# Patient Record
Sex: MALE | Race: WHITE | NOT HISPANIC OR LATINO | Employment: UNEMPLOYED | URBAN - METROPOLITAN AREA
[De-identification: names, ages, dates, MRNs, and addresses within clinical notes are randomized per-mention and may not be internally consistent; named-entity substitution may affect disease eponyms.]

---

## 2021-04-14 DIAGNOSIS — Z23 ENCOUNTER FOR IMMUNIZATION: ICD-10-CM

## 2023-07-17 ENCOUNTER — APPOINTMENT (EMERGENCY)
Dept: RADIOLOGY | Facility: HOSPITAL | Age: 68
DRG: 871 | End: 2023-07-17
Payer: COMMERCIAL

## 2023-07-17 ENCOUNTER — APPOINTMENT (EMERGENCY)
Dept: CT IMAGING | Facility: HOSPITAL | Age: 68
DRG: 871 | End: 2023-07-17
Payer: COMMERCIAL

## 2023-07-17 ENCOUNTER — HOSPITAL ENCOUNTER (INPATIENT)
Facility: HOSPITAL | Age: 68
LOS: 10 days | Discharge: NURSING, REHAB OR LONG-TERM CARE | DRG: 871 | End: 2023-07-27
Attending: EMERGENCY MEDICINE | Admitting: INTERNAL MEDICINE
Payer: COMMERCIAL

## 2023-07-17 DIAGNOSIS — J81.1 PULMONARY EDEMA: ICD-10-CM

## 2023-07-17 DIAGNOSIS — R60.1 ANASARCA: ICD-10-CM

## 2023-07-17 DIAGNOSIS — L30.9 DERMATITIS: ICD-10-CM

## 2023-07-17 DIAGNOSIS — R41.82 ALTERED MENTAL STATUS: Primary | ICD-10-CM

## 2023-07-17 DIAGNOSIS — J18.9 PNEUMONIA: ICD-10-CM

## 2023-07-17 PROBLEM — E11.9 TYPE 2 DIABETES MELLITUS (HCC): Status: ACTIVE | Noted: 2023-07-17

## 2023-07-17 PROBLEM — E66.9 OBESITY: Status: ACTIVE | Noted: 2023-07-17

## 2023-07-17 PROBLEM — I89.0 LYMPHEDEMA: Status: ACTIVE | Noted: 2023-07-17

## 2023-07-17 PROBLEM — J96.22 ACUTE ON CHRONIC RESPIRATORY FAILURE WITH HYPOXIA AND HYPERCAPNIA (HCC): Status: ACTIVE | Noted: 2023-07-17

## 2023-07-17 PROBLEM — J96.21 ACUTE ON CHRONIC RESPIRATORY FAILURE WITH HYPOXIA AND HYPERCAPNIA (HCC): Status: ACTIVE | Noted: 2023-07-17

## 2023-07-17 PROBLEM — R79.89 ELEVATED TROPONIN: Status: ACTIVE | Noted: 2023-07-17

## 2023-07-17 PROBLEM — R77.8 ELEVATED TROPONIN: Status: ACTIVE | Noted: 2023-07-17

## 2023-07-17 PROBLEM — G93.40 ENCEPHALOPATHY ACUTE: Status: ACTIVE | Noted: 2023-07-17

## 2023-07-17 PROBLEM — I10 HYPERTENSION: Status: ACTIVE | Noted: 2023-07-17

## 2023-07-17 PROBLEM — A41.9 SEPSIS (HCC): Status: ACTIVE | Noted: 2023-07-17

## 2023-07-17 PROBLEM — J96.92 RESPIRATORY FAILURE WITH HYPERCAPNIA (HCC): Status: ACTIVE | Noted: 2023-07-17

## 2023-07-17 PROBLEM — N39.0 URINARY TRACT INFECTION: Status: ACTIVE | Noted: 2023-07-17

## 2023-07-17 LAB
4HR DELTA HS TROPONIN: 14 NG/L
ALBUMIN SERPL BCP-MCNC: 3.3 G/DL (ref 3.5–5)
ALP SERPL-CCNC: 118 U/L (ref 34–104)
ALT SERPL W P-5'-P-CCNC: 26 U/L (ref 7–52)
AMMONIA PLAS-SCNC: 38 UMOL/L (ref 18–72)
ANION GAP SERPL CALCULATED.3IONS-SCNC: 6 MMOL/L
APTT PPP: 30 SECONDS (ref 23–37)
AST SERPL W P-5'-P-CCNC: 29 U/L (ref 13–39)
ATRIAL RATE: 86 BPM
BACTERIA UR QL AUTO: ABNORMAL /HPF
BASE EXCESS BLDA CALC-SCNC: 7 MMOL/L (ref -2–3)
BASOPHILS # BLD MANUAL: 0 THOUSAND/UL (ref 0–0.1)
BASOPHILS NFR MAR MANUAL: 0 % (ref 0–1)
BILIRUB SERPL-MCNC: 0.74 MG/DL (ref 0.2–1)
BILIRUB UR QL STRIP: NEGATIVE
BNP SERPL-MCNC: 402 PG/ML (ref 0–100)
BUN SERPL-MCNC: 34 MG/DL (ref 5–25)
CA-I BLD-SCNC: 1.37 MMOL/L (ref 1.12–1.32)
CALCIUM ALBUM COR SERPL-MCNC: 10.7 MG/DL (ref 8.3–10.1)
CALCIUM SERPL-MCNC: 10.1 MG/DL (ref 8.4–10.2)
CARDIAC TROPONIN I PNL SERPL HS: 177 NG/L
CARDIAC TROPONIN I PNL SERPL HS: 191 NG/L
CHLORIDE SERPL-SCNC: 92 MMOL/L (ref 96–108)
CK SERPL-CCNC: 53 U/L (ref 39–308)
CLARITY UR: CLEAR
CO2 SERPL-SCNC: 35 MMOL/L (ref 21–32)
COLOR UR: YELLOW
CREAT SERPL-MCNC: 1.01 MG/DL (ref 0.6–1.3)
EOSINOPHIL # BLD MANUAL: 0 THOUSAND/UL (ref 0–0.4)
EOSINOPHIL NFR BLD MANUAL: 0 % (ref 0–6)
ERYTHROCYTE [DISTWIDTH] IN BLOOD BY AUTOMATED COUNT: 12.3 % (ref 11.6–15.1)
FLUAV RNA RESP QL NAA+PROBE: NEGATIVE
FLUBV RNA RESP QL NAA+PROBE: NEGATIVE
GFR SERPL CREATININE-BSD FRML MDRD: 75 ML/MIN/1.73SQ M
GLUCOSE SERPL-MCNC: 164 MG/DL (ref 65–140)
GLUCOSE SERPL-MCNC: 174 MG/DL (ref 65–140)
GLUCOSE UR STRIP-MCNC: NEGATIVE MG/DL
HCO3 BLDA-SCNC: 36.6 MMOL/L (ref 24–30)
HCT VFR BLD AUTO: 46.9 % (ref 36.5–49.3)
HCT VFR BLD CALC: 38 % (ref 36.5–49.3)
HGB BLD-MCNC: 15 G/DL (ref 12–17)
HGB BLDA-MCNC: 12.9 G/DL (ref 12–17)
HGB UR QL STRIP.AUTO: ABNORMAL
HYALINE CASTS #/AREA URNS LPF: ABNORMAL /LPF
INR PPP: 1.01 (ref 0.84–1.19)
KETONES UR STRIP-MCNC: NEGATIVE MG/DL
L PNEUMO1 AG UR QL IA.RAPID: NEGATIVE
LACTATE SERPL-SCNC: 1.4 MMOL/L (ref 0.5–2)
LEUKOCYTE ESTERASE UR QL STRIP: NEGATIVE
LYMPHOCYTES # BLD AUTO: 0.93 THOUSAND/UL (ref 0.6–4.47)
LYMPHOCYTES # BLD AUTO: 6 % (ref 14–44)
MCH RBC QN AUTO: 31.3 PG (ref 26.8–34.3)
MCHC RBC AUTO-ENTMCNC: 32 G/DL (ref 31.4–37.4)
MCV RBC AUTO: 98 FL (ref 82–98)
METAMYELOCYTES NFR BLD MANUAL: 2 % (ref 0–1)
MONOCYTES # BLD AUTO: 0.47 THOUSAND/UL (ref 0–1.22)
MONOCYTES NFR BLD: 3 % (ref 4–12)
MUCOUS THREADS UR QL AUTO: ABNORMAL
MYELOCYTES NFR BLD MANUAL: 1 % (ref 0–1)
NEUTROPHILS # BLD MANUAL: 13.67 THOUSAND/UL (ref 1.85–7.62)
NEUTS BAND NFR BLD MANUAL: 2 % (ref 0–8)
NEUTS SEG NFR BLD AUTO: 86 % (ref 43–75)
NITRITE UR QL STRIP: NEGATIVE
NON-SQ EPI CELLS URNS QL MICRO: ABNORMAL /HPF
P AXIS: 50 DEGREES
PCO2 BLD: 39 MMOL/L (ref 21–32)
PCO2 BLD: 79.9 MM HG (ref 42–50)
PH BLD: 7.27 [PH] (ref 7.3–7.4)
PH UR STRIP.AUTO: 5.5 [PH]
PLATELET # BLD AUTO: 508 THOUSANDS/UL (ref 149–390)
PLATELET BLD QL SMEAR: ABNORMAL
PMV BLD AUTO: 9.3 FL (ref 8.9–12.7)
PO2 BLD: 198 MM HG (ref 35–45)
POLYCHROMASIA BLD QL SMEAR: PRESENT
POTASSIUM BLD-SCNC: 4.3 MMOL/L (ref 3.5–5.3)
POTASSIUM SERPL-SCNC: 4.5 MMOL/L (ref 3.5–5.3)
PR INTERVAL: 200 MS
PROCALCITONIN SERPL-MCNC: 0.29 NG/ML
PROT SERPL-MCNC: 7.7 G/DL (ref 6.4–8.4)
PROT UR STRIP-MCNC: ABNORMAL MG/DL
PROTHROMBIN TIME: 14 SECONDS (ref 11.6–14.5)
QRS AXIS: 115 DEGREES
QRSD INTERVAL: 112 MS
QT INTERVAL: 368 MS
QTC INTERVAL: 440 MS
RBC # BLD AUTO: 4.8 MILLION/UL (ref 3.88–5.62)
RBC #/AREA URNS AUTO: ABNORMAL /HPF
RSV RNA RESP QL NAA+PROBE: NEGATIVE
S PNEUM AG UR QL: NEGATIVE
SAO2 % BLD FROM PO2: 99 % (ref 60–85)
SARS-COV-2 RNA RESP QL NAA+PROBE: NEGATIVE
SODIUM BLD-SCNC: 132 MMOL/L (ref 136–145)
SODIUM SERPL-SCNC: 133 MMOL/L (ref 135–147)
SP GR UR STRIP.AUTO: >=1.03 (ref 1–1.03)
SPECIMEN SOURCE: ABNORMAL
T WAVE AXIS: 57 DEGREES
UROBILINOGEN UR STRIP-ACNC: 2 MG/DL
VENTRICULAR RATE: 86 BPM
WBC # BLD AUTO: 15.53 THOUSAND/UL (ref 4.31–10.16)
WBC #/AREA URNS AUTO: ABNORMAL /HPF

## 2023-07-17 PROCEDURE — 85014 HEMATOCRIT: CPT

## 2023-07-17 PROCEDURE — 93010 ELECTROCARDIOGRAM REPORT: CPT | Performed by: INTERNAL MEDICINE

## 2023-07-17 PROCEDURE — 82330 ASSAY OF CALCIUM: CPT

## 2023-07-17 PROCEDURE — 99291 CRITICAL CARE FIRST HOUR: CPT

## 2023-07-17 PROCEDURE — 74177 CT ABD & PELVIS W/CONTRAST: CPT

## 2023-07-17 PROCEDURE — 99291 CRITICAL CARE FIRST HOUR: CPT | Performed by: INTERNAL MEDICINE

## 2023-07-17 PROCEDURE — 94760 N-INVAS EAR/PLS OXIMETRY 1: CPT

## 2023-07-17 PROCEDURE — 84145 PROCALCITONIN (PCT): CPT | Performed by: EMERGENCY MEDICINE

## 2023-07-17 PROCEDURE — 85027 COMPLETE CBC AUTOMATED: CPT | Performed by: EMERGENCY MEDICINE

## 2023-07-17 PROCEDURE — 83036 HEMOGLOBIN GLYCOSYLATED A1C: CPT

## 2023-07-17 PROCEDURE — 72125 CT NECK SPINE W/O DYE: CPT

## 2023-07-17 PROCEDURE — 85007 BL SMEAR W/DIFF WBC COUNT: CPT | Performed by: EMERGENCY MEDICINE

## 2023-07-17 PROCEDURE — 80053 COMPREHEN METABOLIC PANEL: CPT | Performed by: EMERGENCY MEDICINE

## 2023-07-17 PROCEDURE — 99291 CRITICAL CARE FIRST HOUR: CPT | Performed by: EMERGENCY MEDICINE

## 2023-07-17 PROCEDURE — 94003 VENT MGMT INPAT SUBQ DAY: CPT

## 2023-07-17 PROCEDURE — 87449 NOS EACH ORGANISM AG IA: CPT | Performed by: INTERNAL MEDICINE

## 2023-07-17 PROCEDURE — 93005 ELECTROCARDIOGRAM TRACING: CPT

## 2023-07-17 PROCEDURE — 94762 N-INVAS EAR/PLS OXIMTRY CONT: CPT

## 2023-07-17 PROCEDURE — 82947 ASSAY GLUCOSE BLOOD QUANT: CPT

## 2023-07-17 PROCEDURE — 96375 TX/PRO/DX INJ NEW DRUG ADDON: CPT

## 2023-07-17 PROCEDURE — 96365 THER/PROPH/DIAG IV INF INIT: CPT

## 2023-07-17 PROCEDURE — 71260 CT THORAX DX C+: CPT

## 2023-07-17 PROCEDURE — 36600 WITHDRAWAL OF ARTERIAL BLOOD: CPT

## 2023-07-17 PROCEDURE — 85730 THROMBOPLASTIN TIME PARTIAL: CPT | Performed by: EMERGENCY MEDICINE

## 2023-07-17 PROCEDURE — 82550 ASSAY OF CK (CPK): CPT

## 2023-07-17 PROCEDURE — G0390 TRAUMA RESPONS W/HOSP CRITI: HCPCS

## 2023-07-17 PROCEDURE — 70450 CT HEAD/BRAIN W/O DYE: CPT

## 2023-07-17 PROCEDURE — 85610 PROTHROMBIN TIME: CPT | Performed by: EMERGENCY MEDICINE

## 2023-07-17 PROCEDURE — 71045 X-RAY EXAM CHEST 1 VIEW: CPT

## 2023-07-17 PROCEDURE — 82803 BLOOD GASES ANY COMBINATION: CPT

## 2023-07-17 PROCEDURE — 83605 ASSAY OF LACTIC ACID: CPT | Performed by: EMERGENCY MEDICINE

## 2023-07-17 PROCEDURE — 36415 COLL VENOUS BLD VENIPUNCTURE: CPT | Performed by: EMERGENCY MEDICINE

## 2023-07-17 PROCEDURE — 84132 ASSAY OF SERUM POTASSIUM: CPT

## 2023-07-17 PROCEDURE — 83880 ASSAY OF NATRIURETIC PEPTIDE: CPT | Performed by: EMERGENCY MEDICINE

## 2023-07-17 PROCEDURE — 0241U HB NFCT DS VIR RESP RNA 4 TRGT: CPT | Performed by: EMERGENCY MEDICINE

## 2023-07-17 PROCEDURE — 81001 URINALYSIS AUTO W/SCOPE: CPT | Performed by: EMERGENCY MEDICINE

## 2023-07-17 PROCEDURE — 82140 ASSAY OF AMMONIA: CPT

## 2023-07-17 PROCEDURE — 94002 VENT MGMT INPAT INIT DAY: CPT

## 2023-07-17 PROCEDURE — 87040 BLOOD CULTURE FOR BACTERIA: CPT | Performed by: EMERGENCY MEDICINE

## 2023-07-17 PROCEDURE — 84484 ASSAY OF TROPONIN QUANT: CPT | Performed by: EMERGENCY MEDICINE

## 2023-07-17 PROCEDURE — 84295 ASSAY OF SERUM SODIUM: CPT

## 2023-07-17 PROCEDURE — 87081 CULTURE SCREEN ONLY: CPT | Performed by: INTERNAL MEDICINE

## 2023-07-17 RX ORDER — LISINOPRIL 10 MG/1
10 TABLET ORAL DAILY
Status: CANCELLED | OUTPATIENT
Start: 2023-07-18

## 2023-07-17 RX ORDER — CEFTRIAXONE 1 G/50ML
1000 INJECTION, SOLUTION INTRAVENOUS ONCE
Status: COMPLETED | OUTPATIENT
Start: 2023-07-17 | End: 2023-07-17

## 2023-07-17 RX ORDER — CEFEPIME HYDROCHLORIDE 2 G/50ML
2000 INJECTION, SOLUTION INTRAVENOUS EVERY 8 HOURS
Status: COMPLETED | OUTPATIENT
Start: 2023-07-17 | End: 2023-07-23

## 2023-07-17 RX ORDER — HEPARIN SODIUM 5000 [USP'U]/ML
5000 INJECTION, SOLUTION INTRAVENOUS; SUBCUTANEOUS EVERY 8 HOURS SCHEDULED
Status: DISCONTINUED | OUTPATIENT
Start: 2023-07-17 | End: 2023-07-27 | Stop reason: HOSPADM

## 2023-07-17 RX ORDER — CEFDINIR 300 MG/1
300 CAPSULE ORAL EVERY 12 HOURS SCHEDULED
COMMUNITY
End: 2023-07-27

## 2023-07-17 RX ORDER — VANCOMYCIN HYDROCHLORIDE 1 G/200ML
9 INJECTION, SOLUTION INTRAVENOUS EVERY 12 HOURS
Status: DISCONTINUED | OUTPATIENT
Start: 2023-07-18 | End: 2023-07-19

## 2023-07-17 RX ORDER — CHLORHEXIDINE GLUCONATE 0.12 MG/ML
15 RINSE ORAL EVERY 12 HOURS SCHEDULED
Status: DISCONTINUED | OUTPATIENT
Start: 2023-07-17 | End: 2023-07-20

## 2023-07-17 RX ORDER — FERROUS SULFATE 325(65) MG
325 TABLET ORAL
Status: ON HOLD | COMMUNITY

## 2023-07-17 RX ORDER — HYDROCHLOROTHIAZIDE 12.5 MG/1
12.5 TABLET ORAL DAILY
Status: CANCELLED | OUTPATIENT
Start: 2023-07-18

## 2023-07-17 RX ORDER — NALOXONE HYDROCHLORIDE 1 MG/ML
2 INJECTION INTRAMUSCULAR; INTRAVENOUS; SUBCUTANEOUS ONCE
Status: COMPLETED | OUTPATIENT
Start: 2023-07-17 | End: 2023-07-17

## 2023-07-17 RX ORDER — FUROSEMIDE 10 MG/ML
40 INJECTION INTRAMUSCULAR; INTRAVENOUS DAILY
Status: DISCONTINUED | OUTPATIENT
Start: 2023-07-18 | End: 2023-07-23

## 2023-07-17 RX ORDER — NALOXONE HYDROCHLORIDE 1 MG/ML
INJECTION INTRAMUSCULAR; INTRAVENOUS; SUBCUTANEOUS
Status: COMPLETED
Start: 2023-07-17 | End: 2023-07-17

## 2023-07-17 RX ORDER — HYDROCHLOROTHIAZIDE 12.5 MG/1
12.5 TABLET ORAL DAILY
COMMUNITY
End: 2023-07-27

## 2023-07-17 RX ORDER — LISINOPRIL 10 MG/1
10 TABLET ORAL DAILY
Status: ON HOLD | COMMUNITY

## 2023-07-17 RX ORDER — FUROSEMIDE 10 MG/ML
40 INJECTION INTRAMUSCULAR; INTRAVENOUS ONCE
Status: COMPLETED | OUTPATIENT
Start: 2023-07-17 | End: 2023-07-17

## 2023-07-17 RX ADMIN — HEPARIN SODIUM 5000 UNITS: 5000 INJECTION INTRAVENOUS; SUBCUTANEOUS at 18:10

## 2023-07-17 RX ADMIN — FUROSEMIDE 40 MG: 10 INJECTION, SOLUTION INTRAVENOUS at 15:10

## 2023-07-17 RX ADMIN — IOHEXOL 100 ML: 350 INJECTION, SOLUTION INTRAVENOUS at 14:22

## 2023-07-17 RX ADMIN — AZITHROMYCIN MONOHYDRATE 500 MG: 500 INJECTION, POWDER, LYOPHILIZED, FOR SOLUTION INTRAVENOUS at 15:58

## 2023-07-17 RX ADMIN — CEFEPIME HYDROCHLORIDE 2000 MG: 2 INJECTION, SOLUTION INTRAVENOUS at 18:11

## 2023-07-17 RX ADMIN — SODIUM CHLORIDE 250 ML: 0.9 INJECTION, SOLUTION INTRAVENOUS at 15:19

## 2023-07-17 RX ADMIN — VANCOMYCIN HYDROCHLORIDE 2000 MG: 1 INJECTION, POWDER, LYOPHILIZED, FOR SOLUTION INTRAVENOUS at 19:32

## 2023-07-17 RX ADMIN — NALOXONE HYDROCHLORIDE 2 MG: 1 INJECTION INTRAMUSCULAR; INTRAVENOUS; SUBCUTANEOUS at 13:42

## 2023-07-17 RX ADMIN — CEFTRIAXONE 1000 MG: 1 INJECTION, SOLUTION INTRAVENOUS at 15:09

## 2023-07-17 RX ADMIN — NALOXONE HYDROCHLORIDE 2 MG: 1 INJECTION PARENTERAL at 13:42

## 2023-07-17 NOTE — ASSESSMENT & PLAN NOTE
· Brought to ED from [facility]F after being found by roommate on the floor and altered compared to baseline.   · Approximate downtime 1.5 hours  · CTH: no acute intracranial abnormality  · CT c-spine: no c-spine fracture or traumatic malalignment  · VBG pH 7.2/pCO2 79.9  · Received 2mg IV Narvcan in ED without improvement in mental status    Plan  · Q4H neuro checks  · Continuous BIPAP  · Check ammonia level

## 2023-07-17 NOTE — PLAN OF CARE
Problem: MOBILITY - ADULT  Goal: Maintain or return to baseline ADL function  Description: INTERVENTIONS:  -  Assess patient's ability to carry out ADLs; assess patient's baseline for ADL function and identify physical deficits which impact ability to perform ADLs (bathing, care of mouth/teeth, toileting, grooming, dressing, etc.)  - Assess/evaluate cause of self-care deficits   - Assess range of motion  - Assess patient's mobility; develop plan if impaired  - Assess patient's need for assistive devices and provide as appropriate  - Encourage maximum independence but intervene and supervise when necessary  - Involve family in performance of ADLs  - Assess for home care needs following discharge   - Consider OT consult to assist with ADL evaluation and planning for discharge  - Provide patient education as appropriate  Outcome: Progressing  Goal: Maintains/Returns to pre admission functional level  Description: INTERVENTIONS:  - Perform BMAT or MOVE assessment daily.   - Set and communicate daily mobility goal to care team and patient/family/caregiver. - Collaborate with rehabilitation services on mobility goals if consulted  - Perform Range of Motion 2 times a day. - Reposition patient every 2 hours.   -- Record patient progress and toleration of activity level   Outcome: Progressing     Problem: INFECTION - ADULT  Goal: Absence or prevention of progression during hospitalization  Description: INTERVENTIONS:  - Assess and monitor for signs and symptoms of infection  - Monitor lab/diagnostic results  - Monitor all insertion sites, i.e. indwelling lines, tubes, and drains  - Monitor endotracheal if appropriate and nasal secretions for changes in amount and color  - Reform appropriate cooling/warming therapies per order  - Administer medications as ordered  - Instruct and encourage patient and family to use good hand hygiene technique  - Identify and instruct in appropriate isolation precautions for identified infection/condition  Outcome: Progressing     Problem: DISCHARGE PLANNING  Goal: Discharge to home or other facility with appropriate resources  Description: INTERVENTIONS:  - Identify barriers to discharge w/patient and caregiver  - Arrange for needed discharge resources and transportation as appropriate  - Identify discharge learning needs (meds, wound care, etc.)  - Arrange for interpretive services to assist at discharge as needed  - Refer to Case Management Department for coordinating discharge planning if the patient needs post-hospital services based on physician/advanced practitioner order or complex needs related to functional status, cognitive ability, or social support system  Outcome: Progressing     Problem: Knowledge Deficit  Goal: Patient/family/caregiver demonstrates understanding of disease process, treatment plan, medications, and discharge instructions  Description: Complete learning assessment and assess knowledge base.   Interventions:  - Provide teaching at level of understanding  - Provide teaching via preferred learning methods  Outcome: Progressing     Problem: CARDIOVASCULAR - ADULT  Goal: Maintains optimal cardiac output and hemodynamic stability  Description: INTERVENTIONS:  - Monitor I/O, vital signs and rhythm  - Monitor for S/S and trends of decreased cardiac output  - Administer and titrate ordered vasoactive medications to optimize hemodynamic stability  - Assess quality of pulses, skin color and temperature  - Assess for signs of decreased coronary artery perfusion  - Instruct patient to report change in severity of symptoms  Outcome: Progressing  Goal: Absence of cardiac dysrhythmias or at baseline rhythm  Description: INTERVENTIONS:  - Continuous cardiac monitoring, vital signs, obtain 12 lead EKG if ordered  - Administer antiarrhythmic and heart rate control medications as ordered  - Monitor electrolytes and administer replacement therapy as ordered  Outcome: Progressing Problem: RESPIRATORY - ADULT  Goal: Achieves optimal ventilation and oxygenation  Description: INTERVENTIONS:  - Assess for changes in respiratory status  - Assess for changes in mentation and behavior  - Position to facilitate oxygenation and minimize respiratory effort  - Oxygen administered by appropriate delivery if ordered  - Initiate smoking cessation education as indicated  - Encourage broncho-pulmonary hygiene including cough, deep breathe, Incentive Spirometry  - Assess the need for suctioning and aspirate as needed  - Assess and instruct to report SOB or any respiratory difficulty  - Respiratory Therapy support as indicated  Outcome: Progressing

## 2023-07-17 NOTE — ASSESSMENT & PLAN NOTE
· SIRS: tachypnec, leukocytosis  · CT CAP: Right upper lobe groundglass opacity and right lower lobe consolidation along with bilateral effusions  · Per family, patient admitted at [facility] x1 week for UTI and DEANDRE, discharged 7/14 on 300mg Cefdinir BID.   · UA negative  · Received azithromycin and ceftriaxone in ED  · LA 1.4  · Procal 0.29    Plan  · Cefepime and Vancomycin  · Follow up blood cultures   · Trend WBC and fever curve

## 2023-07-17 NOTE — ASSESSMENT & PLAN NOTE
· Per family, patient admitted at [facility] x1 week for UTI and DEANDRE, discharged 7/14 on 300mg Cefdinir BID.   · UA negative    Plan  · IV Cefepime and Vanco to cover PNA and UTI

## 2023-07-17 NOTE — ASSESSMENT & PLAN NOTE
· Per family, patient admitted at [facility] x1 week for UTI and DEANDRE, discharged 7/14 on 300mg Cefdinir BID.   · UA negative    Plan  · IV Cefepime and Vanco to cover PNA and UTI

## 2023-07-17 NOTE — ASSESSMENT & PLAN NOTE
· Per chart patient wears 2L oxygen at SNF, however, family states the patient does not wear oxygen at home. Unsure if oxygen requirement is new since admission at [facility] last week.   · VBG pH 7.2/pCO2 79.9  · Placed on continuous BIPAP in ED    Plan  · Continue BIPAP  · Check ABG   · Titrate oxygen with SpO2 goal > 90%

## 2023-07-17 NOTE — ASSESSMENT & PLAN NOTE
· Per chart patient wears 2L oxygen at SNF, however, family states the patient does not wear oxygen at home. Unsure if oxygen requirement is new since admission at [facility] last week.   · VBG pH 7.2/pCO2 79.9  · Placed on continuous BIPAP in ED    Plan  · Continue BIPAP  · Check ABG   · Titrate oxygen with SpO2 goal > 90%

## 2023-07-17 NOTE — RESPIRATORY THERAPY NOTE
Respiratory Therapy Note. Pt returned to Bipap therapy at request of MD. RN notified. Pt must remain on therapy for now. RT will  Continue to monitor.  Pt pulled mask off earlier and was placed on NC 5L/M   07/17/23 1600   Non-Invasive Information   O2 Interface Device Face mask   Non-Invasive Ventilation Mode BiPAP   $ Continous NIV Subsequent   SpO2 95 %   Non-Invasive Settings   IPAP (cm) 22 cm   EPAP (cm) 6 cm   Rate (Set) 18   FiO2 (%) (S)  50   Pressure Support (cm H2O) 16   Rise Time 3   Inspiratory Time (Set) 0.9   Non-Invasive Readings   Skin Intervention Skin intact   Total Rate 18   MV (Mech) 14.6   Spontaneous Vt (mL) 811   Leak (lpm) 35   Non-Invasive Alarms   Insp Pressure High (cm H20) 30   Insp Pressure Low (cm H20) 5   MV Low (L/min) 4.5   Vt High (mL) 1500   Vt Low (mL) 300   High Resp Rate (BPM) 40 BPM   Low Resp Rate (BPM) 10 BPM   Apnea Interval (sec) 20   Apnea Pressure 20

## 2023-07-17 NOTE — ED PROVIDER NOTES
Emergency Department Trauma Note  Yordan Anderson 69 y.o. male MRN: [id number]  Unit/Bed#: /-01 Encounter: [id number]      Trauma Alert: Trauma Acuity: A  Model of Arrival: Mode of Arrival: ALS via Trauma Squad Name and Number: [id number]  Trauma Team: Current Providers  Attending Provider: Roni Metcalf MD  Attending Provider: Carlos Gamino DO  Attending Provider: Levi Christianson DO  Registered Nurse: Addie Stanton RN  Registered Nurse: Gifty Guillen RN  Consultants:     None      History of Present Illness     Chief Complaint:   Chief Complaint   Patient presents with   • Altered Mental Status     Since this AM, found down at [facility] 1 hours PTA     HPI:  Yordan Anderson is a 69 y.o. male who presents with confusion then found down today. Mechanism:Details of Incident: pt found by roommate at [facility] on the floor. according to staff, patient is altered compared to baseline. confusion started around breakfast this am Injury Date: 07/17/23 Injury Time: 1234      Hx from paramedics - 38-year-old male brought in by ambulance from local [facility] with concern for altered mental status with baseline confusion. Staff noted he was confused this morning at breakfast today but then he was found down next to his bed about an hour and a half ago by roommate. GCS was initially 10 by paramedics and he was chronically on 2 L oxygen but staff placed him on a nonrebreather. GCS 13 in ER. Breath sounds rales bilateral. No crepitus or chest wall tenderness with compression over the chest. No pain or instability with compression over the pelvis. We placed cervical collar on patient in ER. Review of Systems   Unable to perform ROS: Mental status change   All other systems reviewed and are negative. Historical Information     Immunizations: There is no immunization history on file for this patient.     Past Medical History:   Diagnosis Date   • Diabetes mellitus ([address])    • Hypertension No family history on file. No past surgical history on file. E-Cigarette/Vaping     E-Cigarette/Vaping Substances       Family History: non-contributory    Meds/Allergies   Prior to Admission Medications   Prescriptions Last Dose Informant Patient Reported? Taking? cefdinir (OMNICEF) 300 mg capsule 7/16/2023  Yes Yes   Sig: Take 300 mg by mouth every 12 (twelve) hours   ferrous sulfate 325 (65 Fe) mg tablet 7/16/2023  Yes Yes   Sig: Take 325 mg by mouth daily with breakfast   hydrochlorothiazide (HYDRODIURIL) 12.5 mg tablet 7/16/2023  Yes Yes   Sig: Take 12.5 mg by mouth daily   lisinopril (ZESTRIL) 10 mg tablet 7/16/2023  Yes Yes   Sig: Take 10 mg by mouth daily      Facility-Administered Medications: None       Not on File    PHYSICAL EXAM    PE limited by: nothing    Objective   Vitals:   First set: Temperature: (!) 96.2 °F (35.7 °C) (07/17/23 1340)  Pulse: 86 (07/17/23 1326)  Respirations: 18 (07/17/23 1326)  Blood Pressure: 154/74 (07/17/23 1326)  SpO2: 95 % (07/17/23 1326)  FiO2 (%): 50 (07/17/23 1723)    Primary Survey:   (A) Airway: patent  (B) Breathing: rales  (C) Circulation: Pulses:   normal  (D) Disabliity:  GCS Total:  13  (E) Expose:  Completed    Secondary Survey: (Click on Physical Exam tab above)  Physical Exam  Vitals and nursing note reviewed. Constitutional:       Appearance: He is well-developed. He is obese. He is diaphoretic. HENT:      Head: Normocephalic and atraumatic. Right Ear: External ear normal.      Left Ear: External ear normal.      Nose: Nose normal.   Eyes:      Conjunctiva/sclera: Conjunctivae normal.      Comments: Pupils 2 mm equal and reactive bilateral   Cardiovascular:      Rate and Rhythm: Normal rate and regular rhythm. Heart sounds: Normal heart sounds. Pulmonary:      Effort: Pulmonary effort is normal. No respiratory distress. Breath sounds: Decreased breath sounds and rales present. No wheezing.    Abdominal:      General: Bowel sounds are normal. There is no distension. Palpations: Abdomen is soft. Tenderness: There is no abdominal tenderness. Musculoskeletal:         General: Swelling present. No deformity. Normal range of motion. Cervical back: No bony tenderness. No spinous process tenderness. Thoracic back: No bony tenderness. Lumbar back: No bony tenderness. Skin:     General: Skin is warm. Findings: No rash. Neurological:      General: No focal deficit present. Mental Status: He is lethargic. GCS: GCS eye subscore is 3. GCS verbal subscore is 4. GCS motor subscore is 6. Cranial Nerves: Cranial nerves 2-12 are intact. Sensory: Sensation is intact. No sensory deficit. Motor: Motor function is intact. Comments: Generalized weakness, no focal deficit noted   Psychiatric:         Mood and Affect: Mood normal.         Cervical spine cleared by clinical criteria? No (imaging required)      Invasive Devices     Peripheral Intravenous Line  Duration           Peripheral IV 07/17/23 Distal;Right;Upper;Ventral (anterior) Arm <1 day    Peripheral IV 07/17/23 Dorsal (posterior); Left Hand <1 day    Peripheral IV 07/17/23 Dorsal (posterior); Right Hand <1 day    Peripheral IV 07/17/23 Left Antecubital <1 day          Drain  Duration           Urethral Catheter Temperature probe 16 Fr. <1 day                Lab Results:   Results Reviewed     Procedure Component Value Units Date/Time    Ammonia [[id number]]  (Normal) Collected: 07/17/23 1653    Lab Status: Final result Specimen: Blood from Arm, Left Updated: 07/17/23 1717     Ammonia 38 umol/L     FLU/RSV/COVID - if FLU/RSV clinically relevant [[id number]] Collected: 07/17/23 1350    Lab Status:  In process Specimen: Nares from Nose Updated: 07/17/23 1657    CK [[id number]]  (Normal) Collected: 07/17/23 1339    Lab Status: Final result Specimen: Blood from Arm, Right Updated: 07/17/23 1649     Total CK 53 U/L     Hemoglobin A1C [[id number]] Lab Status: No result Specimen: Blood     HS Troponin I 4hr [[id number]]     Lab Status: No result Specimen: Blood     HS Troponin I 2hr [[id number]]     Lab Status: No result Specimen: Blood     HS Troponin 0hr (reflex protocol) [[id number]]  (Abnormal) Collected: 07/17/23 1559    Lab Status: Final result Specimen: Blood from Arm, Left Updated: 07/17/23 1628     hs TnI 0hr 177 ng/L     Procalcitonin [[id number]]  (Abnormal) Collected: 07/17/23 1339    Lab Status: Final result Specimen: Blood from Arm, Right Updated: 07/17/23 1541     Procalcitonin 0.29 ng/ml     B-Type Natriuretic Peptide(BNP) [[id number]]  (Abnormal) Collected: 07/17/23 1339    Lab Status: Final result Specimen: Blood from Arm, Right Updated: 07/17/23 1523      pg/mL     Urine Microscopic [[id number]]  (Abnormal) Collected: 07/17/23 1444    Lab Status: Final result Specimen: Urine, Indwelling Sung Catheter Updated: 07/17/23 1512     RBC, UA 2-4 /hpf      WBC, UA None Seen /hpf      Epithelial Cells Moderate /hpf      Bacteria, UA Occasional /hpf      MUCUS THREADS Occasional     Hyaline Casts, UA 4-10 /lpf     RBC Morphology Reflex Test [[id number]] Collected: 07/17/23 1339    Lab Status: Final result Specimen: Blood from Arm, Right Updated: 07/17/23 1501    CBC and differential [[id number]]  (Abnormal) Collected: 07/17/23 1339    Lab Status: Final result Specimen: Blood from Arm, Right Updated: 07/17/23 1456     WBC 15.53 Thousand/uL      RBC 4.80 Million/uL      Hemoglobin 15.0 g/dL      Hematocrit 46.9 %      MCV 98 fL      MCH 31.3 pg      MCHC 32.0 g/dL      RDW 12.3 %      MPV 9.3 fL      Platelets 773 Thousands/uL     Narrative: This is an appended report. These results have been appended to a previously verified report.     Manual Differential(PHLEBS Do Not Order) [[id number]]  (Abnormal) Collected: 07/17/23 1339    Lab Status: Final result Specimen: Blood from Arm, Right Updated: 07/17/23 1456     Segmented % 86 %      Bands % 2 % Lymphocytes % 6 %      Monocytes % 3 %      Eosinophils, % 0 %      Basophils % 0 %      Metamyelocytes% 2 %      Myelocytes % 1 %      Absolute Neutrophils 13.67 Thousand/uL      Lymphocytes Absolute 0.93 Thousand/uL      Monocytes Absolute 0.47 Thousand/uL      Eosinophils Absolute 0.00 Thousand/uL      Basophils Absolute 0.00 Thousand/uL      Total Counted --     Platelet Estimate Increased     Polychromasia Present    UA w Reflex to Microscopic w Reflex to Culture [[id number]]  (Abnormal) Collected: 07/17/23 1444    Lab Status: Final result Specimen: Urine, Indwelling Sung Catheter Updated: 07/17/23 1453     Color, UA Yellow     Clarity, UA Clear     Specific Gravity, UA >=1.030     pH, UA 5.5     Leukocytes, UA Negative     Nitrite, UA Negative     Protein, UA 30 (1+) mg/dl      Glucose, UA Negative mg/dl      Ketones, UA Negative mg/dl      Urobilinogen, UA 2.0 mg/dl      Bilirubin, UA Negative     Occult Blood, UA Trace    Comprehensive metabolic panel [[id number]]  (Abnormal) Collected: 07/17/23 1339    Lab Status: Final result Specimen: Blood from Arm, Right Updated: 07/17/23 1427     Sodium 133 mmol/L      Potassium 4.5 mmol/L      Chloride 92 mmol/L      CO2 35 mmol/L      ANION GAP 6 mmol/L      BUN 34 mg/dL      Creatinine 1.01 mg/dL      Glucose 164 mg/dL      Calcium 10.1 mg/dL      Corrected Calcium 10.7 mg/dL      AST 29 U/L      ALT 26 U/L      Alkaline Phosphatase 118 U/L      Total Protein 7.7 g/dL      Albumin 3.3 g/dL      Total Bilirubin 0.74 mg/dL      eGFR 75 ml/min/1.73sq m     Narrative:      [place] guidelines for Chronic Kidney Disease (CKD):   •  Stage 1 with normal or high GFR (GFR > 90 mL/min/1.73 square meters)  •  Stage 2 Mild CKD (GFR = 60-89 mL/min/1.73 square meters)  •  Stage 3A Moderate CKD (GFR = 45-59 mL/min/1.73 square meters)  •  Stage 3B Moderate CKD (GFR = 30-44 mL/min/1.73 square meters)  •  Stage 4 Severe CKD (GFR = 15-29 mL/min/1.73 square meters)  •  Stage 5 End Stage CKD (GFR <15 mL/min/1.73 square meters)  Note: GFR calculation is accurate only with a steady state creatinine    Lactic acid, plasma (w/reflex if result > 2.0) [[id number]]  (Normal) Collected: 07/17/23 1350    Lab Status: Final result Specimen: Blood from Arm, Left Updated: 07/17/23 1415     LACTIC ACID 1.4 mmol/L     Narrative:      Result may be elevated if tourniquet was used during collection. APTT [[id number]]  (Normal) Collected: 07/17/23 1339    Lab Status: Final result Specimen: Blood from Arm, Right Updated: 07/17/23 1405     PTT 30 seconds     Protime-INR [[id number]]  (Normal) Collected: 07/17/23 1339    Lab Status: Final result Specimen: Blood from Arm, Right Updated: 07/17/23 1405     Protime 14.0 seconds      INR 1.01    POCT Blood Gas (CG8+) [[id number]]  (Abnormal) Collected: 07/17/23 1356    Lab Status: Final result Specimen: Venous Updated: 07/17/23 1359     ph, [place] ISTAT 7.268     pCO2, [place] i-STAT 79.9 mm HG      pO2, [place] i-STAT 198.0 mm HG      BE, i-STAT 7 mmol/L      HCO3, [place] i-STAT 36.6 mmol/L      CO2, i-STAT 39 mmol/L      O2 Sat, i-STAT 99 %      SODIUM, I-STAT 132 mmol/l      Potassium, i-STAT 4.3 mmol/L      Calcium, Ionized i-STAT 1.37 mmol/L      Hct, i-STAT 38 %      Hgb, i-STAT 12.9 g/dl      Glucose, i-STAT 174 mg/dl      Specimen Type VENOUS    Blood culture #2 [[id number]] Collected: 07/17/23 1350    Lab Status: In process Specimen: Blood from Arm, Left Updated: 07/17/23 1353    Blood culture #1 [[id number]] Collected: 07/17/23 1350    Lab Status: In process Specimen: Blood from Hand, Left Updated: 07/17/23 1353                 Imaging Studies:   Direct to CT: No  TRAUMA - CT head wo contrast   Final Result by Judy Huitron MD (07/17 1443)      No acute intracranial abnormality. 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             Workstation performed: [id number]         TRAUMA - CT spine cervical wo contrast   Final Result by Johny Freitas MD (07/17 5833)      No cervical spine fracture or traumatic malalignment. Right upper lobe groundglass opacity and small effusion noted. Please see concurrent report for CT scan including the chest.            Workstation performed: [id number]         TRAUMA - CT chest abdomen pelvis w contrast   Final Result by Johny Freitas MD (07/17 0758)      No acute posttraumatic abnormality detected in the chest, abdomen or pelvis. Right upper lobe groundglass opacity and right lower lobe consolidation along with bilateral effusions. Correlate for possible pneumonia versus pulmonary edema. This could all be due to third spacing. Gallstones with surrounding inflammation highly suspicious for acute cholecystitis. Chest and abdominal wall anasarca. Workstation performed: [id number]         XR Trauma chest portable   Final Result by Monica Sotelo MD (07/17 6042)      Focal opacity in the right mid lung suspect for pneumonia. Vascular congestion and pulmonary edema. The study was marked in [facility] for immediate notification. Workstation performed: [id number]               Procedures  ECG 12 Lead Documentation Only    Date/Time: 7/17/2023 4:34 PM    Performed by: Abelardo Cervantes DO  Authorized by: Abelardo Cervantes DO    Indications / Diagnosis:  Altered, found down  ECG reviewed by me, the ED Provider: yes    Patient location:  ED  Previous ECG:     Previous ECG:  Unavailable  Interpretation:     Interpretation: normal    Rate:     ECG rate:  86    ECG rate assessment: normal    Rhythm:     Rhythm: sinus rhythm    Ectopy:     Ectopy: PAC    QRS:     QRS axis:  Normal    QRS intervals:  Normal  Conduction:     Conduction: abnormal      Abnormal conduction: incomplete RBBB    ST segments:     ST segments:  Normal  T waves:     T waves: normal    Comments: This EKG was interpreted by me.     CriticalCare Time    Date/Time: 7/17/2023 5:30 PM    Performed by: Los Morgan DO  Authorized by: Los Morgan DO    Critical care provider statement:     Critical care time (minutes):  45    Critical care time was exclusive of:  Separately billable procedures and treating other patients and teaching time    Critical care was necessary to treat or prevent imminent or life-threatening deterioration of the following conditions:  Cardiac failure, CNS failure or compromise and respiratory failure    Critical care was time spent personally by me on the following activities:  Obtaining history from patient or surrogate, development of treatment plan with patient or surrogate, discussions with consultants, evaluation of patient's response to treatment, examination of patient, review of old charts, re-evaluation of patient's condition, ordering and review of radiographic studies, ordering and review of laboratory studies and ordering and performing treatments and interventions             ED Course  ED Course as of 07/17/23 1736   Mon Jul 17, 2023   0138 The 30ml/kg fluid bolus was not given to the patient despite hypotension and/or significantly elevated lactate of = 4 and/or presence of septic shock due to: Heart Failure. The patient will be administered 250 ml of crystalloid fluid instead. Orders for this have been placed in [facility]. The patient may receive additional colloid or crystalloid fluids thereafter based on clinical condition. Los Morgan DO       Patient had NRB on and sats were good, we tried nasal canula and sats decreased into 80s. We placed on bipap and he improved - he pulled it off and was mentating well, GCS 15 about 90 minutes after bipap but critical care felt he needs more time on the bipap. Cervical collar removed by me after imaging and when patient was more alert - no pain or tenderness with rom. Wife came to bedside - she said he has been confused intermittently for couple weeks now.   He was discharged from [address] [place] [address] several days ago for sepsis uti.  Patient doesn't think they attended to him while at the nursing home. Medical Decision Making  Differential less likely significant trauma -found down but no visible trauma noted, no pain or tenderness. Other differential for acute encephalopathy - hypoxia possibly from sleep apnea, pneumonia, sepsis, chf, less likely narcotic overdose, dehydration, electrolyte abnl, arrhythmia. Amount and/or Complexity of Data Reviewed  Labs: ordered. Radiology: ordered. Risk  Prescription drug management. Decision regarding hospitalization. Disposition  [facility] Transfer: No  Final diagnoses: Altered mental status   Pneumonia   Anasarca   Pulmonary edema     Time reflects when diagnosis was documented in both MDM as applicable and the Disposition within this note     Time User Action Codes Description Comment    7/17/2023  3:19 PM Salena Mayes Add [R41.82] Altered mental status     7/17/2023  3:19 PM Salena Mayes Add [J18.9] Pneumonia     7/17/2023  3:19 PM Salena Mayes Add [R60.1] Anasarca     7/17/2023  3:43 PM Salena Mayes Add [J81.1] Pulmonary edema       ED Disposition     ED Disposition   Admit    Condition   Stable    Date/Time   Mon Jul 17, 2023  3:43 PM    Comment   Case was discussed with Sandra Coughlin* and the patient's admission status was agreed to be Admission Status: inpatient status to the service of Dr. Charles Raza* . Follow-up Information    None       Current Discharge Medication List      CONTINUE these medications which have NOT CHANGED    Details   cefdinir (OMNICEF) 300 mg capsule Take 300 mg by mouth every 12 (twelve) hours      ferrous sulfate 325 (65 Fe) mg tablet Take 325 mg by mouth daily with breakfast      hydrochlorothiazide (HYDRODIURIL) 12.5 mg tablet Take 12.5 mg by mouth daily      lisinopril (ZESTRIL) 10 mg tablet Take 10 mg by mouth daily           No discharge procedures on file.     PDMP Review     None          ED Provider  Electronically Signed by         El Holm, DO  07/17/23 1730

## 2023-07-17 NOTE — ASSESSMENT & PLAN NOTE
No results found for: "HGBA1C"    No results for input(s): "POCGLU" in the last 72 hours.     Blood Sugar Average: Last 72 hrs:    · History of DM2 listed in chart from SNF,however, no medication listed on MAR from SNF    Plan  · Q6H fingersticks  · Check Hgb A1C

## 2023-07-17 NOTE — RESPIRATORY THERAPY NOTE
Respiratory Therapy Note. Pt placed on bipap therapy s/p ABG, and the taken to cat scan on bipap. Pt tolerated this well and  Returned to ER. RN at bedside and aware.  Ambu bag at bedside with mask    07/17/23 0417   Respiratory Assessment   Assessment Type Pre-treatment   General Appearance Lethargic;Eyes open/responds to voice   Respiratory Pattern Shallow   Chest Assessment Chest expansion symmetrical   Bilateral Breath Sounds Diminished   Resp Comments S/P ABG pt placed on bipap therapy pre cat scan   Non-Invasive Information   O2 Interface Device Face mask   Non-Invasive Ventilation Mode BiPAP   $ Continous NIV Initial   SpO2 100 %   Non-Invasive Settings   IPAP (cm) 22 cm   EPAP (cm) 6 cm   Rate (Set) 18   FiO2 (%) 60   Pressure Support (cm H2O) 16   Rise Time 3   Inspiratory Time (Set) 0.9   Non-Invasive Readings   Skin Intervention Skin intact   Total Rate 22   Spontaneous Vt (mL) 680   Leak (lpm) 42   Non-Invasive Alarms   Insp Pressure High (cm H20) 30   Insp Pressure Low (cm H20) 5   MV Low (L/min) 4.5   Vt High (mL) 1500   Vt Low (mL) 300   High Resp Rate (BPM) 40 BPM   Low Resp Rate (BPM) 10 BPM   Apnea Interval (sec) 20   Apnea Pressure 20

## 2023-07-17 NOTE — ASSESSMENT & PLAN NOTE
· Brought to ED from SNF after being found by roommate on the floor and altered compared to baseline.   · Approximate downtime 1.5 hours  · CTH: no acute intracranial abnormality  · CT c-spine: no c-spine fracture or traumatic malalignment  · VBG pH 7.2/pCO2 79.9  · Received 2mg IV Narvcan in ED without improvement in mental status  · Ammonia 38     Plan  · Q4H neuro checks  · Continuous BIPAP

## 2023-07-17 NOTE — SEPSIS NOTE
Sepsis Note   Felisha Cooper 69 y.o. male MRN: [id number]  Unit/Bed#: TR13B Encounter: [id number]       Initial Sepsis Screening     [address] Name 07/17/23 1518                Is the patient's history suggestive of a new or worsening infection? Yes (Proceed)  -REGSI        Suspected source of infection pneumonia  -REGIS        Indicate SIRS criteria Tachypnea > 20 resp per min;Leukocytosis (WBC > 75090 IJL) OR Leukopenia (WBC <4000 IJL) OR Bandemia (WBC >10% bands)  -REGIS        Are two or more of the above signs & symptoms of infection both present and new to the patient? Yes (Proceed)  -REGIS        Assess for evidence of organ dysfunction: Are any of the below criteria present within 6 hours of suspected infection and SIRS criteria that are NOT considered to be chronic conditions? New need for invasive/non-invasive ventilation  -REGIS        Date of presentation of severe sepsis 07/17/23  -REGIS        Time of presentation of severe sepsis 1519  -REGIS        Sepsis Note: Click "NEXT" below (NOT "close") to generate sepsis note based on above information. YES (proceed by clicking "NEXT")  -[address]  (r) = Recorded By, (t) = Taken By, (c) = Cosigned By    [facility] Name Provider Type    [address] [place] [address],  Physician                    There is no height or weight on file to calculate BMI. Wt Readings from Last 1 Encounters:   07/17/23 (!) 172 kg (379 lb 3.1 oz)        Height is required to calculate ideal body weight. Undermining Type: Entire Wound

## 2023-07-17 NOTE — H&P
[facility]  H&P  Name: Skip Ruiz 69 y.o. male I MRN: [id number]  Unit/Bed#: [facility] I Date of Admission: 7/17/2023   Date of Service: 7/17/2023 I Hospital Day: 0      Assessment/Plan   * Acute on chronic respiratory failure with hypoxia and hypercapnia (HCC)  Assessment & Plan  · Per chart patient wears 2L oxygen at SNF, however, family states the patient does not wear oxygen at home. Unsure if oxygen requirement is new since admission at [facility] last week. · VBG pH 7.2/pCO2 79.9  · Placed on continuous BIPAP in ED    Plan  · Continue BIPAP  · Check ABG   · Titrate oxygen with SpO2 goal > 90%    Encephalopathy acute  Assessment & Plan  · Brought to ED from [facility] after being found by roommate on the floor and altered compared to baseline. · Approximate downtime 1.5 hours  · CTH: no acute intracranial abnormality  · CT c-spine: no c-spine fracture or traumatic malalignment  · VBG pH 7.2/pCO2 79.9  · Received 2mg IV Narvcan in ED without improvement in mental status    Plan  · Q4H neuro checks  · Continuous BIPAP  · Check ammonia level    Sepsis (HCC)  Assessment & Plan  · SIRS: tachypnec, leukocytosis  · CT CAP: Right upper lobe groundglass opacity and right lower lobe consolidation along with bilateral effusions  · Per family, patient admitted at [facility] x1 week for UTI and DEANDRE, discharged 7/14 on 300mg Cefdinir BID. · UA negative  · Received azithromycin and ceftriaxone in ED  · LA 1.4  · Procal 0.29    Plan  · Cefepime and Vancomycin  · Follow up blood cultures   · Trend WBC and fever curve    Urinary tract infection  Assessment & Plan  · Per family, patient admitted at [facility] x1 week for UTI and DEANDRE, discharged 7/14 on 300mg Cefdinir BID.   · UA negative    Plan  · IV Cefepime and Vanco to cover PNA and UTI    Hypertension  Assessment & Plan  · Home regimen: lisinopril    Plan  · Hold home regimen while NPO    Elevated troponin  Assessment & Plan  · Tropon 177     Plan  · Trend troponin   · No ischemic changes on EKG    Obesity  Assessment & Plan  · Encourage diet and lifestyle modifications when medically stable    Type 2 diabetes mellitus ([address])  Assessment & Plan  No results found for: "HGBA1C"    No results for input(s): "POCGLU" in the last 72 hours. Blood Sugar Average: Last 72 hrs:    · History of DM2 listed in chart from [facility],however, no medication listed on MAR from SNF    Plan  · Q6H fingersticks  · Check Hgb A1C      Lymphedema  Assessment & Plan  · Home regimen: HCTZ    Plan  · 40mg IV lasix daily       History of Present Illness     HPI: Anne Marie Elias is a 69 y.o. who presents to the ED from SNF after being found on the floor by his roommate and altered/confused from baseline. PMH includes HTN, lymphedema and DM2. Patient sleepy/lethargic but easily arousable. GCS 13. CTH and CT c-spine negative. Patient met SIRS criteria (tachypnea and leukocytosis) and CT CAP +pneumonia, pulm congestion and atelectasis. UA negative. LA 1.4. Procal 0.29. Received azithromycin and ceftriaxone in ED. Patient placed on BIPAP due to VBG with pH 7.2/pCO2 79.9. To note, patient discharged on 7/14 from [facility] after spending a week being treated for urosepsis and DEANDRE. Patient admitted to [facility] for continuous BIPAP. History obtained from spouse and child and chart review. Review of Systems   Constitutional: Positive for fatigue. Negative for chills, diaphoresis, fever and unexpected weight change. HENT: Negative. Eyes: Negative. Respiratory: Negative. Cardiovascular: Positive for leg swelling. Negative for chest pain and palpitations. Gastrointestinal: Negative. Endocrine: Negative. Genitourinary: Positive for difficulty urinating. Negative for dysuria and flank pain. Musculoskeletal: Negative. Skin: Negative. Allergic/Immunologic: Negative. Neurological: Negative. Hematological: Negative. Psychiatric/Behavioral: Positive for confusion.  Negative for agitation, behavioral problems and hallucinations. Historical Information   Past Medical History:  No date: Diabetes mellitus ([address])  No date: Hypertension No past surgical history on file. Current Outpatient Medications   Medication Instructions   • cefdinir (OMNICEF) 300 mg, Oral, Every 12 hours scheduled   • ferrous sulfate 325 mg, Oral, Daily with breakfast   • hydrochlorothiazide (HYDRODIURIL) 12.5 mg, Oral, Daily   • lisinopril (ZESTRIL) 10 mg, Oral, Daily    Not on File     No family history on file. Objective                            Vitals I/O      Most Recent Min/Max in 24hrs   Temp 98.4 °F (36.9 °C) Temp  Min: 81.1 °F (27.3 °C)  Max: 98.7 °F (37.1 °C)   Pulse 73 Pulse  Min: 71  Max: 88   Resp 22 Resp  Min: 15  Max: 29   /80 BP  Min: 116/87  Max: 154/74   O2 Sat 97 % SpO2  Min: 91 %  Max: 100 %      Intake/Output Summary (Last 24 hours) at 7/17/2023 1705  Last data filed at 7/17/2023 1539  Gross per 24 hour   Intake 50 ml   Output --   Net 50 ml         No diet orders on file     Invasive Monitoring Physical exam   N/A Physical Exam  Vitals and nursing note reviewed. Constitutional:       General: He is not in acute distress. Appearance: He is morbidly obese. HENT:      Head: Normocephalic and atraumatic. Mouth/Throat:      Mouth: Mucous membranes are dry. Pharynx: Oropharynx is clear. Eyes:      Extraocular Movements: Extraocular movements intact. Pupils: Pupils are equal, round, and reactive to light. Cardiovascular:      Rate and Rhythm: Normal rate and regular rhythm. Pulses: Normal pulses. Heart sounds: Normal heart sounds. Pulmonary:      Effort: No respiratory distress. Breath sounds: No stridor. Rhonchi present. No wheezing or rales. Abdominal:      General: Bowel sounds are normal. There is no distension. Tenderness: There is no abdominal tenderness. Musculoskeletal:      Cervical back: Normal range of motion. Right lower leg: Edema present.    Skin: General: Skin is warm and dry. Capillary Refill: Capillary refill takes less than 2 seconds. Coloration: Skin is not jaundiced or pale. Neurological:      General: No focal deficit present. Mental Status: He is lethargic and confused. GCS: GCS eye subscore is 3. GCS verbal subscore is 4. GCS motor subscore is 6. Sensory: Sensation is intact. Motor: Motor function is intact. Psychiatric:         Mood and Affect: Mood normal.         Behavior: Behavior normal.          Diagnostic Studies      EKG: Sinus rhythm  Imaging: I have personally reviewed pertinent reports.    and I have personally reviewed pertinent films in PACS     Medications:  Scheduled PRN   cefepime, 2,000 mg, Q12H  vancomycin, 15 mg/kg, Q12H          Continuous          Labs:    CBC    Recent Labs     07/17/23  1339 07/17/23  1356   WBC 15.53*  --    HGB 15.0 12.9   HCT 46.9 38   *  --    BANDSPCT 2  --      BMP    Recent Labs     07/17/23  1339 07/17/23  1356   SODIUM 133*  --    K 4.5  --    CL 92*  --    CO2 35* 39*   AGAP 6  --    BUN 34*  --    CREATININE 1.01  --    CALCIUM 10.1  --        Coags    Recent Labs     07/17/23  1339   INR 1.01   PTT 30        Additional Electrolytes  Recent Labs     07/17/23  1356   CAIONIZED 1.37*          Blood Gas    No recent results  No recent results LFTs  Recent Labs     07/17/23  1339   ALT 26   AST 29   ALKPHOS 118*   ALB 3.3*   TBILI 0.74       Infectious  Recent Labs     07/17/23  1339   PROCALCITONI 0.29*     Glucose  Recent Labs     07/17/23  1339   GLUC 164*           Anticipated Length of Stay is > [facility] Chantale Greenberg

## 2023-07-17 NOTE — ASSESSMENT & PLAN NOTE
· SIRS: tachypnec, leukocytosis  · CT CAP: Right upper lobe groundglass opacity and right lower lobe consolidation along with bilateral effusions  · Per family, patient admitted at [facility] x1 week for UTI and DEANDRE, discharged 7/14 on 300mg Cefdinir BID.   · UA negative  · Received azithromycin and ceftriaxone in ED  · LA 1.4  · Procal 0.29    Plan  · Cefepime and Vancomycin  · Follow up blood cultures   · Trend WBC and fever curve

## 2023-07-18 LAB
2HR DELTA HS TROPONIN: -12 NG/L
ANION GAP SERPL CALCULATED.3IONS-SCNC: 6 MMOL/L
BASE EX.OXY STD BLDV CALC-SCNC: 91.2 % (ref 60–80)
BASE EXCESS BLDV CALC-SCNC: 2.7 MMOL/L
BASOPHILS # BLD AUTO: 0.09 THOUSANDS/ÂΜL (ref 0–0.1)
BASOPHILS NFR BLD AUTO: 1 % (ref 0–1)
BUN SERPL-MCNC: 35 MG/DL (ref 5–25)
CALCIUM SERPL-MCNC: 9.6 MG/DL (ref 8.4–10.2)
CARDIAC TROPONIN I PNL SERPL HS: 148 NG/L (ref 8–18)
CARDIAC TROPONIN I PNL SERPL HS: 165 NG/L
CHLORIDE SERPL-SCNC: 95 MMOL/L (ref 96–108)
CO2 SERPL-SCNC: 32 MMOL/L (ref 21–32)
CREAT SERPL-MCNC: 1.02 MG/DL (ref 0.6–1.3)
EOSINOPHIL # BLD AUTO: 0.07 THOUSAND/ÂΜL (ref 0–0.61)
EOSINOPHIL NFR BLD AUTO: 1 % (ref 0–6)
ERYTHROCYTE [DISTWIDTH] IN BLOOD BY AUTOMATED COUNT: 12.3 % (ref 11.6–15.1)
EST. AVERAGE GLUCOSE BLD GHB EST-MCNC: 140 MG/DL
GFR SERPL CREATININE-BSD FRML MDRD: 74 ML/MIN/1.73SQ M
GLUCOSE SERPL-MCNC: 149 MG/DL (ref 65–140)
GLUCOSE SERPL-MCNC: 153 MG/DL (ref 65–140)
GLUCOSE SERPL-MCNC: 153 MG/DL (ref 65–140)
GLUCOSE SERPL-MCNC: 163 MG/DL (ref 65–140)
HBA1C MFR BLD: 6.5 %
HCO3 BLDV-SCNC: 30.8 MMOL/L (ref 24–30)
HCT VFR BLD AUTO: 44.3 % (ref 36.5–49.3)
HGB BLD-MCNC: 14.1 G/DL (ref 12–17)
IMM GRANULOCYTES # BLD AUTO: 0.29 THOUSAND/UL (ref 0–0.2)
IMM GRANULOCYTES NFR BLD AUTO: 2 % (ref 0–2)
LYMPHOCYTES # BLD AUTO: 0.77 THOUSANDS/ÂΜL (ref 0.6–4.47)
LYMPHOCYTES NFR BLD AUTO: 5 % (ref 14–44)
MAGNESIUM SERPL-MCNC: 2 MG/DL (ref 1.9–2.7)
MCH RBC QN AUTO: 31.5 PG (ref 26.8–34.3)
MCHC RBC AUTO-ENTMCNC: 31.8 G/DL (ref 31.4–37.4)
MCV RBC AUTO: 99 FL (ref 82–98)
MONOCYTES # BLD AUTO: 0.8 THOUSAND/ÂΜL (ref 0.17–1.22)
MONOCYTES NFR BLD AUTO: 6 % (ref 4–12)
NEUTROPHILS # BLD AUTO: 12.12 THOUSANDS/ÂΜL (ref 1.85–7.62)
NEUTS SEG NFR BLD AUTO: 85 % (ref 43–75)
NRBC BLD AUTO-RTO: 0 /100 WBCS
O2 CT BLDV-SCNC: 19 ML/DL
PCO2 BLDV: 62.9 MM HG (ref 42–50)
PH BLDV: 7.31 [PH] (ref 7.3–7.4)
PHOSPHATE SERPL-MCNC: 3.6 MG/DL (ref 2.3–4.1)
PLATELET # BLD AUTO: 331 THOUSANDS/UL (ref 149–390)
PMV BLD AUTO: 10 FL (ref 8.9–12.7)
PO2 BLDV: 73.4 MM HG (ref 35–45)
POTASSIUM SERPL-SCNC: 4.2 MMOL/L (ref 3.5–5.3)
PROCALCITONIN SERPL-MCNC: 0.25 NG/ML
RBC # BLD AUTO: 4.48 MILLION/UL (ref 3.88–5.62)
SODIUM SERPL-SCNC: 133 MMOL/L (ref 135–147)
WBC # BLD AUTO: 14.14 THOUSAND/UL (ref 4.31–10.16)

## 2023-07-18 PROCEDURE — 94003 VENT MGMT INPAT SUBQ DAY: CPT

## 2023-07-18 PROCEDURE — 84484 ASSAY OF TROPONIN QUANT: CPT | Performed by: EMERGENCY MEDICINE

## 2023-07-18 PROCEDURE — 84145 PROCALCITONIN (PCT): CPT

## 2023-07-18 PROCEDURE — 94760 N-INVAS EAR/PLS OXIMETRY 1: CPT

## 2023-07-18 PROCEDURE — 83735 ASSAY OF MAGNESIUM: CPT

## 2023-07-18 PROCEDURE — 84100 ASSAY OF PHOSPHORUS: CPT

## 2023-07-18 PROCEDURE — 84484 ASSAY OF TROPONIN QUANT: CPT | Performed by: PHYSICIAN ASSISTANT

## 2023-07-18 PROCEDURE — 87070 CULTURE OTHR SPECIMN AEROBIC: CPT

## 2023-07-18 PROCEDURE — 82948 REAGENT STRIP/BLOOD GLUCOSE: CPT

## 2023-07-18 PROCEDURE — 85025 COMPLETE CBC W/AUTO DIFF WBC: CPT

## 2023-07-18 PROCEDURE — 87205 SMEAR GRAM STAIN: CPT

## 2023-07-18 PROCEDURE — 97163 PT EVAL HIGH COMPLEX 45 MIN: CPT

## 2023-07-18 PROCEDURE — NC001 PR NO CHARGE: Performed by: INTERNAL MEDICINE

## 2023-07-18 PROCEDURE — 80048 BASIC METABOLIC PNL TOTAL CA: CPT

## 2023-07-18 PROCEDURE — 99232 SBSQ HOSP IP/OBS MODERATE 35: CPT | Performed by: INTERNAL MEDICINE

## 2023-07-18 PROCEDURE — 82805 BLOOD GASES W/O2 SATURATION: CPT

## 2023-07-18 PROCEDURE — 97167 OT EVAL HIGH COMPLEX 60 MIN: CPT

## 2023-07-18 PROCEDURE — 94660 CPAP INITIATION&MGMT: CPT

## 2023-07-18 RX ORDER — LEVALBUTEROL INHALATION SOLUTION 1.25 MG/3ML
SOLUTION RESPIRATORY (INHALATION)
Status: COMPLETED
Start: 2023-07-18 | End: 2023-07-18

## 2023-07-18 RX ORDER — LISINOPRIL 10 MG/1
10 TABLET ORAL DAILY
Status: DISCONTINUED | OUTPATIENT
Start: 2023-07-18 | End: 2023-07-27 | Stop reason: HOSPADM

## 2023-07-18 RX ORDER — INSULIN LISPRO 100 [IU]/ML
2-12 INJECTION, SOLUTION INTRAVENOUS; SUBCUTANEOUS EVERY 6 HOURS SCHEDULED
Status: DISCONTINUED | OUTPATIENT
Start: 2023-07-18 | End: 2023-07-19

## 2023-07-18 RX ORDER — MICONAZOLE NITRATE 20 MG/G
CREAM TOPICAL 2 TIMES DAILY
Status: DISCONTINUED | OUTPATIENT
Start: 2023-07-18 | End: 2023-07-27 | Stop reason: HOSPADM

## 2023-07-18 RX ORDER — HYDRALAZINE HYDROCHLORIDE 20 MG/ML
5 INJECTION INTRAMUSCULAR; INTRAVENOUS EVERY 6 HOURS PRN
Status: DISCONTINUED | OUTPATIENT
Start: 2023-07-18 | End: 2023-07-27 | Stop reason: HOSPADM

## 2023-07-18 RX ORDER — LANOLIN ALCOHOL/MO/W.PET/CERES
6 CREAM (GRAM) TOPICAL
Status: DISCONTINUED | OUTPATIENT
Start: 2023-07-18 | End: 2023-07-27 | Stop reason: HOSPADM

## 2023-07-18 RX ADMIN — VANCOMYCIN HYDROCHLORIDE 1000 MG: 1 INJECTION, SOLUTION INTRAVENOUS at 09:07

## 2023-07-18 RX ADMIN — LEVALBUTEROL HYDROCHLORIDE: 1.25 SOLUTION RESPIRATORY (INHALATION) at 03:02

## 2023-07-18 RX ADMIN — CHLORHEXIDINE GLUCONATE 15 ML: 1.2 RINSE ORAL at 21:33

## 2023-07-18 RX ADMIN — MICONAZOLE NITRATE: 20 CREAM TOPICAL at 18:14

## 2023-07-18 RX ADMIN — HEPARIN SODIUM 5000 UNITS: 5000 INJECTION INTRAVENOUS; SUBCUTANEOUS at 13:04

## 2023-07-18 RX ADMIN — CEFEPIME HYDROCHLORIDE 2000 MG: 2 INJECTION, SOLUTION INTRAVENOUS at 11:09

## 2023-07-18 RX ADMIN — VANCOMYCIN HYDROCHLORIDE 1000 MG: 1 INJECTION, SOLUTION INTRAVENOUS at 19:55

## 2023-07-18 RX ADMIN — IPRATROPIUM BROMIDE: 0.5 SOLUTION RESPIRATORY (INHALATION) at 03:02

## 2023-07-18 RX ADMIN — HYDRALAZINE HYDROCHLORIDE 5 MG: 20 INJECTION INTRAMUSCULAR; INTRAVENOUS at 00:41

## 2023-07-18 RX ADMIN — CEFEPIME HYDROCHLORIDE 2000 MG: 2 INJECTION, SOLUTION INTRAVENOUS at 18:14

## 2023-07-18 RX ADMIN — FUROSEMIDE 40 MG: 10 INJECTION, SOLUTION INTRAMUSCULAR; INTRAVENOUS at 09:22

## 2023-07-18 RX ADMIN — INSULIN LISPRO 2 UNITS: 100 INJECTION, SOLUTION INTRAVENOUS; SUBCUTANEOUS at 13:04

## 2023-07-18 RX ADMIN — MELATONIN 6 MG: at 21:33

## 2023-07-18 RX ADMIN — HEPARIN SODIUM 5000 UNITS: 5000 INJECTION INTRAVENOUS; SUBCUTANEOUS at 05:34

## 2023-07-18 RX ADMIN — CEFEPIME HYDROCHLORIDE 2000 MG: 2 INJECTION, SOLUTION INTRAVENOUS at 01:55

## 2023-07-18 RX ADMIN — CHLORHEXIDINE GLUCONATE 15 ML: 1.2 RINSE ORAL at 09:22

## 2023-07-18 RX ADMIN — LISINOPRIL 10 MG: 10 TABLET ORAL at 09:22

## 2023-07-18 RX ADMIN — HEPARIN SODIUM 5000 UNITS: 5000 INJECTION INTRAVENOUS; SUBCUTANEOUS at 21:33

## 2023-07-18 NOTE — ASSESSMENT & PLAN NOTE
• Per family, patient admitted at [facility] x1 week for UTI and DEANDRE, discharged 7/14 on 300mg Cefdinir BID. • UA negative     Plan  • IV Cefepime. DC vancomycin as MRSA testing neg.

## 2023-07-18 NOTE — ASSESSMENT & PLAN NOTE
• Per chart patient wears 2L oxygen at SNF, however, family states the patient does not wear oxygen at home. Unsure if oxygen requirement is new since admission at [facility] last week.   • VBG pH 7.2/pCO2 79.9 -->Placed on continuous BIPAP in ED  • VBG pH 7.3/62.9 --> BIPAP HS       Plan  • Titrate oxygen with SpO2 goal > 90%

## 2023-07-18 NOTE — UTILIZATION REVIEW
Initial Clinical Review  Date: 7/19/23     Day 3: Has surpassed a 2nd midnight with active treatments and services, which include monitoring oxygen sats to keep sat 88-94%,  Attempt to qualify for Bipap tonight, check overnight sat 4 liters and am abg, continue cefepime and vancomycin diuresis as needed and on IV lasix. .    Admission: Date/Time/Statement:   Admission Orders (From admission, onward)     Ordered        07/17/23 1544  INPATIENT ADMISSION  Once                      Orders Placed This Encounter   Procedures   • INPATIENT ADMISSION     Standing Status:   Standing     Number of Occurrences:   1     Order Specific Question:   Level of Care     Answer:   Level 1 Stepdown [13]     Order Specific Question:   Estimated length of stay     Answer:   More than 2 Midnights     Order Specific Question:   Certification     Answer:   I certify that inpatient services are medically necessary for this patient for a duration of greater than two midnights. See H&P and MD Progress Notes for additional information about the patient's course of treatment. ED Arrival Information     Expected   -    Arrival   7/17/2023 13:20    Acuity   Emergent            Means of arrival   Ambulance    Escorted by   Trang Sandoval)    Service   Hospitalist    Admission type   Emergency            Arrival complaint   Altered Mental Status            Chief Complaint   Patient presents with   • Altered Mental Status     Since this AM, found down at SNF 1 hours PTA       Initial Presentation: 69 y.o. male from SNF to ED via ems admitted inpatient due to acute on chronic hypercarbic and acute hypoxic respiratory failure/Sepsis related to pneumonia/toxic metabolic encephalopathy/pleural effusions/volume overload. Presented due to altered mental status starting at breakfast on day of arrival and then found down next to his bed about 1.5 hours prior to arrival.  Per wife confused last 1 to 2 weeks. Per ems GCS 10. On exam GCS 13 in ED. Lungs rales. Obese. Diaphoretic. Lethargic. Generalized swelling. . Procalcitonin 0.29. Wbc 15.53. Bun 34, creatinine 1.01. PCO2 79.9. Hs Tnl 177. 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 Ct chest showed possible pneumonia vs pulmonary edema. Possible acute cholecystitis. In the ED placed on NRB, trial of nasal cannula and sat to 80s. Placed on Bipap   In the ED given Narcan, Lasix, started on ceftriaxone and azithromycin, given IVF bolus. Plan is hold narcotics, benzodiazepines. Neuro checks. Trial of diuresis. Wean Bipap as able. Use at night only if able to wean. NPO while on Bipap. Continue cefepime and vancomycin. Follow cultures. Date: 7/18/23    Day 2:  Episode overnight refused Bipap, confused to place. Was off Bipap about 2 hours. + chills and fatigue. Short of breath and cough. Agitation. On exam:  Abdominal distention. Obese. Bilateral +2 edema LE with bandages and chronic vacular changes. Alert and answers questions. Confused about time in hospital and thinks at SNF. Added melatonin. Continue to hold narcotics and benzodiazepines. Keep off Bipap as tolerated during day, return at HS. Diuresis. Continue antibiotics. Date: 7/19/23     Day 3: Has surpassed a 2nd midnight with active treatments and services, which include monitoring oxygen sats to keep sat 88-94%,  Attempt to qualify for Bipap tonight, check overnight sat 4 liters and am abg, continue cefepime and vancomycin diuresis as needed and on IV lasix. Able to tolerate Bipap when not sleeping. Sputum clear. On exam net -[id number].   Lungs: diminished diffusely likely related to habitus    ED Triage Vitals   Temperature Pulse Respirations Blood Pressure SpO2   07/17/23 1340 07/17/23 1326 07/17/23 1326 07/17/23 1326 07/17/23 1326   (!) 96.2 °F (35.7 °C) 86 18 154/74 95 %      Temp Source Heart Rate Source Patient Position - Orthostatic VS BP Location FiO2 (%)   07/17/23 1340 07/17/23 1350 07/17/23 1326 07/17/23 1326 07/17/23 1723   Axillary Monitor Sitting Right arm 50      Pain Score       07/17/23 1326       No Pain          Wt Readings from Last 1 Encounters:   07/18/23 (!) 168 kg (370 lb 13 oz)     Additional Vital Signs:   07/19/23 0730 99.3 °F (37.4 °C) 69 19 151/75 105 96 % -- -- -- BiPAP -- Lying   07/19/23 0431 99 °F (37.2 °C) 69 19 141/74 100 95 % -- -- -- BiPAP -- Lying   07/19/23 0409 -- -- -- -- -- 98 % -- -- -- -- Face mask --   07/19/23 0013 97.4 °F (36.3 °C) Abnormal  76 19 101/68 81 95 % -- -- -- BiPAP -- Lying   07/18/23 2300 -- -- -- -- -- -- -- -- -- -- Face mask --   07/18/23 1959 98.3 °F (36.8 °C) 74 18 104/59 76 93 % -- -- -- BiPAP -- Lying   07/18/23 1307 -- -- -- -- -- 94 % 50 -- -- BiPAP Face mask --   07/18/23 1132 -- 86 20 155/81 112 89 % Abnormal  -- -- -- -- -- --   07/18/23 1000 -- 91 20 169/109 Abnormal  128 91 % -- -- -- -- -- --   07/18/23 0800 98.2 °F (36.8 °C) 87 19 153/85 113 93 % -- 36 4 L/min Nasal cannula -- Lying   07/18/23 0400 98.1 °F (36.7 °C) 86 22 187/95 Abnormal  132 92 % -- 36 4 L/min Nasal cannula -- Lying   07/18/23 0000 98.5 °F (36.9 °C) 81 16 176/91 Abnormal  126 98 % 50 -- -- BiPAP -- Lying   07/17/23 2019 -- -- -- -- -- 92 % -- 28 2 L/min Nasal cannula -- --   07/17/23 2000 97.3 °F (36.3 °C) Abnormal  80 21 170/96 127 96 % 50 -- -- BiPAP -- Lying   07/17/23 1705 98.4 °F (36.9 °C) 73 22 142/80 -- 97 % -- -- -- BiPAP -- Lying   07/17/23 1600 98.2 °F (36.8 °C) 84 22 -- -- 95 % -- -- -- -- Face mask --   07/17/23 1525 98.4 °F (36.9 °C) 81 -- -- -- 95 % -- -- -- -- -- --   07/17/23 15:23:07 98.7 °F (37.1 °C) 84 22 -- -- 93 % -- -- -- Nasal cannula -- --   07/17/23 1455 97.5 °F (36.4 °C) 76 -- -- -- 100 % -- -- -- -- -- --   07/17/23 14:46:37 97 °F (36.1 °C) Abnormal  78 22 146/107 Abnormal  -- 100 % -- -- -- BiPAP -- Sitting   07/17/23 1411 -- 79 20 -- -- 99 % -- -- -- BiPAP -- Lying   07/17/23 1356 -- -- -- -- -- 100 % -- -- -- -- Face mask --   07/17/23 1350 98.7 °F (37.1 °C) 79 22 148/82 -- 99 % -- -- -- BiPAP -- Lying     Pertinent Labs/Diagnostic Test Results:   TRAUMA - CT head wo contrast   Final Result by Abdi Mueller MD (07/17 1443)      No acute intracranial abnormality. 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 Workstation performed: [id number]         TRAUMA - CT spine cervical wo contrast   Final Result by Abdi Mueller MD (07/17 1446)      No cervical spine fracture or traumatic malalignment. Right upper lobe groundglass opacity and small effusion noted. Please see concurrent report for CT scan including the chest.            Workstation performed: [id number]         TRAUMA - CT chest abdomen pelvis w contrast   Final Result by Abdi Mueller MD (07/17 1455)      No acute posttraumatic abnormality detected in the chest, abdomen or pelvis. Right upper lobe groundglass opacity and right lower lobe consolidation along with bilateral effusions. Correlate for possible pneumonia versus pulmonary edema. This could all be due to third spacing. Gallstones with surrounding inflammation highly suspicious for acute cholecystitis. Chest and abdominal wall anasarca. Workstation performed: [id number]         XR Trauma chest portable   Final Result by Bran Gonzalez MD (07/17 1352)      Focal opacity in the right mid lung suspect for pneumonia. Vascular congestion and pulmonary edema. The study was marked in [facility] for immediate notification.                   Workstation performed: [id number]           Results from last 7 days   Lab Units 07/17/23  1350   SARS-COV-2  Negative     Results from last 7 days   Lab Units 07/18/23  0526 07/17/23  1356 07/17/23  1339   WBC Thousand/uL 14.14*  --  15.53*   HEMOGLOBIN g/dL 14.1  --  15.0   I STAT HEMOGLOBIN g/dl  --  12.9  --    HEMATOCRIT % 44.3  --  46.9   HEMATOCRIT, ISTAT %  --  38  --    PLATELETS Thousands/uL 331  --  508*   NEUTROS ABS Thousands/µL 12.12*  --   --    BANDS PCT %  --   --  2     Results from last 7 days   Lab Units 07/18/23  0526 07/17/23  1356 07/17/23  1339   SODIUM mmol/L 133*  --  133*   POTASSIUM mmol/L 4.2  --  4.5   CHLORIDE mmol/L 95*  --  92*   CO2 mmol/L 32  --  35*   CO2, I-STAT mmol/L  --  39*  --    ANION GAP mmol/L 6  --  6   BUN mg/dL 35*  --  34*   CREATININE mg/dL 1.02  --  1.01   EGFR ml/min/1.73sq m 74  --  75   CALCIUM mg/dL 9.6  --  10.1   CALCIUM, IONIZED, ISTAT mmol/L  --  1.37*  --    MAGNESIUM mg/dL 2.0  --   --    PHOSPHORUS mg/dL 3.6  --   --      Results from last 7 days   Lab Units 07/17/23  1653 07/17/23  1339   AST U/L  --  29   ALT U/L  --  26   ALK PHOS U/L  --  118*   TOTAL PROTEIN g/dL  --  7.7   ALBUMIN g/dL  --  3.3*   TOTAL BILIRUBIN mg/dL  --  0.74   AMMONIA umol/L 38  --      Results from last 7 days   Lab Units 07/18/23  0022   POC GLUCOSE mg/dl 163*     Results from last 7 days   Lab Units 07/18/23  0526 07/17/23  1339   GLUCOSE RANDOM mg/dL 153* 164*     Results from last 7 days   Lab Units 07/17/23  1947   HEMOGLOBIN A1C % 6.5*   EAG mg/dl 140     Results from last 7 days   Lab Units 07/18/23  0808   PH FAISAL  7.308   PCO2 FAISAL mm Hg 62.9*   PO2 FAISAL mm Hg 73.4*   HCO3 FAISAL mmol/L 30.8*   BASE EXC FAISAL mmol/L 2.7   O2 CONTENT FAISAL ml/dL 19.0   O2 HGB, VENOUS % 91.2*     Results from last 7 days   Lab Units 07/17/23  1356   PH, FAISAL I-STAT  7.268*   PCO2, FAISAL ISTAT mm HG 79.9*   PO2, FAISAL ISTAT mm .0*   HCO3, FAISAL ISTAT mmol/L 36.6*   I STAT BASE EXC mmol/L 7*   I STAT O2 SAT % 99*     Results from last 7 days   Lab Units 07/17/23  1339   CK TOTAL U/L 53     Results from last 7 days   Lab Units 07/18/23  0808 07/17/23  1947 07/17/23  1559   HS TNI 0HR ng/L  --   --  177*   HS TNI 2HR ng/L 165*  --   --    HSTNI D2 ng/L -12  --   --    HS TNI 4HR ng/L  --  191*  --    HSTNI D4 ng/L  --  14  --      Results from last 7 days   Lab Units 07/17/23  1339   PROTIME seconds 14.0   INR  1.01   PTT seconds 30     Results from last 7 days   Lab Units 07/18/23  0526 07/17/23  1339   PROCALCITONIN ng/ml 0.25 0.29*     Results from last 7 days   Lab Units 07/17/23  1350   LACTIC ACID mmol/L 1.4     Results from last 7 days   Lab Units 07/17/23  1339   BNP pg/mL 402*     Results from last 7 days   Lab Units 07/17/23  1444   CLARITY UA  Clear   COLOR UA  Yellow   SPEC GRAV UA  >=1.030   PH UA  5.5   GLUCOSE UA mg/dl Negative   KETONES UA mg/dl Negative   BLOOD UA  Trace*   PROTEIN UA mg/dl 30 (1+)*   NITRITE UA  Negative   BILIRUBIN UA  Negative   UROBILINOGEN UA (BE) mg/dl 2.0*   LEUKOCYTES UA  Negative   WBC UA /hpf None Seen   RBC UA /hpf 2-4   BACTERIA UA /hpf Occasional   EPITHELIAL CELLS WET PREP /hpf Moderate*   MUCUS THREADS  Occasional*     Results from last 7 days   Lab Units 07/17/23  1826 07/17/23  1350   STREP PNEUMONIAE ANTIGEN, URINE  Negative  --    LEGIONELLA URINARY ANTIGEN  Negative  --    INFLUENZA A PCR   --  Negative   INFLUENZA B PCR   --  Negative   RSV PCR   --  Negative     Results from last 7 days   Lab Units 07/17/23  1350   BLOOD CULTURE  Received in Microbiology Lab. Culture in Progress. Received in Microbiology Lab. Culture in Progress.        ED Treatment:   Medication Administration from 07/17/2023 1320 to 07/17/2023 1727       Date/Time Order Dose Route Action Comments     07/17/2023 1342 EDT naloxone (NARCAN) injection 2 mg 2 mg Intravenous Given --     07/17/2023 1510 EDT furosemide (LASIX) injection 40 mg 40 mg Intravenous Given --     07/17/2023 1509 EDT cefTRIAXone (ROCEPHIN) IVPB (premix in dextrose) 1,000 mg 50 mL 1,000 mg Intravenous New Bag --     07/17/2023 1558 EDT azithromycin (ZITHROMAX) 500 mg in sodium chloride 0.9% 250mL IVPB 500 mg 500 mg Intravenous New Bag --     07/17/2023 1519 EDT sodium chloride 0.9 % bolus 250 mL 250 mL Intravenous New Bag --        Past Medical History:   Diagnosis Date   • Diabetes mellitus ([address])    • Hypertension      Present on Admission:  **None**      Admitting Diagnosis: Anasarca [R60.1]  Altered mental status [R41.82]  Pneumonia [J18.9]  Pulmonary edema [J81.1]  Age/Sex: 69 y.o. male  Admission Orders:   07/17/23 1544  Scheduled Medications:  cefepime, 2,000 mg, Intravenous, Q8H  chlorhexidine, 15 mL, Mouth/Throat, Q12H MARIO  furosemide, 40 mg, Intravenous, Daily  heparin (porcine), 5,000 Units, Subcutaneous, Q8H MARIO  insulin lispro, 2-12 Units, Subcutaneous, Q6H MARIO  lisinopril, 10 mg, Oral, Daily  melatonin, 6 mg, Oral, HS  vancomycin, 9 mg/kg (Adjusted), Intravenous, Q12H      Continuous IV Infusions:     PRN Meds:  hydrALAZINE, 5 mg, Intravenous, Q6H PRN x 1 7/18      Urinary catheter - dc 7/18  Cardio pulmonary monitoring  NPO  Bipap      Network Utilization Review Department  ATTENTION: Please call with any questions or concerns to [phone] and carefully listen to the prompts so that you are directed to the right person. All voicemails are confidential.  Rodri Amen all requests for admission clinical reviews, approved or denied determinations and any other requests to dedicated fax number below belonging to the campus where the patient is receiving treatment.  List of dedicated fax numbers for the Facilities:  [place] DENIALS (Administrative/Medical Necessity) [phone]   [facility] [phone]   [address] [place] [address] Drive [phone]   [facility] [address] [facility] [phone]   [address] [facility] [address] [facility] [id number] [place] [address] [phone]   [address] [facility] [address] [place] [phone]

## 2023-07-18 NOTE — PLAN OF CARE
Problem: MOBILITY - ADULT  Goal: Maintain or return to baseline ADL function  Description: INTERVENTIONS:  -  Assess patient's ability to carry out ADLs; assess patient's baseline for ADL function and identify physical deficits which impact ability to perform ADLs (bathing, care of mouth/teeth, toileting, grooming, dressing, etc.)  - Assess/evaluate cause of self-care deficits   - Assess range of motion  - Assess patient's mobility; develop plan if impaired  - Assess patient's need for assistive devices and provide as appropriate  - Encourage maximum independence but intervene and supervise when necessary  - Involve family in performance of ADLs  - Assess for home care needs following discharge   - Consider OT consult to assist with ADL evaluation and planning for discharge  - Provide patient education as appropriate  Outcome: Progressing  Goal: Maintains/Returns to pre admission functional level  Description: INTERVENTIONS:  - Perform BMAT or MOVE assessment daily.   - Set and communicate daily mobility goal to care team and patient/family/caregiver. - Collaborate with rehabilitation services on mobility goals if consulted  - Perform Range of Motion 4 times a day. - Reposition patient every 2 hours.   - Dangle patient 3 times a day  - Stand patient 3 times a day  - Ambulate patient 3 times a day  - Out of bed to chair 3 times a day   - Out of bed for meals 3 times a day  - Out of bed for toileting  - Record patient progress and toleration of activity level   Outcome: Progressing     Problem: PAIN - ADULT  Goal: Verbalizes/displays adequate comfort level or baseline comfort level  Description: Interventions:  - Encourage patient to monitor pain and request assistance  - Assess pain using appropriate pain scale  - Administer analgesics based on type and severity of pain and evaluate response  - Implement non-pharmacological measures as appropriate and evaluate response  - Consider cultural and social influences on pain and pain management  - Notify physician/advanced practitioner if interventions unsuccessful or patient reports new pain  Outcome: Progressing     Problem: INFECTION - ADULT  Goal: Absence or prevention of progression during hospitalization  Description: INTERVENTIONS:  - Assess and monitor for signs and symptoms of infection  - Monitor lab/diagnostic results  - Monitor all insertion sites, i.e. indwelling lines, tubes, and drains  - Monitor endotracheal if appropriate and nasal secretions for changes in amount and color  - [place] appropriate cooling/warming therapies per order  - Administer medications as ordered  - Instruct and encourage patient and family to use good hand hygiene technique  - Identify and instruct in appropriate isolation precautions for identified infection/condition  Outcome: Progressing     Problem: DISCHARGE PLANNING  Goal: Discharge to home or other facility with appropriate resources  Description: INTERVENTIONS:  - Identify barriers to discharge w/patient and caregiver  - Arrange for needed discharge resources and transportation as appropriate  - Identify discharge learning needs (meds, wound care, etc.)  - Arrange for interpretive services to assist at discharge as needed  - Refer to Case Management Department for coordinating discharge planning if the patient needs post-hospital services based on physician/advanced practitioner order or complex needs related to functional status, cognitive ability, or social support system  Outcome: Progressing     Problem: Knowledge Deficit  Goal: Patient/family/caregiver demonstrates understanding of disease process, treatment plan, medications, and discharge instructions  Description: Complete learning assessment and assess knowledge base.   Interventions:  - Provide teaching at level of understanding  - Provide teaching via preferred learning methods  Outcome: Progressing     Problem: CARDIOVASCULAR - ADULT  Goal: Maintains optimal cardiac output and hemodynamic stability  Description: INTERVENTIONS:  - Monitor I/O, vital signs and rhythm  - Monitor for S/S and trends of decreased cardiac output  - Administer and titrate ordered vasoactive medications to optimize hemodynamic stability  - Assess quality of pulses, skin color and temperature  - Assess for signs of decreased coronary artery perfusion  - Instruct patient to report change in severity of symptoms  Outcome: Progressing  Goal: Absence of cardiac dysrhythmias or at baseline rhythm  Description: INTERVENTIONS:  - Continuous cardiac monitoring, vital signs, obtain 12 lead EKG if ordered  - Administer antiarrhythmic and heart rate control medications as ordered  - Monitor electrolytes and administer replacement therapy as ordered  Outcome: Progressing     Problem: RESPIRATORY - ADULT  Goal: Achieves optimal ventilation and oxygenation  Description: INTERVENTIONS:  - Assess for changes in respiratory status  - Assess for changes in mentation and behavior  - Position to facilitate oxygenation and minimize respiratory effort  - Oxygen administered by appropriate delivery if ordered  - Initiate smoking cessation education as indicated  - Encourage broncho-pulmonary hygiene including cough, deep breathe, Incentive Spirometry  - Assess the need for suctioning and aspirate as needed  - Assess and instruct to report SOB or any respiratory difficulty  - Respiratory Therapy support as indicated  Outcome: Progressing     Problem: SAFETY,RESTRAINT: NV/NON-SELF DESTRUCTIVE BEHAVIOR  Goal: Remains free of harm/injury (restraint for non violent/non self-detsructive behavior)  Description: INTERVENTIONS:  - Instruct patient/family regarding restraint use   - Assess and monitor physiologic and psychological status   - Provide interventions and comfort measures to meet assessed patient needs   - Identify and implement measures to help patient regain control  - Assess readiness for release of restraint   Outcome: Progressing  Goal: Returns to optimal restraint-free functioning  Description: INTERVENTIONS:  - Assess the patient's behavior and symptoms that indicate continued need for restraint  - Identify and implement measures to help patient regain control  - Assess readiness for release of restraint   Outcome: Progressing     Problem: Prexisting or High Potential for Compromised Skin Integrity  Goal: Skin integrity is maintained or improved  Description: INTERVENTIONS:  - Identify patients at risk for skin breakdown  - Assess and monitor skin integrity  - Assess and monitor nutrition and hydration status  - Monitor labs   - Assess for incontinence   - Turn and reposition patient  - Assist with mobility/ambulation  - Relieve pressure over bony prominences  - Avoid friction and shearing  - Provide appropriate hygiene as needed including keeping skin clean and dry  - Evaluate need for skin moisturizer/barrier cream  - Collaborate with interdisciplinary team   - Patient/family teaching  - Consider wound care consult   Outcome: Progressing

## 2023-07-18 NOTE — NURSING NOTE
SARA Marinelli made aware that pt still HTN now 180s/90s despite PRN hydralazine order. No new orders received at this time.

## 2023-07-18 NOTE — PHYSICAL THERAPY NOTE
PHYSICAL THERAPY EVALUATION NOTE      Patient Name: Shyla Sy  TUXPG'J Date: 2023    AGE:   69 y.o.  Mrn:   [id number]  ADMIT DX:  Anasarca [R60.1]  Altered mental status [R41.82]  Pneumonia [J18.9]  Pulmonary edema [J81.1]    Past Medical History:   Diagnosis Date    Diabetes mellitus ([address])     Hypertension      Length Of Stay: 1  PHYSICAL THERAPY EVALUATION :   Patient's identity confirmed via 2 patient identifiers (full name and ) at start of session       23 1222   PT Last Visit   PT Visit Date 23   Note Type   Note type Evaluation   Pain Assessment   Pain Assessment Tool 0-10   Pain Score No Pain   Home Living   Type of Home SNF  ([facility])   Additional Comments Pt resides in a 2 , can have [address]   Prior Function   Vocational Retired  (Construction)   Comments At baseline, pt resides w/ his wife, does not use AD PTA   General   Family/Caregiver Present Yes  (Wife)   Cognition   Overall Cognitive Status Impaired   Arousal/Participation Alert   Attention Attends with cues to redirect   Orientation Level Oriented to person;Disoriented to place; Disoriented to time;Disoriented to situation   Memory Decreased recall of recent events;Decreased recall of precautions   Following Commands Follows one step commands with increased time or repetition   Comments Pt requires frequent redirection, step-by-step instructions. Often questioning why we are doing something. RLE Assessment   RLE Assessment WFL   Strength RLE   RLE Overall Strength 3+/5   LLE Assessment   LLE Assessment WFL   Strength LLE   LLE Overall Strength 3+/5   Bed Mobility   Supine to Sit 3  Moderate assistance   Additional items Assist x 1; Increased time required;Verbal cues  (to pt's R)   Additional Comments Pt perseverates on recliner chair "elevating".  Provided continued education that recliner chair is not adjustable, not a lift chair   Transfers Sit to Stand 3  Moderate assistance   Additional items Assist x 2; Increased time required;Verbal cues  (from lowest height of bed)   Stand to Sit 4  Minimal assistance   Additional items Assist x 2; Increased time required;Verbal cues   Ambulation/Elevation   Gait pattern Decreased foot clearance; Short stride; Excessively slow   Gait Assistance 4  Minimal assist   Additional items Assist x 2;Verbal cues   Assistive Device Rolling walker   Distance 6 ft   Balance   Static Sitting Fair +   Static Standing Poor +   Ambulatory Poor +   Activity Tolerance   Activity Tolerance Patient limited by fatigue  (AMS)   Medical Staff Made Aware OT [facility] Aware ICU RN Sammy May   Assessment   Problem List Decreased strength;Decreased endurance; Impaired balance;Decreased mobility; Decreased cognition; Impaired judgement;Decreased safety awareness; Obesity   Assessment Sanjiv Harmon is a 69 y.o. Male who presents to [address] [place] [address] on 7/17/2023 from [facility] w/ c/o unwitnessed fall and diagnosis of acute on chronic respiratory failure w/hypoxia. Orders for PT eval and treat received. Pt presents w/ comorbidities of DMII, lymphedema, HTN. At baseline, pt mobilizes I w/ no AD, and reports + falls in the last 6 months. Upon evaluation, pt presents w/ the following deficits: weakness, impaired balance, decreased endurance and impaired cognition. Upon eval, pt requires  Mod A x 1 for bed mobility, mod A x 2 for transfers, and min A x 2 for gait. Based on this PT evaluation today, patient's discharge recommendation is for Level II. During this admission, pt would benefit from continued skilled inpatient PT in the acute care setting in order to address the abovementioned deficits to maximize function and mobility before DC from acute care.    Goals   Patient Goals to get better   [facility] Expiration Date 07/28/23   Short Term Goal #1 Patient will: Perform all bed mobility tasks w/ minx1 to improve pt's independence w/ repositioning for decrease risk of skin breakdown, Perform all transfers w/ minx1 consistently from various height surfaces in order to improve I w/ engagement w/ real-world environments/situations, Ambulate at least 25 ft. with roller walker w/ minx1 w/o LOB to facilitate return and engagement w/ previous living environment, Increase all balance 1/2 grade to decrease risk for falls and Tolerate 3 hr OOB to faciliate upright tolerance   PT Treatment Day 0   Plan   Treatment/Interventions Functional transfer training;LE strengthening/ROM; Therapeutic exercise; Endurance training;Cognitive reorientation;Patient/family training;Equipment eval/education; Bed mobility;Gait training   PT Frequency 2-3x/wk   Recommendation   UB Rehab Discharge Recommendation (PT/OT) Level 2   Equipment Recommended Walker   AM-PAC Basic Mobility Inpatient   Turning in Flat Bed Without Bedrails 2   Lying on Back to Sitting on Edge of Flat Bed Without Bedrails 2   Moving Bed to Chair 2   Standing Up From Chair Using Arms 2   Walk in Room 2   Climb 3-5 Stairs With Railing 1   Basic Mobility Inpatient Raw Score 11   Basic Mobility Standardized Score 30.25   Highest Level Of Mobility   -HL Goal 4: Move to chair/commode   JH-HLM Achieved 6: Walk 10 steps or more   End of Consult   Patient Position at End of Consult Bedside chair;Bed/Chair alarm activated; All needs within reach           The patient's AM-PAC Basic Mobility Inpatient Short Form Raw Score is 11, Standardized Score is 30.25. A standardized score less than 38.32 (raw score of 16) suggests the patient may benefit from discharge to post-acute rehabilitation services which may not coincide with above PT recommendations. However please refer to therapist recommendation for discharge planning given other factors that may influence destination.     Pt would benefit from skilled inpatient PT during this admission in order to facilitate progress towards goals to maximize functional independence    Ash Doss, PT, DPT

## 2023-07-18 NOTE — ASSESSMENT & PLAN NOTE
• Brought to ED from SNF after being found by roommate on the floor and altered compared to baseline.   • Approximate downtime 1.5 hours  • CTH: no acute intracranial abnormality  • CT c-spine: no c-spine fracture or traumatic malalignment  • VBG pH 7.2/pCO2 79.9  • Received 2mg IV Narvcan in ED without improvement in mental status  • Ammonia 38      Plan  • Q4H neuro checks  • Add melatonin  • OOB during the day to encourage proper sleep/wake cycle

## 2023-07-18 NOTE — OCCUPATIONAL THERAPY NOTE
Occupational Therapy Evaluation     Patient Name: Sanjiv Harmon  [id number]'J Date: 7/18/2023  Problem List  Principal Problem:    Acute on chronic respiratory failure with hypoxia and hypercapnia (HCC)  Active Problems:    Encephalopathy acute    Sepsis ([address])    Urinary tract infection    Hypertension    Lymphedema    Type 2 diabetes mellitus (HCC)    Obesity    Elevated troponin    Past Medical History  Past Medical History:   Diagnosis Date    Diabetes mellitus ([address])     Hypertension      Past Surgical History  No past surgical history on file.        07/18/23 1238   OT Last Visit   OT Visit Date 07/18/23   Note Type   Note type Evaluation   Pain Assessment   Pain Assessment Tool 0-10   Pain Score No Pain   Home Living   Type of Home SNF   Additional Comments Pt presents from STR. Prior to rehab was living with wife in [address] [place] [address] where he was independent with ADLs   Prior Function   Comments Pt presents from STR where he required assist with ADLs and functional mobility. Prior to this was home and performing ADLs independently. Subjective   Subjective Pt received in supine position. Pt agreeable to session. Confused at times. Wife at bedside. ADL   Eating Assistance 5  Supervision/Setup   Grooming Assistance 5  Supervision/Setup   UB Bathing Assistance 4  Minimal Assistance   LB Bathing Assistance 2  Maximal Assistance   UB Dressing Assistance 3  Moderate Assistance   LB Dressing Assistance 2  Maximal Assistance   Toileting Assistance  3  Moderate Assistance   Bed Mobility   Supine to Sit 3  Moderate assistance   Additional items Assist x 1;Verbal cues   Additional Comments Pt remained seated in recliner by end of session.    Transfers   Sit to Stand 3  Moderate assistance   Additional items Assist x 2;Verbal cues   Stand to Sit 4  Minimal assistance   Additional items Assist x 2;Verbal cues   Functional Mobility   Functional Mobility 4  Minimal assistance   Additional Comments x 2, RW   Balance   Static Sitting Fair + Dynamic Sitting Fair   Static Standing Poor +   Dynamic Standing Poor   Activity Tolerance   Activity Tolerance Patient limited by fatigue   Medical Staff Made Aware  [address]   Nurse Made Aware LIVAN GLORIA Assessment   RUE Assessment WFL   LUE Assessment   LUE Assessment WFL   Cognition   Overall Cognitive Status Impaired   Arousal/Participation Alert   Attention Attends with cues to redirect   Orientation Level Oriented to person;Disoriented to place; Disoriented to time   Memory Decreased recall of precautions;Decreased recall of recent events;Decreased short term memory   Following Commands Follows one step commands with increased time or repetition   Assessment   Limitation Decreased ADL status; Decreased fine motor control;Decreased self-care trans;Decreased high-level ADLs; Decreased cognition;Decreased endurance   Prognosis Fair   Assessment Pt is a 69 y.o. male seen for OT evaluation at [facility], admitted 7/17/2023 w/ Acute on chronic respiratory failure with hypoxia and hypercapnia ([address]). Comorbidities affecting pt's functional performance at time of assessment include: HTN, obesity post remote gastric bypass, lymphedema, DM2, prior CA (previously on CPAP). Personal factors affecting pt at time of IE include:difficulty performing ADLS, difficulty performing IADLS  and decreased functional mobility. Prior to admission, pt was undergoing STR. Pt required assist with ADLS and IADLS,  & required RW PTA. Prior to STR, was independent with ADLs and mobility. Upon evaluation: Pt requires mod Ax 1 for bed mobility, min-mod X 2 for functional mobility/transfers, sup-mod A for UB ADLs and mod- max A for LB ADLS 2* the following deficits impacting occupational performance: weakness, decreased strength, decreased balance, decreased tolerance, impaired memory and impaired problem solving. Full objective findings from OT assessment regarding body systems outlined above.  These impairments, as well as risk for falls  limit pt's ability to safely engage in all baseline areas of occupation and mobility. Pt to benefit from continued skilled OT tx while in the hospital to address deficits as defined above and maximize level of functional independence w ADL's and functional mobility. Occupational Performance areas to address include: bathing/shower, toilet hygiene, dressing and functional mobility. This evaluation required an extensive review of medical and/or therapy records and additional review of physical, cognitive and psychosocial history related to functional performance. Based upon functional performance deficits and assessments, this evaluation has been identified as a high complexity evaluation. The patient's raw score on the AM-PAC Daily Activity inpatient short form is 15, standardized score is 34.69, less than 39.4. Patients at this level are likely to benefit from DC to post-acute rehabilitation services. However please refer to therapist recommendation for discharge planning given other factors that may influence destination. At this time, OT recommendations at time of discharge are level 2 resources. Goals   Patient Goals Pt wishes to get better   Plan   Treatment Interventions ADL retraining;Functional transfer training; Compensatory technique education;Patient/family training; Endurance training;Cognitive reorientation   Goal Expiration Date 07/28/23   OT Treatment Day 0   OT Frequency 3-5x/wk   Recommendation   UB Rehab Discharge Recommendation (PT/OT) Level 2   AM-PAC Daily Activity Inpatient   Lower Body Dressing 2   Bathing 2   Toileting 2   Upper Body Dressing 3   Grooming 3   Eating 3   Daily Activity Raw Score 15   Daily Activity Standardized Score (Calc for Raw Score >=11) 34.69   AM-PAC Applied Cognition Inpatient   Following a Speech/Presentation 2   Understanding Ordinary Conversation 3   Taking Medications 2   Remembering Where Things Are Placed or Put Away 2   Remembering List of 4-5 Errands 2   Taking Care of Complicated Tasks 2   Applied Cognition Raw Score 13   Applied Cognition Standardized Score 30.46   End of Consult   Education Provided Yes   Patient Position at End of Consult Bedside chair;Bed/Chair alarm activated; All needs within reach   Nurse Communication Nurse aware of consult           Pt will achieve the following goals within 10 days. *Pt will complete UB bathing and dressing with sup. *Pt will complete LB bathing and dressing with sup . * Pt will complete toileting w/ sup w/ G hygiene/thoroughness using DME PRN    *Pt will complete bed mobility with sup, with bed flat and no side rail to prep for purposeful tasks    *Pt will perform functional transfers with on/off all surfaces with sup using DME as needed w/ G balance/safety. *Pt will increase standing tolerance x 5  minutes for inc'd tolerance with standing purposeful tasks. *Pt will sit on EOB for 20 minutes for increased safety with seated activity tolerance during ADL tasks. *Patient will verbalize 3 safety awareness/ principles to prevent falls in the home setting. *Pt will improve functional mobility during ADL/IADL/leisure tasks to sup using DME as needed w/ G balance/safety.      Patricia Aragon, OTR/L

## 2023-07-18 NOTE — PROGRESS NOTES
Kee Jean is a 69 y.o. male who is currently ordered Vancomycin IV with management by the Pharmacy Consult service. Relevant clinical data and objective / subjective history reviewed. Vancomycin Assessment:  Indication and Goal AUC/Trough: Pneumonia (goal -600, trough >10), -600, trough >10  Clinical Status: stable  Micro:     Renal Function:  SCr: 1.01 mg/dL  CrCl: 114.7 mL/min  Renal replacement: Not on dialysis  Days of Therapy: 1  Current Dose: 2000mg IV loading dose  Vancomycin Plan:  New Dosinmg IV Q12H  Estimated AUC: 465 mcg*hr/mL  Estimated Trough: 13 mcg/mL  Next Level: With morning labs at approximately 0600 on 23  Renal Function Monitoring: Daily BMP and [place] will continue to follow closely for s/sx of nephrotoxicity, infusion reactions and appropriateness of therapy. BMP and CBC will be ordered per protocol. We will continue to follow the patient’s culture results and clinical progress daily.     Shani Martinez, Pharmacist

## 2023-07-18 NOTE — CASE MANAGEMENT
Case Management Assessment & Discharge Planning Note    Patient name Madiha Rodríguez  Location /-93 MRN [id number]  : 1955 Date 2023       Current Admission Date: 2023  Current Admission Diagnosis:Acute on chronic respiratory failure with hypoxia and hypercapnia [facility])   Patient Active Problem List    Diagnosis Date Noted   • Encephalopathy acute 2023   • Sepsis ([address]) 2023   • Urinary tract infection 2023   • Hypertension 2023   • Lymphedema 2023   • Type 2 diabetes mellitus ([address]) 2023   • Obesity 2023   • Acute on chronic respiratory failure with hypoxia and hypercapnia ([address]) 2023   • Elevated troponin 2023      LOS (days): 1  Geometric Mean LOS (GMLOS) (days): 5.00  Days to GMLOS:4     OBJECTIVE:    Risk of Unplanned Readmission Score: 11.99      Current admission status: Inpatient       Preferred Pharmacy:   [address] [place] [address]  Phone: [phone] Fax: [phone]    Primary Care Provider: Cherie Schultz DO    Primary Insurance: [facility]  Secondary Insurance:     ASSESSMENT:  [place] Proxies    There are no active Health Care Proxies on file. Readmission Root Cause  30 Day Readmission: No    Patient Information  Admitted from[de-identified] Facility [facility])  Mental Status: Alert  During Assessment patient was accompanied by: Spouse, Daughter  Assessment information provided by[de-identified] Spouse, Daughter  Primary Caregiver:  Other (Comment)  Caregiver's Name[de-identified] [facility]  Support Systems: Spouse/significant other, Children, Family members  [place] of Residence: [address] [facility] do you live in?: [facility]  Type of Current Residence: 2 [place] home  Upon entering residence, is there a bedroom on the main floor (no further steps)?: No  A bedroom is located on the following floor levels of residence (select all that apply):: 2nd Floor  Upon entering residence, is there a bathroom on the main floor (no further steps)?: No  Indicate which floors of current residence have a bathroom (select all the apply):: 2nd Floor  Number of steps to 2nd floor from main floor: One Flight  In the last 12 months, was there a time when you were not able to pay the mortgage or rent on time?: No  In the last 12 months, how many places have you lived?: 1  In the last 12 months, was there a time when you did not have a steady place to sleep or slept in a shelter (including now)?: No  Homeless/housing insecurity resource given?: N/A  Living Arrangements: Lives w/ Spouse/significant other, Other (Comment) (Currently at [facility])  Is patient a ?: No    Activities of Daily Living Prior to Admission  Functional Status: Assistance  Completes ADLs independently?: No  Level of ADL dependence: Assistance  Ambulates independently?: No  Level of ambulatory dependence: Assistance  Does patient use assisted devices?: No  Does patient currently own DME?: No  Does patient have a history of Outpatient Therapy (PT/OT)?: No  Does the patient have a history of Short-Term Rehab?: No  Does patient have a history of HHC?: No  Does patient currently have [address]?: No    Patient Information Continued  Income Source: Pension/FPC  Does patient have prescription coverage?: Yes  Within the past 12 months, you worried that your food would run out before you got the money to buy more.: Never true  Within the past 12 months, the food you bought just didn't last and you didn't have money to get more.: Never true  Food insecurity resource given?: N/A  Does patient receive dialysis treatments?: No  Does patient have a history of substance abuse?: No  Does patient have a history of Mental Health Diagnosis?: No    Means of Transportation  Means of Transport to Appts[de-identified] Drives Self  In the past 12 months, has lack of transportation kept you from medical appointments or from getting medications?: No  In the past 12 months, has lack of transportation kept you from meetings, work, or from getting things needed for daily living?: No  Was application for public transport provided?: N/A  DISCHARGE DETAILS:    Discharge planning discussed with[de-identified] patient's wife and patient's dtr  Surprise of Choice: Yes  Comments - Freedom of Choice: Was at [facility] for a few days for STR-requesting new STR facility  CM contacted family/caregiver?: Yes       Contacts  Patient Contacts: Real Cocking: wife: [phone], Angela Gardner: dtr: [phone]  Relationship to Patient[de-identified] Family  Contact Method: In Person  Reason/Outcome: Emergency Contact, Continuity of Care, Discharge Planning      Additional Comments: Met with Pt, Pt's wife, dtr at bedside. Discussed role of case management. Pt currently at [facility] for a few days for STR. Pt's dtr reports Pt was discharged to [facility] from [facility] on Saturday. Prior to [place], Pt lives with wife in [facility] and independent PTA. No DME. No VNA/SNF/MH/D&A. Pt's wife and dtr are requesting new STR facility. Pt's wife and dtr are agreeable for blanket referral within 20 miles. [place] SNF referral sent via [address] Will need to obtain insurance auth prior to discharge. CM to follow.

## 2023-07-18 NOTE — PROGRESS NOTES
[facility]  Progress Note  Name: Rich Zamora  MRN: [id number]  Unit/Bed#: -01 I Date of Admission: 7/17/2023   Date of Service: 7/18/2023 I Hospital Day: 1    Assessment/Plan   * Acute on chronic respiratory failure with hypoxia and hypercapnia (HCC)  Assessment & Plan  · Per chart patient wears 2L oxygen at SNF, however, family states the patient does not wear oxygen at home. Unsure if oxygen requirement is new since admission at [facility] last week. · VBG pH 7.2/pCO2 79.9  · Placed on continuous BIPAP in ED    Plan  · Continue BIPAP  · Check ABG   · Titrate oxygen with SpO2 goal > 90%    Encephalopathy acute  Assessment & Plan  · Brought to ED from SNF after being found by roommate on the floor and altered compared to baseline. · Approximate downtime 1.5 hours  · CTH: no acute intracranial abnormality  · CT c-spine: no c-spine fracture or traumatic malalignment  · VBG pH 7.2/pCO2 79.9  · Received 2mg IV Narvcan in ED without improvement in mental status  · Ammonia 38     Plan  · Q4H neuro checks  · Continuous BIPAP      Sepsis (HCC)  Assessment & Plan  · SIRS: tachypnec, leukocytosis  · CT CAP: Right upper lobe groundglass opacity and right lower lobe consolidation along with bilateral effusions  · Per family, patient admitted at [facility] x1 week for UTI and DEANDRE, discharged 7/14 on 300mg Cefdinir BID. · UA negative  · Received azithromycin and ceftriaxone in ED  · LA 1.4  · Procal 0.29    Plan  · Cefepime and Vancomycin  · Follow up blood cultures   · Trend WBC and fever curve    Urinary tract infection  Assessment & Plan  · Per family, patient admitted at [facility] x1 week for UTI and DEANDRE, discharged 7/14 on 300mg Cefdinir BID.   · UA negative    Plan  · IV Cefepime and Vanco to cover PNA and UTI    Hypertension  Assessment & Plan  · Home regimen: lisinopril    Plan  · Hold home regimen while NPO    Elevated troponin  Assessment & Plan  · Tropon 177     Plan  · Trend troponin   · No ischemic changes on EKG      Obesity  Assessment & Plan  · Encourage diet and lifestyle modifications when medically stable    Type 2 diabetes mellitus ([address])  Assessment & Plan  No results found for: "HGBA1C"    No results for input(s): "POCGLU" in the last 72 hours. Blood Sugar Average: Last 72 hrs:    · History of DM2 listed in chart from SNF,however, no medication listed on MAR from SNF    Plan  · Q6H fingersticks  · Check Hgb A1C      Lymphedema  Assessment & Plan  · Home regimen: HCTZ    Plan  · 40mg IV lasix daily           ICU Core Measures     A: Assess, Prevent, and Manage Pain · Has pain been assessed? Yes  · Need for changes to pain regimen? No   B: Both SAT/SAT  · N/A   C: Choice of Sedation · RASS Goal: 0 Alert and Calm or N/A patient not on sedation  · Need for changes to sedation or analgesia regimen? NA   D: Delirium · CAM-ICU: Positive   E: Early Mobility  · Plan for early mobility? Yes   F: Family Engagement · Plan for family engagement today? Yes       Antibiotic Review: Awaiting culture results. Review of Invasive Devices: Sung Plan: Sung to be removed. Order has been placed        Prophylaxis:  VTE VTE covered by:  heparin (porcine), Subcutaneous, 5,000 Units at 07/17/23 1810       Stress Ulcer  not ordered       Subjective   HPI/24hr events:   · Had an episode where he refused BiPAP, also seems to be confused how long he was in the hospital which hospital and his nursing home stay. · Had him off BiPAP for approximately 2 hours and placed him back on, and the time of of BiPAP he was clear he was at [facility] still confused how long he was here  · Patient will need to get out of bed to chair, PT OT evaluation  · BiPAP through the night- he would benefit from BiPAP nightly  · Patient again pulled his BiPAP off refused and started pulling all his leads off and wanted to go home appeared confused -time and date.   This is his second night here not his first     ROS performed Review of Systems   Constitutional: Positive for chills and fatigue. HENT: Negative. Respiratory: Positive for cough and shortness of breath. Negative for choking and chest tightness. Cardiovascular: Positive for leg swelling. Gastrointestinal: Negative. Genitourinary: Negative. Musculoskeletal: Negative. Allergic/Immunologic: Negative. Neurological: Negative. Psychiatric/Behavioral: Positive for agitation. Objective               Patient states he has some discomfort in his lower back is mostly because of how he lays he likes to sit up in a chair and he sometimes sleeps in recliner. Vitals I/O      Most Recent Min/Max in 24hrs   Temp 98.5 °F (36.9 °C) Temp  Min: 81.1 °F (27.3 °C)  Max: 98.7 °F (37.1 °C)   Pulse 81 Pulse  Min: 70  Max: 88   Resp 16 Resp  Min: 15  Max: 29   BP (!) 176/91 BP  Min: 116/87  Max: 176/91   O2 Sat 98 % SpO2  Min: 91 %  Max: 100 %      Intake/Output Summary (Last 24 hours) at 7/18/2023 0505  Last data filed at 7/18/2023 0000  Gross per 24 hour   Intake 130 ml   Output 2015 ml   Net -1885 ml         Diet NPO     Invasive Monitoring Physical exam    Physical Exam  Vitals and nursing note reviewed. Constitutional:       Appearance: He is well-developed. HENT:      Head: Normocephalic and atraumatic. Eyes:      Pupils: Pupils are equal, round, and reactive to light. Cardiovascular:      Rate and Rhythm: Normal rate and regular rhythm. Pulmonary:      Effort: Pulmonary effort is normal.      Breath sounds: Normal breath sounds. Abdominal:      General: Abdomen is protuberant. Bowel sounds are normal. There is distension. Palpations: Abdomen is soft. Tenderness: There is no abdominal tenderness. There is no guarding. Comments: obese   Genitourinary:     Penis: Normal.    Musculoskeletal:         General: Normal range of motion. Cervical back: Normal range of motion and neck supple.       Right lower leg: Edema present. Left lower leg: Edema present. Comments: +2 edema with bandages and chronic vascular changes    Skin:     General: Skin is warm and dry. Capillary Refill: Capillary refill takes less than 2 seconds. Neurological:      Mental Status: He is alert. Comments: Is alert and answers questions appropriately at times  Still be is confused about length of stay in this hospital  It up with the nursing home            Diagnostic Studies      EKG: NSR  Imaging: I have personally reviewed pertinent reports.        Medications:  Scheduled PRN   cefepime, 2,000 mg, Q8H  chlorhexidine, 15 mL, Q12H MARIO  furosemide, 40 mg, Daily  heparin (porcine), 5,000 Units, Q8H MARIO  vancomycin, 9 mg/kg (Adjusted), Q12H      hydrALAZINE, 5 mg, Q6H PRN       Continuous          Labs:    CBC    Recent Labs     07/17/23  1339 07/17/23  1356   WBC 15.53*  --    HGB 15.0 12.9   HCT 46.9 38   *  --    BANDSPCT 2  --      BMP    Recent Labs     07/17/23  1339 07/17/23  1356   SODIUM 133*  --    K 4.5  --    CL 92*  --    CO2 35* 39*   AGAP 6  --    BUN 34*  --    CREATININE 1.01  --    CALCIUM 10.1  --        Coags    Recent Labs     07/17/23  1339   INR 1.01   PTT 30        Additional Electrolytes  Recent Labs     07/17/23  1356   CAIONIZED 1.37*          Blood Gas    No recent results  No recent results LFTs  Recent Labs     07/17/23  1339   ALT 26   AST 29   ALKPHOS 118*   ALB 3.3*   TBILI 0.74       Infectious  Recent Labs     07/17/23  1339   PROCALCITONI 0.29*     Glucose  Recent Labs     07/17/23  1339   GLUC 164*               José Skinner PA-C

## 2023-07-18 NOTE — ASSESSMENT & PLAN NOTE
Lab Results   Component Value Date    HGBA1C 6.5 (H) 07/17/2023       Recent Labs     07/18/23  0022   POCGLU 163*       Blood Sugar Average: Last 72 hrs:  (P) 163     • History of DM2 listed in chart from SNF,however, no medication listed on MAR from SNF     Plan  • Q6H fingersticks  • Check Hgb A1C

## 2023-07-18 NOTE — PLAN OF CARE
Problem: OCCUPATIONAL THERAPY ADULT  Goal: Performs self-care activities at highest level of function for planned discharge setting. See evaluation for individualized goals. Description: Treatment Interventions: ADL retraining, Functional transfer training, Compensatory technique education, Patient/family training, Endurance training, Cognitive reorientation          See flowsheet documentation for full assessment, interventions and recommendations. Note: Limitation: Decreased ADL status, Decreased fine motor control, Decreased self-care trans, Decreased high-level ADLs, Decreased cognition, Decreased endurance  Prognosis: Fair  Assessment: Pt is a 69 y.o. male seen for OT evaluation at [facility], admitted 7/17/2023 w/ Acute on chronic respiratory failure with hypoxia and hypercapnia ([address]). Comorbidities affecting pt's functional performance at time of assessment include: HTN, obesity post remote gastric bypass, lymphedema, DM2, prior CA (previously on CPAP). Personal factors affecting pt at time of IE include:difficulty performing ADLS, difficulty performing IADLS  and decreased functional mobility. Prior to admission, pt was undergoing STR. Pt required assist with ADLS and IADLS,  & required RW PTA. Prior to STR, was independent with ADLs and mobility. Upon evaluation: Pt requires mod Ax 1 for bed mobility, min-mod X 2 for functional mobility/transfers, sup-mod A for UB ADLs and mod- max A for LB ADLS 2* the following deficits impacting occupational performance: weakness, decreased strength, decreased balance, decreased tolerance, impaired memory and impaired problem solving. Full objective findings from OT assessment regarding body systems outlined above. These impairments, as well as risk for falls  limit pt's ability to safely engage in all baseline areas of occupation and mobility.  Pt to benefit from continued skilled OT tx while in the hospital to address deficits as defined above and maximize level of functional independence w ADL's and functional mobility. Occupational Performance areas to address include: bathing/shower, toilet hygiene, dressing and functional mobility. This evaluation required an extensive review of medical and/or therapy records and additional review of physical, cognitive and psychosocial history related to functional performance. Based upon functional performance deficits and assessments, this evaluation has been identified as a high complexity evaluation. The patient's raw score on the -PAC Daily Activity inpatient short form is 15, standardized score is 34.69, less than 39.4. Patients at this level are likely to benefit from DC to post-acute rehabilitation services. However please refer to therapist recommendation for discharge planning given other factors that may influence destination. At this time, OT recommendations at time of discharge are level 2 resources.

## 2023-07-18 NOTE — PROGRESS NOTES
Yahaira Acevedo is a 69 y.o. male who is currently ordered Vancomycin IV with management by the Pharmacy Consult service. Relevant clinical data and objective / subjective history reviewed. Vancomycin Assessment:  Indication and Goal AUC/Trough: Pneumonia (goal -600, trough >10), -600, trough >10  Clinical Status: stable  Micro:     Renal Function:  SCr: 1.02 mg/dL  CrCl: 112.8 mL/min  Renal replacement: Not on dialysis  Days of Therapy: 2  Current Dose: 1000mg IV Q12H  Vancomycin Plan:  New Dosing: No change  Estimated AUC: 472 mcg*hr/mL  Estimated Trough: 13.3 mcg/mL  Next Level: With AM labs at 0600 on 7/19/23  Renal Function Monitoring: Daily BMP and [place] will continue to follow closely for s/sx of nephrotoxicity, infusion reactions and appropriateness of therapy. BMP and CBC will be ordered per protocol. We will continue to follow the patient’s culture results and clinical progress daily.     Maggie Benítez, Pharmacist

## 2023-07-18 NOTE — ASSESSMENT & PLAN NOTE
• SIRS: tachypnec, leukocytosis  • CT CAP: Right upper lobe groundglass opacity and right lower lobe consolidation along with bilateral effusions  • Per family, patient admitted at [facility] x1 week for UTI and DEANDRE, discharged 7/14 on 300mg Cefdinir BID. • UA negative  • Received azithromycin and ceftriaxone in ED  • LA 1.4  • Procal 0.29     Plan  • Cefepime . DC further vancomycin d/t neg MRSA swab  • Blood cx neg thus far.   • Trend WBC and fever curve

## 2023-07-18 NOTE — PLAN OF CARE
Problem: PHYSICAL THERAPY ADULT  Goal: Performs mobility at highest level of function for planned discharge setting. See evaluation for individualized goals. Description: Treatment/Interventions: Functional transfer training, LE strengthening/ROM, Therapeutic exercise, Endurance training, Cognitive reorientation, Patient/family training, Equipment eval/education, Bed mobility, Gait training  Equipment Recommended: Maxime Benitez       See flowsheet documentation for full assessment, interventions and recommendations. 7/18/2023 1550 by Laurence Gaona PT  Note:    Problem List: Decreased strength, Decreased endurance, Impaired balance, Decreased mobility, Decreased cognition, Impaired judgement, Decreased safety awareness, Obesity  Assessment: Yordan Anderson is a 69 y.o. Male who presents to [address] [place] [address] on 7/17/2023 from [facility] STR w/ c/o unwitnessed fall and diagnosis of acute on chronic respiratory failure w/hypoxia. Orders for PT eval and treat received. Pt presents w/ comorbidities of DMII, lymphedema, HTN. At baseline, pt mobilizes I w/ no AD, and reports + falls in the last 6 months. Upon evaluation, pt presents w/ the following deficits: weakness, impaired balance, decreased endurance and impaired cognition. Upon eval, pt requires  Mod A x 1 for bed mobility, mod A x 2 for transfers, and min A x 2 for gait. Based on this PT evaluation today, patient's discharge recommendation is for Level II. During this admission, pt would benefit from continued skilled inpatient PT in the acute care setting in order to address the abovementioned deficits to maximize function and mobility before DC from acute care. See flowsheet documentation for full assessment.

## 2023-07-18 NOTE — PROGRESS NOTES
Critical Care Interval Transfer Note:    Please refer to progress note from earlier today for full details. Barriers to discharge:   · Speech evaluation  • Trial of diuresis goal negative 500-1000cc  • IV Vancomycin and IV cefepime     Consults: IP CONSULT TO CASE MANAGEMENT  IP CONSULT TO PHARMACY    Recommended to review admission imaging for incidental findings and document in discharge navigator: Chart reviewed, no known incidental findings noted at this time. Discharge Plan: Anticipate discharge in 48-72 hrs to discharge location to be determined pending rehab evaluations. Sung Plan: Sung to be removed. Order has been placed      Will need PT/OT evaluation due to patient coming from SNF after previous 1 week hospital admission at [facility]. Patient seen and evaluated by Critical Care today and deemed to be appropriate for transfer to Stepdown Level 2. Spoke to Dr. Miley Arreola from [facility] to accept transfer. Critical care can be contacted via [employer] with any questions or concerns.     Kimberlee Dwyer, [address] [place] [address]

## 2023-07-19 LAB
ANION GAP SERPL CALCULATED.3IONS-SCNC: 5 MMOL/L
BASOPHILS # BLD AUTO: 0.06 THOUSANDS/ÂΜL (ref 0–0.1)
BASOPHILS NFR BLD AUTO: 0 % (ref 0–1)
BUN SERPL-MCNC: 31 MG/DL (ref 5–25)
CALCIUM SERPL-MCNC: 9.6 MG/DL (ref 8.4–10.2)
CHLORIDE SERPL-SCNC: 96 MMOL/L (ref 96–108)
CO2 SERPL-SCNC: 36 MMOL/L (ref 21–32)
CREAT SERPL-MCNC: 0.98 MG/DL (ref 0.6–1.3)
EOSINOPHIL # BLD AUTO: 0.42 THOUSAND/ÂΜL (ref 0–0.61)
EOSINOPHIL NFR BLD AUTO: 3 % (ref 0–6)
ERYTHROCYTE [DISTWIDTH] IN BLOOD BY AUTOMATED COUNT: 12.3 % (ref 11.6–15.1)
GFR SERPL CREATININE-BSD FRML MDRD: 78 ML/MIN/1.73SQ M
GLUCOSE SERPL-MCNC: 127 MG/DL (ref 65–140)
GLUCOSE SERPL-MCNC: 135 MG/DL (ref 65–140)
GLUCOSE SERPL-MCNC: 148 MG/DL (ref 65–140)
GLUCOSE SERPL-MCNC: 149 MG/DL (ref 65–140)
GLUCOSE SERPL-MCNC: 156 MG/DL (ref 65–140)
GLUCOSE SERPL-MCNC: 206 MG/DL (ref 65–140)
HCT VFR BLD AUTO: 39.4 % (ref 36.5–49.3)
HGB BLD-MCNC: 12.6 G/DL (ref 12–17)
IMM GRANULOCYTES # BLD AUTO: 0.24 THOUSAND/UL (ref 0–0.2)
IMM GRANULOCYTES NFR BLD AUTO: 2 % (ref 0–2)
LYMPHOCYTES # BLD AUTO: 1.07 THOUSANDS/ÂΜL (ref 0.6–4.47)
LYMPHOCYTES NFR BLD AUTO: 7 % (ref 14–44)
MAGNESIUM SERPL-MCNC: 1.9 MG/DL (ref 1.9–2.7)
MCH RBC QN AUTO: 30.7 PG (ref 26.8–34.3)
MCHC RBC AUTO-ENTMCNC: 32 G/DL (ref 31.4–37.4)
MCV RBC AUTO: 96 FL (ref 82–98)
MONOCYTES # BLD AUTO: 0.99 THOUSAND/ÂΜL (ref 0.17–1.22)
MONOCYTES NFR BLD AUTO: 6 % (ref 4–12)
MRSA NOSE QL CULT: NORMAL
NEUTROPHILS # BLD AUTO: 12.79 THOUSANDS/ÂΜL (ref 1.85–7.62)
NEUTS SEG NFR BLD AUTO: 82 % (ref 43–75)
NRBC BLD AUTO-RTO: 0 /100 WBCS
PHOSPHATE SERPL-MCNC: 3.1 MG/DL (ref 2.3–4.1)
PLATELET # BLD AUTO: 432 THOUSANDS/UL (ref 149–390)
PMV BLD AUTO: 8.9 FL (ref 8.9–12.7)
POTASSIUM SERPL-SCNC: 3.9 MMOL/L (ref 3.5–5.3)
RBC # BLD AUTO: 4.11 MILLION/UL (ref 3.88–5.62)
SODIUM SERPL-SCNC: 137 MMOL/L (ref 135–147)
VANCOMYCIN SERPL-MCNC: 13.6 UG/ML (ref 10–20)
WBC # BLD AUTO: 15.57 THOUSAND/UL (ref 4.31–10.16)

## 2023-07-19 PROCEDURE — 99232 SBSQ HOSP IP/OBS MODERATE 35: CPT | Performed by: INTERNAL MEDICINE

## 2023-07-19 PROCEDURE — 80202 ASSAY OF VANCOMYCIN: CPT

## 2023-07-19 PROCEDURE — 94660 CPAP INITIATION&MGMT: CPT

## 2023-07-19 PROCEDURE — 99232 SBSQ HOSP IP/OBS MODERATE 35: CPT | Performed by: HOSPITALIST

## 2023-07-19 PROCEDURE — 82948 REAGENT STRIP/BLOOD GLUCOSE: CPT

## 2023-07-19 PROCEDURE — 85025 COMPLETE CBC W/AUTO DIFF WBC: CPT

## 2023-07-19 PROCEDURE — 84100 ASSAY OF PHOSPHORUS: CPT

## 2023-07-19 PROCEDURE — 97530 THERAPEUTIC ACTIVITIES: CPT

## 2023-07-19 PROCEDURE — 80048 BASIC METABOLIC PNL TOTAL CA: CPT

## 2023-07-19 PROCEDURE — 83735 ASSAY OF MAGNESIUM: CPT

## 2023-07-19 PROCEDURE — 94760 N-INVAS EAR/PLS OXIMETRY 1: CPT

## 2023-07-19 RX ORDER — INSULIN LISPRO 100 [IU]/ML
2-12 INJECTION, SOLUTION INTRAVENOUS; SUBCUTANEOUS
Status: DISCONTINUED | OUTPATIENT
Start: 2023-07-19 | End: 2023-07-27 | Stop reason: HOSPADM

## 2023-07-19 RX ADMIN — CEFEPIME HYDROCHLORIDE 2000 MG: 2 INJECTION, SOLUTION INTRAVENOUS at 10:27

## 2023-07-19 RX ADMIN — CHLORHEXIDINE GLUCONATE 15 ML: 1.2 RINSE ORAL at 09:00

## 2023-07-19 RX ADMIN — MICONAZOLE NITRATE: 20 CREAM TOPICAL at 09:10

## 2023-07-19 RX ADMIN — GLYCERIN, HYPROMELLOSE, POLYETHYLENE GLYCOL 1 DROP: .2; .2; 1 LIQUID OPHTHALMIC at 17:44

## 2023-07-19 RX ADMIN — VANCOMYCIN HYDROCHLORIDE 1000 MG: 1 INJECTION, SOLUTION INTRAVENOUS at 08:55

## 2023-07-19 RX ADMIN — INSULIN LISPRO 4 UNITS: 100 INJECTION, SOLUTION INTRAVENOUS; SUBCUTANEOUS at 12:31

## 2023-07-19 RX ADMIN — CEFEPIME HYDROCHLORIDE 2000 MG: 2 INJECTION, SOLUTION INTRAVENOUS at 02:07

## 2023-07-19 RX ADMIN — MICONAZOLE NITRATE: 20 CREAM TOPICAL at 17:44

## 2023-07-19 RX ADMIN — CHLORHEXIDINE GLUCONATE 15 ML: 1.2 RINSE ORAL at 21:58

## 2023-07-19 RX ADMIN — INSULIN LISPRO 2 UNITS: 100 INJECTION, SOLUTION INTRAVENOUS; SUBCUTANEOUS at 21:59

## 2023-07-19 RX ADMIN — FUROSEMIDE 40 MG: 10 INJECTION, SOLUTION INTRAMUSCULAR; INTRAVENOUS at 09:01

## 2023-07-19 RX ADMIN — HEPARIN SODIUM 5000 UNITS: 5000 INJECTION INTRAVENOUS; SUBCUTANEOUS at 21:58

## 2023-07-19 RX ADMIN — HEPARIN SODIUM 5000 UNITS: 5000 INJECTION INTRAVENOUS; SUBCUTANEOUS at 05:40

## 2023-07-19 RX ADMIN — MELATONIN 6 MG: at 21:58

## 2023-07-19 RX ADMIN — LISINOPRIL 10 MG: 10 TABLET ORAL at 09:01

## 2023-07-19 RX ADMIN — CEFEPIME HYDROCHLORIDE 2000 MG: 2 INJECTION, SOLUTION INTRAVENOUS at 17:44

## 2023-07-19 RX ADMIN — HEPARIN SODIUM 5000 UNITS: 5000 INJECTION INTRAVENOUS; SUBCUTANEOUS at 14:53

## 2023-07-19 NOTE — PHYSICAL THERAPY NOTE
PHYSICAL THERAPY TREATMENT NOTE    Patient Name: Yahaira Acevedo  UCEIV'U Date: 7/19/2023 07/19/23 1310   PT Last Visit   PT Visit Date 07/19/23   Note Type   Note Type Treatment   Pain Assessment   Pain Assessment Tool 0-10   Pain Score No Pain   Restrictions/Precautions   Weight Bearing Precautions Per Order No   Other Precautions Chair Alarm; Bed Alarm; Fall Risk;Multiple lines;Telemetry   General   Chart Reviewed Yes   Family/Caregiver Present Yes   Cognition   Overall Cognitive Status Impaired   Arousal/Participation Alert   Attention Attends with cues to redirect   Orientation Level Oriented to person;Oriented to place;Oriented to time;Disoriented to situation   Memory Decreased recall of precautions;Decreased recall of recent events;Decreased short term memory   Following Commands Follows one step commands with increased time or repetition   Bed Mobility   Supine to Sit Unable to assess   Transfers   Sit to Stand 2  Maximal assistance   Additional items Assist x 2; Increased time required;Verbal cues   Stand to Sit 4  Minimal assistance   Additional items Assist x 2; Increased time required;Verbal cues   Additional Comments Pt requires 3 trials of STS from recliner chair due to low height and his tall stature. Benefits from B HHA w/ knee block to come to stand, then provided w/ RW   Ambulation/Elevation   Gait pattern Decreased foot clearance   Gait Assistance 4  Minimal assist   Additional items Assist x 2;Verbal cues   Assistive Device Rolling walker   Distance 2 ft   Balance   Static Sitting Fair +   Static Standing Poor   Ambulatory Poor   Activity Tolerance   Activity Tolerance Patient limited by fatigue   Nurse Made Aware RN [facility]   Assessment   Problem List Decreased strength;Decreased endurance; Impaired balance;Decreased mobility; Decreased cognition; Impaired judgement;Decreased safety awareness; Obesity   Assessment Pt seen for PT intervention. Improved mentation, cognition today compared to yesterday. Pt w/ increased difficulty getting out of chair compared to EOB due the lower chair height and his tall stature. Once on his feet, pt is able to pivot with less physical A than his STS transfer requires. Pt seated OOB in recliner chair at end of session   Goals   Patient Goals none stated today   STG Expiration Date 07/28/23   Short Term Goal #1 Patient will: Perform all bed mobility tasks w/ minx1 to improve pt's independence w/ repositioning for decrease risk of skin breakdown, Perform all transfers w/ minx1 consistently from various height surfaces in order to improve I w/ engagement w/ real-world environments/situations, Ambulate at least 25 ft. with roller walker w/ minx1 w/o LOB to facilitate return and engagement w/ previous living environment, Increase all balance 1/2 grade to decrease risk for falls and Tolerate 3 hr OOB to faciliate upright tolerance   PT Treatment Day 1   Plan   Treatment/Interventions Functional transfer training;LE strengthening/ROM; Therapeutic exercise; Endurance training;Cognitive reorientation;Patient/family training;Equipment eval/education; Bed mobility;Gait training   PT Frequency 2-3x/wk   Recommendation   UB Rehab Discharge Recommendation (PT/OT) Level 2   Equipment Recommended Walker   AM-PAC Basic Mobility Inpatient   Turning in Flat Bed Without Bedrails 2   Lying on Back to Sitting on Edge of Flat Bed Without Bedrails 2   Moving Bed to Chair 2   Standing Up From Chair Using Arms 1   Walk in Room 2   Climb 3-5 Stairs With Railing 1   Basic Mobility Inpatient Raw Score 10   Turning Head Towards Sound 3   Follow Simple Instructions 3   Low Function Basic Mobility Raw Score  16   Low Function Basic Mobility Standardized Score  25.72   Highest Level Of Mobility   -HLM Goal 4: Move to chair/commode   JH-HLM Achieved 6: Walk 10 steps or more   End of Consult   Patient Position at End of Consult Seated edge of bed; All needs within reach       The patient's AM-PAC Basic Mobility Inpatient Short Form Raw Score is 10. A Raw score of less than or equal to 16 suggests the patient may benefit from discharge to post-acute rehabilitation services. Please also refer to the recommendation of the Physical Therapist for safe discharge planning. Pt would continue to benefit from skilled PT during this admission in order to progress patient towards goals to decrease risk of falls and maximize independence.      Lavern Schumacher, PT, DPT

## 2023-07-19 NOTE — PROGRESS NOTES
[facility]  Progress Note  Name: Johann Gutierres  MRN: [id number]  Unit/Bed#: -01 I Date of Admission: 7/17/2023   Date of Service: 7/19/2023 I Hospital Day: 2    Assessment/Plan   Elevated troponin  Assessment & Plan  • Tropon 177      Plan  • Trend troponin   • No ischemic changes on EKG    Obesity  Assessment & Plan  • Encourage diet and lifestyle modifications when medically stable    Type 2 diabetes mellitus (HCC)  Assessment & Plan  Lab Results   Component Value Date    HGBA1C 6.5 (H) 07/17/2023       Recent Labs     07/18/23  0022   POCGLU 163*       Blood Sugar Average: Last 72 hrs:  (P) 163     • History of DM2 listed in chart from SNF,however, no medication listed on MAR from SNF     Plan  • Q6H fingersticks  • Check Hgb A1C    Lymphedema  Assessment & Plan  • Home regimen: HCTZ     Plan  • 40mg IV lasix daily    Hypertension  Assessment & Plan  • Home regimen: lisinopril     Plan  • Hold home regimen while NPO    Urinary tract infection  Assessment & Plan  • Per family, patient admitted at [facility] x1 week for UTI and DEANDRE, discharged 7/14 on 300mg Cefdinir BID. • UA negative     Plan  • IV Cefepime. DC vancomycin as MRSA testing neg. Sepsis ([address])  Assessment & Plan  • SIRS: tachypnec, leukocytosis  • CT CAP: Right upper lobe groundglass opacity and right lower lobe consolidation along with bilateral effusions  • Per family, patient admitted at [facility] x1 week for UTI and DEANDRE, discharged 7/14 on 300mg Cefdinir BID. • UA negative  • Received azithromycin and ceftriaxone in ED  • LA 1.4  • Procal 0.29     Plan  • Cefepime . DC further vancomycin d/t neg MRSA swab  • Blood cx neg thus far. • Trend WBC and fever curve    Encephalopathy acute  Assessment & Plan  • Brought to ED from SNF after being found by roommate on the floor and altered compared to baseline.   • Approximate downtime 1.5 hours  • CTH: no acute intracranial abnormality  • CT c-spine: no c-spine fracture or traumatic malalignment  • VBG pH 7.2/pCO2 79.9  • Received 2mg IV Narvcan in ED without improvement in mental status  • Ammonia 38      Plan  • Q4H neuro checks  • Add melatonin  • OOB during the day to encourage proper sleep/wake cycle    * Acute on chronic respiratory failure with hypoxia and hypercapnia (HCC)  Assessment & Plan  • Per chart patient wears 2L oxygen at SNF, however, family states the patient does not wear oxygen at home. Unsure if oxygen requirement is new since admission at [facility] last week. • VBG pH 7.2/pCO2 79.9 -->Placed on continuous BIPAP in ED  • VBG pH 7.3/62.9 --> BIPAP HS       Plan  • Titrate oxygen with SpO2 goal > 90%            VTE  Prophylaxis:   Pharmacologic: in place  Mechanical VTE Prophylaxis in Place: Yes    Patient Centered Rounds: I have performed bedside rounds with nursing staff today. Discussions with Specialists or Other Care Team Provider: case management    Education and Discussions with Family / Patient: pt family bedside      Current Length of Stay: 2 day(s)    Current Patient Status: Inpatient        Code Status: Level 3 - DNAR and DNI    Discharge Plan: Pt will require continued inpatient hospitalization. Subjective:   Pt reports interval improvement from admission  He still has a dense cough but improved   Working on getting out of bed    Patient is seen and examined at bedside. All other ROS are negative. Objective:     Vitals:   Temp (24hrs), Av.5 °F (36.9 °C), Min:97.4 °F (36.3 °C), Max:99.3 °F (37.4 °C)    Temp:  [97.4 °F (36.3 °C)-99.3 °F (37.4 °C)] 98.4 °F (36.9 °C)  HR:  [69-80] 80  Resp:  [18-22] 22  BP: (101-169)/(59-82) 169/82  SpO2:  [92 %-98 %] 92 %  Body mass index is 46.13 kg/m². Input and Output Summary (last 24 hours):        Intake/Output Summary (Last 24 hours) at 2023 1427  Last data filed at 2023 1201  Gross per 24 hour   Intake 620 ml   Output 1990 ml   Net -1370 ml       Physical Exam:       GEN: No acute distress, comfortable, obese male on o2  HEEENT: No JVD, PERRLA, no scleral icterus  RESP: Lungs coarse   CV: RRR, +s1/s2   ABD: SOFT NON TENDER, POSITIVE BOWEL SOUNDS, NO DISTENTION  PSYCH: CALM  NEURO: Mentation baseline, generalized weakness  SKIN: NO RASH  EXTREM: NO EDEMA    Additional Data:     Labs:    Results from last 7 days   Lab Units 07/19/23  0552 07/17/23  1356 07/17/23  1339   WBC Thousand/uL 15.57*   < > 15.53*   HEMOGLOBIN g/dL 12.6   < > 15.0   I STAT HEMOGLOBIN   --    < >  --    HEMATOCRIT % 39.4   < > 46.9   HEMATOCRIT, ISTAT   --    < >  --    PLATELETS Thousands/uL 432*   < > 508*   BANDS PCT %  --   --  2   NEUTROS PCT % 82*   < >  --    LYMPHS PCT % 7*   < >  --    LYMPHO PCT %  --   --  6*   MONOS PCT % 6   < >  --    MONO PCT %  --   --  3*   EOS PCT % 3   < > 0    < > = values in this interval not displayed. Results from last 7 days   Lab Units 07/19/23  0552 07/17/23  1356 07/17/23  1339   SODIUM mmol/L 137   < > 133*   POTASSIUM mmol/L 3.9   < > 4.5   CHLORIDE mmol/L 96   < > 92*   CO2 mmol/L 36*   < > 35*   CO2, I-STAT   --    < >  --    BUN mg/dL 31*   < > 34*   CREATININE mg/dL 0.98   < > 1.01   ANION GAP mmol/L 5   < > 6   CALCIUM mg/dL 9.6   < > 10.1   ALBUMIN g/dL  --   --  3.3*   TOTAL BILIRUBIN mg/dL  --   --  0.74   ALK PHOS U/L  --   --  118*   ALT U/L  --   --  26   AST U/L  --   --  29   GLUCOSE RANDOM mg/dL 127   < > 164*    < > = values in this interval not displayed.      Results from last 7 days   Lab Units 07/17/23  1339   INR  1.01     Results from last 7 days   Lab Units 07/19/23  1231 07/19/23  0538 07/19/23  0016 07/18/23  1804 07/18/23  1148 07/18/23  0022   POC GLUCOSE mg/dl 206* 135 149* 149* 153* 163*     Results from last 7 days   Lab Units 07/17/23  1947   HEMOGLOBIN A1C % 6.5*     Results from last 7 days   Lab Units 07/18/23  0526 07/17/23  1350 07/17/23  1339   LACTIC ACID mmol/L  --  1.4  --    PROCALCITONIN ng/ml 0.25  --  0.29*       Lines/Drains:  Invasive Devices     Peripheral Intravenous Line  Duration           Peripheral IV 07/18/23 Dorsal (posterior); Left Forearm 1 day                Telemetry:        * I Have Reviewed All Lab Data Listed Above. Imaging:     Results for orders placed during the hospital encounter of 07/17/23    XR Trauma chest portable    Narrative  CHEST    INDICATION:   TRAUMA. Patient found unresponsive. Patient has suspected COVID-19. COMPARISON:  None    EXAM PERFORMED/VIEWS:  XR CHEST PORTABLE AP semierect  Images: 2    FINDINGS:    Heart shadow appears enlarged. Atherosclerotic aortic tortuosity is noted. Vascular congestion with a pulmonary edema pattern. More focal patchy opacity in the right mid lung suspect for pneumonia. No obvious effusion or pneumothorax. No acute osseous abnormalities. Impression  Focal opacity in the right mid lung suspect for pneumonia. Vascular congestion and pulmonary edema. The study was marked in [facility] for immediate notification. Workstation performed: [id number]    No results found for this or any previous visit. *I have reviewed all imaging reports listed above      Recent Cultures (last 7 days):     Results from last 7 days   Lab Units 07/18/23  0940 07/17/23  1826 07/17/23  1350   BLOOD CULTURE   --   --  No Growth at 24 hrs. No Growth at 24 hrs.    SPUTUM CULTURE  Culture too young- will reincubate  --   --    GRAM STAIN RESULT  Rare Epithelial cells per low power field  No Polys or Bacteria seen  --   --    LEGIONELLA URINARY ANTIGEN   --  Negative  --        Last 24 Hours Medication List:   Current Facility-Administered Medications   Medication Dose Route Frequency Provider Last Rate   • cefepime  2,000 mg Intravenous Q8H RALPH Sullivan Stopped (07/19/23 1041)   • chlorhexidine  15 mL Mouth/Throat Q12H [facility] RALPH Sullivan     • furosemide  40 mg Intravenous Daily RALPH Sullivan     • heparin (porcine)  5,000 Units Subcutaneous Q8H [address]     • hydrALAZINE  5 mg Intravenous Q6H PRN RALPH Knox     • insulin lispro  2-12 Units Subcutaneous Q6H [address]     • lisinopril  10 mg Oral Daily RALPH Knox     • melatonin  6 mg Oral HS RALPH Knox     • DANNA ANTIFUNGAL   Topical BID RALPH Knox          Today, Patient Was Seen By: Madhu Claire MD    ** Please Note: Dictation voice to text software may have been used in the creation of this document.  **

## 2023-07-19 NOTE — CASE MANAGEMENT
Case Management Discharge Planning Note    Patient name Yahaira Acevedo  Location /-42 MRN [id number]  : 1955 Date 2023       Current Admission Date: 2023  Current Admission Diagnosis:Acute on chronic respiratory failure with hypoxia and hypercapnia [facility])   Patient Active Problem List    Diagnosis Date Noted   • Encephalopathy acute 2023   • Sepsis ([address]) 2023   • Urinary tract infection 2023   • Hypertension 2023   • Lymphedema 2023   • Type 2 diabetes mellitus ([address]) 2023   • Obesity 2023   • Acute on chronic respiratory failure with hypoxia and hypercapnia ([address]) 2023   • Elevated troponin 2023      LOS (days): 2  Geometric Mean LOS (GMLOS) (days): 5.00  Days to GMLOS:3.1     OBJECTIVE:  Risk of Unplanned Readmission Score: 11.62         Current admission status: Inpatient   Preferred Pharmacy:   [address] [place] [address]  Phone: [phone] Fax: [phone]    Primary Care Provider: Sharif Veras DO    Primary Insurance: [facility]  Secondary Insurance:     DISCHARGE DETAILS:    Discharge planning discussed with[de-identified] Tova Biswas (wife)  Freedom of Choice: Yes     Treatment Team Recommendation: Short Term Rehab  Discharge Destination Plan[de-identified] Short Term Rehab  Transport at Discharge : BLS Ambulance     Additional Comments: CM called and spoke to pt's wife Tova Biswas to discuss accepting SNF's. Tova Biswas is requesting [facility]--CM reserved in [address] no

## 2023-07-19 NOTE — PROGRESS NOTES
Progress Note - Pulmonary   Jenny London 69 y.o. male MRN: [id number]  Unit/Bed#: -01 Encounter: [id number]      Assessment & Recommendations:  1. Acute on likely chronic hypercarbic and acute hypoxic respiratory failure  · Titrate O2 to keep SpO2 88-94%  · IS, OOB-chair, PT/OT  · Will attempt to qualify for BiPAP tonight - check overnight SpO2 on 4L/min NC and check AM ABG  · Discussed extensively with patient and his daughter    2. SIRS/Sepsis - POA - RR/WBC related to pneumonia - improving    3. Multilobar pneumonia with recent admission for UTI  · Continue cefepime/vanc pending further culture results D#3/7 abx    4. Obesity, suspected CA, possible OHVS - as above attempt to qualify for BiPAP    5. Toxic/metabolic encephalopathy with likely delirium component - improved    6. Pleural effusions, volume overload - diuresis per primary team      Subjective:   69year old man with HTN, obesity post remote gastric bypass, lymphedema, DM2, prior CA (previously on CPAP), and recent admission to [facility] for UTI.  Found unresponsive by roommate at rehab. [facility] ED found to have respiratory distress/hypercarbia and placed on BiPAP was well as multilobar pneumonia and started on CTX.  He was weaned from BiPAP, downgraded to [facility] 7/18.     24hr events - feeling improved, tolerating BiPAP but does not sleep well with it, clearing sputum, no hemoptysis, no pleurisy    Objective:       Vitals: Blood pressure 159/82, pulse 69, temperature 99.3 °F (37.4 °C), temperature source Oral, resp. rate 19, height 6' 3" (1.905 m), weight (!) 167 kg (369 lb 0.8 oz), SpO2 95 %. , 4LNC, Body mass index is 46.13 kg/m².       Intake/Output Summary (Last 24 hours) at 7/19/2023 1011  Last data filed at 7/19/2023 0935  Gross per 24 hour   Intake 500 ml   Output 1765 ml   Net -1265 ml         Physical Exam  Gen: Obese older man, awake, alert, oriented x 3, no acute distress  HEENT: Mucous membranes moist, no oral lesions, no thrush  NECK: No accessory muscle use, JVP not elevated  Cardiac: Regular, single S1, single S2, no murmurs, no rubs, no gallops  Lungs: diminished diffusely likely related to habitus, no wheeze or rales, no pleural rubs, no rhonchi  Abdomen: normoactive bowel sounds, soft nontender, nondistended, no rebound or rigidity, no guarding  Extremities: no cyanosis, no clubbing, 1-2+ edema with CVS changes and LE dressings in place    Labs: I have personally reviewed pertinent lab results.   Laboratory and Diagnostics  Results from last 7 days   Lab Units 07/19/23  0552 07/18/23  0526 07/17/23  1356 07/17/23  1339   WBC Thousand/uL 15.57* 14.14*  --  15.53*   HEMOGLOBIN g/dL 12.6 14.1  --  15.0   I STAT HEMOGLOBIN g/dl  --   --  12.9  --    HEMATOCRIT % 39.4 44.3  --  46.9   HEMATOCRIT, ISTAT %  --   --  38  --    PLATELETS Thousands/uL 432* 331  --  508*   NEUTROS PCT % 82* 85*  --   --    BANDS PCT %  --   --   --  2   MONOS PCT % 6 6  --   --    MONO PCT %  --   --   --  3*   EOS PCT % 3 1  --  0     Results from last 7 days   Lab Units 07/19/23 0552 07/18/23 0526 07/17/23 1356 07/17/23  1339   SODIUM mmol/L 137 133*  --  133*   POTASSIUM mmol/L 3.9 4.2  --  4.5   CHLORIDE mmol/L 96 95*  --  92*   CO2 mmol/L 36* 32  --  35*   CO2, I-STAT mmol/L  --   --  39*  --    ANION GAP mmol/L 5 6  --  6   BUN mg/dL 31* 35*  --  34*   CREATININE mg/dL 0.98 1.02  --  1.01   CALCIUM mg/dL 9.6 9.6  --  10.1   GLUCOSE RANDOM mg/dL 127 153*  --  164*   ALT U/L  --   --   --  26   AST U/L  --   --   --  29   ALK PHOS U/L  --   --   --  118*   ALBUMIN g/dL  --   --   --  3.3*   TOTAL BILIRUBIN mg/dL  --   --   --  0.74     Results from last 7 days   Lab Units 07/19/23  0552 07/18/23  0526   MAGNESIUM mg/dL 1.9 2.0   PHOSPHORUS mg/dL 3.1 3.6      Results from last 7 days   Lab Units 07/17/23  1339   INR  1.01   PTT seconds 30          Results from last 7 days   Lab Units 07/17/23  1350   LACTIC ACID mmol/L 1.4                     Results from last 7 days Lab Units 07/18/23  0526 07/17/23  1339   PROCALCITONIN ng/ml 0.25 0.29*       VBG 7.27/80/198--->7.31/63/73     MICRO  Sputum 7/18 pending  MRSA pending  FLU/RSV/COVID neg  Bld Cx 7/17 NGTD  UA neg nit/LE  Strep/legionella ag neg     RADIOGRAPHS - images personally reviewed  CT C/A/P - right sided mixed ground glass and more dense basilar infiltrates, slight left basilar infiltrate, right > left pleural effusions, no acute IA pathology, noted anasarca     CT  head/Cspine without acute traumatic injury or acute IC findings, remote right cerebellar infarct      Davide Wheat DO, [facility]

## 2023-07-19 NOTE — PROGRESS NOTES
Jenny London is a 69 y.o. male who is currently ordered Vancomycin IV with management by the Pharmacy Consult service. Relevant clinical data and objective / subjective history reviewed. Vancomycin Assessment:  Indication and Goal AUC/Trough: Pneumonia (goal -600, trough >10), -600, trough >10  Clinical Status: stable  Micro:     Renal Function:  SCr:  0.98 mg/dL  CrCl: 118 mL/min  Renal replacement: Not on dialysis  Days of Therapy: 3  Current Dose: 1000 mg Q12H  Vancomycin Plan:  New Dosing: No change  Estimated AUC: 415  mcg*hr/mL  Estimated Trough: 10.8 mcg/mL  Next Level: @0600 AM on 07/24 Good Fit (unless renal fxn changes )  Renal Function Monitoring: Daily BMP and UOP  Pharmacy will continue to follow closely for s/sx of nephrotoxicity, infusion reactions and appropriateness of therapy. BMP and CBC will be ordered per protocol. We will continue to follow the patient’s culture results and clinical progress daily.     Moncho Shah, Pharmacist

## 2023-07-19 NOTE — PLAN OF CARE
Problem: MOBILITY - ADULT  Goal: Maintain or return to baseline ADL function  Description: INTERVENTIONS:  -  Assess patient's ability to carry out ADLs; assess patient's baseline for ADL function and identify physical deficits which impact ability to perform ADLs (bathing, care of mouth/teeth, toileting, grooming, dressing, etc.)  - Assess/evaluate cause of self-care deficits   - Assess range of motion  - Assess patient's mobility; develop plan if impaired  - Assess patient's need for assistive devices and provide as appropriate  - Encourage maximum independence but intervene and supervise when necessary  - Involve family in performance of ADLs  - Assess for home care needs following discharge   - Consider OT consult to assist with ADL evaluation and planning for discharge  - Provide patient education as appropriate  Outcome: Progressing  Goal: Maintains/Returns to pre admission functional level  Description: INTERVENTIONS:  - Perform BMAT or MOVE assessment daily.   - Set and communicate daily mobility goal to care team and patient/family/caregiver. - Collaborate with rehabilitation services on mobility goals if consulted  - Perform Range of Motion 4 times a day. - Reposition patient every 2 hours.   - Dangle patient 3 times a day  - Stand patient 3 times a day  - Ambulate patient 3 times a day  - Out of bed to chair 3 times a day   - Out of bed for meals 3 times a day  - Out of bed for toileting  - Record patient progress and toleration of activity level   Outcome: Progressing     Problem: PAIN - ADULT  Goal: Verbalizes/displays adequate comfort level or baseline comfort level  Description: Interventions:  - Encourage patient to monitor pain and request assistance  - Assess pain using appropriate pain scale  - Administer analgesics based on type and severity of pain and evaluate response  - Implement non-pharmacological measures as appropriate and evaluate response  - Consider cultural and social influences on pain and pain management  - Notify physician/advanced practitioner if interventions unsuccessful or patient reports new pain  Outcome: Progressing     Problem: INFECTION - ADULT  Goal: Absence or prevention of progression during hospitalization  Description: INTERVENTIONS:  - Assess and monitor for signs and symptoms of infection  - Monitor lab/diagnostic results  - Monitor all insertion sites, i.e. indwelling lines, tubes, and drains  - Monitor endotracheal if appropriate and nasal secretions for changes in amount and color  - [place] appropriate cooling/warming therapies per order  - Administer medications as ordered  - Instruct and encourage patient and family to use good hand hygiene technique  - Identify and instruct in appropriate isolation precautions for identified infection/condition  Outcome: Progressing     Problem: DISCHARGE PLANNING  Goal: Discharge to home or other facility with appropriate resources  Description: INTERVENTIONS:  - Identify barriers to discharge w/patient and caregiver  - Arrange for needed discharge resources and transportation as appropriate  - Identify discharge learning needs (meds, wound care, etc.)  - Arrange for interpretive services to assist at discharge as needed  - Refer to Case Management Department for coordinating discharge planning if the patient needs post-hospital services based on physician/advanced practitioner order or complex needs related to functional status, cognitive ability, or social support system  Outcome: Progressing     Problem: Knowledge Deficit  Goal: Patient/family/caregiver demonstrates understanding of disease process, treatment plan, medications, and discharge instructions  Description: Complete learning assessment and assess knowledge base.   Interventions:  - Provide teaching at level of understanding  - Provide teaching via preferred learning methods  Outcome: Progressing     Problem: CARDIOVASCULAR - ADULT  Goal: Maintains optimal cardiac output and hemodynamic stability  Description: INTERVENTIONS:  - Monitor I/O, vital signs and rhythm  - Monitor for S/S and trends of decreased cardiac output  - Administer and titrate ordered vasoactive medications to optimize hemodynamic stability  - Assess quality of pulses, skin color and temperature  - Assess for signs of decreased coronary artery perfusion  - Instruct patient to report change in severity of symptoms  Outcome: Progressing  Goal: Absence of cardiac dysrhythmias or at baseline rhythm  Description: INTERVENTIONS:  - Continuous cardiac monitoring, vital signs, obtain 12 lead EKG if ordered  - Administer antiarrhythmic and heart rate control medications as ordered  - Monitor electrolytes and administer replacement therapy as ordered  Outcome: Progressing     Problem: RESPIRATORY - ADULT  Goal: Achieves optimal ventilation and oxygenation  Description: INTERVENTIONS:  - Assess for changes in respiratory status  - Assess for changes in mentation and behavior  - Position to facilitate oxygenation and minimize respiratory effort  - Oxygen administered by appropriate delivery if ordered  - Initiate smoking cessation education as indicated  - Encourage broncho-pulmonary hygiene including cough, deep breathe, Incentive Spirometry  - Assess the need for suctioning and aspirate as needed  - Assess and instruct to report SOB or any respiratory difficulty  - Respiratory Therapy support as indicated  Outcome: Progressing     Problem: SAFETY,RESTRAINT: NV/NON-SELF DESTRUCTIVE BEHAVIOR  Goal: Remains free of harm/injury (restraint for non violent/non self-detsructive behavior)  Description: INTERVENTIONS:  - Instruct patient/family regarding restraint use   - Assess and monitor physiologic and psychological status   - Provide interventions and comfort measures to meet assessed patient needs   - Identify and implement measures to help patient regain control  - Assess readiness for release of restraint   Outcome: Progressing  Goal: Returns to optimal restraint-free functioning  Description: INTERVENTIONS:  - Assess the patient's behavior and symptoms that indicate continued need for restraint  - Identify and implement measures to help patient regain control  - Assess readiness for release of restraint   Outcome: Progressing     Problem: Prexisting or High Potential for Compromised Skin Integrity  Goal: Skin integrity is maintained or improved  Description: INTERVENTIONS:  - Identify patients at risk for skin breakdown  - Assess and monitor skin integrity  - Assess and monitor nutrition and hydration status  - Monitor labs   - Assess for incontinence   - Turn and reposition patient  - Assist with mobility/ambulation  - Relieve pressure over bony prominences  - Avoid friction and shearing  - Provide appropriate hygiene as needed including keeping skin clean and dry  - Evaluate need for skin moisturizer/barrier cream  - Collaborate with interdisciplinary team   - Patient/family teaching  - Consider wound care consult   Outcome: Progressing

## 2023-07-19 NOTE — PLAN OF CARE
Problem: PHYSICAL THERAPY ADULT  Goal: Performs mobility at highest level of function for planned discharge setting. See evaluation for individualized goals. Description: Treatment/Interventions: Functional transfer training, LE strengthening/ROM, Therapeutic exercise, Endurance training, Cognitive reorientation, Patient/family training, Equipment eval/education, Bed mobility, Gait training  Equipment Recommended: [employer]       See flowsheet documentation for full assessment, interventions and recommendations. Note:    Problem List: Decreased strength, Decreased endurance, Impaired balance, Decreased mobility, Decreased cognition, Impaired judgement, Decreased safety awareness, Obesity  Assessment: Pt seen for PT intervention. Improved mentation, cognition today compared to yesterday. Pt w/ increased difficulty getting out of chair compared to EOB due the lower chair height and his tall stature. Once on his feet, pt is able to pivot with less physical A than his STS transfer requires. Pt seated OOB in recliner chair at end of session             See flowsheet documentation for full assessment.

## 2023-07-20 LAB
ALBUMIN SERPL BCP-MCNC: 2.7 G/DL (ref 3.5–5)
ALP SERPL-CCNC: 82 U/L (ref 34–104)
ALT SERPL W P-5'-P-CCNC: 16 U/L (ref 7–52)
ANION GAP SERPL CALCULATED.3IONS-SCNC: 4 MMOL/L
AST SERPL W P-5'-P-CCNC: 22 U/L (ref 13–39)
BACTERIA SPT RESP CULT: NORMAL
BASE EXCESS BLDA CALC-SCNC: 11 MMOL/L (ref -2–3)
BASOPHILS # BLD AUTO: 0.04 THOUSANDS/ÂΜL (ref 0–0.1)
BASOPHILS NFR BLD AUTO: 0 % (ref 0–1)
BILIRUB SERPL-MCNC: 0.87 MG/DL (ref 0.2–1)
BUN SERPL-MCNC: 28 MG/DL (ref 5–25)
CA-I BLD-SCNC: 1.33 MMOL/L (ref 1.12–1.32)
CALCIUM ALBUM COR SERPL-MCNC: 10.5 MG/DL (ref 8.3–10.1)
CALCIUM SERPL-MCNC: 9.5 MG/DL (ref 8.4–10.2)
CHLORIDE SERPL-SCNC: 94 MMOL/L (ref 96–108)
CO2 SERPL-SCNC: 38 MMOL/L (ref 21–32)
CREAT SERPL-MCNC: 0.99 MG/DL (ref 0.6–1.3)
EOSINOPHIL # BLD AUTO: 0.47 THOUSAND/ÂΜL (ref 0–0.61)
EOSINOPHIL NFR BLD AUTO: 3 % (ref 0–6)
ERYTHROCYTE [DISTWIDTH] IN BLOOD BY AUTOMATED COUNT: 12.5 % (ref 11.6–15.1)
GFR SERPL CREATININE-BSD FRML MDRD: 77 ML/MIN/1.73SQ M
GLUCOSE SERPL-MCNC: 123 MG/DL (ref 65–140)
GLUCOSE SERPL-MCNC: 123 MG/DL (ref 65–140)
GLUCOSE SERPL-MCNC: 130 MG/DL (ref 65–140)
GLUCOSE SERPL-MCNC: 160 MG/DL (ref 65–140)
GLUCOSE SERPL-MCNC: 179 MG/DL (ref 65–140)
GLUCOSE SERPL-MCNC: 182 MG/DL (ref 65–140)
GRAM STN SPEC: NORMAL
GRAM STN SPEC: NORMAL
HCO3 BLDA-SCNC: 38.6 MMOL/L (ref 22–28)
HCT VFR BLD AUTO: 40.4 % (ref 36.5–49.3)
HCT VFR BLD CALC: 37 % (ref 36.5–49.3)
HGB BLD-MCNC: 12.8 G/DL (ref 12–17)
HGB BLDA-MCNC: 12.6 G/DL (ref 12–17)
IMM GRANULOCYTES # BLD AUTO: 0.21 THOUSAND/UL (ref 0–0.2)
IMM GRANULOCYTES NFR BLD AUTO: 1 % (ref 0–2)
LYMPHOCYTES # BLD AUTO: 1.24 THOUSANDS/ÂΜL (ref 0.6–4.47)
LYMPHOCYTES NFR BLD AUTO: 8 % (ref 14–44)
MCH RBC QN AUTO: 30.7 PG (ref 26.8–34.3)
MCHC RBC AUTO-ENTMCNC: 31.7 G/DL (ref 31.4–37.4)
MCV RBC AUTO: 97 FL (ref 82–98)
MONOCYTES # BLD AUTO: 1.04 THOUSAND/ÂΜL (ref 0.17–1.22)
MONOCYTES NFR BLD AUTO: 6 % (ref 4–12)
NEUTROPHILS # BLD AUTO: 13.56 THOUSANDS/ÂΜL (ref 1.85–7.62)
NEUTS SEG NFR BLD AUTO: 82 % (ref 43–75)
NRBC BLD AUTO-RTO: 0 /100 WBCS
PCO2 BLD: 41 MMOL/L (ref 21–32)
PCO2 BLD: 62.2 MM HG (ref 36–44)
PH BLD: 7.4 [PH] (ref 7.35–7.45)
PLATELET # BLD AUTO: 445 THOUSANDS/UL (ref 149–390)
PMV BLD AUTO: 8.9 FL (ref 8.9–12.7)
PO2 BLD: 59 MM HG (ref 75–129)
POTASSIUM BLD-SCNC: 3.5 MMOL/L (ref 3.5–5.3)
POTASSIUM SERPL-SCNC: 3.7 MMOL/L (ref 3.5–5.3)
PROT SERPL-MCNC: 6 G/DL (ref 6.4–8.4)
RBC # BLD AUTO: 4.17 MILLION/UL (ref 3.88–5.62)
SAO2 % BLD FROM PO2: 89 % (ref 60–85)
SODIUM BLD-SCNC: 135 MMOL/L (ref 136–145)
SODIUM SERPL-SCNC: 136 MMOL/L (ref 135–147)
SPECIMEN SOURCE: ABNORMAL
WBC # BLD AUTO: 16.56 THOUSAND/UL (ref 4.31–10.16)

## 2023-07-20 PROCEDURE — 99232 SBSQ HOSP IP/OBS MODERATE 35: CPT | Performed by: HOSPITALIST

## 2023-07-20 PROCEDURE — 94760 N-INVAS EAR/PLS OXIMETRY 1: CPT

## 2023-07-20 PROCEDURE — 84295 ASSAY OF SERUM SODIUM: CPT

## 2023-07-20 PROCEDURE — 85025 COMPLETE CBC W/AUTO DIFF WBC: CPT | Performed by: PHYSICIAN ASSISTANT

## 2023-07-20 PROCEDURE — 85014 HEMATOCRIT: CPT

## 2023-07-20 PROCEDURE — 82947 ASSAY GLUCOSE BLOOD QUANT: CPT

## 2023-07-20 PROCEDURE — 94640 AIRWAY INHALATION TREATMENT: CPT

## 2023-07-20 PROCEDURE — 80053 COMPREHEN METABOLIC PANEL: CPT | Performed by: PHYSICIAN ASSISTANT

## 2023-07-20 PROCEDURE — 99232 SBSQ HOSP IP/OBS MODERATE 35: CPT | Performed by: INTERNAL MEDICINE

## 2023-07-20 PROCEDURE — 82330 ASSAY OF CALCIUM: CPT

## 2023-07-20 PROCEDURE — 94762 N-INVAS EAR/PLS OXIMTRY CONT: CPT

## 2023-07-20 PROCEDURE — 94660 CPAP INITIATION&MGMT: CPT

## 2023-07-20 PROCEDURE — 82803 BLOOD GASES ANY COMBINATION: CPT

## 2023-07-20 PROCEDURE — 84132 ASSAY OF SERUM POTASSIUM: CPT

## 2023-07-20 PROCEDURE — 36600 WITHDRAWAL OF ARTERIAL BLOOD: CPT

## 2023-07-20 PROCEDURE — 82948 REAGENT STRIP/BLOOD GLUCOSE: CPT

## 2023-07-20 RX ORDER — ALBUTEROL SULFATE 2.5 MG/3ML
2.5 SOLUTION RESPIRATORY (INHALATION) EVERY 4 HOURS PRN
Status: DISCONTINUED | OUTPATIENT
Start: 2023-07-20 | End: 2023-07-27 | Stop reason: HOSPADM

## 2023-07-20 RX ORDER — LEVALBUTEROL INHALATION SOLUTION 1.25 MG/3ML
1.25 SOLUTION RESPIRATORY (INHALATION)
Status: DISCONTINUED | OUTPATIENT
Start: 2023-07-20 | End: 2023-07-27 | Stop reason: HOSPADM

## 2023-07-20 RX ORDER — ACETAMINOPHEN 325 MG/1
650 TABLET ORAL EVERY 6 HOURS PRN
Status: DISCONTINUED | OUTPATIENT
Start: 2023-07-20 | End: 2023-07-27 | Stop reason: HOSPADM

## 2023-07-20 RX ORDER — GUAIFENESIN 600 MG/1
1200 TABLET, EXTENDED RELEASE ORAL EVERY 12 HOURS SCHEDULED
Status: DISCONTINUED | OUTPATIENT
Start: 2023-07-20 | End: 2023-07-27 | Stop reason: HOSPADM

## 2023-07-20 RX ADMIN — HEPARIN SODIUM 5000 UNITS: 5000 INJECTION INTRAVENOUS; SUBCUTANEOUS at 13:43

## 2023-07-20 RX ADMIN — LEVALBUTEROL HYDROCHLORIDE 1.25 MG: 1.25 SOLUTION RESPIRATORY (INHALATION) at 08:10

## 2023-07-20 RX ADMIN — LISINOPRIL 10 MG: 10 TABLET ORAL at 08:40

## 2023-07-20 RX ADMIN — IPRATROPIUM BROMIDE 0.5 MG: 0.5 SOLUTION RESPIRATORY (INHALATION) at 08:09

## 2023-07-20 RX ADMIN — INSULIN LISPRO 2 UNITS: 100 INJECTION, SOLUTION INTRAVENOUS; SUBCUTANEOUS at 11:42

## 2023-07-20 RX ADMIN — HEPARIN SODIUM 5000 UNITS: 5000 INJECTION INTRAVENOUS; SUBCUTANEOUS at 04:50

## 2023-07-20 RX ADMIN — GUAIFENESIN 1200 MG: 600 TABLET ORAL at 11:42

## 2023-07-20 RX ADMIN — GUAIFENESIN 1200 MG: 600 TABLET ORAL at 22:27

## 2023-07-20 RX ADMIN — CEFEPIME HYDROCHLORIDE 2000 MG: 2 INJECTION, SOLUTION INTRAVENOUS at 02:08

## 2023-07-20 RX ADMIN — LEVALBUTEROL HYDROCHLORIDE 1.25 MG: 1.25 SOLUTION RESPIRATORY (INHALATION) at 19:13

## 2023-07-20 RX ADMIN — GLYCERIN, HYPROMELLOSE, POLYETHYLENE GLYCOL 1 DROP: .2; .2; 1 LIQUID OPHTHALMIC at 10:29

## 2023-07-20 RX ADMIN — CEFEPIME HYDROCHLORIDE 2000 MG: 2 INJECTION, SOLUTION INTRAVENOUS at 17:11

## 2023-07-20 RX ADMIN — LEVALBUTEROL HYDROCHLORIDE 1.25 MG: 1.25 SOLUTION RESPIRATORY (INHALATION) at 13:20

## 2023-07-20 RX ADMIN — HEPARIN SODIUM 5000 UNITS: 5000 INJECTION INTRAVENOUS; SUBCUTANEOUS at 22:27

## 2023-07-20 RX ADMIN — INSULIN LISPRO 2 UNITS: 100 INJECTION, SOLUTION INTRAVENOUS; SUBCUTANEOUS at 22:27

## 2023-07-20 RX ADMIN — MICONAZOLE NITRATE: 20 CREAM TOPICAL at 17:11

## 2023-07-20 RX ADMIN — MICONAZOLE NITRATE: 20 CREAM TOPICAL at 08:41

## 2023-07-20 RX ADMIN — IPRATROPIUM BROMIDE 0.5 MG: 0.5 SOLUTION RESPIRATORY (INHALATION) at 19:13

## 2023-07-20 RX ADMIN — MELATONIN 6 MG: at 22:27

## 2023-07-20 RX ADMIN — FUROSEMIDE 40 MG: 10 INJECTION, SOLUTION INTRAMUSCULAR; INTRAVENOUS at 08:40

## 2023-07-20 RX ADMIN — IPRATROPIUM BROMIDE 0.5 MG: 0.5 SOLUTION RESPIRATORY (INHALATION) at 13:20

## 2023-07-20 RX ADMIN — INSULIN LISPRO 2 UNITS: 100 INJECTION, SOLUTION INTRAVENOUS; SUBCUTANEOUS at 17:10

## 2023-07-20 RX ADMIN — CEFEPIME HYDROCHLORIDE 2000 MG: 2 INJECTION, SOLUTION INTRAVENOUS at 10:21

## 2023-07-20 RX ADMIN — ACETAMINOPHEN 325MG 650 MG: 325 TABLET ORAL at 17:10

## 2023-07-20 NOTE — PLAN OF CARE
Problem: MOBILITY - ADULT  Goal: Maintain or return to baseline ADL function  Description: INTERVENTIONS:  -  Assess patient's ability to carry out ADLs; assess patient's baseline for ADL function and identify physical deficits which impact ability to perform ADLs (bathing, care of mouth/teeth, toileting, grooming, dressing, etc.)  - Assess/evaluate cause of self-care deficits   - Assess range of motion  - Assess patient's mobility; develop plan if impaired  - Assess patient's need for assistive devices and provide as appropriate  - Encourage maximum independence but intervene and supervise when necessary  - Involve family in performance of ADLs  - Assess for home care needs following discharge   - Consider OT consult to assist with ADL evaluation and planning for discharge  - Provide patient education as appropriate  Outcome: Progressing  Goal: Maintains/Returns to pre admission functional level  Description: INTERVENTIONS:  - Perform BMAT or MOVE assessment daily.   - Set and communicate daily mobility goal to care team and patient/family/caregiver. - Collaborate with rehabilitation services on mobility goals if consulted  - Perform Range of Motion 3 times a day. - Reposition patient every 2 hours.   - Dangle patient 3 times a day  - Stand patient 3 times a day  - Ambulate patient 3 times a day  - Out of bed to chair 3 times a day   - Out of bed for meals 3 times a day  - Out of bed for toileting  - Record patient progress and toleration of activity level   Outcome: Progressing     Problem: PAIN - ADULT  Goal: Verbalizes/displays adequate comfort level or baseline comfort level  Description: Interventions:  - Encourage patient to monitor pain and request assistance  - Assess pain using appropriate pain scale  - Administer analgesics based on type and severity of pain and evaluate response  - Implement non-pharmacological measures as appropriate and evaluate response  - Consider cultural and social influences on pain and pain management  - Notify physician/advanced practitioner if interventions unsuccessful or patient reports new pain  Outcome: Progressing     Problem: INFECTION - ADULT  Goal: Absence or prevention of progression during hospitalization  Description: INTERVENTIONS:  - Assess and monitor for signs and symptoms of infection  - Monitor lab/diagnostic results  - Monitor all insertion sites, i.e. indwelling lines, tubes, and drains  - Monitor endotracheal if appropriate and nasal secretions for changes in amount and color  - [place] appropriate cooling/warming therapies per order  - Administer medications as ordered  - Instruct and encourage patient and family to use good hand hygiene technique  - Identify and instruct in appropriate isolation precautions for identified infection/condition  Outcome: Progressing     Problem: DISCHARGE PLANNING  Goal: Discharge to home or other facility with appropriate resources  Description: INTERVENTIONS:  - Identify barriers to discharge w/patient and caregiver  - Arrange for needed discharge resources and transportation as appropriate  - Identify discharge learning needs (meds, wound care, etc.)  - Arrange for interpretive services to assist at discharge as needed  - Refer to Case Management Department for coordinating discharge planning if the patient needs post-hospital services based on physician/advanced practitioner order or complex needs related to functional status, cognitive ability, or social support system  Outcome: Progressing     Problem: Knowledge Deficit  Goal: Patient/family/caregiver demonstrates understanding of disease process, treatment plan, medications, and discharge instructions  Description: Complete learning assessment and assess knowledge base.   Interventions:  - Provide teaching at level of understanding  - Provide teaching via preferred learning methods  Outcome: Progressing     Problem: CARDIOVASCULAR - ADULT  Goal: Maintains optimal cardiac output and hemodynamic stability  Description: INTERVENTIONS:  - Monitor I/O, vital signs and rhythm  - Monitor for S/S and trends of decreased cardiac output  - Administer and titrate ordered vasoactive medications to optimize hemodynamic stability  - Assess quality of pulses, skin color and temperature  - Assess for signs of decreased coronary artery perfusion  - Instruct patient to report change in severity of symptoms  Outcome: Progressing  Goal: Absence of cardiac dysrhythmias or at baseline rhythm  Description: INTERVENTIONS:  - Continuous cardiac monitoring, vital signs, obtain 12 lead EKG if ordered  - Administer antiarrhythmic and heart rate control medications as ordered  - Monitor electrolytes and administer replacement therapy as ordered  Outcome: Progressing     Problem: RESPIRATORY - ADULT  Goal: Achieves optimal ventilation and oxygenation  Description: INTERVENTIONS:  - Assess for changes in respiratory status  - Assess for changes in mentation and behavior  - Position to facilitate oxygenation and minimize respiratory effort  - Oxygen administered by appropriate delivery if ordered  - Initiate smoking cessation education as indicated  - Encourage broncho-pulmonary hygiene including cough, deep breathe, Incentive Spirometry  - Assess the need for suctioning and aspirate as needed  - Assess and instruct to report SOB or any respiratory difficulty  - Respiratory Therapy support as indicated  Outcome: Progressing     Problem: SAFETY,RESTRAINT: NV/NON-SELF DESTRUCTIVE BEHAVIOR  Goal: Remains free of harm/injury (restraint for non violent/non self-detsructive behavior)  Description: INTERVENTIONS:  - Instruct patient/family regarding restraint use   - Assess and monitor physiologic and psychological status   - Provide interventions and comfort measures to meet assessed patient needs   - Identify and implement measures to help patient regain control  - Assess readiness for release of restraint   Outcome: Progressing  Goal: Returns to optimal restraint-free functioning  Description: INTERVENTIONS:  - Assess the patient's behavior and symptoms that indicate continued need for restraint  - Identify and implement measures to help patient regain control  - Assess readiness for release of restraint   Outcome: Progressing     Problem: Prexisting or High Potential for Compromised Skin Integrity  Goal: Skin integrity is maintained or improved  Description: INTERVENTIONS:  - Identify patients at risk for skin breakdown  - Assess and monitor skin integrity  - Assess and monitor nutrition and hydration status  - Monitor labs   - Assess for incontinence   - Turn and reposition patient  - Assist with mobility/ambulation  - Relieve pressure over bony prominences  - Avoid friction and shearing  - Provide appropriate hygiene as needed including keeping skin clean and dry  - Evaluate need for skin moisturizer/barrier cream  - Collaborate with interdisciplinary team   - Patient/family teaching  - Consider wound care consult   Outcome: Progressing     Problem: Nutrition/Hydration-ADULT  Goal: Nutrient/Hydration intake appropriate for improving, restoring or maintaining nutritional needs  Description: Monitor and assess patient's nutrition/hydration status for malnutrition. Collaborate with interdisciplinary team and initiate plan and interventions as ordered. Monitor patient's weight and dietary intake as ordered or per policy. Utilize nutrition screening tool and intervene as necessary. Determine patient's food preferences and provide high-protein, high-caloric foods as appropriate.      INTERVENTIONS:  - Monitor oral intake, urinary output, labs, and treatment plans  - Assess nutrition and hydration status and recommend course of action  - Evaluate amount of meals eaten  - Assist patient with eating if necessary   - Allow adequate time for meals  - Recommend/ encourage appropriate diets, oral nutritional supplements, and vitamin/mineral supplements  - Order, calculate, and assess calorie counts as needed  - Recommend, monitor, and adjust tube feedings and TPN/PPN based on assessed needs  - Assess need for intravenous fluids  - Provide specific nutrition/hydration education as appropriate  - Include patient/family/caregiver in decisions related to nutrition  Outcome: Progressing

## 2023-07-20 NOTE — ASSESSMENT & PLAN NOTE
• Per chart patient wears 2L oxygen at [facility], however, family states the patient does not wear oxygen at home. Unsure if oxygen requirement is new since admission at [facility] last week.   • VBG pH 7.2/pCO2 79.9 -->Placed on continuous BIPAP in ED  • VBG pH 7.3/62.9 --> BIPAP HS       Plan  • Titrate oxygen with SpO2 goal > 90%

## 2023-07-20 NOTE — PROGRESS NOTES
[id number]  Progress Note  Name: Goyo Joseph  MRN: [id number]  Unit/Bed#: -01 I Date of Admission: 7/17/2023   Date of Service: 7/20/2023 I Hospital Day: 3    Assessment/Plan   Elevated troponin  Assessment & Plan  • Tropon 177      Plan  • Trend troponin   • No ischemic changes on EKG    Obesity  Assessment & Plan  • Encourage diet and lifestyle modifications when medically stable    Type 2 diabetes mellitus [facility])  Assessment & Plan  Lab Results   Component Value Date    HGBA1C 6.5 (H) 07/17/2023       Recent Labs     07/19/23  1231 07/19/23  1635 07/19/23  2127 07/20/23  0734   POCGLU 206* 148* 156* 123       Blood Sugar Average: Last 72 hrs:  (P) 585.5875929935106086     • History of DM2 listed in chart from [facility],however, no medication listed on MAR from SNF     Plan  • Q6H fingersticks  • Check Hgb A1C    Lymphedema  Assessment & Plan  • Home regimen: HCTZ     Plan  • 40mg IV lasix daily    Hypertension  Assessment & Plan  • Home regimen: lisinopril     Plan  • Hold home regimen while NPO    Urinary tract infection  Assessment & Plan  • Per family, patient admitted at [facility] x1 week for UTI and DEANDRE, discharged 7/14 on 300mg Cefdinir BID. • UA negative     Plan  • IV Cefepime. DC vancomycin as MRSA testing neg. Sepsis ([address])  Assessment & Plan  • SIRS: tachypnec, leukocytosis  • CT CAP: Right upper lobe groundglass opacity and right lower lobe consolidation along with bilateral effusions  • Per family, patient admitted at [facility] x1 week for UTI and DEANDRE, discharged 7/14 on 300mg Cefdinir BID. • UA negative  • Received azithromycin and ceftriaxone in ED  • LA 1.4  • Procal 0.29     Plan  • Cefepime . DC further vancomycin d/t neg MRSA swab  • Blood cx neg thus far. • Trend WBC and fever curve    Encephalopathy acute  Assessment & Plan  • Brought to ED from [facility] after being found by roommate on the floor and altered compared to baseline.   • Approximate downtime 1.5 hours  • CTH: no acute intracranial abnormality  • CT c-spine: no c-spine fracture or traumatic malalignment  • VBG pH 7.2/pCO2 79.9  • Received 2mg IV Narvcan in ED without improvement in mental status  • Ammonia 38      Plan  • RESOLVED. Was 2/2 hypercapnea  • Add melatonin  • OOB during the day to encourage proper sleep/wake cycle    * Acute on chronic respiratory failure with hypoxia and hypercapnia (HCC)  Assessment & Plan  • Per chart patient wears 2L oxygen at SNF, however, family states the patient does not wear oxygen at home. Unsure if oxygen requirement is new since admission at [facility] last week. • VBG pH 7.2/pCO2 79.9 -->Placed on continuous BIPAP in ED  • VBG pH 7.3/62.9 --> BIPAP HS       Plan  • Titrate oxygen with SpO2 goal > 90%            VTE  Prophylaxis:   Pharmacologic: in place  Mechanical VTE Prophylaxis in Place: Yes    Patient Centered Rounds: I have performed bedside rounds with nursing staff today. Discussions with Specialists or Other Care Team Provider: case management    Education and Discussions with Family / Patient: pt family bedside      Current Length of Stay: 3 day(s)    Current Patient Status: Inpatient        Code Status: Level 3 - DNAR and DNI    Discharge Plan: Pt will require continued inpatient hospitalization. Subjective:   Pt wants to get out of the bed  Coughing up copious amounts of sputum    Patient is seen and examined at bedside. All other ROS are negative. Objective:     Vitals:   Temp (24hrs), Av.8 °F (37.1 °C), Min:98.4 °F (36.9 °C), Max:99.4 °F (37.4 °C)    Temp:  [98.4 °F (36.9 °C)-99.4 °F (37.4 °C)] 98.4 °F (36.9 °C)  HR:  [77-86] 77  Resp:  [16-22] 16  BP: (125-169)/(76-82) 125/76  SpO2:  [89 %-93 %] 92 %  Body mass index is 44.92 kg/m². Input and Output Summary (last 24 hours):        Intake/Output Summary (Last 24 hours) at 2023 1106  Last data filed at 2023 0909  Gross per 24 hour   Intake 960 ml   Output 1070 ml   Net -110 ml       Physical Exam: GEN: No acute distress, comfortable, on o2  HEEENT: No JVD, PERRLA, no scleral icterus  RESP: Lungs coarse  CV: RRR, +s1/s2   ABD: SOFT NON TENDER, POSITIVE BOWEL SOUNDS, NO DISTENTION  PSYCH: CALM  NEURO: Mentation baseline, NO FOCAL DEFICITS  SKIN: NO RASH  EXTREM: NO EDEMA    Additional Data:     Labs:    Results from last 7 days   Lab Units 07/20/23  0553 07/20/23  0503 07/17/23  1356 07/17/23  1339   WBC Thousand/uL  --  16.56*   < > 15.53*   HEMOGLOBIN g/dL  --  12.8   < > 15.0   I STAT HEMOGLOBIN g/dl 12.6  --    < >  --    HEMATOCRIT %  --  40.4   < > 46.9   HEMATOCRIT, ISTAT % 37  --    < >  --    PLATELETS Thousands/uL  --  445*   < > 508*   BANDS PCT %  --   --   --  2   NEUTROS PCT %  --  82*   < >  --    LYMPHS PCT %  --  8*   < >  --    LYMPHO PCT %  --   --   --  6*   MONOS PCT %  --  6   < >  --    MONO PCT %  --   --   --  3*   EOS PCT %  --  3   < > 0    < > = values in this interval not displayed.      Results from last 7 days   Lab Units 07/20/23  0553 07/20/23  0503   SODIUM mmol/L  --  136   POTASSIUM mmol/L  --  3.7   CHLORIDE mmol/L  --  94*   CO2 mmol/L  --  38*   CO2, I-STAT mmol/L 41*  --    BUN mg/dL  --  28*   CREATININE mg/dL  --  0.99   ANION GAP mmol/L  --  4   CALCIUM mg/dL  --  9.5   ALBUMIN g/dL  --  2.7*   TOTAL BILIRUBIN mg/dL  --  0.87   ALK PHOS U/L  --  82   ALT U/L  --  16   AST U/L  --  22   GLUCOSE RANDOM mg/dL  --  123     Results from last 7 days   Lab Units 07/17/23  1339   INR  1.01     Results from last 7 days   Lab Units 07/20/23  0734 07/19/23  2127 07/19/23  1635 07/19/23  1231 07/19/23  0538 07/19/23  0016 07/18/23  1804 07/18/23  1148 07/18/23  0022   POC GLUCOSE mg/dl 123 156* 148* 206* 135 149* 149* 153* 163*     Results from last 7 days   Lab Units 07/17/23  1947   HEMOGLOBIN A1C % 6.5*     Results from last 7 days   Lab Units 07/18/23  0526 07/17/23  1350 07/17/23  1339   LACTIC ACID mmol/L  --  1.4  --    PROCALCITONIN ng/ml 0.25  --  0.29* Lines/Drains:  Invasive Devices     Peripheral Intravenous Line  Duration           Peripheral IV 07/18/23 Dorsal (posterior); Left Forearm 2 days                Telemetry:        * I Have Reviewed All Lab Data Listed Above. Imaging:     Results for orders placed during the hospital encounter of 07/17/23    XR Trauma chest portable    Narrative  CHEST    INDICATION:   TRAUMA. Patient found unresponsive. Patient has suspected COVID-19. COMPARISON:  None    EXAM PERFORMED/VIEWS:  XR CHEST PORTABLE AP semierect  Images: 2    FINDINGS:    Heart shadow appears enlarged. Atherosclerotic aortic tortuosity is noted. Vascular congestion with a pulmonary edema pattern. More focal patchy opacity in the right mid lung suspect for pneumonia. No obvious effusion or pneumothorax. No acute osseous abnormalities. Impression  Focal opacity in the right mid lung suspect for pneumonia. Vascular congestion and pulmonary edema. The study was marked in [facility] for immediate notification. Workstation performed: [id number]    No results found for this or any previous visit. *I have reviewed all imaging reports listed above      Recent Cultures (last 7 days):     Results from last 7 days   Lab Units 07/18/23  0940 07/17/23  1826 07/17/23  1350   BLOOD CULTURE   --   --  No Growth at 48 hrs. No Growth at 48 hrs.    SPUTUM CULTURE  Culture too young- will reincubate  --   --    GRAM STAIN RESULT  Rare Epithelial cells per low power field  No Polys or Bacteria seen  --   --    LEGIONELLA URINARY ANTIGEN   --  Negative  --        Last 24 Hours Medication List:   Current Facility-Administered Medications   Medication Dose Route Frequency Provider Last Rate   • albuterol  2.5 mg Nebulization Q4H PRN Vitaliy Kim MD     • cefepime  2,000 mg Intravenous Q8H Xander Burgos PA-C 2,000 mg (07/20/23 1021)   • furosemide  40 mg Intravenous Daily Xander Burgos PA-C     • glycerin-hypromellose-  1 drop Both Eyes Q4H PRN Yash Garcia PA-C     • guaiFENesin  1,200 mg Oral Q12H [address] [place] MD     • heparin (porcine)  5,000 Units Subcutaneous [facility] Yash Garcia PA-C     • hydrALAZINE  5 mg Intravenous Q6H PRN Yash Garcia PA-C     • insulin lispro  2-12 Units Subcutaneous 4x Daily (AC & HS) Yash Garcia PA-C     • ipratropium  0.5 mg Nebulization TID Orlando Trent MD     • levalbuterol  1.25 mg Nebulization TID Orlando Trent MD     • lisinopril  10 mg Oral Daily Yash Garcia PA-C     • melatonin  6 mg Oral HS Yash Garcia PA-C     • DANNA ANTIFUNGAL   Topical BID Yash Garcia PA-C          Today, Patient Was Seen By: Orlando Trent MD    ** Please Note: Dictation voice to text software may have been used in the creation of this document.  **

## 2023-07-20 NOTE — ASSESSMENT & PLAN NOTE
• SIRS: tachypnec, leukocytosis  • CT CAP: Right upper lobe groundglass opacity and right lower lobe consolidation along with bilateral effusions  • Per family, patient admitted at [facility] x1 week for UTI and DEANDRE, discharged 7/14 on 300mg Cefdinir BID. • UA negative  • Received azithromycin and ceftriaxone in ED  • LA 1.4  • Procal 0.29     Plan  • Cefepime . DC further vancomycin d/t neg MRSA swab  • Blood cx neg thus far.   • Trend WBC and fever curve

## 2023-07-20 NOTE — ASSESSMENT & PLAN NOTE
• Per family, patient admitted at [facility] x1 week for UTI and DEANDRE, discharged 7/14 on 300mg Cefdinir BID. • UA negative     Plan  • IV Cefepime. DC vancomycin as MRSA testing neg.

## 2023-07-20 NOTE — RESPIRATORY THERAPY NOTE
RT Protocol Note  Yordan Anderson 69 y.o. male MRN: [id number]  Unit/Bed#: -01 Encounter: [id number]    Assessment    Principal Problem:    Acute on chronic respiratory failure with hypoxia and hypercapnia (HCC)  Active Problems:    Encephalopathy acute    Sepsis ([address])    Urinary tract infection    Hypertension    Lymphedema    Type 2 diabetes mellitus (HCC)    Obesity    Elevated troponin      Home Pulmonary Medications:         Past Medical History:   Diagnosis Date    Diabetes mellitus ([address])     Hypertension      Social History     Socioeconomic History    Marital status:      Spouse name: Not on file    Number of children: Not on file    Years of education: Not on file    Highest education level: Not on file   Occupational History    Not on file   Tobacco Use    Smoking status: Not on file    Smokeless tobacco: Not on file   Substance and Sexual Activity    Alcohol use: Not on file    Drug use: Not on file    Sexual activity: Not on file   Other Topics Concern    Not on file   Social History Narrative    Not on file     Social Determinants of Health     Financial Resource Strain: Not on file   Food Insecurity: No Food Insecurity (7/18/2023)    Hunger Vital Sign     Worried About Running Out of Food in the Last Year: Never true     Ran Out of Food in the Last Year: Never true   Transportation Needs: No Transportation Needs (7/18/2023)    PRAPARE - Transportation     Lack of Transportation (Medical): No     Lack of Transportation (Non-Medical):  No   Physical Activity: Not on file   Stress: Not on file   Social Connections: Not on file   Intimate Partner Violence: Not on file   Housing Stability: Low Risk  (7/18/2023)    Housing Stability Vital Sign     Unable to Pay for Housing in the Last Year: No     Number of Places Lived in the Last Year: 1     Unstable Housing in the Last Year: No       Subjective    Subjective Data: pt was transitioned from Bipap to 4 L NC    Objective    Physical Exam: Vitals:  Blood pressure 147/76, pulse 85, temperature 99.4 °F (37.4 °C), resp. rate 17, height 6' 3" (1.905 m), weight (!) 163 kg (359 lb 5.6 oz), SpO2 (!) 89 %. Imaging and other studies: I have personally reviewed pertinent reports.       O2 Device: (S) NC     Plan    Respiratory Plan: (P) Mild Distress pathway        Resp Comments: Pt placed on bipap for nap

## 2023-07-20 NOTE — ASSESSMENT & PLAN NOTE
Lab Results   Component Value Date    HGBA1C 6.5 (H) 07/17/2023       Recent Labs     07/19/23  1231 07/19/23  1635 07/19/23  2127 07/20/23  0734   POCGLU 206* 148* 156* 123       Blood Sugar Average: Last 72 hrs:  (P) 307.6813919556003167     • History of DM2 listed in chart from [facility],however, no medication listed on MAR from SNF     Plan  • Q6H fingersticks  • Check Hgb A1C

## 2023-07-20 NOTE — PROGRESS NOTES
Progress Note - Pulmonary   Madiha Listen 69 y.o. male MRN: [id number]  Unit/Bed#: -01 Encounter: [id number]      Assessment & Recommendations:  1. Acute on likely chronic hypercarbic and acute hypoxic respiratory failure  · Titrate O2 to keep SpO2 88-94%  · IS, OOB-chair, PT/OT  · Overnight SpO2 on 4L/min NC - Paras SpO2 69%, desaturation <89% 4 hours 1 min  · AM ABG 7.40/62/59  · Has qualified for home BiPAP, will plan for initiation of AutoBiPAP at d/c and will d/w CM  · Will resume BiPAP 18/8cmH2O for tonight  · Discussed extensively with patient and his daughter    2. SIRS/Sepsis - POA - RR/WBC related to pneumonia - improving    3. Multilobar pneumonia with recent admission for UTI  · Continue cefepime, vanc stopped D#4/7 abx - de-escalate further based upon sputum results    4. Obesity, suspected CA, possible OHVS - as above    5. Toxic/metabolic encephalopathy with likely delirium component - improved    6. Pleural effusions, volume overload - diuresis per primary team      Subjective:   69year old man with HTN, obesity post remote gastric bypass, lymphedema, DM2, prior CA (previously on CPAP), and recent admission to [facility] for UTI.  Found unresponsive by roommate at rehab. [facility] ED found to have respiratory distress/hypercarbia and placed on BiPAP was well as multilobar pneumonia and started on CTX.  He was weaned from BiPAP, downgraded to [facility] 7/18.     24hr events - He had poor sleep overnight, was not on BiPAP, producing yellow sputum, no hemoptysis, willing to use BiPAP tonight. Objective:       Vitals: Blood pressure 125/76, pulse 77, temperature 98.4 °F (36.9 °C), resp. rate 16, height 6' 3" (1.905 m), weight (!) 163 kg (359 lb 5.6 oz), SpO2 92 %. , 4LNC, Body mass index is 44.92 kg/m².       Intake/Output Summary (Last 24 hours) at 7/20/2023 1253  Last data filed at 7/20/2023 1124  Gross per 24 hour   Intake 840 ml   Output 1395 ml   Net -555 ml         Physical Exam  GEN Obese older man in chair, conversant, nontoxic  HEENT MMM, no thrush, no oral lesions, no scleral icterus  Neck No accessory muscle use, JVP not elevated  CV reg, single s1/2, no m/r  Pulm diminished at bases, no wheeze or rales  ABD +BS soft NTND, no rebound  EXT warm, brisk cap refill, 1+ LE edema with CVS      Labs: I have personally reviewed pertinent lab results.   Laboratory and Diagnostics  Results from last 7 days   Lab Units 07/20/23  0553 07/20/23  0503 07/19/23  0552 07/18/23  0526 07/17/23  1356 07/17/23  1339   WBC Thousand/uL  --  16.56* 15.57* 14.14*  --  15.53*   HEMOGLOBIN g/dL  --  12.8 12.6 14.1  --  15.0   I STAT HEMOGLOBIN g/dl 12.6  --   --   --  12.9  --    HEMATOCRIT %  --  40.4 39.4 44.3  --  46.9   HEMATOCRIT, ISTAT % 37  --   --   --  38  --    PLATELETS Thousands/uL  --  445* 432* 331  --  508*   NEUTROS PCT %  --  82* 82* 85*  --   --    BANDS PCT %  --   --   --   --   --  2   MONOS PCT %  --  6 6 6  --   --    MONO PCT %  --   --   --   --   --  3*   EOS PCT %  --  3 3 1  --  0     Results from last 7 days   Lab Units 07/20/23  0553 07/20/23  0503 07/19/23  0552 07/18/23  0526 07/17/23  1356 07/17/23  1339   SODIUM mmol/L  --  136 137 133*  --  133*   POTASSIUM mmol/L  --  3.7 3.9 4.2  --  4.5   CHLORIDE mmol/L  --  94* 96 95*  --  92*   CO2 mmol/L  --  38* 36* 32  --  35*   CO2, I-STAT mmol/L 41*  --   --   --  39*  --    ANION GAP mmol/L  --  4 5 6  --  6   BUN mg/dL  --  28* 31* 35*  --  34*   CREATININE mg/dL  --  0.99 0.98 1.02  --  1.01   CALCIUM mg/dL  --  9.5 9.6 9.6  --  10.1   GLUCOSE RANDOM mg/dL  --  123 127 153*  --  164*   ALT U/L  --  16  --   --   --  26   AST U/L  --  22  --   --   --  29   ALK PHOS U/L  --  82  --   --   --  118*   ALBUMIN g/dL  --  2.7*  --   --   --  3.3*   TOTAL BILIRUBIN mg/dL  --  0.87  --   --   --  0.74     Results from last 7 days   Lab Units 07/19/23  0552 07/18/23  0526   MAGNESIUM mg/dL 1.9 2.0   PHOSPHORUS mg/dL 3.1 3.6      Results from last 7 days   Lab Units 07/17/23  1339   INR  1.01   PTT seconds 30          Results from last 7 days   Lab Units 07/17/23  1350   LACTIC ACID mmol/L 1.4                     Results from last 7 days   Lab Units 07/18/23  0526 07/17/23  1339   PROCALCITONIN ng/ml 0.25 0.29*       VBG 7.27/80/198--->7.31/63/73     MICRO  Sputum 7/18 pending  MRSA negative  FLU/RSV/COVID neg  Bld Cx 7/17 NGTD  UA neg nit/LE  Strep/legionella ag neg     RADIOGRAPHS - new images personally reviewed  CT C/A/P - right sided mixed ground glass and more dense basilar infiltrates, slight left basilar infiltrate, right > left pleural effusions, no acute IA pathology, noted anasarca     CT  head/Cspine without acute traumatic injury or acute IC findings, remote right cerebellar infarct      Davide Wheat DO, [facility]

## 2023-07-20 NOTE — PLAN OF CARE
Problem: MOBILITY - ADULT  Goal: Maintain or return to baseline ADL function  Description: INTERVENTIONS:  -  Assess patient's ability to carry out ADLs; assess patient's baseline for ADL function and identify physical deficits which impact ability to perform ADLs (bathing, care of mouth/teeth, toileting, grooming, dressing, etc.)  - Assess/evaluate cause of self-care deficits   - Assess range of motion  - Assess patient's mobility; develop plan if impaired  - Assess patient's need for assistive devices and provide as appropriate  - Encourage maximum independence but intervene and supervise when necessary  - Involve family in performance of ADLs  - Assess for home care needs following discharge   - Consider OT consult to assist with ADL evaluation and planning for discharge  - Provide patient education as appropriate  Outcome: Progressing  Goal: Maintains/Returns to pre admission functional level  Description: INTERVENTIONS:  - Perform BMAT or MOVE assessment daily.   - Set and communicate daily mobility goal to care team and patient/family/caregiver. - Collaborate with rehabilitation services on mobility goals if consulted  - Perform Range of Motion 3 times a day. - Reposition patient every 2 hours.   - Dangle patient 3 times a day  - Stand patient 3 times a day  - Ambulate patient 3 times a day  - Out of bed to chair 3 times a day   - Out of bed for meals 3 times a day  - Out of bed for toileting  - Record patient progress and toleration of activity level   Outcome: Progressing     Problem: PAIN - ADULT  Goal: Verbalizes/displays adequate comfort level or baseline comfort level  Description: Interventions:  - Encourage patient to monitor pain and request assistance  - Assess pain using appropriate pain scale  - Administer analgesics based on type and severity of pain and evaluate response  - Implement non-pharmacological measures as appropriate and evaluate response  - Consider cultural and social influences on pain and pain management  - Notify physician/advanced practitioner if interventions unsuccessful or patient reports new pain  Outcome: Progressing     Problem: INFECTION - ADULT  Goal: Absence or prevention of progression during hospitalization  Description: INTERVENTIONS:  - Assess and monitor for signs and symptoms of infection  - Monitor lab/diagnostic results  - Monitor all insertion sites, i.e. indwelling lines, tubes, and drains  - Monitor endotracheal if appropriate and nasal secretions for changes in amount and color  - [place] appropriate cooling/warming therapies per order  - Administer medications as ordered  - Instruct and encourage patient and family to use good hand hygiene technique  - Identify and instruct in appropriate isolation precautions for identified infection/condition  Outcome: Progressing     Problem: DISCHARGE PLANNING  Goal: Discharge to home or other facility with appropriate resources  Description: INTERVENTIONS:  - Identify barriers to discharge w/patient and caregiver  - Arrange for needed discharge resources and transportation as appropriate  - Identify discharge learning needs (meds, wound care, etc.)  - Arrange for interpretive services to assist at discharge as needed  - Refer to Case Management Department for coordinating discharge planning if the patient needs post-hospital services based on physician/advanced practitioner order or complex needs related to functional status, cognitive ability, or social support system  Outcome: Progressing     Problem: Knowledge Deficit  Goal: Patient/family/caregiver demonstrates understanding of disease process, treatment plan, medications, and discharge instructions  Description: Complete learning assessment and assess knowledge base.   Interventions:  - Provide teaching at level of understanding  - Provide teaching via preferred learning methods  Outcome: Progressing     Problem: CARDIOVASCULAR - ADULT  Goal: Maintains optimal cardiac output and hemodynamic stability  Description: INTERVENTIONS:  - Monitor I/O, vital signs and rhythm  - Monitor for S/S and trends of decreased cardiac output  - Administer and titrate ordered vasoactive medications to optimize hemodynamic stability  - Assess quality of pulses, skin color and temperature  - Assess for signs of decreased coronary artery perfusion  - Instruct patient to report change in severity of symptoms  Outcome: Progressing  Goal: Absence of cardiac dysrhythmias or at baseline rhythm  Description: INTERVENTIONS:  - Continuous cardiac monitoring, vital signs, obtain 12 lead EKG if ordered  - Administer antiarrhythmic and heart rate control medications as ordered  - Monitor electrolytes and administer replacement therapy as ordered  Outcome: Progressing     Problem: RESPIRATORY - ADULT  Goal: Achieves optimal ventilation and oxygenation  Description: INTERVENTIONS:  - Assess for changes in respiratory status  - Assess for changes in mentation and behavior  - Position to facilitate oxygenation and minimize respiratory effort  - Oxygen administered by appropriate delivery if ordered  - Initiate smoking cessation education as indicated  - Encourage broncho-pulmonary hygiene including cough, deep breathe, Incentive Spirometry  - Assess the need for suctioning and aspirate as needed  - Assess and instruct to report SOB or any respiratory difficulty  - Respiratory Therapy support as indicated  Outcome: Progressing     Problem: SAFETY,RESTRAINT: NV/NON-SELF DESTRUCTIVE BEHAVIOR  Goal: Remains free of harm/injury (restraint for non violent/non self-detsructive behavior)  Description: INTERVENTIONS:  - Instruct patient/family regarding restraint use   - Assess and monitor physiologic and psychological status   - Provide interventions and comfort measures to meet assessed patient needs   - Identify and implement measures to help patient regain control  - Assess readiness for release of restraint   Outcome: Progressing  Goal: Returns to optimal restraint-free functioning  Description: INTERVENTIONS:  - Assess the patient's behavior and symptoms that indicate continued need for restraint  - Identify and implement measures to help patient regain control  - Assess readiness for release of restraint   Outcome: Progressing     Problem: Prexisting or High Potential for Compromised Skin Integrity  Goal: Skin integrity is maintained or improved  Description: INTERVENTIONS:  - Identify patients at risk for skin breakdown  - Assess and monitor skin integrity  - Assess and monitor nutrition and hydration status  - Monitor labs   - Assess for incontinence   - Turn and reposition patient  - Assist with mobility/ambulation  - Relieve pressure over bony prominences  - Avoid friction and shearing  - Provide appropriate hygiene as needed including keeping skin clean and dry  - Evaluate need for skin moisturizer/barrier cream  - Collaborate with interdisciplinary team   - Patient/family teaching  - Consider wound care consult   Outcome: Progressing     Problem: Nutrition/Hydration-ADULT  Goal: Nutrient/Hydration intake appropriate for improving, restoring or maintaining nutritional needs  Description: Monitor and assess patient's nutrition/hydration status for malnutrition. Collaborate with interdisciplinary team and initiate plan and interventions as ordered. Monitor patient's weight and dietary intake as ordered or per policy. Utilize nutrition screening tool and intervene as necessary. Determine patient's food preferences and provide high-protein, high-caloric foods as appropriate.      INTERVENTIONS:  - Monitor oral intake, urinary output, labs, and treatment plans  - Assess nutrition and hydration status and recommend course of action  - Evaluate amount of meals eaten  - Assist patient with eating if necessary   - Allow adequate time for meals  - Recommend/ encourage appropriate diets, oral nutritional supplements, and vitamin/mineral supplements  - Order, calculate, and assess calorie counts as needed  - Recommend, monitor, and adjust tube feedings and TPN/PPN based on assessed needs  - Assess need for intravenous fluids  - Provide specific nutrition/hydration education as appropriate  - Include patient/family/caregiver in decisions related to nutrition  Outcome: Progressing     Problem: Potential for Falls  Goal: Patient will remain free of falls  Description: INTERVENTIONS:  - Educate patient/family on patient safety including physical limitations  - Instruct patient to call for assistance with activity   - Consult OT/PT to assist with strengthening/mobility   - Keep Call bell within reach  - Keep bed low and locked with side rails adjusted as appropriate  - Keep care items and personal belongings within reach  - Initiate and maintain comfort rounds  - Make Fall Risk Sign visible to staff  - Offer Toileting every  Hours, in advance of need  - Initiate/Maintain alarm  - Obtain necessary fall risk management equipmen  - Apply yellow socks and bracelet for high fall risk patients  - Consider moving patient to room near nurses station  Outcome: Progressing

## 2023-07-20 NOTE — ASSESSMENT & PLAN NOTE
• Brought to ED from SNF after being found by roommate on the floor and altered compared to baseline. • Approximate downtime 1.5 hours  • CTH: no acute intracranial abnormality  • CT c-spine: no c-spine fracture or traumatic malalignment  • VBG pH 7.2/pCO2 79.9  • Received 2mg IV Narvcan in ED without improvement in mental status  • Ammonia 38      Plan  • RESOLVED.  Was 2/2 hypercapnea  • Add melatonin  • OOB during the day to encourage proper sleep/wake cycle

## 2023-07-21 LAB
ALBUMIN SERPL BCP-MCNC: 2.5 G/DL (ref 3.5–5)
ALP SERPL-CCNC: 75 U/L (ref 34–104)
ALT SERPL W P-5'-P-CCNC: 20 U/L (ref 7–52)
ANION GAP SERPL CALCULATED.3IONS-SCNC: 4 MMOL/L
AST SERPL W P-5'-P-CCNC: 24 U/L (ref 13–39)
BASOPHILS # BLD AUTO: 0.04 THOUSANDS/ÂΜL (ref 0–0.1)
BASOPHILS NFR BLD AUTO: 0 % (ref 0–1)
BILIRUB SERPL-MCNC: 0.91 MG/DL (ref 0.2–1)
BUN SERPL-MCNC: 27 MG/DL (ref 5–25)
CALCIUM ALBUM COR SERPL-MCNC: 10.4 MG/DL (ref 8.3–10.1)
CALCIUM SERPL-MCNC: 9.2 MG/DL (ref 8.4–10.2)
CHLORIDE SERPL-SCNC: 94 MMOL/L (ref 96–108)
CO2 SERPL-SCNC: 39 MMOL/L (ref 21–32)
CREAT SERPL-MCNC: 0.92 MG/DL (ref 0.6–1.3)
EOSINOPHIL # BLD AUTO: 0.44 THOUSAND/ÂΜL (ref 0–0.61)
EOSINOPHIL NFR BLD AUTO: 3 % (ref 0–6)
ERYTHROCYTE [DISTWIDTH] IN BLOOD BY AUTOMATED COUNT: 12.5 % (ref 11.6–15.1)
GFR SERPL CREATININE-BSD FRML MDRD: 84 ML/MIN/1.73SQ M
GLUCOSE SERPL-MCNC: 141 MG/DL (ref 65–140)
GLUCOSE SERPL-MCNC: 142 MG/DL (ref 65–140)
GLUCOSE SERPL-MCNC: 147 MG/DL (ref 65–140)
GLUCOSE SERPL-MCNC: 169 MG/DL (ref 65–140)
GLUCOSE SERPL-MCNC: 197 MG/DL (ref 65–140)
HCT VFR BLD AUTO: 34.9 % (ref 36.5–49.3)
HGB BLD-MCNC: 11.3 G/DL (ref 12–17)
IMM GRANULOCYTES # BLD AUTO: 0.17 THOUSAND/UL (ref 0–0.2)
IMM GRANULOCYTES NFR BLD AUTO: 1 % (ref 0–2)
LYMPHOCYTES # BLD AUTO: 1.11 THOUSANDS/ÂΜL (ref 0.6–4.47)
LYMPHOCYTES NFR BLD AUTO: 7 % (ref 14–44)
MCH RBC QN AUTO: 31.1 PG (ref 26.8–34.3)
MCHC RBC AUTO-ENTMCNC: 32.4 G/DL (ref 31.4–37.4)
MCV RBC AUTO: 96 FL (ref 82–98)
MONOCYTES # BLD AUTO: 1.11 THOUSAND/ÂΜL (ref 0.17–1.22)
MONOCYTES NFR BLD AUTO: 7 % (ref 4–12)
NEUTROPHILS # BLD AUTO: 12.29 THOUSANDS/ÂΜL (ref 1.85–7.62)
NEUTS SEG NFR BLD AUTO: 82 % (ref 43–75)
NRBC BLD AUTO-RTO: 0 /100 WBCS
PLATELET # BLD AUTO: 390 THOUSANDS/UL (ref 149–390)
PMV BLD AUTO: 8.8 FL (ref 8.9–12.7)
POTASSIUM SERPL-SCNC: 3.5 MMOL/L (ref 3.5–5.3)
PROT SERPL-MCNC: 5.8 G/DL (ref 6.4–8.4)
RBC # BLD AUTO: 3.63 MILLION/UL (ref 3.88–5.62)
SODIUM SERPL-SCNC: 137 MMOL/L (ref 135–147)
WBC # BLD AUTO: 15.16 THOUSAND/UL (ref 4.31–10.16)

## 2023-07-21 PROCEDURE — 80053 COMPREHEN METABOLIC PANEL: CPT | Performed by: HOSPITALIST

## 2023-07-21 PROCEDURE — 97530 THERAPEUTIC ACTIVITIES: CPT

## 2023-07-21 PROCEDURE — 85025 COMPLETE CBC W/AUTO DIFF WBC: CPT | Performed by: HOSPITALIST

## 2023-07-21 PROCEDURE — 94760 N-INVAS EAR/PLS OXIMETRY 1: CPT

## 2023-07-21 PROCEDURE — 82948 REAGENT STRIP/BLOOD GLUCOSE: CPT

## 2023-07-21 PROCEDURE — 94640 AIRWAY INHALATION TREATMENT: CPT

## 2023-07-21 PROCEDURE — 99232 SBSQ HOSP IP/OBS MODERATE 35: CPT | Performed by: INTERNAL MEDICINE

## 2023-07-21 PROCEDURE — 99232 SBSQ HOSP IP/OBS MODERATE 35: CPT | Performed by: HOSPITALIST

## 2023-07-21 PROCEDURE — 94668 MNPJ CHEST WALL SBSQ: CPT

## 2023-07-21 PROCEDURE — 97116 GAIT TRAINING THERAPY: CPT

## 2023-07-21 RX ORDER — DIAPER,BRIEF,INFANT-TODD,DISP
EACH MISCELLANEOUS 4 TIMES DAILY PRN
Status: DISCONTINUED | OUTPATIENT
Start: 2023-07-21 | End: 2023-07-25

## 2023-07-21 RX ADMIN — HYDROCORTISONE: 1 OINTMENT TOPICAL at 16:38

## 2023-07-21 RX ADMIN — IPRATROPIUM BROMIDE 0.5 MG: 0.5 SOLUTION RESPIRATORY (INHALATION) at 19:32

## 2023-07-21 RX ADMIN — GLYCERIN, HYPROMELLOSE, POLYETHYLENE GLYCOL 1 DROP: .2; .2; 1 LIQUID OPHTHALMIC at 15:47

## 2023-07-21 RX ADMIN — CEFEPIME HYDROCHLORIDE 2000 MG: 2 INJECTION, SOLUTION INTRAVENOUS at 17:33

## 2023-07-21 RX ADMIN — IPRATROPIUM BROMIDE 0.5 MG: 0.5 SOLUTION RESPIRATORY (INHALATION) at 15:01

## 2023-07-21 RX ADMIN — INSULIN LISPRO 2 UNITS: 100 INJECTION, SOLUTION INTRAVENOUS; SUBCUTANEOUS at 16:34

## 2023-07-21 RX ADMIN — GUAIFENESIN 1200 MG: 600 TABLET ORAL at 21:25

## 2023-07-21 RX ADMIN — CEFEPIME HYDROCHLORIDE 2000 MG: 2 INJECTION, SOLUTION INTRAVENOUS at 09:00

## 2023-07-21 RX ADMIN — LEVALBUTEROL HYDROCHLORIDE 1.25 MG: 1.25 SOLUTION RESPIRATORY (INHALATION) at 15:01

## 2023-07-21 RX ADMIN — LEVALBUTEROL HYDROCHLORIDE 1.25 MG: 1.25 SOLUTION RESPIRATORY (INHALATION) at 07:15

## 2023-07-21 RX ADMIN — LEVALBUTEROL HYDROCHLORIDE 1.25 MG: 1.25 SOLUTION RESPIRATORY (INHALATION) at 19:32

## 2023-07-21 RX ADMIN — MICONAZOLE NITRATE: 20 CREAM TOPICAL at 17:33

## 2023-07-21 RX ADMIN — MELATONIN 6 MG: at 21:25

## 2023-07-21 RX ADMIN — HEPARIN SODIUM 5000 UNITS: 5000 INJECTION INTRAVENOUS; SUBCUTANEOUS at 06:26

## 2023-07-21 RX ADMIN — INSULIN LISPRO 2 UNITS: 100 INJECTION, SOLUTION INTRAVENOUS; SUBCUTANEOUS at 21:27

## 2023-07-21 RX ADMIN — GUAIFENESIN 1200 MG: 600 TABLET ORAL at 09:00

## 2023-07-21 RX ADMIN — IPRATROPIUM BROMIDE 0.5 MG: 0.5 SOLUTION RESPIRATORY (INHALATION) at 07:15

## 2023-07-21 RX ADMIN — HYDROCORTISONE: 1 OINTMENT TOPICAL at 21:34

## 2023-07-21 RX ADMIN — CEFEPIME HYDROCHLORIDE 2000 MG: 2 INJECTION, SOLUTION INTRAVENOUS at 02:00

## 2023-07-21 RX ADMIN — HEPARIN SODIUM 5000 UNITS: 5000 INJECTION INTRAVENOUS; SUBCUTANEOUS at 13:27

## 2023-07-21 RX ADMIN — FUROSEMIDE 40 MG: 10 INJECTION, SOLUTION INTRAMUSCULAR; INTRAVENOUS at 09:00

## 2023-07-21 RX ADMIN — LISINOPRIL 10 MG: 10 TABLET ORAL at 09:00

## 2023-07-21 RX ADMIN — HEPARIN SODIUM 5000 UNITS: 5000 INJECTION INTRAVENOUS; SUBCUTANEOUS at 21:26

## 2023-07-21 RX ADMIN — ACETAMINOPHEN 325MG 650 MG: 325 TABLET ORAL at 15:45

## 2023-07-21 RX ADMIN — MICONAZOLE NITRATE: 20 CREAM TOPICAL at 09:00

## 2023-07-21 NOTE — ASSESSMENT & PLAN NOTE
• Per family, patient admitted at [facility] x1 week for UTI and DEANDRE, discharged 7/14 on 300mg Cefdinir BID. • UA negative     Plan  • IV Cefepime. DC vancomycin as MRSA testing neg.

## 2023-07-21 NOTE — CASE MANAGEMENT
Case Management Discharge Planning Note    Patient name James Bird  Location [address] [facility] [id number] MRN [id number]  : 1955 Date 2023       Current Admission Date: 2023  Current Admission Diagnosis:Acute on chronic respiratory failure with hypoxia and hypercapnia [facility])   Patient Active Problem List    Diagnosis Date Noted   • Encephalopathy acute 2023   • Sepsis ([address]) 2023   • Urinary tract infection 2023   • Hypertension 2023   • Lymphedema 2023   • Type 2 diabetes mellitus ([address]) 2023   • Obesity 2023   • Acute on chronic respiratory failure with hypoxia and hypercapnia ([address]) 2023   • Elevated troponin 2023      LOS (days): 4  Geometric Mean LOS (GMLOS) (days): 5.00  Days to GMLOS:0.9     OBJECTIVE:  Risk of Unplanned Readmission Score: 14.92      Current admission status: Inpatient   Preferred Pharmacy:   [address] [place] [address]  Phone: [phone] Fax: [phone]    Primary Care Provider: Irasema Quarles DO    Primary Insurance: [facility]  Secondary Insurance:     DISCHARGE DETAILS:    Discharge planning discussed with[de-identified] Argenis Kenter of Choice: Yes  Comments - Freedom of Choice: Now goign to [facility] pending auth. CM contacted family/caregiver?: Yes  Were Treatment Team discharge recommendations reviewed with patient/caregiver?: Yes  Did patient/caregiver verbalize understanding of patient care needs?: Yes  Were patient/caregiver advised of the risks associated with not following Treatment Team discharge recommendations?: Yes    Contacts  Patient Contacts: James Bird: wife: [phone], Mary Cuauhtemoc: dtr: [phone]  Relationship to Patient[de-identified] Family  Contact Method:  In Person  Reason/Outcome: Emergency Contact, Continuity of Care, Discharge  [facility] [address]         Is the patient interested in [facility] at discharge?: No    DME Referral Provided  Referral made for DME?: No    Other Referral/Resources/Interventions Provided:  Interventions: Acute Rehab    Treatment Team Recommendation: Acute Rehab  Discharge Destination Plan[de-identified] Acute Rehab  Transport at Discharge : BLS Ambulance     IMM Given (Date):: 07/21/23  IMM Given to[de-identified] Family  Family notified[de-identified] Reviewed with pt and family. SO signed. Additional Comments: Pt is now recommended for acute rehab. Family is in agreement and choose [facility]. They will submit for auth on Monday. New notes will be needed. IMM reviewed with family.

## 2023-07-21 NOTE — PROGRESS NOTES
[facility]  Progress Note  Name: Navdeep Torrez  MRN: [id number]  Unit/Bed#: -01 I Date of Admission: 7/17/2023   Date of Service: 7/21/2023 I Hospital Day: 4    Assessment/Plan   Elevated troponin  Assessment & Plan  • Tropon 177      Plan  • Trend troponin   • No ischemic changes on EKG    Obesity  Assessment & Plan  • Encourage diet and lifestyle modifications when medically stable    Type 2 diabetes mellitus [facility])  Assessment & Plan  Lab Results   Component Value Date    HGBA1C 6.5 (H) 07/17/2023       Recent Labs     07/20/23  1110 07/20/23  1651 07/20/23  2053 07/21/23  0726   POCGLU 182* 160* 179* 141*       Blood Sugar Average: Last 72 hrs:  (P) 833.8364889143913593     • History of DM2 listed in chart from [facility],however, no medication listed on MAR from SNF     Plan  • Q6H fingersticks  • Check Hgb A1C    Lymphedema  Assessment & Plan  • Home regimen: HCTZ     Plan  • 40mg IV lasix daily    Hypertension  Assessment & Plan  • Home regimen: lisinopril     Plan  • Hold home regimen while NPO    Urinary tract infection  Assessment & Plan  • Per family, patient admitted at [facility] x1 week for UTI and DEANDRE, discharged 7/14 on 300mg Cefdinir BID. • UA negative     Plan  • IV Cefepime. DC vancomycin as MRSA testing neg. Sepsis ([address])  Assessment & Plan  • SIRS: tachypnec, leukocytosis  • CT CAP: Right upper lobe groundglass opacity and right lower lobe consolidation along with bilateral effusions  • Per family, patient admitted at [facility] x1 week for UTI and DEANDRE, discharged 7/14 on 300mg Cefdinir BID. • UA negative  • Received azithromycin and ceftriaxone in ED  • LA 1.4  • Procal 0.29     Plan  • Cefepime . DC further vancomycin d/t neg MRSA swab  • Blood cx neg thus far. • Trend WBC and fever curve    Encephalopathy acute  Assessment & Plan  • Brought to ED from [facility] after being found by roommate on the floor and altered compared to baseline.   • Approximate downtime 1.5 hours  • CTH: no acute intracranial abnormality  • CT c-spine: no c-spine fracture or traumatic malalignment  • VBG pH 7.2/pCO2 79.9  • Received 2mg IV Narvcan in ED without improvement in mental status  • Ammonia 38      Plan  • RESOLVED. Was 2/2 hypercapnea  • Add melatonin  • OOB during the day to encourage proper sleep/wake cycle    * Acute on chronic respiratory failure with hypoxia and hypercapnia (HCC)  Assessment & Plan  • Per chart patient wears 2L oxygen at SNF, however, family states the patient does not wear oxygen at home. Unsure if oxygen requirement is new since [facility]. • VBG pH 7.2/pCO2 79.9 -->Placed on continuous BIPAP in ED  • VBG pH 7.3/62.9 --> BIPAP HS       Plan  • Titrate oxygen with SpO2 goal > 90%              VTE  Prophylaxis:   Pharmacologic: in place  Mechanical VTE Prophylaxis in Place: Yes    Patient Centered Rounds: I have performed bedside rounds with nursing staff today. Discussions with Specialists or Other Care Team Provider: case management    Education and Discussions with Family / Patient: pt dtr. Current Length of Stay: 4 day(s)    Current Patient Status: Inpatient        Code Status: Level 3 - DNAR and DNI    Discharge Plan: Pt will require continued inpatient hospitalization. Subjective:     Pt out of bed to chair  Breathing cont to improve w/diuresis, abx. Patient is seen and examined at bedside. All other ROS are negative. Objective:     Vitals:   Temp (24hrs), Av.8 °F (37.1 °C), Min:98.1 °F (36.7 °C), Max:99.7 °F (37.6 °C)    Temp:  [98.1 °F (36.7 °C)-99.7 °F (37.6 °C)] 98.1 °F (36.7 °C)  HR:  [74-85] 76  Resp:  [18-20] 18  BP: (135-149)/(74-81) 135/75  SpO2:  [90 %-98 %] 92 %  Body mass index is 45.71 kg/m². Input and Output Summary (last 24 hours):        Intake/Output Summary (Last 24 hours) at 2023 1213  Last data filed at 2023 1201  Gross per 24 hour   Intake 480 ml   Output 2350 ml   Net -1870 ml       Physical Exam: GEN: No acute distress, comfortable, obese, on o2. HEEENT: No JVD, PERRLA, no scleral icterus  RESP: Lungs clear to auscultation bilaterally  CV: RRR, +s1/s2   ABD: SOFT NON TENDER, POSITIVE BOWEL SOUNDS, NO DISTENTION  PSYCH: CALM  NEURO: Mentation baseline, NO FOCAL DEFICITS  SKIN: post hives present  EXTREM: NO EDEMA    Additional Data:     Labs:    Results from last 7 days   Lab Units 07/21/23  0224 07/17/23  1356 07/17/23  1339   WBC Thousand/uL 15.16*   < > 15.53*   HEMOGLOBIN g/dL 11.3*   < > 15.0   I STAT HEMOGLOBIN   --    < >  --    HEMATOCRIT % 34.9*   < > 46.9   HEMATOCRIT, ISTAT   --    < >  --    PLATELETS Thousands/uL 390   < > 508*   BANDS PCT %  --   --  2   NEUTROS PCT % 82*   < >  --    LYMPHS PCT % 7*   < >  --    LYMPHO PCT %  --   --  6*   MONOS PCT % 7   < >  --    MONO PCT %  --   --  3*   EOS PCT % 3   < > 0    < > = values in this interval not displayed.      Results from last 7 days   Lab Units 07/21/23  0224   SODIUM mmol/L 137   POTASSIUM mmol/L 3.5   CHLORIDE mmol/L 94*   CO2 mmol/L 39*   BUN mg/dL 27*   CREATININE mg/dL 0.92   ANION GAP mmol/L 4   CALCIUM mg/dL 9.2   ALBUMIN g/dL 2.5*   TOTAL BILIRUBIN mg/dL 0.91   ALK PHOS U/L 75   ALT U/L 20   AST U/L 24   GLUCOSE RANDOM mg/dL 142*     Results from last 7 days   Lab Units 07/17/23  1339   INR  1.01     Results from last 7 days   Lab Units 07/21/23  0726 07/20/23  2053 07/20/23  1651 07/20/23  1110 07/20/23  0734 07/19/23  2127 07/19/23  1635 07/19/23  1231 07/19/23  0538 07/19/23  0016 07/18/23  1804 07/18/23  1148   POC GLUCOSE mg/dl 141* 179* 160* 182* 123 156* 148* 206* 135 149* 149* 153*     Results from last 7 days   Lab Units 07/17/23  1947   HEMOGLOBIN A1C % 6.5*     Results from last 7 days   Lab Units 07/18/23  0526 07/17/23  1350 07/17/23  1339   LACTIC ACID mmol/L  --  1.4  --    PROCALCITONIN ng/ml 0.25  --  0.29*       Lines/Drains:  Invasive Devices     Peripheral Intravenous Line  Duration           Peripheral IV 07/21/23 Dorsal (posterior); Right Forearm <1 day                Telemetry:        * I Have Reviewed All Lab Data Listed Above. Imaging:     Results for orders placed during the hospital encounter of 07/17/23    XR Trauma chest portable    Narrative  CHEST    INDICATION:   TRAUMA. Patient found unresponsive. Patient has suspected COVID-19. COMPARISON:  None    EXAM PERFORMED/VIEWS:  XR CHEST PORTABLE AP semierect  Images: 2    FINDINGS:    Heart shadow appears enlarged. Atherosclerotic aortic tortuosity is noted. Vascular congestion with a pulmonary edema pattern. More focal patchy opacity in the right mid lung suspect for pneumonia. No obvious effusion or pneumothorax. No acute osseous abnormalities. Impression  Focal opacity in the right mid lung suspect for pneumonia. Vascular congestion and pulmonary edema. The study was marked in [facility] for immediate notification. Workstation performed: [id number]    No results found for this or any previous visit. *I have reviewed all imaging reports listed above      Recent Cultures (last 7 days):     Results from last 7 days   Lab Units 07/18/23  0940 07/17/23  1826 07/17/23  1350   BLOOD CULTURE   --   --  No Growth at 72 hrs. No Growth at 72 hrs.    SPUTUM CULTURE  1+ Growth of  --   --    GRAM STAIN RESULT  Rare Epithelial cells per low power field  No Polys or Bacteria seen  --   --    LEGIONELLA URINARY ANTIGEN   --  Negative  --        Last 24 Hours Medication List:   Current Facility-Administered Medications   Medication Dose Route Frequency Provider Last Rate   • acetaminophen  650 mg Oral Q6H PRN Preethi Escalera MD     • albuterol  2.5 mg Nebulization Q4H PRN Preethi Escalera MD     • cefepime  2,000 mg Intravenous Q8H Woodard Schlatter, PA-C 2,000 mg (07/21/23 0900)   • furosemide  40 mg Intravenous Daily Woodard Schlatter, PA-C     • glycerin-hypromellose-  1 drop Both Eyes Q4H PRN Woodard Schlatter, PA-C • guaiFENesin  1,200 mg Oral Q12H Zoe Wilson MD     • heparin (porcine)  5,000 Units Subcutaneous [facility] Sheryle Hila, PA-C     • hydrALAZINE  5 mg Intravenous Q6H PRN Sheryle Hila, PA-C     • hydrocortisone   Topical 4x Daily PRN Milton Pedroza MD     • insulin lispro  2-12 Units Subcutaneous 4x Daily (AC & HS) Sheryle Hila, PA-C     • ipratropium  0.5 mg Nebulization TID Milton Pedroza MD     • levalbuterol  1.25 mg Nebulization TID Milton Pedroza MD     • lisinopril  10 mg Oral Daily Sheryle Hila, PA-C     • melatonin  6 mg Oral HS Sheryle Hila, PA-C     • DANNA ANTIFUNGAL   Topical BID Sheryle Hila, PA-C          Today, Patient Was Seen By: Milton Pedroza MD    ** Please Note: Dictation voice to text software may have been used in the creation of this document.  **

## 2023-07-21 NOTE — PLAN OF CARE
Problem: OCCUPATIONAL THERAPY ADULT  Goal: Performs self-care activities at highest level of function for planned discharge setting. See evaluation for individualized goals. Description: Treatment Interventions: ADL retraining, Functional transfer training, Patient/family training, Compensatory technique education, Endurance training, Cognitive reorientation, Energy conservation          See flowsheet documentation for full assessment, interventions and recommendations. Outcome: Progressing  Note: Limitation: Decreased ADL status, Decreased fine motor control, Decreased self-care trans, Decreased high-level ADLs, Decreased cognition, Decreased endurance  Prognosis: Fair  Assessment: Pt seen for OT tx session with focus on functional balance, functional mobility, ADL status, and transfer safety. Patient agreeable to OT treatment session. Pt received seated OOB to Recliner. Performed sit>stand transfers with mod-max Ax 1-2. Benefited from [facility] for appropriate weightshifting. Performed functional mobility with mod AX 1. Pt benefited for seated rest break ~>2 min to recover. SpO2 down to 88%. Tolerated simple seated ADL task with set-up. Patient continues to be functioning below baseline level, occupational performance remains limited secondary to factors listed above, and pt at increased risk for falls and injury. The patient's raw score on the AM-PAC Daily Activity inpatient short form is 16, standardized score is 35.96, less than 39.4. Patients at this level are likely to benefit from DC to post-acute rehabilitation services. Please refer to the recommendation of the Occupational Therapist for safe DC planning. From OT standpoint, recommendation at time of d/c would be level 1 high intensity resources. .  Patient to benefit from continued Occupational Therapy treatment while in the hospital to address deficits as defined above and maximize level of functional independence with ADLs and functional mobility.  Pt left with call bell in reach, tray table in reach, needs met, chair alarm activated, SCDs on.

## 2023-07-21 NOTE — ASSESSMENT & PLAN NOTE
Lab Results   Component Value Date    HGBA1C 6.5 (H) 07/17/2023       Recent Labs     07/20/23  1110 07/20/23  1651 07/20/23 2053 07/21/23  0726   POCGLU 182* 160* 179* 141*       Blood Sugar Average: Last 72 hrs:  (P) 805.1546371739828270     • History of DM2 listed in chart from [facility],however, no medication listed on MAR from SNF     Plan  • Q6H fingersticks  • Check Hgb A1C

## 2023-07-21 NOTE — ASSESSMENT & PLAN NOTE
• Per chart patient wears 2L oxygen at [facility]F, however, family states the patient does not wear oxygen at home. Unsure if oxygen requirement is new since admission at [facility] last week.   • VBG pH 7.2/pCO2 79.9 -->Placed on continuous BIPAP in ED  • VBG pH 7.3/62.9 --> BIPAP HS       Plan  • Titrate oxygen with SpO2 goal > 90%

## 2023-07-21 NOTE — OCCUPATIONAL THERAPY NOTE
Occupational Therapy Tx Note     Patient Name: Nicolas Conn  [id number] Date: 7/21/2023  Problem List  Principal Problem:    Acute on chronic respiratory failure with hypoxia and hypercapnia (HCC)  Active Problems:    Encephalopathy acute    Sepsis ([address])    Urinary tract infection    Hypertension    Lymphedema    Type 2 diabetes mellitus (HCC)    Obesity    Elevated troponin          07/21/23 1329   OT Last Visit   OT Visit Date 07/21/23   Note Type   Note Type Treatment for insurance authorization   Pain Assessment   Pain Assessment Tool 0-10   Pain Score No Pain   Restrictions/Precautions   Weight Bearing Precautions Per Order No   Other Precautions Fall Risk; Chair Alarm   ADL   Where Assessed Chair   Grooming Assistance 7  Independent   Grooming Deficit Setup;Brushing hair   Bed Mobility   Additional Comments Pt received sitting in recliner. Transfers   Sit to Stand 2  Maximal assistance   Additional items Assist x 2;Verbal cues  (progressed to mod Ax 2 from elevated bed surface.)   Stand to Sit 3  Moderate assistance   Additional items Verbal cues; Assist x 1   Functional Mobility   Functional Mobility 3  Moderate assistance   Additional Comments x 1, RW   Cognition   Arousal/Participation Alert   Attention Attends with cues to redirect   Orientation Level Oriented to person;Oriented to place;Oriented to time;Disoriented to situation   Memory Decreased recall of precautions   Following Commands Follows multistep commands with increased time or repetition   Activity Tolerance   Activity Tolerance Patient limited by fatigue   Medical Staff Made Aware PT LIVAN Flower Bridgett   Assessment   Assessment Pt seen for OT tx session with focus on functional balance, functional mobility, ADL status, and transfer safety. Patient agreeable to OT treatment session. Pt received seated OOB to Recliner. Performed sit>stand transfers with mod-max Ax 1-2. Benefited from [facility] for appropriate weightshifting.  Performed functional mobility with mod AX 1. Pt benefited for seated rest break ~>2 min to recover. SpO2 down to 88%. Tolerated simple seated ADL task with set-up. Patient continues to be functioning below baseline level, occupational performance remains limited secondary to factors listed above, and pt at increased risk for falls and injury. The patient's raw score on the AM-PAC Daily Activity inpatient short form is 16, standardized score is 35.96, less than 39.4. Patients at this level are likely to benefit from DC to post-acute rehabilitation services. Please refer to the recommendation of the Occupational Therapist for safe DC planning. From OT standpoint, recommendation at time of d/c would be level 1 high intensity resources. .  Patient to benefit from continued Occupational Therapy treatment while in the hospital to address deficits as defined above and maximize level of functional independence with ADLs and functional mobility. Pt left with call bell in reach, tray table in reach, needs met, chair alarm activated, SCDs on. Plan   Treatment Interventions ADL retraining;Functional transfer training;Patient/family training; Compensatory technique education; Endurance training;Cognitive reorientation; Energy conservation   Goal Expiration Date 07/28/23   OT Treatment Day 1   OT Frequency 3-5x/wk   Recommendation   UB Rehab Discharge Recommendation (PT/OT) Level 1   AM-PAC Daily Activity Inpatient   Lower Body Dressing 1   Bathing 2   Toileting 2   Upper Body Dressing 3   Grooming 4   Eating 4   Daily Activity Raw Score 16   Daily Activity Standardized Score (Calc for Raw Score >=11) 35.96   AM-PAC Applied Cognition Inpatient   Following a Speech/Presentation 2   Understanding Ordinary Conversation 3   Taking Medications 2   Remembering Where Things Are Placed or Put Away 2   Remembering List of 4-5 Errands 2   Taking Care of Complicated Tasks 2   Applied Cognition Raw Score 13   Applied Cognition Standardized Score 30.46   End of Consult Education Provided Yes   Patient Position at End of Consult Bedside chair;Bed/Chair alarm activated; All needs within reach   Nurse Communication Nurse aware of consult           Seema Oh OTR/L

## 2023-07-21 NOTE — PLAN OF CARE
Problem: MOBILITY - ADULT  Goal: Maintain or return to baseline ADL function  Description: INTERVENTIONS:  -  Assess patient's ability to carry out ADLs; assess patient's baseline for ADL function and identify physical deficits which impact ability to perform ADLs (bathing, care of mouth/teeth, toileting, grooming, dressing, etc.)  - Assess/evaluate cause of self-care deficits   - Assess range of motion  - Assess patient's mobility; develop plan if impaired  - Assess patient's need for assistive devices and provide as appropriate  - Encourage maximum independence but intervene and supervise when necessary  - Involve family in performance of ADLs  - Assess for home care needs following discharge   - Consider OT consult to assist with ADL evaluation and planning for discharge  - Provide patient education as appropriate  Outcome: Progressing  Goal: Maintains/Returns to pre admission functional level  Description: INTERVENTIONS:  - Perform BMAT or MOVE assessment daily.   - Set and communicate daily mobility goal to care team and patient/family/caregiver. - Collaborate with rehabilitation services on mobility goals if consulted  - Perform Range of Motion 3 times a day. - Reposition patient every 2 hours.   - Dangle patient 3 times a day  - Stand patient 3 times a day  - Ambulate patient 3 times a day  - Out of bed to chair 3 times a day   - Out of bed for meals 3 times a day  - Out of bed for toileting  - Record patient progress and toleration of activity level   Outcome: Progressing     Problem: PAIN - ADULT  Goal: Verbalizes/displays adequate comfort level or baseline comfort level  Description: Interventions:  - Encourage patient to monitor pain and request assistance  - Assess pain using appropriate pain scale  - Administer analgesics based on type and severity of pain and evaluate response  - Implement non-pharmacological measures as appropriate and evaluate response  - Consider cultural and social influences on pain and pain management  - Notify physician/advanced practitioner if interventions unsuccessful or patient reports new pain  Outcome: Progressing     Problem: INFECTION - ADULT  Goal: Absence or prevention of progression during hospitalization  Description: INTERVENTIONS:  - Assess and monitor for signs and symptoms of infection  - Monitor lab/diagnostic results  - Monitor all insertion sites, i.e. indwelling lines, tubes, and drains  - Monitor endotracheal if appropriate and nasal secretions for changes in amount and color  - [place] appropriate cooling/warming therapies per order  - Administer medications as ordered  - Instruct and encourage patient and family to use good hand hygiene technique  - Identify and instruct in appropriate isolation precautions for identified infection/condition  Outcome: Progressing     Problem: DISCHARGE PLANNING  Goal: Discharge to home or other facility with appropriate resources  Description: INTERVENTIONS:  - Identify barriers to discharge w/patient and caregiver  - Arrange for needed discharge resources and transportation as appropriate  - Identify discharge learning needs (meds, wound care, etc.)  - Arrange for interpretive services to assist at discharge as needed  - Refer to Case Management Department for coordinating discharge planning if the patient needs post-hospital services based on physician/advanced practitioner order or complex needs related to functional status, cognitive ability, or social support system  Outcome: Progressing     Problem: Knowledge Deficit  Goal: Patient/family/caregiver demonstrates understanding of disease process, treatment plan, medications, and discharge instructions  Description: Complete learning assessment and assess knowledge base.   Interventions:  - Provide teaching at level of understanding  - Provide teaching via preferred learning methods  Outcome: Progressing     Problem: CARDIOVASCULAR - ADULT  Goal: Maintains optimal cardiac output and hemodynamic stability  Description: INTERVENTIONS:  - Monitor I/O, vital signs and rhythm  - Monitor for S/S and trends of decreased cardiac output  - Administer and titrate ordered vasoactive medications to optimize hemodynamic stability  - Assess quality of pulses, skin color and temperature  - Assess for signs of decreased coronary artery perfusion  - Instruct patient to report change in severity of symptoms  Outcome: Progressing  Goal: Absence of cardiac dysrhythmias or at baseline rhythm  Description: INTERVENTIONS:  - Continuous cardiac monitoring, vital signs, obtain 12 lead EKG if ordered  - Administer antiarrhythmic and heart rate control medications as ordered  - Monitor electrolytes and administer replacement therapy as ordered  Outcome: Progressing     Problem: RESPIRATORY - ADULT  Goal: Achieves optimal ventilation and oxygenation  Description: INTERVENTIONS:  - Assess for changes in respiratory status  - Assess for changes in mentation and behavior  - Position to facilitate oxygenation and minimize respiratory effort  - Oxygen administered by appropriate delivery if ordered  - Initiate smoking cessation education as indicated  - Encourage broncho-pulmonary hygiene including cough, deep breathe, Incentive Spirometry  - Assess the need for suctioning and aspirate as needed  - Assess and instruct to report SOB or any respiratory difficulty  - Respiratory Therapy support as indicated  Outcome: Progressing     Problem: SAFETY,RESTRAINT: NV/NON-SELF DESTRUCTIVE BEHAVIOR  Goal: Remains free of harm/injury (restraint for non violent/non self-detsructive behavior)  Description: INTERVENTIONS:  - Instruct patient/family regarding restraint use   - Assess and monitor physiologic and psychological status   - Provide interventions and comfort measures to meet assessed patient needs   - Identify and implement measures to help patient regain control  - Assess readiness for release of restraint   Outcome: Progressing  Goal: Returns to optimal restraint-free functioning  Description: INTERVENTIONS:  - Assess the patient's behavior and symptoms that indicate continued need for restraint  - Identify and implement measures to help patient regain control  - Assess readiness for release of restraint   Outcome: Progressing     Problem: Prexisting or High Potential for Compromised Skin Integrity  Goal: Skin integrity is maintained or improved  Description: INTERVENTIONS:  - Identify patients at risk for skin breakdown  - Assess and monitor skin integrity  - Assess and monitor nutrition and hydration status  - Monitor labs   - Assess for incontinence   - Turn and reposition patient  - Assist with mobility/ambulation  - Relieve pressure over bony prominences  - Avoid friction and shearing  - Provide appropriate hygiene as needed including keeping skin clean and dry  - Evaluate need for skin moisturizer/barrier cream  - Collaborate with interdisciplinary team   - Patient/family teaching  - Consider wound care consult   Outcome: Progressing     Problem: Nutrition/Hydration-ADULT  Goal: Nutrient/Hydration intake appropriate for improving, restoring or maintaining nutritional needs  Description: Monitor and assess patient's nutrition/hydration status for malnutrition. Collaborate with interdisciplinary team and initiate plan and interventions as ordered. Monitor patient's weight and dietary intake as ordered or per policy. Utilize nutrition screening tool and intervene as necessary. Determine patient's food preferences and provide high-protein, high-caloric foods as appropriate.      INTERVENTIONS:  - Monitor oral intake, urinary output, labs, and treatment plans  - Assess nutrition and hydration status and recommend course of action  - Evaluate amount of meals eaten  - Assist patient with eating if necessary   - Allow adequate time for meals  - Recommend/ encourage appropriate diets, oral nutritional supplements, and vitamin/mineral supplements  - Order, calculate, and assess calorie counts as needed  - Recommend, monitor, and adjust tube feedings and TPN/PPN based on assessed needs  - Assess need for intravenous fluids  - Provide specific nutrition/hydration education as appropriate  - Include patient/family/caregiver in decisions related to nutrition  Outcome: Progressing     Problem: Potential for Falls  Goal: Patient will remain free of falls  Description: INTERVENTIONS:  - Educate patient/family on patient safety including physical limitations  - Instruct patient to call for assistance with activity   - Consult OT/PT to assist with strengthening/mobility   - Keep Call bell within reach  - Keep bed low and locked with side rails adjusted as appropriate  - Keep care items and personal belongings within reach  - Initiate and maintain comfort rounds  - Make Fall Risk Sign visible to staff  - Offer Toileting every 2 Hours, in advance of need  - Initiate/Maintain alarm  - Obtain necessary fall risk management equipment:   - Apply yellow socks and bracelet for high fall risk patients  - Consider moving patient to room near nurses station  Outcome: Progressing

## 2023-07-21 NOTE — PROGRESS NOTES
Progress Note - Pulmonary   Alexys Segura 69 y.o. male MRN: [id number]  Unit/Bed#: -01 Encounter: [id number]      Assessment & Recommendations:  1. Acute on likely chronic hypercarbic and acute hypoxic respiratory failure  • Titrate O2 to keep SpO2 88-94%  • IS, OOB-chair, PT/OT  • Overnight SpO2 on 4L/min NC - [place] SpO2 69%, desaturation <89% 4 hours 1 min  • AM ABG 7.40/62/59  • Has qualified for home BiPAP, will plan for initiation of AutoBiPAP at d/c  • Cont BiPAP 18/8cmH2O at night while admitted  • Discussed extensively with patient and his daughter     2. SIRS/Sepsis - POA - RR/WBC related to pneumonia - improving     3. Multilobar pneumonia with recent admission for UTI  • Continue cefepime, vanc stopped D#5/7 abx - would complete course with cefepime for now  • Add flutter valve for secretion clearance  • Continue neb therapy     4. Obesity, suspected CA, possible OHVS - as above     5. Toxic/metabolic encephalopathy with likely delirium component - improved     6. Pleural effusions, volume overload - diuresis per primary team    Subjective:   Poor sleep with secretions and BiPAP last night, continues to mobilize and noted some blood streaking, no large volume hemoptysis, no chest pain, no pleurisy, no fevers or chills, improving strength    Objective:       Vitals: Blood pressure 135/75, pulse 76, temperature 98.1 °F (36.7 °C), resp. rate 18, height 6' 3" (1.905 m), weight (!) 166 kg (365 lb 11.9 oz), SpO2 92 %. , 4LNC, Body mass index is 45.71 kg/m².       Intake/Output Summary (Last 24 hours) at 7/21/2023 1110  Last data filed at 7/21/2023 1049  Gross per 24 hour   Intake 480 ml   Output 1925 ml   Net -1445 ml         Physical Exam  Gen: Obese older man in chair, nontoxic, awake, alert, oriented x 3, no acute distress  HEENT: Mucous membranes moist, no oral lesions, no thrush  NECK: No accessory muscle use, JVP not elevated  Cardiac: Regular, single S1, single S2, no murmurs, no rubs, no gallops  Lungs: noted mixed central and left basilar rhonchi, no wheeze or rales  Abdomen: normoactive bowel sounds, soft nontender, nondistended, no rebound or rigidity, no guarding  Extremities: no cyanosis, no clubbing, 2+ LE edema with CVS changes    Labs: I have personally reviewed pertinent lab results.   Laboratory and Diagnostics  Results from last 7 days   Lab Units 07/21/23 0224 07/20/23  0553 07/20/23  0503 07/19/23  0552 07/18/23  0526 07/17/23  1356 07/17/23  1339   WBC Thousand/uL 15.16*  --  16.56* 15.57* 14.14*  --  15.53*   HEMOGLOBIN g/dL 11.3*  --  12.8 12.6 14.1  --  15.0   I STAT HEMOGLOBIN g/dl  --  12.6  --   --   --  12.9  --    HEMATOCRIT % 34.9*  --  40.4 39.4 44.3  --  46.9   HEMATOCRIT, ISTAT %  --  37  --   --   --  38  --    PLATELETS Thousands/uL 390  --  445* 432* 331  --  508*   NEUTROS PCT % 82*  --  82* 82* 85*  --   --    BANDS PCT %  --   --   --   --   --   --  2   MONOS PCT % 7  --  6 6 6  --   --    MONO PCT %  --   --   --   --   --   --  3*   EOS PCT % 3  --  3 3 1  --  0     Results from last 7 days   Lab Units 07/21/23 0224 07/20/23 0553 07/20/23  0503 07/19/23  0552 07/18/23  0526 07/17/23  1356 07/17/23  1339   SODIUM mmol/L 137  --  136 137 133*  --  133*   POTASSIUM mmol/L 3.5  --  3.7 3.9 4.2  --  4.5   CHLORIDE mmol/L 94*  --  94* 96 95*  --  92*   CO2 mmol/L 39*  --  38* 36* 32  --  35*   CO2, I-STAT mmol/L  --  41*  --   --   --  39*  --    ANION GAP mmol/L 4  --  4 5 6  --  6   BUN mg/dL 27*  --  28* 31* 35*  --  34*   CREATININE mg/dL 0.92  --  0.99 0.98 1.02  --  1.01   CALCIUM mg/dL 9.2  --  9.5 9.6 9.6  --  10.1   GLUCOSE RANDOM mg/dL 142*  --  123 127 153*  --  164*   ALT U/L 20  --  16  --   --   --  26   AST U/L 24  --  22  --   --   --  29   ALK PHOS U/L 75  --  82  --   --   --  118*   ALBUMIN g/dL 2.5*  --  2.7*  --   --   --  3.3*   TOTAL BILIRUBIN mg/dL 0.91  --  0.87  --   --   --  0.74     Results from last 7 days   Lab Units 07/19/23  0552 07/18/23  0526   MAGNESIUM mg/dL 1.9 2.0   PHOSPHORUS mg/dL 3.1 3.6      Results from last 7 days   Lab Units 07/17/23  1339   INR  1.01   PTT seconds 30          Results from last 7 days   Lab Units 07/17/23  1350   LACTIC ACID mmol/L 1.4                     Results from last 7 days   Lab Units 07/18/23  0526 07/17/23  1339   PROCALCITONIN ng/ml 0.25 0.29*     VBG 7.27/80/198--->7.31/63/73     MICRO  Sputum 7/18 1+ MRF  MRSA negative  FLU/RSV/COVID neg  Bld Cx 7/17 NGTD  UA neg nit/LE  Strep/legionella ag neg     RADIOGRAPHS - new images personally reviewed  CT C/A/P - right sided mixed ground glass and more dense basilar infiltrates, slight left basilar infiltrate, right > left pleural effusions, no acute IA pathology, noted anasarca     CT  head/Cspine without acute traumatic injury or acute IC findings, remote right cerebellar infarct         Davide Wheat DO, [facility]

## 2023-07-21 NOTE — PLAN OF CARE
Problem: MOBILITY - ADULT  Goal: Maintain or return to baseline ADL function  Description: INTERVENTIONS:  -  Assess patient's ability to carry out ADLs; assess patient's baseline for ADL function and identify physical deficits which impact ability to perform ADLs (bathing, care of mouth/teeth, toileting, grooming, dressing, etc.)  - Assess/evaluate cause of self-care deficits   - Assess range of motion  - Assess patient's mobility; develop plan if impaired  - Assess patient's need for assistive devices and provide as appropriate  - Encourage maximum independence but intervene and supervise when necessary  - Involve family in performance of ADLs  - Assess for home care needs following discharge   - Consider OT consult to assist with ADL evaluation and planning for discharge  - Provide patient education as appropriate  Outcome: Progressing  Goal: Maintains/Returns to pre admission functional level  Description: INTERVENTIONS:  - Perform BMAT or MOVE assessment daily.   - Set and communicate daily mobility goal to care team and patient/family/caregiver. - Collaborate with rehabilitation services on mobility goals if consulted  - Perform Range of Motion 3 times a day. - Reposition patient every 2 hours.   - Dangle patient 3 times a day  - Stand patient 3 times a day  - Ambulate patient 3 times a day  - Out of bed to chair 3 times a day   - Out of bed for meals 3 times a day  - Out of bed for toileting  - Record patient progress and toleration of activity level   Outcome: Progressing     Problem: PAIN - ADULT  Goal: Verbalizes/displays adequate comfort level or baseline comfort level  Description: Interventions:  - Encourage patient to monitor pain and request assistance  - Assess pain using appropriate pain scale  - Administer analgesics based on type and severity of pain and evaluate response  - Implement non-pharmacological measures as appropriate and evaluate response  - Consider cultural and social influences on pain and pain management  - Notify physician/advanced practitioner if interventions unsuccessful or patient reports new pain  Outcome: Progressing     Problem: INFECTION - ADULT  Goal: Absence or prevention of progression during hospitalization  Description: INTERVENTIONS:  - Assess and monitor for signs and symptoms of infection  - Monitor lab/diagnostic results  - Monitor all insertion sites, i.e. indwelling lines, tubes, and drains  - Monitor endotracheal if appropriate and nasal secretions for changes in amount and color  - [place] appropriate cooling/warming therapies per order  - Administer medications as ordered  - Instruct and encourage patient and family to use good hand hygiene technique  - Identify and instruct in appropriate isolation precautions for identified infection/condition  Outcome: Progressing     Problem: DISCHARGE PLANNING  Goal: Discharge to home or other facility with appropriate resources  Description: INTERVENTIONS:  - Identify barriers to discharge w/patient and caregiver  - Arrange for needed discharge resources and transportation as appropriate  - Identify discharge learning needs (meds, wound care, etc.)  - Arrange for interpretive services to assist at discharge as needed  - Refer to Case Management Department for coordinating discharge planning if the patient needs post-hospital services based on physician/advanced practitioner order or complex needs related to functional status, cognitive ability, or social support system  Outcome: Progressing     Problem: Knowledge Deficit  Goal: Patient/family/caregiver demonstrates understanding of disease process, treatment plan, medications, and discharge instructions  Description: Complete learning assessment and assess knowledge base.   Interventions:  - Provide teaching at level of understanding  - Provide teaching via preferred learning methods  Outcome: Progressing     Problem: CARDIOVASCULAR - ADULT  Goal: Maintains optimal cardiac output and hemodynamic stability  Description: INTERVENTIONS:  - Monitor I/O, vital signs and rhythm  - Monitor for S/S and trends of decreased cardiac output  - Administer and titrate ordered vasoactive medications to optimize hemodynamic stability  - Assess quality of pulses, skin color and temperature  - Assess for signs of decreased coronary artery perfusion  - Instruct patient to report change in severity of symptoms  Outcome: Progressing  Goal: Absence of cardiac dysrhythmias or at baseline rhythm  Description: INTERVENTIONS:  - Continuous cardiac monitoring, vital signs, obtain 12 lead EKG if ordered  - Administer antiarrhythmic and heart rate control medications as ordered  - Monitor electrolytes and administer replacement therapy as ordered  Outcome: Progressing     Problem: RESPIRATORY - ADULT  Goal: Achieves optimal ventilation and oxygenation  Description: INTERVENTIONS:  - Assess for changes in respiratory status  - Assess for changes in mentation and behavior  - Position to facilitate oxygenation and minimize respiratory effort  - Oxygen administered by appropriate delivery if ordered  - Initiate smoking cessation education as indicated  - Encourage broncho-pulmonary hygiene including cough, deep breathe, Incentive Spirometry  - Assess the need for suctioning and aspirate as needed  - Assess and instruct to report SOB or any respiratory difficulty  - Respiratory Therapy support as indicated  Outcome: Progressing     Problem: SAFETY,RESTRAINT: NV/NON-SELF DESTRUCTIVE BEHAVIOR  Goal: Remains free of harm/injury (restraint for non violent/non self-detsructive behavior)  Description: INTERVENTIONS:  - Instruct patient/family regarding restraint use   - Assess and monitor physiologic and psychological status   - Provide interventions and comfort measures to meet assessed patient needs   - Identify and implement measures to help patient regain control  - Assess readiness for release of restraint   Outcome: Progressing  Goal: Returns to optimal restraint-free functioning  Description: INTERVENTIONS:  - Assess the patient's behavior and symptoms that indicate continued need for restraint  - Identify and implement measures to help patient regain control  - Assess readiness for release of restraint   Outcome: Progressing     Problem: Prexisting or High Potential for Compromised Skin Integrity  Goal: Skin integrity is maintained or improved  Description: INTERVENTIONS:  - Identify patients at risk for skin breakdown  - Assess and monitor skin integrity  - Assess and monitor nutrition and hydration status  - Monitor labs   - Assess for incontinence   - Turn and reposition patient  - Assist with mobility/ambulation  - Relieve pressure over bony prominences  - Avoid friction and shearing  - Provide appropriate hygiene as needed including keeping skin clean and dry  - Evaluate need for skin moisturizer/barrier cream  - Collaborate with interdisciplinary team   - Patient/family teaching  - Consider wound care consult   Outcome: Progressing     Problem: Nutrition/Hydration-ADULT  Goal: Nutrient/Hydration intake appropriate for improving, restoring or maintaining nutritional needs  Description: Monitor and assess patient's nutrition/hydration status for malnutrition. Collaborate with interdisciplinary team and initiate plan and interventions as ordered. Monitor patient's weight and dietary intake as ordered or per policy. Utilize nutrition screening tool and intervene as necessary. Determine patient's food preferences and provide high-protein, high-caloric foods as appropriate.      INTERVENTIONS:  - Monitor oral intake, urinary output, labs, and treatment plans  - Assess nutrition and hydration status and recommend course of action  - Evaluate amount of meals eaten  - Assist patient with eating if necessary   - Allow adequate time for meals  - Recommend/ encourage appropriate diets, oral nutritional supplements, and vitamin/mineral supplements  - Order, calculate, and assess calorie counts as needed  - Recommend, monitor, and adjust tube feedings and TPN/PPN based on assessed needs  - Assess need for intravenous fluids  - Provide specific nutrition/hydration education as appropriate  - Include patient/family/caregiver in decisions related to nutrition  Outcome: Progressing     Problem: Potential for Falls  Goal: Patient will remain free of falls  Description: INTERVENTIONS:  - Educate patient/family on patient safety including physical limitations  - Instruct patient to call for assistance with activity   - Consult OT/PT to assist with strengthening/mobility   - Keep Call bell within reach  - Keep bed low and locked with side rails adjusted as appropriate  - Keep care items and personal belongings within reach  - Initiate and maintain comfort rounds  - Make Fall Risk Sign visible to staff  - Offer Toileting every  Hours, in advance of need  - Initiate/Maintain alarm  - Obtain necessary fall risk management equipmen  - Apply yellow socks and bracelet for high fall risk patients  - Consider moving patient to room near nurses station  Outcome: Progressing

## 2023-07-21 NOTE — ASSESSMENT & PLAN NOTE
• SIRS: tachypnec, leukocytosis  • CT CAP: Right upper lobe groundglass opacity and right lower lobe consolidation along with bilateral effusions  • Per family, patient admitted at [facility] x1 week for UTI and DEANDRE, discharged 7/14 on 300mg Cefdinir BID. • UA negative  • Received azithromycin and ceftriaxone in ED  • LA 1.4  • Procal 0.29     Plan  • Cefepime . DC further vancomycin d/t neg MRSA swab  • Blood cx neg thus far.   • Trend WBC and fever curve

## 2023-07-21 NOTE — PLAN OF CARE
Problem: PHYSICAL THERAPY ADULT  Goal: Performs mobility at highest level of function for planned discharge setting. See evaluation for individualized goals. Description: Treatment/Interventions: Functional transfer training, LE strengthening/ROM, Therapeutic exercise, Endurance training, Cognitive reorientation, Patient/family training, Equipment eval/education, Bed mobility, Gait training  Equipment Recommended: Justice Messing       See flowsheet documentation for full assessment, interventions and recommendations. Note:    Problem List: Decreased strength, Decreased endurance, Impaired balance, Decreased mobility, Obesity  Assessment: Pt seen for PT intervention. Pt w/ continued significant improvement in cognition and alertness, which positively impacts functional mobility and tolerance to activity. Pt continues to require max A x 2 to perform STS from low recliner chair. However he is able to ambulate further and more trials than previous sessions (and even at STR per dtr). Pt is only mod A x 1 to ambulate w/ RW and benefits from cues to keep head up/looking forward. Pt returned to recliner chair at end of session. Discharge recommendatio nis for Level 1 as pt demonstrates continued improvement w/ functional mobility,  tolerance to longer therapy sessions, previously I at baseline, and significant social support             See flowsheet documentation for full assessment.

## 2023-07-22 LAB
ALBUMIN SERPL BCP-MCNC: 2.6 G/DL (ref 3.5–5)
ALP SERPL-CCNC: 74 U/L (ref 34–104)
ALT SERPL W P-5'-P-CCNC: 18 U/L (ref 7–52)
ANION GAP SERPL CALCULATED.3IONS-SCNC: 3 MMOL/L
AST SERPL W P-5'-P-CCNC: 23 U/L (ref 13–39)
BACTERIA BLD CULT: NORMAL
BACTERIA BLD CULT: NORMAL
BASOPHILS # BLD AUTO: 0.05 THOUSANDS/ÂΜL (ref 0–0.1)
BASOPHILS NFR BLD AUTO: 0 % (ref 0–1)
BILIRUB SERPL-MCNC: 0.93 MG/DL (ref 0.2–1)
BUN SERPL-MCNC: 26 MG/DL (ref 5–25)
CALCIUM ALBUM COR SERPL-MCNC: 10.4 MG/DL (ref 8.3–10.1)
CALCIUM SERPL-MCNC: 9.3 MG/DL (ref 8.4–10.2)
CHLORIDE SERPL-SCNC: 91 MMOL/L (ref 96–108)
CO2 SERPL-SCNC: 41 MMOL/L (ref 21–32)
CREAT SERPL-MCNC: 1 MG/DL (ref 0.6–1.3)
EOSINOPHIL # BLD AUTO: 0.41 THOUSAND/ÂΜL (ref 0–0.61)
EOSINOPHIL NFR BLD AUTO: 3 % (ref 0–6)
ERYTHROCYTE [DISTWIDTH] IN BLOOD BY AUTOMATED COUNT: 12.4 % (ref 11.6–15.1)
GFR SERPL CREATININE-BSD FRML MDRD: 76 ML/MIN/1.73SQ M
GLUCOSE SERPL-MCNC: 130 MG/DL (ref 65–140)
GLUCOSE SERPL-MCNC: 132 MG/DL (ref 65–140)
GLUCOSE SERPL-MCNC: 140 MG/DL (ref 65–140)
GLUCOSE SERPL-MCNC: 166 MG/DL (ref 65–140)
GLUCOSE SERPL-MCNC: 178 MG/DL (ref 65–140)
HCT VFR BLD AUTO: 36.8 % (ref 36.5–49.3)
HGB BLD-MCNC: 12 G/DL (ref 12–17)
IMM GRANULOCYTES # BLD AUTO: 0.14 THOUSAND/UL (ref 0–0.2)
IMM GRANULOCYTES NFR BLD AUTO: 1 % (ref 0–2)
LYMPHOCYTES # BLD AUTO: 1.14 THOUSANDS/ÂΜL (ref 0.6–4.47)
LYMPHOCYTES NFR BLD AUTO: 8 % (ref 14–44)
MCH RBC QN AUTO: 31.3 PG (ref 26.8–34.3)
MCHC RBC AUTO-ENTMCNC: 32.6 G/DL (ref 31.4–37.4)
MCV RBC AUTO: 96 FL (ref 82–98)
MONOCYTES # BLD AUTO: 1.19 THOUSAND/ÂΜL (ref 0.17–1.22)
MONOCYTES NFR BLD AUTO: 8 % (ref 4–12)
NEUTROPHILS # BLD AUTO: 11.55 THOUSANDS/ÂΜL (ref 1.85–7.62)
NEUTS SEG NFR BLD AUTO: 80 % (ref 43–75)
NRBC BLD AUTO-RTO: 0 /100 WBCS
PLATELET # BLD AUTO: 383 THOUSANDS/UL (ref 149–390)
PMV BLD AUTO: 8.6 FL (ref 8.9–12.7)
POTASSIUM SERPL-SCNC: 3.6 MMOL/L (ref 3.5–5.3)
PROT SERPL-MCNC: 6 G/DL (ref 6.4–8.4)
RBC # BLD AUTO: 3.84 MILLION/UL (ref 3.88–5.62)
SODIUM SERPL-SCNC: 135 MMOL/L (ref 135–147)
WBC # BLD AUTO: 14.48 THOUSAND/UL (ref 4.31–10.16)

## 2023-07-22 PROCEDURE — 99232 SBSQ HOSP IP/OBS MODERATE 35: CPT | Performed by: INTERNAL MEDICINE

## 2023-07-22 PROCEDURE — 80053 COMPREHEN METABOLIC PANEL: CPT | Performed by: HOSPITALIST

## 2023-07-22 PROCEDURE — 94640 AIRWAY INHALATION TREATMENT: CPT

## 2023-07-22 PROCEDURE — 99232 SBSQ HOSP IP/OBS MODERATE 35: CPT | Performed by: HOSPITALIST

## 2023-07-22 PROCEDURE — 82948 REAGENT STRIP/BLOOD GLUCOSE: CPT

## 2023-07-22 PROCEDURE — 94660 CPAP INITIATION&MGMT: CPT

## 2023-07-22 PROCEDURE — 85025 COMPLETE CBC W/AUTO DIFF WBC: CPT | Performed by: HOSPITALIST

## 2023-07-22 PROCEDURE — 94669 MECHANICAL CHEST WALL OSCILL: CPT

## 2023-07-22 PROCEDURE — 94760 N-INVAS EAR/PLS OXIMETRY 1: CPT

## 2023-07-22 RX ORDER — ACETAZOLAMIDE 250 MG/1
250 TABLET ORAL ONCE
Status: COMPLETED | OUTPATIENT
Start: 2023-07-22 | End: 2023-07-22

## 2023-07-22 RX ORDER — ACETAZOLAMIDE 250 MG/1
250 TABLET ORAL ONCE
Status: DISCONTINUED | OUTPATIENT
Start: 2023-07-22 | End: 2023-07-22

## 2023-07-22 RX ADMIN — CEFEPIME HYDROCHLORIDE 2000 MG: 2 INJECTION, SOLUTION INTRAVENOUS at 10:49

## 2023-07-22 RX ADMIN — GLYCERIN, HYPROMELLOSE, POLYETHYLENE GLYCOL 1 DROP: .2; .2; 1 LIQUID OPHTHALMIC at 20:26

## 2023-07-22 RX ADMIN — HEPARIN SODIUM 5000 UNITS: 5000 INJECTION INTRAVENOUS; SUBCUTANEOUS at 13:54

## 2023-07-22 RX ADMIN — GLYCERIN, HYPROMELLOSE, POLYETHYLENE GLYCOL 1 DROP: .2; .2; 1 LIQUID OPHTHALMIC at 12:43

## 2023-07-22 RX ADMIN — HEPARIN SODIUM 5000 UNITS: 5000 INJECTION INTRAVENOUS; SUBCUTANEOUS at 06:16

## 2023-07-22 RX ADMIN — GUAIFENESIN 1200 MG: 600 TABLET ORAL at 21:44

## 2023-07-22 RX ADMIN — INSULIN LISPRO 2 UNITS: 100 INJECTION, SOLUTION INTRAVENOUS; SUBCUTANEOUS at 21:44

## 2023-07-22 RX ADMIN — IPRATROPIUM BROMIDE 0.5 MG: 0.5 SOLUTION RESPIRATORY (INHALATION) at 13:41

## 2023-07-22 RX ADMIN — GUAIFENESIN 1200 MG: 600 TABLET ORAL at 09:00

## 2023-07-22 RX ADMIN — IPRATROPIUM BROMIDE 0.5 MG: 0.5 SOLUTION RESPIRATORY (INHALATION) at 19:36

## 2023-07-22 RX ADMIN — LISINOPRIL 10 MG: 10 TABLET ORAL at 09:00

## 2023-07-22 RX ADMIN — ACETAZOLAMIDE 250 MG: 250 TABLET ORAL at 12:45

## 2023-07-22 RX ADMIN — MICONAZOLE NITRATE: 20 CREAM TOPICAL at 09:04

## 2023-07-22 RX ADMIN — LEVALBUTEROL HYDROCHLORIDE 1.25 MG: 1.25 SOLUTION RESPIRATORY (INHALATION) at 07:28

## 2023-07-22 RX ADMIN — CEFEPIME HYDROCHLORIDE 2000 MG: 2 INJECTION, SOLUTION INTRAVENOUS at 03:06

## 2023-07-22 RX ADMIN — IPRATROPIUM BROMIDE 0.5 MG: 0.5 SOLUTION RESPIRATORY (INHALATION) at 07:28

## 2023-07-22 RX ADMIN — HEPARIN SODIUM 5000 UNITS: 5000 INJECTION INTRAVENOUS; SUBCUTANEOUS at 21:44

## 2023-07-22 RX ADMIN — INSULIN LISPRO 2 UNITS: 100 INJECTION, SOLUTION INTRAVENOUS; SUBCUTANEOUS at 12:46

## 2023-07-22 RX ADMIN — MICONAZOLE NITRATE: 20 CREAM TOPICAL at 18:35

## 2023-07-22 RX ADMIN — HYDROCORTISONE: 1 OINTMENT TOPICAL at 20:26

## 2023-07-22 RX ADMIN — FUROSEMIDE 40 MG: 10 INJECTION, SOLUTION INTRAMUSCULAR; INTRAVENOUS at 09:00

## 2023-07-22 RX ADMIN — HYDROCORTISONE: 1 OINTMENT TOPICAL at 14:58

## 2023-07-22 RX ADMIN — LEVALBUTEROL HYDROCHLORIDE 1.25 MG: 1.25 SOLUTION RESPIRATORY (INHALATION) at 13:41

## 2023-07-22 RX ADMIN — LEVALBUTEROL HYDROCHLORIDE 1.25 MG: 1.25 SOLUTION RESPIRATORY (INHALATION) at 19:36

## 2023-07-22 RX ADMIN — CEFEPIME HYDROCHLORIDE 2000 MG: 2 INJECTION, SOLUTION INTRAVENOUS at 18:32

## 2023-07-22 RX ADMIN — MELATONIN 6 MG: at 21:44

## 2023-07-22 NOTE — ASSESSMENT & PLAN NOTE
Lab Results   Component Value Date    HGBA1C 6.5 (H) 07/17/2023       Recent Labs     07/21/23  1611 07/21/23  2118 07/22/23  0713 07/22/23  1143   POCGLU 169* 197* 140 178*       Blood Sugar Average: Last 72 hrs:  (P) [id number]     • History of DM2 listed in chart from [facility],however, no medication listed on MAR from SNF     Plan  • Q6H fingersticks  • Hgb A1C 6.5

## 2023-07-22 NOTE — ASSESSMENT & PLAN NOTE
• Per family, patient admitted at [facility] x1 week for UTI and DEANDRE, discharged 7/14 on 300mg Cefdinir BID. • UA negative     Plan  • IV Cefepime. DC vancomycin as MRSA testing neg.

## 2023-07-22 NOTE — PROGRESS NOTES
Progress Note - Pulmonary   Lori [place] 69 y.o. male MRN: [id number]  Unit/Bed#: -[facility] Encounter: [id number]      Assessment & Recommendations:  1. Acute on likely chronic hypercarbic and acute hypoxic respiratory failure  • Titrate O2 to keep SpO2 88-94%  • IS, OOB-chair, PT/OT  • Overnight SpO2 on 4L/min NC - Paras SpO2 69%, desaturation <89% 4 hours 1 min  • AM ABG 7.40/62/59  • Has qualified for home BiPAP, will plan for initiation of AutoBiPAP at d/c  • Cont BiPAP 18/8cmH2O at night while admitted  • Discussed extensively with patient and his family      2. SIRS/Sepsis - POA - RR/WBC related to pneumonia - improved     3. Multilobar pneumonia with recent admission for UTI  • Continue cefepime, vanc stopped D#6/7 abx - would complete course with cefepime for now   • Cont flutter valve for secretion clearance  • Continue neb therapy  • Repeat imaging in 6-8 weeks     4. Obesity, suspected CA, possible OHVS - as above     5. Toxic/metabolic encephalopathy with likely delirium component - resolved     6. Pleural effusions, volume overload - diuresis per primary team, can add diamox 250mg x 1 given rising bicarb    Subjective:   Feeling improved, tolerated BiPAP better last night, less secretions, worked with physical therapy and strength is improving    Objective:       Vitals: Blood pressure 122/74, pulse 81, temperature 98.6 °F (37 °C), resp. rate 19, height 6' 3" (1.905 m), weight (!) 176 kg (388 lb 10.7 oz), SpO2 93 %. , 4LNC, Body mass index is 48.58 kg/m².       Intake/Output Summary (Last 24 hours) at 7/22/2023 1118  Last data filed at 7/22/2023 0622  Gross per 24 hour   Intake 930 ml   Output 1150 ml   Net -220 ml         Physical Exam  GEN obese older man in chair, conversant, nontoxic  HEENT MMM, no thrush, no oral lesions  Neck No accessory muscle use, JVP not elevated  CV reg, single s1/2, no m/r  Pulm very mild scattered rhonchi, no wheeze, no rales  ABD +BS soft NTND, no rebound  EXT warm, brisk cap refill, 1-2+ LE edema with CVS changes      Labs: I have personally reviewed pertinent lab results.   Laboratory and Diagnostics  Results from last 7 days   Lab Units 07/22/23 0314 07/21/23 0224 07/20/23  0553 07/20/23  0503 07/19/23  0552 07/18/23  0526 07/17/23  1356 07/17/23  1339   WBC Thousand/uL 14.48* 15.16*  --  16.56* 15.57* 14.14*  --  15.53*   HEMOGLOBIN g/dL 12.0 11.3*  --  12.8 12.6 14.1  --  15.0   I STAT HEMOGLOBIN g/dl  --   --  12.6  --   --   --  12.9  --    HEMATOCRIT % 36.8 34.9*  --  40.4 39.4 44.3  --  46.9   HEMATOCRIT, ISTAT %  --   --  37  --   --   --  38  --    PLATELETS Thousands/uL 383 390  --  445* 432* 331  --  508*   NEUTROS PCT % 80* 82*  --  82* 82* 85*  --   --    BANDS PCT %  --   --   --   --   --   --   --  2   MONOS PCT % 8 7  --  6 6 6  --   --    MONO PCT %  --   --   --   --   --   --   --  3*   EOS PCT % 3 3  --  3 3 1  --  0     Results from last 7 days   Lab Units 07/22/23 0314 07/21/23 0224 07/20/23  0553 07/20/23  0503 07/19/23  0552 07/18/23  0526 07/17/23  1356 07/17/23  1339   SODIUM mmol/L 135 137  --  136 137 133*  --  133*   POTASSIUM mmol/L 3.6 3.5  --  3.7 3.9 4.2  --  4.5   CHLORIDE mmol/L 91* 94*  --  94* 96 95*  --  92*   CO2 mmol/L 41* 39*  --  38* 36* 32  --  35*   CO2, I-STAT mmol/L  --   --  41*  --   --   --  39*  --    ANION GAP mmol/L 3 4  --  4 5 6  --  6   BUN mg/dL 26* 27*  --  28* 31* 35*  --  34*   CREATININE mg/dL 1.00 0.92  --  0.99 0.98 1.02  --  1.01   CALCIUM mg/dL 9.3 9.2  --  9.5 9.6 9.6  --  10.1   GLUCOSE RANDOM mg/dL 132 142*  --  123 127 153*  --  164*   ALT U/L 18 20  --  16  --   --   --  26   AST U/L 23 24  --  22  --   --   --  29   ALK PHOS U/L 74 75  --  82  --   --   --  118*   ALBUMIN g/dL 2.6* 2.5*  --  2.7*  --   --   --  3.3*   TOTAL BILIRUBIN mg/dL 0.93 0.91  --  0.87  --   --   --  0.74     Results from last 7 days   Lab Units 07/19/23  0552 07/18/23  0526   MAGNESIUM mg/dL 1.9 2.0   PHOSPHORUS mg/dL 3.1 3.6 Results from last 7 days   Lab Units 07/17/23  1339   INR  1.01   PTT seconds 30          Results from last 7 days   Lab Units 07/17/23  1350   LACTIC ACID mmol/L 1.4                     Results from last 7 days   Lab Units 07/18/23  0526 07/17/23  1339   PROCALCITONIN ng/ml 0.25 0.29*     VBG 7.27/80/198--->7.31/63/73     MICRO  Sputum 7/18 1+ MRF  MRSA negative  FLU/RSV/COVID neg  Bld Cx 7/17 NGTD  UA neg nit/LE  Strep/legionella ag neg     RADIOGRAPHS - no new images  CT C/A/P - right sided mixed ground glass and more dense basilar infiltrates, slight left basilar infiltrate, right > left pleural effusions, no acute IA pathology, noted anasarca     CT  head/Cspine without acute traumatic injury or acute IC findings, remote right cerebellar infarct         Davide Wheat DO, [facility]

## 2023-07-22 NOTE — PLAN OF CARE
Problem: MOBILITY - ADULT  Goal: Maintain or return to baseline ADL function  Description: INTERVENTIONS:  -  Assess patient's ability to carry out ADLs; assess patient's baseline for ADL function and identify physical deficits which impact ability to perform ADLs (bathing, care of mouth/teeth, toileting, grooming, dressing, etc.)  - Assess/evaluate cause of self-care deficits   - Assess range of motion  - Assess patient's mobility; develop plan if impaired  - Assess patient's need for assistive devices and provide as appropriate  - Encourage maximum independence but intervene and supervise when necessary  - Involve family in performance of ADLs  - Assess for home care needs following discharge   - Consider OT consult to assist with ADL evaluation and planning for discharge  - Provide patient education as appropriate  Outcome: Progressing  Goal: Maintains/Returns to pre admission functional level  Description: INTERVENTIONS:  - Perform BMAT or MOVE assessment daily.   - Set and communicate daily mobility goal to care team and patient/family/caregiver. - Collaborate with rehabilitation services on mobility goals if consulted  - Perform Range of Motion 3 times a day. - Reposition patient every 2 hours.   - Dangle patient 3 times a day  - Stand patient 3 times a day  - Ambulate patient 3 times a day  - Out of bed to chair 3 times a day   - Out of bed for meals 3 times a day  - Out of bed for toileting  - Record patient progress and toleration of activity level   Outcome: Progressing     Problem: PAIN - ADULT  Goal: Verbalizes/displays adequate comfort level or baseline comfort level  Description: Interventions:  - Encourage patient to monitor pain and request assistance  - Assess pain using appropriate pain scale  - Administer analgesics based on type and severity of pain and evaluate response  - Implement non-pharmacological measures as appropriate and evaluate response  - Consider cultural and social influences on pain and pain management  - Notify physician/advanced practitioner if interventions unsuccessful or patient reports new pain  Outcome: Progressing     Problem: INFECTION - ADULT  Goal: Absence or prevention of progression during hospitalization  Description: INTERVENTIONS:  - Assess and monitor for signs and symptoms of infection  - Monitor lab/diagnostic results  - Monitor all insertion sites, i.e. indwelling lines, tubes, and drains  - Monitor endotracheal if appropriate and nasal secretions for changes in amount and color  - [place] appropriate cooling/warming therapies per order  - Administer medications as ordered  - Instruct and encourage patient and family to use good hand hygiene technique  - Identify and instruct in appropriate isolation precautions for identified infection/condition  Outcome: Progressing     Problem: DISCHARGE PLANNING  Goal: Discharge to home or other facility with appropriate resources  Description: INTERVENTIONS:  - Identify barriers to discharge w/patient and caregiver  - Arrange for needed discharge resources and transportation as appropriate  - Identify discharge learning needs (meds, wound care, etc.)  - Arrange for interpretive services to assist at discharge as needed  - Refer to Case Management Department for coordinating discharge planning if the patient needs post-hospital services based on physician/advanced practitioner order or complex needs related to functional status, cognitive ability, or social support system  Outcome: Progressing     Problem: Knowledge Deficit  Goal: Patient/family/caregiver demonstrates understanding of disease process, treatment plan, medications, and discharge instructions  Description: Complete learning assessment and assess knowledge base.   Interventions:  - Provide teaching at level of understanding  - Provide teaching via preferred learning methods  Outcome: Progressing     Problem: CARDIOVASCULAR - ADULT  Goal: Maintains optimal cardiac output and hemodynamic stability  Description: INTERVENTIONS:  - Monitor I/O, vital signs and rhythm  - Monitor for S/S and trends of decreased cardiac output  - Administer and titrate ordered vasoactive medications to optimize hemodynamic stability  - Assess quality of pulses, skin color and temperature  - Assess for signs of decreased coronary artery perfusion  - Instruct patient to report change in severity of symptoms  Outcome: Progressing  Goal: Absence of cardiac dysrhythmias or at baseline rhythm  Description: INTERVENTIONS:  - Continuous cardiac monitoring, vital signs, obtain 12 lead EKG if ordered  - Administer antiarrhythmic and heart rate control medications as ordered  - Monitor electrolytes and administer replacement therapy as ordered  Outcome: Progressing     Problem: RESPIRATORY - ADULT  Goal: Achieves optimal ventilation and oxygenation  Description: INTERVENTIONS:  - Assess for changes in respiratory status  - Assess for changes in mentation and behavior  - Position to facilitate oxygenation and minimize respiratory effort  - Oxygen administered by appropriate delivery if ordered  - Initiate smoking cessation education as indicated  - Encourage broncho-pulmonary hygiene including cough, deep breathe, Incentive Spirometry  - Assess the need for suctioning and aspirate as needed  - Assess and instruct to report SOB or any respiratory difficulty  - Respiratory Therapy support as indicated  Outcome: Progressing     Problem: SAFETY,RESTRAINT: NV/NON-SELF DESTRUCTIVE BEHAVIOR  Goal: Remains free of harm/injury (restraint for non violent/non self-detsructive behavior)  Description: INTERVENTIONS:  - Instruct patient/family regarding restraint use   - Assess and monitor physiologic and psychological status   - Provide interventions and comfort measures to meet assessed patient needs   - Identify and implement measures to help patient regain control  - Assess readiness for release of restraint   Outcome: Progressing  Goal: Returns to optimal restraint-free functioning  Description: INTERVENTIONS:  - Assess the patient's behavior and symptoms that indicate continued need for restraint  - Identify and implement measures to help patient regain control  - Assess readiness for release of restraint   Outcome: Progressing     Problem: Prexisting or High Potential for Compromised Skin Integrity  Goal: Skin integrity is maintained or improved  Description: INTERVENTIONS:  - Identify patients at risk for skin breakdown  - Assess and monitor skin integrity  - Assess and monitor nutrition and hydration status  - Monitor labs   - Assess for incontinence   - Turn and reposition patient  - Assist with mobility/ambulation  - Relieve pressure over bony prominences  - Avoid friction and shearing  - Provide appropriate hygiene as needed including keeping skin clean and dry  - Evaluate need for skin moisturizer/barrier cream  - Collaborate with interdisciplinary team   - Patient/family teaching  - Consider wound care consult   Outcome: Progressing     Problem: Nutrition/Hydration-ADULT  Goal: Nutrient/Hydration intake appropriate for improving, restoring or maintaining nutritional needs  Description: Monitor and assess patient's nutrition/hydration status for malnutrition. Collaborate with interdisciplinary team and initiate plan and interventions as ordered. Monitor patient's weight and dietary intake as ordered or per policy. Utilize nutrition screening tool and intervene as necessary. Determine patient's food preferences and provide high-protein, high-caloric foods as appropriate.      INTERVENTIONS:  - Monitor oral intake, urinary output, labs, and treatment plans  - Assess nutrition and hydration status and recommend course of action  - Evaluate amount of meals eaten  - Assist patient with eating if necessary   - Allow adequate time for meals  - Recommend/ encourage appropriate diets, oral nutritional supplements, and vitamin/mineral supplements  - Order, calculate, and assess calorie counts as needed  - Recommend, monitor, and adjust tube feedings and TPN/PPN based on assessed needs  - Assess need for intravenous fluids  - Provide specific nutrition/hydration education as appropriate  - Include patient/family/caregiver in decisions related to nutrition  Outcome: Progressing     Problem: Potential for Falls  Goal: Patient will remain free of falls  Description: INTERVENTIONS:  - Educate patient/family on patient safety including physical limitations  - Instruct patient to call for assistance with activity   - Consult OT/PT to assist with strengthening/mobility   - Keep Call bell within reach  - Keep bed low and locked with side rails adjusted as appropriate  - Keep care items and personal belongings within reach  - Initiate and maintain comfort rounds  - Make Fall Risk Sign visible to staff  - Offer Toileting every 2 Hours, in advance of need  - Initiate/Maintain alarm  - Obtain necessary fall risk management equipment: socks  - Apply yellow socks and bracelet for high fall risk patients  - Consider moving patient to room near nurses station  Outcome: Progressing

## 2023-07-22 NOTE — PROGRESS NOTES
[facility]  Progress Note  Name: Elmer Alexis  MRN: [id number]  Unit/Bed#: -01 I Date of Admission: 7/17/2023   Date of Service: 7/22/2023 I Hospital Day: 5    Assessment/Plan   Elevated troponin  Assessment & Plan  • Tropon 177      Plan  • Trend troponin   • No ischemic changes on EKG    Obesity  Assessment & Plan  • Encourage diet and lifestyle modifications when medically stable    Type 2 diabetes mellitus [facility])  Assessment & Plan  Lab Results   Component Value Date    HGBA1C 6.5 (H) 07/17/2023       Recent Labs     07/21/23  1611 07/21/23  2118 07/22/23  0713 07/22/23  1143   POCGLU 169* 197* 140 178*       Blood Sugar Average: Last 72 hrs:  (P) [id number]     • History of DM2 listed in chart from [facility],however, no medication listed on MAR from SNF     Plan  • Q6H fingersticks  • Hgb A1C 6.5    Lymphedema  Assessment & Plan  • Home regimen: HCTZ     Plan  • 40mg IV lasix daily. S/p diamox. Monitor CO2 levels. Hypertension  Assessment & Plan  • Home regimen: lisinopril     Plan  • Hold home regimen while NPO    Urinary tract infection  Assessment & Plan  • Per family, patient admitted at [facility] x1 week for UTI and DEANDRE, discharged 7/14 on 300mg Cefdinir BID. • UA negative     Plan  • IV Cefepime. DC vancomycin as MRSA testing neg. Sepsis ([address])  Assessment & Plan  • SIRS: tachypnec, leukocytosis  • CT CAP: Right upper lobe groundglass opacity and right lower lobe consolidation along with bilateral effusions  • Per family, patient admitted at [facility] x1 week for UTI and DEANDRE, discharged 7/14 on 300mg Cefdinir BID. • UA negative  • Received azithromycin and ceftriaxone in ED  • LA 1.4  • Procal 0.29     Plan  • Cefepime . DC further vancomycin d/t neg MRSA swab  • Blood cx neg thus far.   • Trend WBC and fever curve    Encephalopathy acute  Assessment & Plan  • Brought to ED from [facility] after being found by roommate on the floor and altered compared to baseline. • Approximate downtime 1.5 hours  • CTH: no acute intracranial abnormality  • CT c-spine: no c-spine fracture or traumatic malalignment  • VBG pH 7.2/pCO2 79.9  • Received 2mg IV Narvcan in ED without improvement in mental status  • Ammonia 38      Plan  • RESOLVED. Was 2/2 hypercapnea  • Add melatonin  • OOB during the day to encourage proper sleep/wake cycle    * Acute on chronic respiratory failure with hypoxia and hypercapnia (HCC)  Assessment & Plan  • Per chart patient wears 2L oxygen at SNF, however, family states the patient does not wear oxygen at home. Unsure if oxygen requirement is new since admission at [facility] last week. • VBG pH 7.2/pCO2 79.9 -->Placed on continuous BIPAP in ED  • VBG pH 7.3/62.9 --> BIPAP HS       Plan  • Titrate oxygen with SpO2 goal > 90%              VTE  Prophylaxis:   Pharmacologic: in place  Mechanical VTE Prophylaxis in Place: Yes    Patient Centered Rounds: I have performed bedside rounds with nursing staff today. Discussions with Specialists or Other Care Team Provider: case management    Education and Discussions with Family / Patient: pt family bedside      Current Length of Stay: 5 day(s)    Current Patient Status: Inpatient        Code Status: Level 3 - DNAR and DNI    Discharge Plan: Pt will require continued inpatient hospitalization. Subjective:   Pt in chair   Breathing better day by day  Strength improving  Pt did have fevers overnight. Patient is seen and examined at bedside. All other ROS are negative. Objective:     Vitals:   Temp (24hrs), Av.5 °F (37.5 °C), Min:98.1 °F (36.7 °C), Max:100.9 °F (38.3 °C)    Temp:  [98.1 °F (36.7 °C)-100.9 °F (38.3 °C)] 98.6 °F (37 °C)  HR:  [81-89] 81  Resp:  [19-20] 19  BP: (116-129)/(58-74) 122/74  SpO2:  [91 %-94 %] 93 %  Body mass index is 48.58 kg/m². Input and Output Summary (last 24 hours):        Intake/Output Summary (Last 24 hours) at 2023 1401  Last data filed at 2023 1201  Gross per 24 hour   Intake 480 ml   Output 1300 ml   Net -820 ml       Physical Exam:       GEN: No acute distress, comfortable, obese on o2  HEEENT: No JVD, PERRLA, no scleral icterus  RESP: Lungs diminished   CV: RRR, +s1/s2   ABD: SOFT NON TENDER, POSITIVE BOWEL SOUNDS, NO DISTENTION  PSYCH: CALM  NEURO: Mentation baseline, NO FOCAL DEFICITS  SKIN: NO RASH  EXTREM: NO EDEMA    Additional Data:     Labs:    Results from last 7 days   Lab Units 07/22/23  0314 07/17/23  1356 07/17/23  1339   WBC Thousand/uL 14.48*   < > 15.53*   HEMOGLOBIN g/dL 12.0   < > 15.0   I STAT HEMOGLOBIN   --    < >  --    HEMATOCRIT % 36.8   < > 46.9   HEMATOCRIT, ISTAT   --    < >  --    PLATELETS Thousands/uL 383   < > 508*   BANDS PCT %  --   --  2   NEUTROS PCT % 80*   < >  --    LYMPHS PCT % 8*   < >  --    LYMPHO PCT %  --   --  6*   MONOS PCT % 8   < >  --    MONO PCT %  --   --  3*   EOS PCT % 3   < > 0    < > = values in this interval not displayed.      Results from last 7 days   Lab Units 07/22/23  0314   SODIUM mmol/L 135   POTASSIUM mmol/L 3.6   CHLORIDE mmol/L 91*   CO2 mmol/L 41*   BUN mg/dL 26*   CREATININE mg/dL 1.00   ANION GAP mmol/L 3   CALCIUM mg/dL 9.3   ALBUMIN g/dL 2.6*   TOTAL BILIRUBIN mg/dL 0.93   ALK PHOS U/L 74   ALT U/L 18   AST U/L 23   GLUCOSE RANDOM mg/dL 132     Results from last 7 days   Lab Units 07/17/23  1339   INR  1.01     Results from last 7 days   Lab Units 07/22/23  1143 07/22/23  0713 07/21/23  2118 07/21/23  1611 07/21/23  1212 07/21/23  0726 07/20/23  2053 07/20/23  1651 07/20/23  1110 07/20/23  0734 07/19/23  2127 07/19/23  1635   POC GLUCOSE mg/dl 178* 140 197* 169* 147* 141* 179* 160* 182* 123 156* 148*     Results from last 7 days   Lab Units 07/17/23  1947   HEMOGLOBIN A1C % 6.5*     Results from last 7 days   Lab Units 07/18/23  0526 07/17/23  1350 07/17/23  1339   LACTIC ACID mmol/L  --  1.4  --    PROCALCITONIN ng/ml 0.25  --  0.29*       Lines/Drains:  Invasive Devices     Peripheral Intravenous Line  Duration           Peripheral IV 07/21/23 Dorsal (posterior); Right Forearm 1 day                Telemetry:        * I Have Reviewed All Lab Data Listed Above. Imaging:     Results for orders placed during the hospital encounter of 07/17/23    XR Trauma chest portable    Narrative  CHEST    INDICATION:   TRAUMA. Patient found unresponsive. Patient has suspected COVID-19. COMPARISON:  None    EXAM PERFORMED/VIEWS:  XR CHEST PORTABLE AP semierect  Images: 2    FINDINGS:    Heart shadow appears enlarged. Atherosclerotic aortic tortuosity is noted. Vascular congestion with a pulmonary edema pattern. More focal patchy opacity in the right mid lung suspect for pneumonia. No obvious effusion or pneumothorax. No acute osseous abnormalities. Impression  Focal opacity in the right mid lung suspect for pneumonia. Vascular congestion and pulmonary edema. The study was marked in [facility] for immediate notification. Workstation performed: [id number]    No results found for this or any previous visit. *I have reviewed all imaging reports listed above      Recent Cultures (last 7 days):     Results from last 7 days   Lab Units 07/18/23  0940 07/17/23  1826 07/17/23  1350   BLOOD CULTURE   --   --  No Growth After 4 Days. No Growth After 4 Days.    SPUTUM CULTURE  1+ Growth of  --   --    GRAM STAIN RESULT  Rare Epithelial cells per low power field  No Polys or Bacteria seen  --   --    LEGIONELLA URINARY ANTIGEN   --  Negative  --        Last 24 Hours Medication List:   Current Facility-Administered Medications   Medication Dose Route Frequency Provider Last Rate   • acetaminophen  650 mg Oral Q6H PRN Lucy Ortiz MD     • albuterol  2.5 mg Nebulization Q4H PRN Lucy Ortiz MD     • cefepime  2,000 mg Intravenous Q8H Ladonna Merino PA-C 2,000 mg (07/22/23 1049)   • furosemide  40 mg Intravenous Daily Ladonna Merino PA-C     • glycerin-hypromellose-  1 drop Both Eyes Q4H PRN Ricki Krueger PA-C     • guaiFENesin  1,200 mg Oral Q12H [address] [facility] Jay Farmer MD     • heparin (porcine)  5,000 Units Subcutaneous [facility] Ricki Krueger PA-C     • hydrALAZINE  5 mg Intravenous Q6H PRN Ricki Krueger PA-C     • hydrocortisone   Topical 4x Daily PRN Olivia Lau MD     • insulin lispro  2-12 Units Subcutaneous 4x Daily (AC & HS) Ricki Krueger PA-C     • ipratropium  0.5 mg Nebulization TID Olivia Lau MD     • levalbuterol  1.25 mg Nebulization TID Olivia Lau MD     • lisinopril  10 mg Oral Daily Ricki Krueger PA-C     • melatonin  6 mg Oral HS Ricki Krueger PA-C     • DANNA ANTIFUNGAL   Topical BID Ricki Krueger PA-C          Today, Patient Was Seen By: Olivia Lau MD    ** Please Note: Dictation voice to text software may have been used in the creation of this document.  **

## 2023-07-22 NOTE — PLAN OF CARE
Problem: MOBILITY - ADULT  Goal: Maintain or return to baseline ADL function  Description: INTERVENTIONS:  -  Assess patient's ability to carry out ADLs; assess patient's baseline for ADL function and identify physical deficits which impact ability to perform ADLs (bathing, care of mouth/teeth, toileting, grooming, dressing, etc.)  - Assess/evaluate cause of self-care deficits   - Assess range of motion  - Assess patient's mobility; develop plan if impaired  - Assess patient's need for assistive devices and provide as appropriate  - Encourage maximum independence but intervene and supervise when necessary  - Involve family in performance of ADLs  - Assess for home care needs following discharge   - Consider OT consult to assist with ADL evaluation and planning for discharge  - Provide patient education as appropriate  Outcome: Progressing  Goal: Maintains/Returns to pre admission functional level  Description: INTERVENTIONS:  - Perform BMAT or MOVE assessment daily.   - Set and communicate daily mobility goal to care team and patient/family/caregiver. - Collaborate with rehabilitation services on mobility goals if consulted  - Perform Range of Motion 3 times a day. - Reposition patient every 2 hours.   - Dangle patient 3 times a day  - Stand patient 3 times a day  - Ambulate patient 3 times a day  - Out of bed to chair 3 times a day   - Out of bed for meals 3 times a day  - Out of bed for toileting  - Record patient progress and toleration of activity level   Outcome: Progressing     Problem: PAIN - ADULT  Goal: Verbalizes/displays adequate comfort level or baseline comfort level  Description: Interventions:  - Encourage patient to monitor pain and request assistance  - Assess pain using appropriate pain scale  - Administer analgesics based on type and severity of pain and evaluate response  - Implement non-pharmacological measures as appropriate and evaluate response  - Consider cultural and social influences on pain and pain management  - Notify physician/advanced practitioner if interventions unsuccessful or patient reports new pain  Outcome: Progressing     Problem: INFECTION - ADULT  Goal: Absence or prevention of progression during hospitalization  Description: INTERVENTIONS:  - Assess and monitor for signs and symptoms of infection  - Monitor lab/diagnostic results  - Monitor all insertion sites, i.e. indwelling lines, tubes, and drains  - Monitor endotracheal if appropriate and nasal secretions for changes in amount and color  - [place] appropriate cooling/warming therapies per order  - Administer medications as ordered  - Instruct and encourage patient and family to use good hand hygiene technique  - Identify and instruct in appropriate isolation precautions for identified infection/condition  Outcome: Progressing     Problem: DISCHARGE PLANNING  Goal: Discharge to home or other facility with appropriate resources  Description: INTERVENTIONS:  - Identify barriers to discharge w/patient and caregiver  - Arrange for needed discharge resources and transportation as appropriate  - Identify discharge learning needs (meds, wound care, etc.)  - Arrange for interpretive services to assist at discharge as needed  - Refer to Case Management Department for coordinating discharge planning if the patient needs post-hospital services based on physician/advanced practitioner order or complex needs related to functional status, cognitive ability, or social support system  Outcome: Progressing     Problem: Knowledge Deficit  Goal: Patient/family/caregiver demonstrates understanding of disease process, treatment plan, medications, and discharge instructions  Description: Complete learning assessment and assess knowledge base.   Interventions:  - Provide teaching at level of understanding  - Provide teaching via preferred learning methods  Outcome: Progressing     Problem: CARDIOVASCULAR - ADULT  Goal: Maintains optimal cardiac output and hemodynamic stability  Description: INTERVENTIONS:  - Monitor I/O, vital signs and rhythm  - Monitor for S/S and trends of decreased cardiac output  - Administer and titrate ordered vasoactive medications to optimize hemodynamic stability  - Assess quality of pulses, skin color and temperature  - Assess for signs of decreased coronary artery perfusion  - Instruct patient to report change in severity of symptoms  Outcome: Progressing  Goal: Absence of cardiac dysrhythmias or at baseline rhythm  Description: INTERVENTIONS:  - Continuous cardiac monitoring, vital signs, obtain 12 lead EKG if ordered  - Administer antiarrhythmic and heart rate control medications as ordered  - Monitor electrolytes and administer replacement therapy as ordered  Outcome: Progressing     Problem: RESPIRATORY - ADULT  Goal: Achieves optimal ventilation and oxygenation  Description: INTERVENTIONS:  - Assess for changes in respiratory status  - Assess for changes in mentation and behavior  - Position to facilitate oxygenation and minimize respiratory effort  - Oxygen administered by appropriate delivery if ordered  - Initiate smoking cessation education as indicated  - Encourage broncho-pulmonary hygiene including cough, deep breathe, Incentive Spirometry  - Assess the need for suctioning and aspirate as needed  - Assess and instruct to report SOB or any respiratory difficulty  - Respiratory Therapy support as indicated  Outcome: Progressing     Problem: SAFETY,RESTRAINT: NV/NON-SELF DESTRUCTIVE BEHAVIOR  Goal: Remains free of harm/injury (restraint for non violent/non self-detsructive behavior)  Description: INTERVENTIONS:  - Instruct patient/family regarding restraint use   - Assess and monitor physiologic and psychological status   - Provide interventions and comfort measures to meet assessed patient needs   - Identify and implement measures to help patient regain control  - Assess readiness for release of restraint   Outcome: Progressing  Goal: Returns to optimal restraint-free functioning  Description: INTERVENTIONS:  - Assess the patient's behavior and symptoms that indicate continued need for restraint  - Identify and implement measures to help patient regain control  - Assess readiness for release of restraint   Outcome: Progressing     Problem: Prexisting or High Potential for Compromised Skin Integrity  Goal: Skin integrity is maintained or improved  Description: INTERVENTIONS:  - Identify patients at risk for skin breakdown  - Assess and monitor skin integrity  - Assess and monitor nutrition and hydration status  - Monitor labs   - Assess for incontinence   - Turn and reposition patient  - Assist with mobility/ambulation  - Relieve pressure over bony prominences  - Avoid friction and shearing  - Provide appropriate hygiene as needed including keeping skin clean and dry  - Evaluate need for skin moisturizer/barrier cream  - Collaborate with interdisciplinary team   - Patient/family teaching  - Consider wound care consult   Outcome: Progressing     Problem: Nutrition/Hydration-ADULT  Goal: Nutrient/Hydration intake appropriate for improving, restoring or maintaining nutritional needs  Description: Monitor and assess patient's nutrition/hydration status for malnutrition. Collaborate with interdisciplinary team and initiate plan and interventions as ordered. Monitor patient's weight and dietary intake as ordered or per policy. Utilize nutrition screening tool and intervene as necessary. Determine patient's food preferences and provide high-protein, high-caloric foods as appropriate.      INTERVENTIONS:  - Monitor oral intake, urinary output, labs, and treatment plans  - Assess nutrition and hydration status and recommend course of action  - Evaluate amount of meals eaten  - Assist patient with eating if necessary   - Allow adequate time for meals  - Recommend/ encourage appropriate diets, oral nutritional supplements, and vitamin/mineral supplements  - Order, calculate, and assess calorie counts as needed  - Recommend, monitor, and adjust tube feedings and TPN/PPN based on assessed needs  - Assess need for intravenous fluids  - Provide specific nutrition/hydration education as appropriate  - Include patient/family/caregiver in decisions related to nutrition  Outcome: Progressing     Problem: Potential for Falls  Goal: Patient will remain free of falls  Description: INTERVENTIONS:  - Educate patient/family on patient safety including physical limitations  - Instruct patient to call for assistance with activity   - Consult OT/PT to assist with strengthening/mobility   - Keep Call bell within reach  - Keep bed low and locked with side rails adjusted as appropriate  - Keep care items and personal belongings within reach  - Initiate and maintain comfort rounds  - Make Fall Risk Sign visible to staff  - Offer Toileting every 2 Hours, in advance of need  - Initiate/Maintain bed alarm  - Obtain necessary fall risk management equipment: socks  - Apply yellow socks and bracelet for high fall risk patients  - Consider moving patient to room near nurses station  Outcome: Progressing

## 2023-07-22 NOTE — ASSESSMENT & PLAN NOTE
• SIRS: tachypnec, leukocytosis  • CT CAP: Right upper lobe groundglass opacity and right lower lobe consolidation along with bilateral effusions  • Per family, patient admitted at [facility] x1 week for UTI and DEANDRE, discharged 7/14 on 300mg Cefdinir BID. • UA negative  • Received azithromycin and ceftriaxone in ED  • LA 1.4  • Procal 0.29     Plan  • Cefepime . DC further vancomycin d/t neg MRSA swab. Plan 7 days. • Patient did have fever overnight. Monitor. • Blood cx neg thus far.   • Trend WBC and fever curve

## 2023-07-22 NOTE — ASSESSMENT & PLAN NOTE
• Per chart patient wears 2L oxygen at SNF, however, family states the patient does not wear oxygen at home. Unsure if oxygen requirement is new since admission at [facility] last week.   • VBG pH 7.2/pCO2 79.9 -->Placed on continuous BIPAP in ED  • VBG pH 7.3/62.9 --> BIPAP HS       Plan  • Titrate oxygen with SpO2 goal > 90%

## 2023-07-23 LAB
ALBUMIN SERPL BCP-MCNC: 2.6 G/DL (ref 3.5–5)
ALP SERPL-CCNC: 68 U/L (ref 34–104)
ALT SERPL W P-5'-P-CCNC: 24 U/L (ref 7–52)
ANION GAP SERPL CALCULATED.3IONS-SCNC: 3 MMOL/L
AST SERPL W P-5'-P-CCNC: 30 U/L (ref 13–39)
BASOPHILS # BLD AUTO: 0.04 THOUSANDS/ÂΜL (ref 0–0.1)
BASOPHILS NFR BLD AUTO: 0 % (ref 0–1)
BILIRUB SERPL-MCNC: 0.89 MG/DL (ref 0.2–1)
BUN SERPL-MCNC: 27 MG/DL (ref 5–25)
CALCIUM ALBUM COR SERPL-MCNC: 10.1 MG/DL (ref 8.3–10.1)
CALCIUM SERPL-MCNC: 9 MG/DL (ref 8.4–10.2)
CHLORIDE SERPL-SCNC: 91 MMOL/L (ref 96–108)
CO2 SERPL-SCNC: 40 MMOL/L (ref 21–32)
CREAT SERPL-MCNC: 1.04 MG/DL (ref 0.6–1.3)
EOSINOPHIL # BLD AUTO: 0.4 THOUSAND/ÂΜL (ref 0–0.61)
EOSINOPHIL NFR BLD AUTO: 4 % (ref 0–6)
ERYTHROCYTE [DISTWIDTH] IN BLOOD BY AUTOMATED COUNT: 12.5 % (ref 11.6–15.1)
GFR SERPL CREATININE-BSD FRML MDRD: 72 ML/MIN/1.73SQ M
GLUCOSE SERPL-MCNC: 123 MG/DL (ref 65–140)
GLUCOSE SERPL-MCNC: 127 MG/DL (ref 65–140)
GLUCOSE SERPL-MCNC: 128 MG/DL (ref 65–140)
GLUCOSE SERPL-MCNC: 156 MG/DL (ref 65–140)
GLUCOSE SERPL-MCNC: 194 MG/DL (ref 65–140)
HCT VFR BLD AUTO: 36.6 % (ref 36.5–49.3)
HGB BLD-MCNC: 11.8 G/DL (ref 12–17)
IMM GRANULOCYTES # BLD AUTO: 0.11 THOUSAND/UL (ref 0–0.2)
IMM GRANULOCYTES NFR BLD AUTO: 1 % (ref 0–2)
LYMPHOCYTES # BLD AUTO: 1.07 THOUSANDS/ÂΜL (ref 0.6–4.47)
LYMPHOCYTES NFR BLD AUTO: 9 % (ref 14–44)
MAGNESIUM SERPL-MCNC: 1.7 MG/DL (ref 1.9–2.7)
MCH RBC QN AUTO: 31.1 PG (ref 26.8–34.3)
MCHC RBC AUTO-ENTMCNC: 32.2 G/DL (ref 31.4–37.4)
MCV RBC AUTO: 96 FL (ref 82–98)
MONOCYTES # BLD AUTO: 1.14 THOUSAND/ÂΜL (ref 0.17–1.22)
MONOCYTES NFR BLD AUTO: 10 % (ref 4–12)
NEUTROPHILS # BLD AUTO: 8.73 THOUSANDS/ÂΜL (ref 1.85–7.62)
NEUTS SEG NFR BLD AUTO: 76 % (ref 43–75)
NRBC BLD AUTO-RTO: 0 /100 WBCS
PLATELET # BLD AUTO: 358 THOUSANDS/UL (ref 149–390)
PMV BLD AUTO: 8.7 FL (ref 8.9–12.7)
POTASSIUM SERPL-SCNC: 3.5 MMOL/L (ref 3.5–5.3)
PROT SERPL-MCNC: 5.9 G/DL (ref 6.4–8.4)
RBC # BLD AUTO: 3.8 MILLION/UL (ref 3.88–5.62)
SODIUM SERPL-SCNC: 134 MMOL/L (ref 135–147)
WBC # BLD AUTO: 11.49 THOUSAND/UL (ref 4.31–10.16)

## 2023-07-23 PROCEDURE — 85025 COMPLETE CBC W/AUTO DIFF WBC: CPT | Performed by: INTERNAL MEDICINE

## 2023-07-23 PROCEDURE — 94760 N-INVAS EAR/PLS OXIMETRY 1: CPT

## 2023-07-23 PROCEDURE — 99232 SBSQ HOSP IP/OBS MODERATE 35: CPT | Performed by: HOSPITALIST

## 2023-07-23 PROCEDURE — 94640 AIRWAY INHALATION TREATMENT: CPT

## 2023-07-23 PROCEDURE — 99232 SBSQ HOSP IP/OBS MODERATE 35: CPT | Performed by: INTERNAL MEDICINE

## 2023-07-23 PROCEDURE — 83735 ASSAY OF MAGNESIUM: CPT | Performed by: INTERNAL MEDICINE

## 2023-07-23 PROCEDURE — 94660 CPAP INITIATION&MGMT: CPT

## 2023-07-23 PROCEDURE — 82948 REAGENT STRIP/BLOOD GLUCOSE: CPT

## 2023-07-23 PROCEDURE — 80053 COMPREHEN METABOLIC PANEL: CPT | Performed by: INTERNAL MEDICINE

## 2023-07-23 RX ORDER — POLYETHYLENE GLYCOL 3350 17 G/17G
17 POWDER, FOR SOLUTION ORAL DAILY
Status: DISCONTINUED | OUTPATIENT
Start: 2023-07-23 | End: 2023-07-27 | Stop reason: HOSPADM

## 2023-07-23 RX ADMIN — INSULIN LISPRO 2 UNITS: 100 INJECTION, SOLUTION INTRAVENOUS; SUBCUTANEOUS at 13:17

## 2023-07-23 RX ADMIN — GLYCERIN, HYPROMELLOSE, POLYETHYLENE GLYCOL 1 DROP: .2; .2; 1 LIQUID OPHTHALMIC at 17:37

## 2023-07-23 RX ADMIN — LEVALBUTEROL HYDROCHLORIDE 1.25 MG: 1.25 SOLUTION RESPIRATORY (INHALATION) at 07:35

## 2023-07-23 RX ADMIN — HEPARIN SODIUM 5000 UNITS: 5000 INJECTION INTRAVENOUS; SUBCUTANEOUS at 21:33

## 2023-07-23 RX ADMIN — POLYETHYLENE GLYCOL 3350 17 G: 17 POWDER, FOR SOLUTION ORAL at 19:36

## 2023-07-23 RX ADMIN — MICONAZOLE NITRATE: 20 CREAM TOPICAL at 09:17

## 2023-07-23 RX ADMIN — GUAIFENESIN 1200 MG: 600 TABLET ORAL at 09:16

## 2023-07-23 RX ADMIN — HEPARIN SODIUM 5000 UNITS: 5000 INJECTION INTRAVENOUS; SUBCUTANEOUS at 16:12

## 2023-07-23 RX ADMIN — CEFEPIME HYDROCHLORIDE 2000 MG: 2 INJECTION, SOLUTION INTRAVENOUS at 09:15

## 2023-07-23 RX ADMIN — IPRATROPIUM BROMIDE 0.5 MG: 0.5 SOLUTION RESPIRATORY (INHALATION) at 21:19

## 2023-07-23 RX ADMIN — LEVALBUTEROL HYDROCHLORIDE 1.25 MG: 1.25 SOLUTION RESPIRATORY (INHALATION) at 21:19

## 2023-07-23 RX ADMIN — HYDROCORTISONE: 1 OINTMENT TOPICAL at 17:39

## 2023-07-23 RX ADMIN — MELATONIN 6 MG: at 21:33

## 2023-07-23 RX ADMIN — HYDROCORTISONE: 1 OINTMENT TOPICAL at 09:24

## 2023-07-23 RX ADMIN — LEVALBUTEROL HYDROCHLORIDE 1.25 MG: 1.25 SOLUTION RESPIRATORY (INHALATION) at 13:49

## 2023-07-23 RX ADMIN — GLYCERIN, HYPROMELLOSE, POLYETHYLENE GLYCOL 1 DROP: .2; .2; 1 LIQUID OPHTHALMIC at 09:17

## 2023-07-23 RX ADMIN — HEPARIN SODIUM 5000 UNITS: 5000 INJECTION INTRAVENOUS; SUBCUTANEOUS at 05:53

## 2023-07-23 RX ADMIN — IPRATROPIUM BROMIDE 0.5 MG: 0.5 SOLUTION RESPIRATORY (INHALATION) at 07:35

## 2023-07-23 RX ADMIN — CEFEPIME HYDROCHLORIDE 2000 MG: 2 INJECTION, SOLUTION INTRAVENOUS at 01:39

## 2023-07-23 RX ADMIN — CEFEPIME HYDROCHLORIDE 2000 MG: 2 INJECTION, SOLUTION INTRAVENOUS at 17:37

## 2023-07-23 RX ADMIN — GUAIFENESIN 1200 MG: 600 TABLET ORAL at 21:33

## 2023-07-23 RX ADMIN — INSULIN LISPRO 2 UNITS: 100 INJECTION, SOLUTION INTRAVENOUS; SUBCUTANEOUS at 21:38

## 2023-07-23 RX ADMIN — MICONAZOLE NITRATE: 20 CREAM TOPICAL at 18:49

## 2023-07-23 RX ADMIN — IPRATROPIUM BROMIDE 0.5 MG: 0.5 SOLUTION RESPIRATORY (INHALATION) at 13:49

## 2023-07-23 NOTE — ASSESSMENT & PLAN NOTE
• Per family, patient admitted at [facility] x1 week for UTI and DEANDRE, discharged 7/14 on 300mg Cefdinir BID. • UA negative     Plan  • IV Cefepime. DC vancomycin as MRSA testing neg.

## 2023-07-23 NOTE — PLAN OF CARE
Problem: MOBILITY - ADULT  Goal: Maintain or return to baseline ADL function  Description: INTERVENTIONS:  -  Assess patient's ability to carry out ADLs; assess patient's baseline for ADL function and identify physical deficits which impact ability to perform ADLs (bathing, care of mouth/teeth, toileting, grooming, dressing, etc.)  - Assess/evaluate cause of self-care deficits   - Assess range of motion  - Assess patient's mobility; develop plan if impaired  - Assess patient's need for assistive devices and provide as appropriate  - Encourage maximum independence but intervene and supervise when necessary  - Involve family in performance of ADLs  - Assess for home care needs following discharge   - Consider OT consult to assist with ADL evaluation and planning for discharge  - Provide patient education as appropriate  Outcome: Progressing  Goal: Maintains/Returns to pre admission functional level  Description: INTERVENTIONS:  - Perform BMAT or MOVE assessment daily.   - Set and communicate daily mobility goal to care team and patient/family/caregiver. - Collaborate with rehabilitation services on mobility goals if consulted  - Perform Range of Motion 3 times a day. - Reposition patient every 2 hours.   - Dangle patient 3 times a day  - Stand patient 3 times a day  - Ambulate patient 3 times a day  - Out of bed to chair 3 times a day   - Out of bed for meals 3 times a day  - Out of bed for toileting  - Record patient progress and toleration of activity level   Outcome: Progressing     Problem: PAIN - ADULT  Goal: Verbalizes/displays adequate comfort level or baseline comfort level  Description: Interventions:  - Encourage patient to monitor pain and request assistance  - Assess pain using appropriate pain scale  - Administer analgesics based on type and severity of pain and evaluate response  - Implement non-pharmacological measures as appropriate and evaluate response  - Consider cultural and social influences on pain and pain management  - Notify physician/advanced practitioner if interventions unsuccessful or patient reports new pain  Outcome: Progressing     Problem: INFECTION - ADULT  Goal: Absence or prevention of progression during hospitalization  Description: INTERVENTIONS:  - Assess and monitor for signs and symptoms of infection  - Monitor lab/diagnostic results  - Monitor all insertion sites, i.e. indwelling lines, tubes, and drains  - Monitor endotracheal if appropriate and nasal secretions for changes in amount and color  - [place] appropriate cooling/warming therapies per order  - Administer medications as ordered  - Instruct and encourage patient and family to use good hand hygiene technique  - Identify and instruct in appropriate isolation precautions for identified infection/condition  Outcome: Progressing     Problem: DISCHARGE PLANNING  Goal: Discharge to home or other facility with appropriate resources  Description: INTERVENTIONS:  - Identify barriers to discharge w/patient and caregiver  - Arrange for needed discharge resources and transportation as appropriate  - Identify discharge learning needs (meds, wound care, etc.)  - Arrange for interpretive services to assist at discharge as needed  - Refer to Case Management Department for coordinating discharge planning if the patient needs post-hospital services based on physician/advanced practitioner order or complex needs related to functional status, cognitive ability, or social support system  Outcome: Progressing     Problem: Knowledge Deficit  Goal: Patient/family/caregiver demonstrates understanding of disease process, treatment plan, medications, and discharge instructions  Description: Complete learning assessment and assess knowledge base.   Interventions:  - Provide teaching at level of understanding  - Provide teaching via preferred learning methods  Outcome: Progressing     Problem: CARDIOVASCULAR - ADULT  Goal: Maintains optimal cardiac output and hemodynamic stability  Description: INTERVENTIONS:  - Monitor I/O, vital signs and rhythm  - Monitor for S/S and trends of decreased cardiac output  - Administer and titrate ordered vasoactive medications to optimize hemodynamic stability  - Assess quality of pulses, skin color and temperature  - Assess for signs of decreased coronary artery perfusion  - Instruct patient to report change in severity of symptoms  Outcome: Progressing  Goal: Absence of cardiac dysrhythmias or at baseline rhythm  Description: INTERVENTIONS:  - Continuous cardiac monitoring, vital signs, obtain 12 lead EKG if ordered  - Administer antiarrhythmic and heart rate control medications as ordered  - Monitor electrolytes and administer replacement therapy as ordered  Outcome: Progressing     Problem: RESPIRATORY - ADULT  Goal: Achieves optimal ventilation and oxygenation  Description: INTERVENTIONS:  - Assess for changes in respiratory status  - Assess for changes in mentation and behavior  - Position to facilitate oxygenation and minimize respiratory effort  - Oxygen administered by appropriate delivery if ordered  - Initiate smoking cessation education as indicated  - Encourage broncho-pulmonary hygiene including cough, deep breathe, Incentive Spirometry  - Assess the need for suctioning and aspirate as needed  - Assess and instruct to report SOB or any respiratory difficulty  - Respiratory Therapy support as indicated  Outcome: Progressing     Problem: SAFETY,RESTRAINT: NV/NON-SELF DESTRUCTIVE BEHAVIOR  Goal: Remains free of harm/injury (restraint for non violent/non self-detsructive behavior)  Description: INTERVENTIONS:  - Instruct patient/family regarding restraint use   - Assess and monitor physiologic and psychological status   - Provide interventions and comfort measures to meet assessed patient needs   - Identify and implement measures to help patient regain control  - Assess readiness for release of restraint   Outcome: Progressing  Goal: Returns to optimal restraint-free functioning  Description: INTERVENTIONS:  - Assess the patient's behavior and symptoms that indicate continued need for restraint  - Identify and implement measures to help patient regain control  - Assess readiness for release of restraint   Outcome: Progressing     Problem: Prexisting or High Potential for Compromised Skin Integrity  Goal: Skin integrity is maintained or improved  Description: INTERVENTIONS:  - Identify patients at risk for skin breakdown  - Assess and monitor skin integrity  - Assess and monitor nutrition and hydration status  - Monitor labs   - Assess for incontinence   - Turn and reposition patient  - Assist with mobility/ambulation  - Relieve pressure over bony prominences  - Avoid friction and shearing  - Provide appropriate hygiene as needed including keeping skin clean and dry  - Evaluate need for skin moisturizer/barrier cream  - Collaborate with interdisciplinary team   - Patient/family teaching  - Consider wound care consult   Outcome: Progressing     Problem: Nutrition/Hydration-ADULT  Goal: Nutrient/Hydration intake appropriate for improving, restoring or maintaining nutritional needs  Description: Monitor and assess patient's nutrition/hydration status for malnutrition. Collaborate with interdisciplinary team and initiate plan and interventions as ordered. Monitor patient's weight and dietary intake as ordered or per policy. Utilize nutrition screening tool and intervene as necessary. Determine patient's food preferences and provide high-protein, high-caloric foods as appropriate.      INTERVENTIONS:  - Monitor oral intake, urinary output, labs, and treatment plans  - Assess nutrition and hydration status and recommend course of action  - Evaluate amount of meals eaten  - Assist patient with eating if necessary   - Allow adequate time for meals  - Recommend/ encourage appropriate diets, oral nutritional supplements, and vitamin/mineral supplements  - Order, calculate, and assess calorie counts as needed  - Recommend, monitor, and adjust tube feedings and TPN/PPN based on assessed needs  - Assess need for intravenous fluids  - Provide specific nutrition/hydration education as appropriate  - Include patient/family/caregiver in decisions related to nutrition  Outcome: Progressing     Problem: Potential for Falls  Goal: Patient will remain free of falls  Description: INTERVENTIONS:  - Educate patient/family on patient safety including physical limitations  - Instruct patient to call for assistance with activity   - Consult OT/PT to assist with strengthening/mobility   - Keep Call bell within reach  - Keep bed low and locked with side rails adjusted as appropriate  - Keep care items and personal belongings within reach  - Initiate and maintain comfort rounds  - Make Fall Risk Sign visible to staff  - Offer Toileting every 2 Hours, in advance of need  - Initiate/Maintain bed alarm  - Obtain necessary fall risk management equipment: socks  - Apply yellow socks and bracelet for high fall risk patients  - Consider moving patient to room near nurses station  Outcome: Progressing

## 2023-07-23 NOTE — PROGRESS NOTES
Progress Note - Pulmonary   Janet Galo 69 y.o. male MRN: [id number]  Unit/Bed#: -01 Encounter: [id number]      Assessment & Recommendations:  1. Acute on likely chronic hypercarbic and acute hypoxic respiratory failure  • Titrate O2 to keep SpO2 88-94%  • IS, OOB-chair, PT/OT  • Overnight SpO2 on 4L/min NC - Paras SpO2 69%, desaturation <89% 4 hours 1 min  • AM ABG 7.40/62/59  • Has qualified for home BiPAP, will plan for initiation of AutoBiPAP at d/c  • Cont BiPAP 18/8cmH2O at night while admitted  • He is committed to PAP therapy use upon discharge      2. SIRS/Sepsis - POA - RR/WBC related to pneumonia - improved     3. Multilobar pneumonia with recent admission for UTI  • Continue cefepime, vanc stopped D#7/7 abx - would complete course today  • Cont flutter valve for secretion clearance  • Continue neb therapy  • Repeat imaging in 6-8 weeks     4. Obesity, suspected CA, possible OHVS - as above     5. Toxic/metabolic encephalopathy with likely delirium component - resolved     6. Pleural effusions, volume overload - diuresis per primary team, hold further diamox, trend Bicarb    Subjective:   Feels improved, less sputum, tolerated BiPAP, improved strength and was able to walk around room with minimal assistance, eager for d/c in near future to rehab. Objective:       Vitals: Blood pressure 108/67, pulse 63, temperature 97.8 °F (36.6 °C), resp. rate 20, height 6' 3" (1.905 m), weight (!) 172 kg (378 lb 8.5 oz), SpO2 97 %. , 2LNC, Body mass index is 47.31 kg/m².       Intake/Output Summary (Last 24 hours) at 7/23/2023 1248  Last data filed at 7/23/2023 0601  Gross per 24 hour   Intake 960 ml   Output 1125 ml   Net -165 ml         Physical Exam  GEN Obese older man in chair, conversant, nontoxic  HEENT MMM, no thrush, no oral lesions  Neck No accessory muscle use, JVP not elevated  CV reg, single s1/2, no m/r   Pulm resolved rhonchi, no wheeze or rales, slightly diminished at bases  ABD +BS soft NTND, no rebound  EXT warm, brisk cap refill, 1+ LE edema with CVS changes        Labs: I have personally reviewed pertinent lab results. Laboratory and Diagnostics  Results from last 7 days   Lab Units 07/23/23 0356 07/22/23 0314 07/21/23 0224 07/20/23  0553 07/20/23  0503 07/19/23  0552 07/18/23  0526 07/17/23  1356 07/17/23  1339   WBC Thousand/uL 11.49* 14.48* 15.16*  --  16.56* 15.57* 14.14*  --  15.53*   HEMOGLOBIN g/dL 11.8* 12.0 11.3*  --  12.8 12.6 14.1  --  15.0   I STAT HEMOGLOBIN g/dl  --   --   --  12.6  --   --   --    < >  --    HEMATOCRIT % 36.6 36.8 34.9*  --  40.4 39.4 44.3  --  46.9   HEMATOCRIT, ISTAT %  --   --   --  37  --   --   --    < >  --    PLATELETS Thousands/uL 358 383 390  --  445* 432* 331  --  508*   NEUTROS PCT % 76* 80* 82*  --  82* 82* 85*  --   --    BANDS PCT %  --   --   --   --   --   --   --   --  2   MONOS PCT % 10 8 7  --  6 6 6  --   --    MONO PCT %  --   --   --   --   --   --   --   --  3*   EOS PCT % 4 3 3  --  3 3 1  --  0    < > = values in this interval not displayed.      Results from last 7 days   Lab Units 07/23/23 0356 07/22/23 0314 07/21/23 0224 07/20/23  0553 07/20/23  0503 07/19/23  0552 07/18/23  0526 07/17/23  1356 07/17/23  1339   SODIUM mmol/L 134* 135 137  --  136 137 133*  --  133*   POTASSIUM mmol/L 3.5 3.6 3.5  --  3.7 3.9 4.2  --  4.5   CHLORIDE mmol/L 91* 91* 94*  --  94* 96 95*  --  92*   CO2 mmol/L 40* 41* 39*  --  38* 36* 32  --  35*   CO2, I-STAT mmol/L  --   --   --  41*  --   --   --    < >  --    ANION GAP mmol/L 3 3 4  --  4 5 6  --  6   BUN mg/dL 27* 26* 27*  --  28* 31* 35*  --  34*   CREATININE mg/dL 1.04 1.00 0.92  --  0.99 0.98 1.02  --  1.01   CALCIUM mg/dL 9.0 9.3 9.2  --  9.5 9.6 9.6  --  10.1   GLUCOSE RANDOM mg/dL 127 132 142*  --  123 127 153*  --  164*   ALT U/L 24 18 20  --  16  --   --   --  26   AST U/L 30 23 24  --  22  --   --   --  29   ALK PHOS U/L 68 74 75  --  82  --   --   --  118*   ALBUMIN g/dL 2.6* 2.6* 2.5*  -- 2.7*  --   --   --  3.3*   TOTAL BILIRUBIN mg/dL 0.89 0.93 0.91  --  0.87  --   --   --  0.74    < > = values in this interval not displayed. Results from last 7 days   Lab Units 07/23/23  0356 07/19/23  0552 07/18/23  0526   MAGNESIUM mg/dL 1.7* 1.9 2.0   PHOSPHORUS mg/dL  --  3.1 3.6      Results from last 7 days   Lab Units 07/17/23  1339   INR  1.01   PTT seconds 30          Results from last 7 days   Lab Units 07/17/23  1350   LACTIC ACID mmol/L 1.4                     Results from last 7 days   Lab Units 07/18/23  0526 07/17/23  1339   PROCALCITONIN ng/ml 0.25 0.29*     VBG 7.27/80/198--->7.31/63/73     MICRO  Sputum 7/18 1+ MRF  MRSA negative  FLU/RSV/COVID neg  Bld Cx 7/17 NGTD  UA neg nit/LE  Strep/legionella ag neg     RADIOGRAPHS - no new images  CT C/A/P - right sided mixed ground glass and more dense basilar infiltrates, slight left basilar infiltrate, right > left pleural effusions, no acute IA pathology, noted anasarca     CT  head/Cspine without acute traumatic injury or acute IC findings, remote right cerebellar infarct         Davide Wheat DO, [facility]

## 2023-07-23 NOTE — ASSESSMENT & PLAN NOTE
• SIRS: tachypnec, leukocytosis  • CT CAP: Right upper lobe groundglass opacity and right lower lobe consolidation along with bilateral effusions  • Per family, patient admitted at [facility] x1 week for UTI and DEANDRE, discharged 7/14 on 300mg Cefdinir BID. • UA negative  • Received azithromycin and ceftriaxone in ED  • LA 1.4  • Procal 0.29     Plan  • Cefepime- complete today. . DC'd further vancomycin d/t neg MRSA swab. Plan 7 days. • Patient did have fever 7/21. Monitor closely. • Blood cx neg thus far.   • Trend WBC and fever curve

## 2023-07-23 NOTE — ASSESSMENT & PLAN NOTE
• Per chart patient wears 2L oxygen at SNF, however, family states the patient does not wear oxygen at home. Unsure if oxygen requirement is new since admission at [facility] last week.   • VBG pH 7.2/pCO2 79.9 -->Placed on continuous BIPAP in ED  • VBG pH 7.3/62.9 --> BIPAP HS       Plan  • Titrate oxygen with SpO2 goal > 90%

## 2023-07-23 NOTE — PROGRESS NOTES
[id number]  Progress Note  Name: Selena Nava  MRN: [id number]  Unit/Bed#: -01 I Date of Admission: 7/17/2023   Date of Service: 7/23/2023 I Hospital Day: 6    Assessment/Plan   Elevated troponin  Assessment & Plan  • Tropon 177      Plan  • Trend troponin   • No ischemic changes on EKG    Obesity  Assessment & Plan  • Encourage diet and lifestyle modifications when medically stable    Type 2 diabetes mellitus [facility])  Assessment & Plan  Lab Results   Component Value Date    HGBA1C 6.5 (H) 07/17/2023       Recent Labs     07/22/23  1143 07/22/23  1621 07/22/23  2123 07/23/23  0738   POCGLU 178* 130 166* 123       Blood Sugar Average: Last 72 hrs:  (P) 035.5720897621072368     • History of DM2 listed in chart from [facility],however, no medication listed on MAR from [facility]     Plan  • Q6H fingersticks  • Hgb A1C 6.5    Lymphedema  Assessment & Plan  • Home regimen: HCTZ     Plan  • Hold 40mg IV lasix daily due to persistent contraction alkalosis. S/p diamox. Monitor CO2 levels. Hypertension  Assessment & Plan  • Home regimen: lisinopril     •      Urinary tract infection  Assessment & Plan  • Per family, patient admitted at [facility] x1 week for UTI and DEANDRE, discharged 7/14 on 300mg Cefdinir BID. • UA negative     Plan  • IV Cefepime. DC vancomycin as MRSA testing neg. Sepsis ([address])  Assessment & Plan  • SIRS: tachypnec, leukocytosis  • CT CAP: Right upper lobe groundglass opacity and right lower lobe consolidation along with bilateral effusions  • Per family, patient admitted at [facility] x1 week for UTI and DEANDRE, discharged 7/14 on 300mg Cefdinir BID. • UA negative  • Received azithromycin and ceftriaxone in ED  • LA 1.4  • Procal 0.29     Plan  • Cefepime- complete today. . DC'd further vancomycin d/t neg MRSA swab. Plan 7 days. • Patient did have fever 7/21. Monitor closely. • Blood cx neg thus far.   • Trend WBC and fever curve    Encephalopathy acute  Assessment & Plan  • Brought to ED from SNF after being found by roommate on the floor and altered compared to baseline. • Approximate downtime 1.5 hours  • CTH: no acute intracranial abnormality  • CT c-spine: no c-spine fracture or traumatic malalignment  • VBG pH 7.2/pCO2 79.9  • Received 2mg IV Narvcan in ED without improvement in mental status  • Ammonia 38      Plan  • RESOLVED. Was 2/2 hypercapnea  • Add melatonin  • OOB during the day to encourage proper sleep/wake cycle    * Acute on chronic respiratory failure with hypoxia and hypercapnia (HCC)  Assessment & Plan  • Per chart patient wears 2L oxygen at SNF, however, family states the patient does not wear oxygen at home. Unsure if oxygen requirement is new since admission at [facility] last week. • VBG pH 7.2/pCO2 79.9 -->Placed on continuous BIPAP in ED  • VBG pH 7.3/62.9 --> BIPAP HS       Plan  • Titrate oxygen with SpO2 goal > 90%            VTE  Prophylaxis:   Pharmacologic: in place  Mechanical VTE Prophylaxis in Place: Yes    Patient Centered Rounds: I have performed bedside rounds with nursing staff today. Discussions with Specialists or Other Care Team Provider: case management    Education and Discussions with Family / Patient: pt family bedside      Current Length of Stay: 6 day(s)    Current Patient Status: Inpatient        Code Status: Level 3 - DNAR and DNI    Discharge Plan: Pt will require continued inpatient hospitalization. Subjective:   Pt states resp status continues to improve  Got out of bed to chair yesterday  No acute chest pain or sob. Patient is seen and examined at bedside. All other ROS are negative. Objective:     Vitals:   Temp (24hrs), Av.4 °F (36.9 °C), Min:97.8 °F (36.6 °C), Max:99.2 °F (37.3 °C)    Temp:  [97.8 °F (36.6 °C)-99.2 °F (37.3 °C)] 97.8 °F (36.6 °C)  HR:  [63-87] 63  Resp:  [20-21] 20  BP: (108-115)/(59-73) 108/67  SpO2:  [90 %-97 %] 97 %  Body mass index is 47.31 kg/m². Input and Output Summary (last 24 hours):        Intake/Output Summary (Last 24 hours) at 7/23/2023 0927  Last data filed at 7/23/2023 0601  Gross per 24 hour   Intake 960 ml   Output 2025 ml   Net -1065 ml       Physical Exam:       GEN: No acute distress, comfortable on o2. HEEENT: No JVD, PERRLA, no scleral icterus  RESP: Lungs clear to auscultation bilaterally  CV: RRR, +s1/s2   ABD: SOFT NON TENDER, POSITIVE BOWEL SOUNDS, NO DISTENTION  PSYCH: CALM  NEURO: Mentation baseline, NO FOCAL DEFICITS  SKIN: NO RASH  EXTREM: NO EDEMA    Additional Data:     Labs:    Results from last 7 days   Lab Units 07/23/23  0356 07/17/23  1356 07/17/23  1339   WBC Thousand/uL 11.49*   < > 15.53*   HEMOGLOBIN g/dL 11.8*   < > 15.0   I STAT HEMOGLOBIN   --    < >  --    HEMATOCRIT % 36.6   < > 46.9   HEMATOCRIT, ISTAT   --    < >  --    PLATELETS Thousands/uL 358   < > 508*   BANDS PCT %  --   --  2   NEUTROS PCT % 76*   < >  --    LYMPHS PCT % 9*   < >  --    LYMPHO PCT %  --   --  6*   MONOS PCT % 10   < >  --    MONO PCT %  --   --  3*   EOS PCT % 4   < > 0    < > = values in this interval not displayed.      Results from last 7 days   Lab Units 07/23/23  0356   SODIUM mmol/L 134*   POTASSIUM mmol/L 3.5   CHLORIDE mmol/L 91*   CO2 mmol/L 40*   BUN mg/dL 27*   CREATININE mg/dL 1.04   ANION GAP mmol/L 3   CALCIUM mg/dL 9.0   ALBUMIN g/dL 2.6*   TOTAL BILIRUBIN mg/dL 0.89   ALK PHOS U/L 68   ALT U/L 24   AST U/L 30   GLUCOSE RANDOM mg/dL 127     Results from last 7 days   Lab Units 07/17/23  1339   INR  1.01     Results from last 7 days   Lab Units 07/23/23  0738 07/22/23  2123 07/22/23  1621 07/22/23  1143 07/22/23  0713 07/21/23  2118 07/21/23  1611 07/21/23  1212 07/21/23  0726 07/20/23  2053 07/20/23  1651 07/20/23  1110   POC GLUCOSE mg/dl 123 166* 130 178* 140 197* 169* 147* 141* 179* 160* 182*     Results from last 7 days   Lab Units 07/17/23  1947   HEMOGLOBIN A1C % 6.5*     Results from last 7 days   Lab Units 07/18/23  0526 07/17/23  1350 07/17/23  1339   LACTIC ACID mmol/L  --  1.4 --    PROCALCITONIN ng/ml 0.25  --  0.29*       Lines/Drains:  Invasive Devices     Peripheral Intravenous Line  Duration           Peripheral IV 07/21/23 Dorsal (posterior); Right Forearm 2 days                Telemetry:        * I Have Reviewed All Lab Data Listed Above. Imaging:     Results for orders placed during the hospital encounter of 07/17/23    XR Trauma chest portable    Narrative  CHEST    INDICATION:   TRAUMA. Patient found unresponsive. Patient has suspected COVID-19. COMPARISON:  None    EXAM PERFORMED/VIEWS:  XR CHEST PORTABLE AP semierect  Images: 2    FINDINGS:    Heart shadow appears enlarged. Atherosclerotic aortic tortuosity is noted. Vascular congestion with a pulmonary edema pattern. More focal patchy opacity in the right mid lung suspect for pneumonia. No obvious effusion or pneumothorax. No acute osseous abnormalities. Impression  Focal opacity in the right mid lung suspect for pneumonia. Vascular congestion and pulmonary edema. The study was marked in [facility] for immediate notification. Workstation performed: [id number]    No results found for this or any previous visit. *I have reviewed all imaging reports listed above      Recent Cultures (last 7 days):     Results from last 7 days   Lab Units 07/18/23  0940 07/17/23  1826 07/17/23  1350   BLOOD CULTURE   --   --  No Growth After 5 Days. No Growth After 5 Days.    SPUTUM CULTURE  1+ Growth of  --   --    GRAM STAIN RESULT  Rare Epithelial cells per low power field  No Polys or Bacteria seen  --   --    LEGIONELLA URINARY ANTIGEN   --  Negative  --        Last 24 Hours Medication List:   Current Facility-Administered Medications   Medication Dose Route Frequency Provider Last Rate   • acetaminophen  650 mg Oral Q6H PRN Fatou Shah MD     • albuterol  2.5 mg Nebulization Q4H PRN Fatou Shah MD     • cefepime  2,000 mg Intravenous Q8H Fatou Shah MD 2,000 mg (07/23/23 7434)   • glycerin-hypromellose-  1 drop Both Eyes Q4H PRN Ayde Jane PA-C     • guaiFENesin  1,200 mg Oral Q12H [facility] Miri Howell MD     • heparin (porcine)  5,000 Units Subcutaneous [facility] Ayde Jane PA-C     • hydrALAZINE  5 mg Intravenous Q6H PRN Ayde Jane PA-C     • hydrocortisone   Topical 4x Daily PRN Rocco Moran MD     • insulin lispro  2-12 Units Subcutaneous 4x Daily (AC & HS) Ayde Jane PA-C     • ipratropium  0.5 mg Nebulization TID Rocco Moran MD     • levalbuterol  1.25 mg Nebulization TID Rocco Moran MD     • lisinopril  10 mg Oral Daily Ayde Jane PA-C     • melatonin  6 mg Oral HS Ayde Jane PA-C     • DANNA ANTIFUNGAL   Topical BID Ayde Jane PA-C          Today, Patient Was Seen By: Rocco Moran MD    ** Please Note: Dictation voice to text software may have been used in the creation of this document.  **

## 2023-07-23 NOTE — ASSESSMENT & PLAN NOTE
Lab Results   Component Value Date    HGBA1C 6.5 (H) 07/17/2023       Recent Labs     07/22/23  1143 07/22/23  1621 07/22/23  2123 07/23/23  0738   POCGLU 178* 130 166* 123       Blood Sugar Average: Last 72 hrs:  (P) [id number]     • History of DM2 listed in chart from [facility],however, no medication listed on MAR from SNF     Plan  • Q6H fingersticks  • Hgb A1C 6.5

## 2023-07-23 NOTE — PLAN OF CARE
Problem: MOBILITY - ADULT  Goal: Maintain or return to baseline ADL function  Description: INTERVENTIONS:  -  Assess patient's ability to carry out ADLs; assess patient's baseline for ADL function and identify physical deficits which impact ability to perform ADLs (bathing, care of mouth/teeth, toileting, grooming, dressing, etc.)  - Assess/evaluate cause of self-care deficits   - Assess range of motion  - Assess patient's mobility; develop plan if impaired  - Assess patient's need for assistive devices and provide as appropriate  - Encourage maximum independence but intervene and supervise when necessary  - Involve family in performance of ADLs  - Assess for home care needs following discharge   - Consider OT consult to assist with ADL evaluation and planning for discharge  - Provide patient education as appropriate  Outcome: Progressing  Goal: Maintains/Returns to pre admission functional level  Description: INTERVENTIONS:  - Perform BMAT or MOVE assessment daily.   - Set and communicate daily mobility goal to care team and patient/family/caregiver. - Collaborate with rehabilitation services on mobility goals if consulted  - Perform Range of Motion 3 times a day. - Reposition patient every 2 hours.   - Dangle patient 3 times a day  - Stand patient 3 times a day  - Ambulate patient 3 times a day  - Out of bed to chair 3 times a day   - Out of bed for meals 3 times a day  - Out of bed for toileting  - Record patient progress and toleration of activity level   Outcome: Progressing     Problem: PAIN - ADULT  Goal: Verbalizes/displays adequate comfort level or baseline comfort level  Description: Interventions:  - Encourage patient to monitor pain and request assistance  - Assess pain using appropriate pain scale  - Administer analgesics based on type and severity of pain and evaluate response  - Implement non-pharmacological measures as appropriate and evaluate response  - Consider cultural and social influences on pain and pain management  - Notify physician/advanced practitioner if interventions unsuccessful or patient reports new pain  Outcome: Progressing     Problem: INFECTION - ADULT  Goal: Absence or prevention of progression during hospitalization  Description: INTERVENTIONS:  - Assess and monitor for signs and symptoms of infection  - Monitor lab/diagnostic results  - Monitor all insertion sites, i.e. indwelling lines, tubes, and drains  - Monitor endotracheal if appropriate and nasal secretions for changes in amount and color  - [place] appropriate cooling/warming therapies per order  - Administer medications as ordered  - Instruct and encourage patient and family to use good hand hygiene technique  - Identify and instruct in appropriate isolation precautions for identified infection/condition  Outcome: Progressing     Problem: DISCHARGE PLANNING  Goal: Discharge to home or other facility with appropriate resources  Description: INTERVENTIONS:  - Identify barriers to discharge w/patient and caregiver  - Arrange for needed discharge resources and transportation as appropriate  - Identify discharge learning needs (meds, wound care, etc.)  - Arrange for interpretive services to assist at discharge as needed  - Refer to Case Management Department for coordinating discharge planning if the patient needs post-hospital services based on physician/advanced practitioner order or complex needs related to functional status, cognitive ability, or social support system  Outcome: Progressing     Problem: Knowledge Deficit  Goal: Patient/family/caregiver demonstrates understanding of disease process, treatment plan, medications, and discharge instructions  Description: Complete learning assessment and assess knowledge base.   Interventions:  - Provide teaching at level of understanding  - Provide teaching via preferred learning methods  Outcome: Progressing     Problem: CARDIOVASCULAR - ADULT  Goal: Maintains optimal cardiac output and hemodynamic stability  Description: INTERVENTIONS:  - Monitor I/O, vital signs and rhythm  - Monitor for S/S and trends of decreased cardiac output  - Administer and titrate ordered vasoactive medications to optimize hemodynamic stability  - Assess quality of pulses, skin color and temperature  - Assess for signs of decreased coronary artery perfusion  - Instruct patient to report change in severity of symptoms  Outcome: Progressing  Goal: Absence of cardiac dysrhythmias or at baseline rhythm  Description: INTERVENTIONS:  - Continuous cardiac monitoring, vital signs, obtain 12 lead EKG if ordered  - Administer antiarrhythmic and heart rate control medications as ordered  - Monitor electrolytes and administer replacement therapy as ordered  Outcome: Progressing     Problem: RESPIRATORY - ADULT  Goal: Achieves optimal ventilation and oxygenation  Description: INTERVENTIONS:  - Assess for changes in respiratory status  - Assess for changes in mentation and behavior  - Position to facilitate oxygenation and minimize respiratory effort  - Oxygen administered by appropriate delivery if ordered  - Initiate smoking cessation education as indicated  - Encourage broncho-pulmonary hygiene including cough, deep breathe, Incentive Spirometry  - Assess the need for suctioning and aspirate as needed  - Assess and instruct to report SOB or any respiratory difficulty  - Respiratory Therapy support as indicated  Outcome: Progressing     Problem: SAFETY,RESTRAINT: NV/NON-SELF DESTRUCTIVE BEHAVIOR  Goal: Remains free of harm/injury (restraint for non violent/non self-detsructive behavior)  Description: INTERVENTIONS:  - Instruct patient/family regarding restraint use   - Assess and monitor physiologic and psychological status   - Provide interventions and comfort measures to meet assessed patient needs   - Identify and implement measures to help patient regain control  - Assess readiness for release of restraint   Outcome: Progressing  Goal: Returns to optimal restraint-free functioning  Description: INTERVENTIONS:  - Assess the patient's behavior and symptoms that indicate continued need for restraint  - Identify and implement measures to help patient regain control  - Assess readiness for release of restraint   Outcome: Progressing     Problem: Prexisting or High Potential for Compromised Skin Integrity  Goal: Skin integrity is maintained or improved  Description: INTERVENTIONS:  - Identify patients at risk for skin breakdown  - Assess and monitor skin integrity  - Assess and monitor nutrition and hydration status  - Monitor labs   - Assess for incontinence   - Turn and reposition patient  - Assist with mobility/ambulation  - Relieve pressure over bony prominences  - Avoid friction and shearing  - Provide appropriate hygiene as needed including keeping skin clean and dry  - Evaluate need for skin moisturizer/barrier cream  - Collaborate with interdisciplinary team   - Patient/family teaching  - Consider wound care consult   Outcome: Progressing     Problem: Nutrition/Hydration-ADULT  Goal: Nutrient/Hydration intake appropriate for improving, restoring or maintaining nutritional needs  Description: Monitor and assess patient's nutrition/hydration status for malnutrition. Collaborate with interdisciplinary team and initiate plan and interventions as ordered. Monitor patient's weight and dietary intake as ordered or per policy. Utilize nutrition screening tool and intervene as necessary. Determine patient's food preferences and provide high-protein, high-caloric foods as appropriate.      INTERVENTIONS:  - Monitor oral intake, urinary output, labs, and treatment plans  - Assess nutrition and hydration status and recommend course of action  - Evaluate amount of meals eaten  - Assist patient with eating if necessary   - Allow adequate time for meals  - Recommend/ encourage appropriate diets, oral nutritional supplements, and vitamin/mineral supplements  - Order, calculate, and assess calorie counts as needed  - Recommend, monitor, and adjust tube feedings and TPN/PPN based on assessed needs  - Assess need for intravenous fluids  - Provide specific nutrition/hydration education as appropriate  - Include patient/family/caregiver in decisions related to nutrition  Outcome: Progressing     Problem: Potential for Falls  Goal: Patient will remain free of falls  Description: INTERVENTIONS:  - Educate patient/family on patient safety including physical limitations  - Instruct patient to call for assistance with activity   - Consult OT/PT to assist with strengthening/mobility   - Keep Call bell within reach  - Keep bed low and locked with side rails adjusted as appropriate  - Keep care items and personal belongings within reach  - Initiate and maintain comfort rounds  - Make Fall Risk Sign visible to staff  - Offer Toileting every x Hours, in advance of need  - Initiate/Maintain xalarm  - Obtain necessary fall risk management equipment: x  - Apply yellow socks and bracelet for high fall risk patients  - Consider moving patient to room near nurses station  Outcome: Progressing

## 2023-07-23 NOTE — ASSESSMENT & PLAN NOTE
• Home regimen: HCTZ     Plan  • Hold 40mg IV lasix daily due to persistent contraction alkalosis. S/p diamox. Monitor CO2 levels.

## 2023-07-24 LAB
ALBUMIN SERPL BCP-MCNC: 2.7 G/DL (ref 3.5–5)
ALP SERPL-CCNC: 73 U/L (ref 34–104)
ALT SERPL W P-5'-P-CCNC: 28 U/L (ref 7–52)
ANION GAP SERPL CALCULATED.3IONS-SCNC: 1 MMOL/L
AST SERPL W P-5'-P-CCNC: 32 U/L (ref 13–39)
BASOPHILS # BLD AUTO: 0.05 THOUSANDS/ÂΜL (ref 0–0.1)
BASOPHILS NFR BLD AUTO: 1 % (ref 0–1)
BILIRUB SERPL-MCNC: 0.94 MG/DL (ref 0.2–1)
BUN SERPL-MCNC: 26 MG/DL (ref 5–25)
CALCIUM ALBUM COR SERPL-MCNC: 10.2 MG/DL (ref 8.3–10.1)
CALCIUM SERPL-MCNC: 9.2 MG/DL (ref 8.4–10.2)
CHLORIDE SERPL-SCNC: 90 MMOL/L (ref 96–108)
CO2 SERPL-SCNC: 40 MMOL/L (ref 21–32)
CREAT SERPL-MCNC: 0.97 MG/DL (ref 0.6–1.3)
EOSINOPHIL # BLD AUTO: 0.36 THOUSAND/ÂΜL (ref 0–0.61)
EOSINOPHIL NFR BLD AUTO: 3 % (ref 0–6)
ERYTHROCYTE [DISTWIDTH] IN BLOOD BY AUTOMATED COUNT: 12.6 % (ref 11.6–15.1)
GFR SERPL CREATININE-BSD FRML MDRD: 79 ML/MIN/1.73SQ M
GLUCOSE SERPL-MCNC: 127 MG/DL (ref 65–140)
GLUCOSE SERPL-MCNC: 127 MG/DL (ref 65–140)
GLUCOSE SERPL-MCNC: 130 MG/DL (ref 65–140)
GLUCOSE SERPL-MCNC: 162 MG/DL (ref 65–140)
GLUCOSE SERPL-MCNC: 164 MG/DL (ref 65–140)
HCT VFR BLD AUTO: 37.5 % (ref 36.5–49.3)
HGB BLD-MCNC: 11.9 G/DL (ref 12–17)
IMM GRANULOCYTES # BLD AUTO: 0.11 THOUSAND/UL (ref 0–0.2)
IMM GRANULOCYTES NFR BLD AUTO: 1 % (ref 0–2)
LYMPHOCYTES # BLD AUTO: 1.14 THOUSANDS/ÂΜL (ref 0.6–4.47)
LYMPHOCYTES NFR BLD AUTO: 10 % (ref 14–44)
MCH RBC QN AUTO: 30.5 PG (ref 26.8–34.3)
MCHC RBC AUTO-ENTMCNC: 31.7 G/DL (ref 31.4–37.4)
MCV RBC AUTO: 96 FL (ref 82–98)
MONOCYTES # BLD AUTO: 1.2 THOUSAND/ÂΜL (ref 0.17–1.22)
MONOCYTES NFR BLD AUTO: 11 % (ref 4–12)
NEUTROPHILS # BLD AUTO: 8.12 THOUSANDS/ÂΜL (ref 1.85–7.62)
NEUTS SEG NFR BLD AUTO: 74 % (ref 43–75)
NRBC BLD AUTO-RTO: 0 /100 WBCS
PLATELET # BLD AUTO: 323 THOUSANDS/UL (ref 149–390)
PMV BLD AUTO: 8.8 FL (ref 8.9–12.7)
POTASSIUM SERPL-SCNC: 3.7 MMOL/L (ref 3.5–5.3)
PROCALCITONIN SERPL-MCNC: 0.15 NG/ML
PROT SERPL-MCNC: 6.2 G/DL (ref 6.4–8.4)
RBC # BLD AUTO: 3.9 MILLION/UL (ref 3.88–5.62)
SODIUM SERPL-SCNC: 131 MMOL/L (ref 135–147)
WBC # BLD AUTO: 10.98 THOUSAND/UL (ref 4.31–10.16)

## 2023-07-24 PROCEDURE — 94640 AIRWAY INHALATION TREATMENT: CPT

## 2023-07-24 PROCEDURE — 99232 SBSQ HOSP IP/OBS MODERATE 35: CPT | Performed by: PHYSICIAN ASSISTANT

## 2023-07-24 PROCEDURE — 82948 REAGENT STRIP/BLOOD GLUCOSE: CPT

## 2023-07-24 PROCEDURE — 84145 PROCALCITONIN (PCT): CPT | Performed by: FAMILY MEDICINE

## 2023-07-24 PROCEDURE — 97116 GAIT TRAINING THERAPY: CPT

## 2023-07-24 PROCEDURE — 94760 N-INVAS EAR/PLS OXIMETRY 1: CPT

## 2023-07-24 PROCEDURE — 85025 COMPLETE CBC W/AUTO DIFF WBC: CPT | Performed by: INTERNAL MEDICINE

## 2023-07-24 PROCEDURE — 94660 CPAP INITIATION&MGMT: CPT

## 2023-07-24 PROCEDURE — 99232 SBSQ HOSP IP/OBS MODERATE 35: CPT | Performed by: FAMILY MEDICINE

## 2023-07-24 PROCEDURE — 80053 COMPREHEN METABOLIC PANEL: CPT | Performed by: INTERNAL MEDICINE

## 2023-07-24 RX ADMIN — GUAIFENESIN 1200 MG: 600 TABLET ORAL at 08:49

## 2023-07-24 RX ADMIN — GUAIFENESIN 1200 MG: 600 TABLET ORAL at 20:59

## 2023-07-24 RX ADMIN — LISINOPRIL 10 MG: 10 TABLET ORAL at 08:50

## 2023-07-24 RX ADMIN — INSULIN LISPRO 2 UNITS: 100 INJECTION, SOLUTION INTRAVENOUS; SUBCUTANEOUS at 22:12

## 2023-07-24 RX ADMIN — MELATONIN 6 MG: at 21:00

## 2023-07-24 RX ADMIN — MICONAZOLE NITRATE: 20 CREAM TOPICAL at 08:53

## 2023-07-24 RX ADMIN — INSULIN LISPRO 2 UNITS: 100 INJECTION, SOLUTION INTRAVENOUS; SUBCUTANEOUS at 13:13

## 2023-07-24 RX ADMIN — HEPARIN SODIUM 5000 UNITS: 5000 INJECTION INTRAVENOUS; SUBCUTANEOUS at 05:39

## 2023-07-24 RX ADMIN — IPRATROPIUM BROMIDE 0.5 MG: 0.5 SOLUTION RESPIRATORY (INHALATION) at 09:17

## 2023-07-24 RX ADMIN — HEPARIN SODIUM 5000 UNITS: 5000 INJECTION INTRAVENOUS; SUBCUTANEOUS at 20:59

## 2023-07-24 RX ADMIN — HYDROCORTISONE: 1 OINTMENT TOPICAL at 07:34

## 2023-07-24 RX ADMIN — LEVALBUTEROL HYDROCHLORIDE 1.25 MG: 1.25 SOLUTION RESPIRATORY (INHALATION) at 09:17

## 2023-07-24 RX ADMIN — LEVALBUTEROL HYDROCHLORIDE 1.25 MG: 1.25 SOLUTION RESPIRATORY (INHALATION) at 14:30

## 2023-07-24 RX ADMIN — GLYCERIN, HYPROMELLOSE, POLYETHYLENE GLYCOL 1 DROP: .2; .2; 1 LIQUID OPHTHALMIC at 08:50

## 2023-07-24 RX ADMIN — IPRATROPIUM BROMIDE 0.5 MG: 0.5 SOLUTION RESPIRATORY (INHALATION) at 19:41

## 2023-07-24 RX ADMIN — LEVALBUTEROL HYDROCHLORIDE 1.25 MG: 1.25 SOLUTION RESPIRATORY (INHALATION) at 19:41

## 2023-07-24 RX ADMIN — IPRATROPIUM BROMIDE 0.5 MG: 0.5 SOLUTION RESPIRATORY (INHALATION) at 14:30

## 2023-07-24 RX ADMIN — MICONAZOLE NITRATE: 20 CREAM TOPICAL at 17:33

## 2023-07-24 NOTE — ARC ADMISSION
Referral received for patient consideration of ARC placement for rehab. Reviewed with Rajwinder Nguyen physician and patient was recommended for slower paced therapy, not ARC appropriate. CM has been updated.

## 2023-07-24 NOTE — ASSESSMENT & PLAN NOTE
Lab Results   Component Value Date    HGBA1C 6.5 (H) 07/17/2023       Recent Labs     07/23/23  2104 07/24/23  0715 07/24/23  1131 07/24/23  1702   POCGLU 194* 130 164* 127       Blood Sugar Average: Last 72 hrs:  (P) [id number]     • History of DM2 listed in chart from [facility],however, no medication listed on MAR from SNF     Plan  • Continue insulin sliding scale  • Hgb A1C 6.5

## 2023-07-24 NOTE — ASSESSMENT & PLAN NOTE
• Per chart patient wears 2L oxygen at SNF, however, family states the patient does not wear oxygen at home. Unsure if oxygen requirement is new since admission at [facility] last week.   • VBG pH 7.2/pCO2 79.9 -->Placed on continuous BIPAP in ED  • VBG pH 7.3/62.9 --> BIPAP HS       Plan  • Titrate oxygen with SpO2 goal > 90%  • Patient reveals to me that he is unable to use CPAP mask at night due to "poor fit"  • He is hoping that the individualized mask that he will use at home will result in a better compliance

## 2023-07-24 NOTE — PHYSICAL THERAPY NOTE
PHYSICAL THERAPY TREATMENT NOTE      Patient Name: Skip Ruiz  YCDVI'E Date: 7/24/2023 07/24/23 1350   PT Last Visit   PT Visit Date 07/24/23   Note Type   Note Type Treatment   Pain Assessment   Pain Assessment Tool 0-10   Pain Score No Pain   Restrictions/Precautions   Weight Bearing Precautions Per Order No   Other Precautions Chair Alarm; Bed Alarm;O2;Fall Risk  (2L NC O2)   General   Chart Reviewed Yes   Response to Previous Treatment Patient with no complaints from previous session. Family/Caregiver Present Yes   Cognition   Overall Cognitive Status WFL   Arousal/Participation Alert   Attention Attends with cues to redirect   Orientation Level Oriented to person;Oriented to place;Oriented to time   Memory Decreased recall of precautions   Following Commands Follows multistep commands with increased time or repetition   Comments Pt recalls therapist from previous sessions, states he walked to the doorway w/ his daughter this weekend   Bed Mobility   Supine to Sit 2  Maximal assistance   Additional items Assist x 2; Increased time required;Verbal cues   Transfers   Sit to Stand 3  Moderate assistance   Additional items Assist x 2; Increased time required;Verbal cues   Stand to Sit 4  Minimal assistance   Additional items Assist x 1; Increased time required;Verbal cues   Ambulation/Elevation   Gait pattern Decreased foot clearance   Gait Assistance 4  Minimal assist   Additional items Assist x 1   Assistive Device Rolling walker   Distance 40 ft   Balance   Static Sitting Fair +   Static Standing Poor +   Ambulatory Poor +   Activity Tolerance   Activity Tolerance Patient tolerated treatment well   Nurse Made Aware KAVIN Bae   Assessment   Problem List Decreased strength;Decreased endurance; Impaired balance;Decreased mobility;Obesity   Assessment Pt seen for PT intervention.  Pt continues to make significant progress each session, as he is highly motivated and family very supportive. Pt reports getting OOB and ambulating w/ staff/family this weekend and wanting to get better. Pt continues to require max A x 2 to get OOB due to his body habitus, and due to height requires mod A x 2 for STS, however this is improved from previous sessions. Most notably is pt's overall improvement in gait. He is able to ambulate 40 ft consecutively, which is the most he has done this admission, and with min A x 1, the least amount of assist he has required this admission. He does not require VC for RW mangaement or safety awareness, is aware of O2 tubing during mobility. He is able to turn and navigate obstacles w/ increased time to ensure safety. Pt returned to recliner chair at end of session. Discharge recommendation continues to be for Level 1 (High Therapy Resource Intensity) upon DC   Goals   Patient Goals go to rehab   STG Expiration Date 07/28/23   Short Term Goal #1 ADDENDUM: Patient will: Perform all bed mobility tasks w/ mod a x 1 to improve pt's independence w/ repositioning for decrease risk of skin breakdown, Perform all transfers w/ mod A x1 consistently from various height surfaces in order to improve I w/ engagement w/ real-world environments/situations, Ambulate at least 50 ft. with roller walker w/ supervision w/o LOB to facilitate return and engagement w/ previous living environment, Increase all balance 1/2 grade to decrease risk for falls and Tolerate 3 hr OOB to faciliate upright tolerance   PT Treatment Day 3   Plan   Treatment/Interventions Functional transfer training;LE strengthening/ROM; Therapeutic exercise; Endurance training;Patient/family training;Equipment eval/education;Gait training;Bed mobility   PT Frequency 3-5x/wk   Recommendation   UB Rehab Discharge Recommendation (PT/OT) Level 1   Equipment Recommended [facility] Recommended HD Bariatric wheeled walker   [address] [place] in Flat Bed Without Bedrails 2   Lying on Back to Sitting on Edge of Flat Bed Without Bedrails 1   Moving Bed to Chair 3   Standing Up From Chair Using Arms 2   Walk in Room 3   Climb 3-5 Stairs With Railing 1   Basic Mobility Inpatient Raw Score 12   Basic Mobility Standardized Score 32.23   Highest Level Of Mobility   -[facility] Goal 4: Move to chair/commode   JH-HLM Achieved 7: Walk 25 feet or more   Education   Education Provided Mobility training   End of Consult   Patient Position at End of Consult Bedside chair; All needs within reach;Bed/Chair alarm activated     The patient's AM-PAC Basic Mobility Inpatient Short Form Raw Score is 12. A Raw score of less than or equal to 16 suggests the patient may benefit from discharge to post-acute rehabilitation services. Please also refer to the recommendation of the Physical Therapist for safe discharge planning. Pt would continue to benefit from skilled PT during this admission in order to progress patient towards goals to decrease risk of falls and maximize independence.      Marcyta Curling, PT, DPT

## 2023-07-24 NOTE — ASSESSMENT & PLAN NOTE
• Per family, patient admitted at [facility] x1 week for UTI and DEANDRE, discharged 7/14 on 300mg Cefdinir BID. • UA negative     Plan  • IV Cefepime completed. DC vancomycin as MRSA testing neg.

## 2023-07-24 NOTE — PROGRESS NOTES
Progress Note - Pulmonary   Heavenly Gonzales 69 y.o. male MRN: [id number]  Unit/Bed#: -01 Encounter: [id number]    Assessment & Plan:  Acute hypoxic respiratory failure with acute on likely chronic hypercapnia  Sepsis, POA 2/2 Multilobar pneumonia. Recently admitted at [facility] for UTI  Morbid obesity  Pleural effusions in the setting of volume overload    · Maintain pulse ox > 88%. Weaned from 4 L down to 2 L. · Pulmonary toilet  · Has qualified for home BiPAP, plan for AutoBiPAP at d/c. Using 18/8 while admitted  · Cefepime completed x 7 days  · Continue Xopenex & Atrovent nebs  · Will need repeat imaging 6-8 weeks  · Diuretics per primary     Subjective:   Patient feeling a little better. No new complaints. Using his IS & flutter regularly. Also has a 3 lb weight he has been using. Objective:     Vitals: Blood pressure 121/73, pulse 76, temperature 98.2 °F (36.8 °C), resp. rate 18, height 6' 3" (1.905 m), weight (!) 171 kg (377 lb 3.3 oz), SpO2 91 %. ,Body mass index is 47.15 kg/m². Intake/Output Summary (Last 24 hours) at 7/24/2023 1014  Last data filed at 7/24/2023 0868  Gross per 24 hour   Intake 1440 ml   Output 1625 ml   Net -185 ml       Invasive Devices     Peripheral Intravenous Line  Duration           Peripheral IV 07/21/23 Dorsal (posterior); Right Forearm 3 days                Physical Exam:   General appearance: Alert and oriented, in no acute distress  Head: Normocephalic, without obvious abnormality, atraumatic  Lungs: Decreased breath sounds  Heart: Regular rate  Abdomen:  No appreciable tenderness  Extremities: Nontender  Skin: Warm and dry  Neurologic: No acute focal deficits are noted    Labs: I have personally reviewed pertinent lab results. Imaging and other studies: I have personally reviewed pertinent reports.

## 2023-07-24 NOTE — PLAN OF CARE
Problem: PHYSICAL THERAPY ADULT  Goal: Performs mobility at highest level of function for planned discharge setting. See evaluation for individualized goals. Description: Treatment/Interventions: Functional transfer training, LE strengthening/ROM, Therapeutic exercise, Endurance training, Cognitive reorientation, Patient/family training, Equipment eval/education, Bed mobility, Gait training  Equipment Recommended: Harvinder Moralez       See flowsheet documentation for full assessment, interventions and recommendations. Note:    Problem List: Decreased strength, Decreased endurance, Impaired balance, Decreased mobility, Obesity  Assessment: Pt seen for PT intervention. Pt continues to make significant progress each session, as he is highly motivated and family very supportive. Pt reports getting OOB and ambulating w/ staff/family this weekend and wanting to get better. Pt continues to require max A x 2 to get OOB due to his body habitus, and due to height requires mod A x 2 for STS, however this is improved from previous sessions. Most notably is pt's overall improvement in gait. He is able to ambulate 40 ft consecutively, which is the most he has done this admission, and with min A x 1, the least amount of assist he has required this admission. He does not require VC for RW mangaement or safety awareness, is aware of O2 tubing during mobility. He is able to turn and navigate obstacles w/ increased time to ensure safety. Pt returned to recliner chair at end of session. Discharge recommendation continues to be for Level 1 (High Therapy Resource Intensity) upon DC             See flowsheet documentation for full assessment.

## 2023-07-24 NOTE — ASSESSMENT & PLAN NOTE
• Brought to ED from SNF after being found by roommate on the floor and altered compared to baseline. • Approximate downtime 1.5 hours  • CTH: no acute intracranial abnormality  • CT c-spine: no c-spine fracture or traumatic malalignment  • VBG pH 7.2/pCO2 79.9  • Received 2mg IV Narvcan in ED without improvement in mental status  • Ammonia 38      Plan  • RESOLVED.  Was 2/2 hypercapnea  • continue melatonin  • OOB during the day to encourage proper sleep/wake cycle

## 2023-07-24 NOTE — PROGRESS NOTES
[facility] [facility]  Progress Note  Name: Bessy Guerra  MRN: [id number]  Unit/Bed#: -01 I Date of Admission: 7/17/2023   Date of Service: 7/24/2023 I Hospital Day: 7    Assessment/Plan   Elevated troponin  Assessment & Plan  • Tropon 177      Plan  • Trended down  • No ischemic changes on EKG    Obesity  Assessment & Plan  • Encourage diet and lifestyle modifications when medically stable    Type 2 diabetes mellitus [facility])  Assessment & Plan  Lab Results   Component Value Date    HGBA1C 6.5 (H) 07/17/2023       Recent Labs     07/23/23  2104 07/24/23  0715 07/24/23  1131 07/24/23  1702   POCGLU 194* 130 164* 127       Blood Sugar Average: Last 72 hrs:  (P) [id number]     • History of DM2 listed in chart from [facility],however, no medication listed on MAR from [facility]     Plan  • Continue insulin sliding scale  • Hgb A1C 6.5    Lymphedema  Assessment & Plan  • Home regimen: HCTZ     Plan  • Hold 40mg IV lasix daily due to persistent contraction alkalosis. S/p diamox. Monitor CO2 levels. Hypertension  Assessment & Plan  • Home regimen: lisinopril     •      Urinary tract infection  Assessment & Plan  • Per family, patient admitted at [facility] x1 week for UTI and DEANDRE, discharged 7/14 on 300mg Cefdinir BID. • UA negative     Plan  • IV Cefepime completed. DC vancomycin as MRSA testing neg. Sepsis ([address])  Assessment & Plan  • SIRS: tachypnec, leukocytosis  • CT CAP: Right upper lobe groundglass opacity and right lower lobe consolidation along with bilateral effusions  • Per family, patient admitted at [facility] x1 week for UTI and DEANDRE, discharged 7/14 on 300mg Cefdinir BID. • UA negative  • Received azithromycin and ceftriaxone in ED  • LA 1.4  • Procal 0.29     Plan  • Cefepime- complete today. . DC'd further vancomycin d/t neg MRSA swab. Plan 7 days. • Patient did have fever 7/23. Monitor closely for the next 24 hours.   • Blood cx neg for 5 days  • Possibly fevers due to pneumonitis  • Procalcitonin is negative today  • Trend WBC and fever curve    Encephalopathy acute  Assessment & Plan  • Brought to ED from SNF after being found by roommate on the floor and altered compared to baseline. • Approximate downtime 1.5 hours  • CTH: no acute intracranial abnormality  • CT c-spine: no c-spine fracture or traumatic malalignment  • VBG pH 7.2/pCO2 79.9  • Received 2mg IV Narvcan in ED without improvement in mental status  • Ammonia 38      Plan  • RESOLVED. Was 2/2 hypercapnea  • continue melatonin  • OOB during the day to encourage proper sleep/wake cycle    * Acute on chronic respiratory failure with hypoxia and hypercapnia (HCC)  Assessment & Plan  • Per chart patient wears 2L oxygen at SNF, however, family states the patient does not wear oxygen at home. Unsure if oxygen requirement is new since admission at [facility] last week. • VBG pH 7.2/pCO2 79.9 -->Placed on continuous BIPAP in ED  • VBG pH 7.3/62.9 --> BIPAP HS       Plan  • Titrate oxygen with SpO2 goal > 90%  • Patient reveals to me that he is unable to use CPAP mask at night due to "poor fit"  • He is hoping that the individualized mask that he will use at home will result in a better compliance             VTE Pharmacologic Prophylaxis:   Pharmacologic: Heparin  Mechanical VTE Prophylaxis in Place: Yes    Patient Centered Rounds: I have performed bedside rounds with nursing staff today. Discussions with Specialists or Other Care Team Provider: Nursing and CM    Education and Discussions with Family / Patient: patient    Time Spent for Care: 20 minutes. More than 50% of total time spent on counseling and coordination of care as described above.     Current Length of Stay: 7 day(s)    Current Patient Status: Inpatient   Certification Statement: The patient will continue to require additional inpatient hospital stay due to recurrent fever    Discharge Plan: 24 hrs    Code Status: Level 3 - DNAR and DNI      Subjective:   Patient was seen and examined this morning. He reports significant improvement compared to how he was before. He still complains of some difficulty breathing. Objective:     Vitals:   Temp (24hrs), Av.4 °F (37.4 °C), Min:98.2 °F (36.8 °C), Max:100.4 °F (38 °C)    Temp:  [98.2 °F (36.8 °C)-100.4 °F (38 °C)] 98.9 °F (37.2 °C)  HR:  [75-86] 75  Resp:  [16-18] 18  BP: (120-140)/(72-80) 120/72  SpO2:  [87 %-96 %] 96 %  Body mass index is 47.15 kg/m². Input and Output Summary (last 24 hours): Intake/Output Summary (Last 24 hours) at 2023 1824  Last data filed at 2023 1303  Gross per 24 hour   Intake 1420 ml   Output 1175 ml   Net 245 ml       Physical Exam:     Physical Exam  Constitutional:       Appearance: He is well-developed. Cardiovascular:      Rate and Rhythm: Normal rate and regular rhythm. Heart sounds: Normal heart sounds. Pulmonary:      Effort: Pulmonary effort is normal. No respiratory distress. Breath sounds: Decreased breath sounds present. Abdominal:      Palpations: Abdomen is soft. Tenderness: There is no abdominal tenderness. Musculoskeletal:         General: No deformity. Right lower leg: No edema. Left lower leg: No edema. Skin:     General: Skin is warm. Findings: No erythema or rash. Neurological:      Mental Status: He is alert.            Additional Data:     Labs:    Results from last 7 days   Lab Units 23  0311   WBC Thousand/uL 10.98*   HEMOGLOBIN g/dL 11.9*   HEMATOCRIT % 37.5   PLATELETS Thousands/uL 323   NEUTROS PCT % 74   LYMPHS PCT % 10*   MONOS PCT % 11   EOS PCT % 3     Results from last 7 days   Lab Units 23  0311   SODIUM mmol/L 131*   POTASSIUM mmol/L 3.7   CHLORIDE mmol/L 90*   CO2 mmol/L 40*   BUN mg/dL 26*   CREATININE mg/dL 0.97   ANION GAP mmol/L 1   CALCIUM mg/dL 9.2   ALBUMIN g/dL 2.7*   TOTAL BILIRUBIN mg/dL 0.94   ALK PHOS U/L 73   ALT U/L 28   AST U/L 32   GLUCOSE RANDOM mg/dL 127         Results from last 7 days   Lab Units 07/24/23  1702 07/24/23  1131 07/24/23  0715 07/23/23  2104 07/23/23  1630 07/23/23  1158 07/23/23  0738 07/22/23  2123 07/22/23  1621 07/22/23  1143 07/22/23  0713 07/21/23  2118   POC GLUCOSE mg/dl 127 164* 130 194* 128 156* 123 166* 130 178* 140 197*     Results from last 7 days   Lab Units 07/17/23  1947   HEMOGLOBIN A1C % 6.5*     Results from last 7 days   Lab Units 07/24/23  1024 07/18/23  0526   PROCALCITONIN ng/ml 0.15 0.25           * I Have Reviewed All Lab Data Listed Above. * Additional Pertinent Lab Tests Reviewed:  All Labs Within Last 24 Hours Reviewed    Imaging:  Recent Cultures (last 7 days):     Results from last 7 days   Lab Units 07/18/23  0940 07/17/23  1826   SPUTUM CULTURE  1+ Growth of  --    GRAM STAIN RESULT  Rare Epithelial cells per low power field  No Polys or Bacteria seen  --    LEGIONELLA URINARY ANTIGEN   --  Negative       Last 24 Hours Medication List:   Current Facility-Administered Medications   Medication Dose Route Frequency Provider Last Rate   • acetaminophen  650 mg Oral Q6H PRN Gautam Paetl MD     • albuterol  2.5 mg Nebulization Q4H PRN Gautam Patel MD     • glycerin-hypromellose-  1 drop Both Eyes Q4H PRN Leon Ramírez PA-C     • guaiFENesin  1,200 mg Oral Q12H [facility] Gautam Patel MD     • heparin (porcine)  5,000 Units Subcutaneous [facility] Leon Ramírez PA-C     • hydrALAZINE  5 mg Intravenous Q6H PRN Leon Ramírez PA-C     • hydrocortisone   Topical 4x Daily PRN Gautam Patel MD     • insulin lispro  2-12 Units Subcutaneous 4x Daily (AC & HS) Leon Ramírez PA-C     • ipratropium  0.5 mg Nebulization TID Gautam Patel MD     • levalbuterol  1.25 mg Nebulization TID Gautam Patel MD     • lisinopril  10 mg Oral Daily Leon Ramírez PA-C     • melatonin  6 mg Oral HS Leon Ramírez PA-C     • DANNA ANTIFUNGAL   Topical BID Leon Ramírez PA-C     • polyethylene glycol  17 g Oral Daily Demetri Moran PA-C          Today, Patient Was Seen By: Patricio Roblero MD    ** Please Note: Dictation voice to text software may have been used in the creation of this document.  **

## 2023-07-24 NOTE — ASSESSMENT & PLAN NOTE
• SIRS: tachypnec, leukocytosis  • CT CAP: Right upper lobe groundglass opacity and right lower lobe consolidation along with bilateral effusions  • Per family, patient admitted at [facility] x1 week for UTI and DEANDRE, discharged 7/14 on 300mg Cefdinir BID. • UA negative  • Received azithromycin and ceftriaxone in ED  • LA 1.4  • Procal 0.29     Plan  • Cefepime- complete today. . DC'd further vancomycin d/t neg MRSA swab. Plan 7 days. • Patient did have fever 7/23. Monitor closely for the next 24 hours.   • Blood cx neg for 5 days  • Possibly fevers due to pneumonitis  • Procalcitonin is negative today  • Trend WBC and fever curve

## 2023-07-24 NOTE — PLAN OF CARE
Problem: MOBILITY - ADULT  Goal: Maintain or return to baseline ADL function  Description: INTERVENTIONS:  -  Assess patient's ability to carry out ADLs; assess patient's baseline for ADL function and identify physical deficits which impact ability to perform ADLs (bathing, care of mouth/teeth, toileting, grooming, dressing, etc.)  - Assess/evaluate cause of self-care deficits   - Assess range of motion  - Assess patient's mobility; develop plan if impaired  - Assess patient's need for assistive devices and provide as appropriate  - Encourage maximum independence but intervene and supervise when necessary  - Involve family in performance of ADLs  - Assess for home care needs following discharge   - Consider OT consult to assist with ADL evaluation and planning for discharge  - Provide patient education as appropriate  Outcome: Progressing  Goal: Maintains/Returns to pre admission functional level  Description: INTERVENTIONS:  - Perform BMAT or MOVE assessment daily.   - Set and communicate daily mobility goal to care team and patient/family/caregiver. - Collaborate with rehabilitation services on mobility goals if consulted  - Perform Range of Motion 3 times a day. - Reposition patient every 2 hours.   - Dangle patient 3 times a day  - Stand patient 3 times a day  - Ambulate patient 3 times a day  - Out of bed to chair 3 times a day   - Out of bed for meals 3 times a day  - Out of bed for toileting  - Record patient progress and toleration of activity level   Outcome: Progressing     Problem: PAIN - ADULT  Goal: Verbalizes/displays adequate comfort level or baseline comfort level  Description: Interventions:  - Encourage patient to monitor pain and request assistance  - Assess pain using appropriate pain scale  - Administer analgesics based on type and severity of pain and evaluate response  - Implement non-pharmacological measures as appropriate and evaluate response  - Consider cultural and social influences on pain and pain management  - Notify physician/advanced practitioner if interventions unsuccessful or patient reports new pain  Outcome: Progressing     Problem: INFECTION - ADULT  Goal: Absence or prevention of progression during hospitalization  Description: INTERVENTIONS:  - Assess and monitor for signs and symptoms of infection  - Monitor lab/diagnostic results  - Monitor all insertion sites, i.e. indwelling lines, tubes, and drains  - Monitor endotracheal if appropriate and nasal secretions for changes in amount and color  - [place] appropriate cooling/warming therapies per order  - Administer medications as ordered  - Instruct and encourage patient and family to use good hand hygiene technique  - Identify and instruct in appropriate isolation precautions for identified infection/condition  Outcome: Progressing     Problem: DISCHARGE PLANNING  Goal: Discharge to home or other facility with appropriate resources  Description: INTERVENTIONS:  - Identify barriers to discharge w/patient and caregiver  - Arrange for needed discharge resources and transportation as appropriate  - Identify discharge learning needs (meds, wound care, etc.)  - Arrange for interpretive services to assist at discharge as needed  - Refer to Case Management Department for coordinating discharge planning if the patient needs post-hospital services based on physician/advanced practitioner order or complex needs related to functional status, cognitive ability, or social support system  Outcome: Progressing     Problem: Knowledge Deficit  Goal: Patient/family/caregiver demonstrates understanding of disease process, treatment plan, medications, and discharge instructions  Description: Complete learning assessment and assess knowledge base.   Interventions:  - Provide teaching at level of understanding  - Provide teaching via preferred learning methods  Outcome: Progressing     Problem: CARDIOVASCULAR - ADULT  Goal: Maintains optimal cardiac output and hemodynamic stability  Description: INTERVENTIONS:  - Monitor I/O, vital signs and rhythm  - Monitor for S/S and trends of decreased cardiac output  - Administer and titrate ordered vasoactive medications to optimize hemodynamic stability  - Assess quality of pulses, skin color and temperature  - Assess for signs of decreased coronary artery perfusion  - Instruct patient to report change in severity of symptoms  Outcome: Progressing  Goal: Absence of cardiac dysrhythmias or at baseline rhythm  Description: INTERVENTIONS:  - Continuous cardiac monitoring, vital signs, obtain 12 lead EKG if ordered  - Administer antiarrhythmic and heart rate control medications as ordered  - Monitor electrolytes and administer replacement therapy as ordered  Outcome: Progressing     Problem: RESPIRATORY - ADULT  Goal: Achieves optimal ventilation and oxygenation  Description: INTERVENTIONS:  - Assess for changes in respiratory status  - Assess for changes in mentation and behavior  - Position to facilitate oxygenation and minimize respiratory effort  - Oxygen administered by appropriate delivery if ordered  - Initiate smoking cessation education as indicated  - Encourage broncho-pulmonary hygiene including cough, deep breathe, Incentive Spirometry  - Assess the need for suctioning and aspirate as needed  - Assess and instruct to report SOB or any respiratory difficulty  - Respiratory Therapy support as indicated  Outcome: Progressing     Problem: Prexisting or High Potential for Compromised Skin Integrity  Goal: Skin integrity is maintained or improved  Description: INTERVENTIONS:  - Identify patients at risk for skin breakdown  - Assess and monitor skin integrity  - Assess and monitor nutrition and hydration status  - Monitor labs   - Assess for incontinence   - Turn and reposition patient  - Assist with mobility/ambulation  - Relieve pressure over bony prominences  - Avoid friction and shearing  - Provide appropriate hygiene as needed including keeping skin clean and dry  - Evaluate need for skin moisturizer/barrier cream  - Collaborate with interdisciplinary team   - Patient/family teaching  - Consider wound care consult   Outcome: Progressing

## 2023-07-25 PROBLEM — L30.9 DERMATITIS: Status: ACTIVE | Noted: 2023-07-25

## 2023-07-25 LAB
ANION GAP SERPL CALCULATED.3IONS-SCNC: 2 MMOL/L
BASOPHILS # BLD AUTO: 0.05 THOUSANDS/ÂΜL (ref 0–0.1)
BASOPHILS NFR BLD AUTO: 0 % (ref 0–1)
BUN SERPL-MCNC: 21 MG/DL (ref 5–25)
CALCIUM SERPL-MCNC: 9.4 MG/DL (ref 8.4–10.2)
CHLORIDE SERPL-SCNC: 93 MMOL/L (ref 96–108)
CO2 SERPL-SCNC: 37 MMOL/L (ref 21–32)
CREAT SERPL-MCNC: 0.84 MG/DL (ref 0.6–1.3)
EOSINOPHIL # BLD AUTO: 0.29 THOUSAND/ÂΜL (ref 0–0.61)
EOSINOPHIL NFR BLD AUTO: 3 % (ref 0–6)
ERYTHROCYTE [DISTWIDTH] IN BLOOD BY AUTOMATED COUNT: 12.4 % (ref 11.6–15.1)
GFR SERPL CREATININE-BSD FRML MDRD: 89 ML/MIN/1.73SQ M
GLUCOSE SERPL-MCNC: 107 MG/DL (ref 65–140)
GLUCOSE SERPL-MCNC: 136 MG/DL (ref 65–140)
GLUCOSE SERPL-MCNC: 176 MG/DL (ref 65–140)
GLUCOSE SERPL-MCNC: 184 MG/DL (ref 65–140)
GLUCOSE SERPL-MCNC: 215 MG/DL (ref 65–140)
HCT VFR BLD AUTO: 38 % (ref 36.5–49.3)
HGB BLD-MCNC: 12.1 G/DL (ref 12–17)
IMM GRANULOCYTES # BLD AUTO: 0.08 THOUSAND/UL (ref 0–0.2)
IMM GRANULOCYTES NFR BLD AUTO: 1 % (ref 0–2)
LYMPHOCYTES # BLD AUTO: 1.17 THOUSANDS/ÂΜL (ref 0.6–4.47)
LYMPHOCYTES NFR BLD AUTO: 10 % (ref 14–44)
MCH RBC QN AUTO: 30.4 PG (ref 26.8–34.3)
MCHC RBC AUTO-ENTMCNC: 31.8 G/DL (ref 31.4–37.4)
MCV RBC AUTO: 96 FL (ref 82–98)
MONOCYTES # BLD AUTO: 1.18 THOUSAND/ÂΜL (ref 0.17–1.22)
MONOCYTES NFR BLD AUTO: 11 % (ref 4–12)
NEUTROPHILS # BLD AUTO: 8.44 THOUSANDS/ÂΜL (ref 1.85–7.62)
NEUTS SEG NFR BLD AUTO: 75 % (ref 43–75)
NRBC BLD AUTO-RTO: 0 /100 WBCS
PLATELET # BLD AUTO: 344 THOUSANDS/UL (ref 149–390)
PMV BLD AUTO: 9.8 FL (ref 8.9–12.7)
POTASSIUM SERPL-SCNC: 3.7 MMOL/L (ref 3.5–5.3)
RBC # BLD AUTO: 3.98 MILLION/UL (ref 3.88–5.62)
SODIUM SERPL-SCNC: 132 MMOL/L (ref 135–147)
WBC # BLD AUTO: 11.21 THOUSAND/UL (ref 4.31–10.16)

## 2023-07-25 PROCEDURE — 94760 N-INVAS EAR/PLS OXIMETRY 1: CPT

## 2023-07-25 PROCEDURE — 99232 SBSQ HOSP IP/OBS MODERATE 35: CPT | Performed by: FAMILY MEDICINE

## 2023-07-25 PROCEDURE — 82948 REAGENT STRIP/BLOOD GLUCOSE: CPT

## 2023-07-25 PROCEDURE — 94640 AIRWAY INHALATION TREATMENT: CPT

## 2023-07-25 PROCEDURE — 97530 THERAPEUTIC ACTIVITIES: CPT

## 2023-07-25 PROCEDURE — 85025 COMPLETE CBC W/AUTO DIFF WBC: CPT | Performed by: FAMILY MEDICINE

## 2023-07-25 PROCEDURE — 80048 BASIC METABOLIC PNL TOTAL CA: CPT | Performed by: FAMILY MEDICINE

## 2023-07-25 RX ORDER — FLUOCINONIDE 0.5 MG/G
OINTMENT TOPICAL 2 TIMES DAILY
Qty: 30 G | Refills: 0 | Status: ON HOLD | OUTPATIENT
Start: 2023-07-25

## 2023-07-25 RX ORDER — FLUOCINONIDE 0.5 MG/G
OINTMENT TOPICAL 2 TIMES DAILY
Status: DISCONTINUED | OUTPATIENT
Start: 2023-07-25 | End: 2023-07-27 | Stop reason: HOSPADM

## 2023-07-25 RX ORDER — GUAIFENESIN 1200 MG/1
1200 TABLET, EXTENDED RELEASE ORAL EVERY 12 HOURS SCHEDULED
Qty: 60 TABLET | Refills: 0 | Status: ON HOLD | OUTPATIENT
Start: 2023-07-25

## 2023-07-25 RX ADMIN — LEVALBUTEROL HYDROCHLORIDE 1.25 MG: 1.25 SOLUTION RESPIRATORY (INHALATION) at 14:53

## 2023-07-25 RX ADMIN — FLUOCINONIDE: 0.5 OINTMENT TOPICAL at 12:07

## 2023-07-25 RX ADMIN — LEVALBUTEROL HYDROCHLORIDE 1.25 MG: 1.25 SOLUTION RESPIRATORY (INHALATION) at 20:03

## 2023-07-25 RX ADMIN — IPRATROPIUM BROMIDE 0.5 MG: 0.5 SOLUTION RESPIRATORY (INHALATION) at 07:46

## 2023-07-25 RX ADMIN — GUAIFENESIN 1200 MG: 600 TABLET ORAL at 21:15

## 2023-07-25 RX ADMIN — HEPARIN SODIUM 5000 UNITS: 5000 INJECTION INTRAVENOUS; SUBCUTANEOUS at 21:16

## 2023-07-25 RX ADMIN — MELATONIN 6 MG: at 21:15

## 2023-07-25 RX ADMIN — GLYCERIN, HYPROMELLOSE, POLYETHYLENE GLYCOL 1 DROP: .2; .2; 1 LIQUID OPHTHALMIC at 08:08

## 2023-07-25 RX ADMIN — GUAIFENESIN 1200 MG: 600 TABLET ORAL at 08:09

## 2023-07-25 RX ADMIN — LEVALBUTEROL HYDROCHLORIDE 1.25 MG: 1.25 SOLUTION RESPIRATORY (INHALATION) at 07:46

## 2023-07-25 RX ADMIN — HEPARIN SODIUM 5000 UNITS: 5000 INJECTION INTRAVENOUS; SUBCUTANEOUS at 04:33

## 2023-07-25 RX ADMIN — INSULIN LISPRO 4 UNITS: 100 INJECTION, SOLUTION INTRAVENOUS; SUBCUTANEOUS at 12:01

## 2023-07-25 RX ADMIN — LISINOPRIL 10 MG: 10 TABLET ORAL at 08:09

## 2023-07-25 RX ADMIN — INSULIN LISPRO 2 UNITS: 100 INJECTION, SOLUTION INTRAVENOUS; SUBCUTANEOUS at 21:16

## 2023-07-25 RX ADMIN — MICONAZOLE NITRATE: 20 CREAM TOPICAL at 08:09

## 2023-07-25 RX ADMIN — INSULIN LISPRO 2 UNITS: 100 INJECTION, SOLUTION INTRAVENOUS; SUBCUTANEOUS at 17:30

## 2023-07-25 RX ADMIN — IPRATROPIUM BROMIDE 0.5 MG: 0.5 SOLUTION RESPIRATORY (INHALATION) at 20:03

## 2023-07-25 RX ADMIN — IPRATROPIUM BROMIDE 0.5 MG: 0.5 SOLUTION RESPIRATORY (INHALATION) at 14:53

## 2023-07-25 NOTE — CASE MANAGEMENT
[address] [facility] [address] received request for authorization from Care Manager. Authorization request for: [address] Name: Ciera Pierre   NPI: [id number]  Facility MD:  Dr Parmar Medico: [id number]  Authorization initiated by contacting insurance: Kyra Via: Phone  Clinicals submitted via: Phone  Case being sent to the Medical Director.   Pending ref- [id number]

## 2023-07-25 NOTE — CASE MANAGEMENT
Case Management Discharge Planning Note    Patient name Heavenly Gonzales  Location [address] [facility] 338/-80 MRN [id number]  : 1955 Date 2023       Current Admission Date: 2023  Current Admission Diagnosis:Acute on chronic respiratory failure with hypoxia and hypercapnia [facility])   Patient Active Problem List    Diagnosis Date Noted   • Encephalopathy acute 2023   • Sepsis ([address]) 2023   • Urinary tract infection 2023   • Hypertension 2023   • Lymphedema 2023   • Type 2 diabetes mellitus ([address]) 2023   • Obesity 2023   • Acute on chronic respiratory failure with hypoxia and hypercapnia ([address] [facility]) 2023   • Elevated troponin 2023      LOS (days): 8  Geometric Mean LOS (GMLOS) (days): 5.00  Days to GMLOS:-3     OBJECTIVE:  Risk of Unplanned Readmission Score: 11.7      Current admission status: Inpatient   Preferred Pharmacy:   [address] [place] [address]  Phone: [phone] Fax: [phone]    Primary Care Provider: Rahul Gonzales DO    Primary Insurance: [facility]  Secondary Insurance:     DISCHARGE DETAILS:    Discharge planning discussed with[de-identified] pt and his family  Freedom of Choice: Yes     CM contacted family/caregiver?: Yes  Were Treatment Team discharge recommendations reviewed with patient/caregiver?: Yes  Did patient/caregiver verbalize understanding of patient care needs?: Yes  Were patient/caregiver advised of the risks associated with not following Treatment Team discharge recommendations?: Yes    Contacts  Patient Contacts: Heavenly Gonzales: wife: [phone], Leah Yanez: dtr: [phone]  Contact Method:  In Person  Reason/Outcome: Emergency Contact, Continuity of Care, Discharge  [facility] [address]         Is the patient interested in [address] at discharge?: No    DME Referral Provided  Referral made for DME?: No    Other Referral/Resources/Interventions Provided:  Interventions: Acute Rehab    Treatment Team Recommendation: Acute Rehab  Discharge Destination Plan[de-identified] Acute Rehab  Transport at Discharge : BLS Ambulance     IMM Given (Date):: 07/25/23  IMM Given to[de-identified] Patient  Family notified[de-identified] Reviewed with pt and family. They are in agreement with d/c. Additional Comments: CM received notice that [facility] can still accept pt. Cm placed requested to cm discharge support on Aidin to submit for authorization.

## 2023-07-25 NOTE — ASSESSMENT & PLAN NOTE
Lab Results   Component Value Date    HGBA1C 6.5 (H) 07/17/2023       Recent Labs     07/24/23  2048 07/25/23  0708 07/25/23  1045 07/25/23  1517   POCGLU 162* 107 215* 184*       Blood Sugar Average: Last 72 hrs:  (P) 153.6     • History of DM2 listed in chart from [facility],however, no medication listed on MAR from SNF     Plan  • Continue insulin sliding scale  • Hgb A1C 6.5

## 2023-07-25 NOTE — ASSESSMENT & PLAN NOTE
Noted multiple profuse rash on the back  It consists of blanching macules  Per patient, it was initially pruritic, but now is improved  Only localized on the back  Patient was started on 1% hydrocortisone cream, but will advance that to flucinonide  Dermatology consult is recommended

## 2023-07-25 NOTE — PLAN OF CARE
Problem: MOBILITY - ADULT  Goal: Maintain or return to baseline ADL function  Description: INTERVENTIONS:  -  Assess patient's ability to carry out ADLs; assess patient's baseline for ADL function and identify physical deficits which impact ability to perform ADLs (bathing, care of mouth/teeth, toileting, grooming, dressing, etc.)  - Assess/evaluate cause of self-care deficits   - Assess range of motion  - Assess patient's mobility; develop plan if impaired  - Assess patient's need for assistive devices and provide as appropriate  - Encourage maximum independence but intervene and supervise when necessary  - Involve family in performance of ADLs  - Assess for home care needs following discharge   - Consider OT consult to assist with ADL evaluation and planning for discharge  - Provide patient education as appropriate  Outcome: Progressing  Goal: Maintains/Returns to pre admission functional level  Description: INTERVENTIONS:  - Perform BMAT or MOVE assessment daily.   - Set and communicate daily mobility goal to care team and patient/family/caregiver. - Collaborate with rehabilitation services on mobility goals if consulted  - Perform Range of Motion 3 times a day. - Reposition patient every 2 hours.   - Dangle patient 3 times a day  - Stand patient 3 times a day  - Ambulate patient 3 times a day  - Out of bed to chair 3 times a day   - Out of bed for meals 3 times a day  - Out of bed for toileting  - Record patient progress and toleration of activity level   Outcome: Progressing     Problem: PAIN - ADULT  Goal: Verbalizes/displays adequate comfort level or baseline comfort level  Description: Interventions:  - Encourage patient to monitor pain and request assistance  - Assess pain using appropriate pain scale  - Administer analgesics based on type and severity of pain and evaluate response  - Implement non-pharmacological measures as appropriate and evaluate response  - Consider cultural and social influences on pain and pain management  - Notify physician/advanced practitioner if interventions unsuccessful or patient reports new pain  Outcome: Progressing     Problem: INFECTION - ADULT  Goal: Absence or prevention of progression during hospitalization  Description: INTERVENTIONS:  - Assess and monitor for signs and symptoms of infection  - Monitor lab/diagnostic results  - Monitor all insertion sites, i.e. indwelling lines, tubes, and drains  - Monitor endotracheal if appropriate and nasal secretions for changes in amount and color  - [place] appropriate cooling/warming therapies per order  - Administer medications as ordered  - Instruct and encourage patient and family to use good hand hygiene technique  - Identify and instruct in appropriate isolation precautions for identified infection/condition  Outcome: Progressing     Problem: DISCHARGE PLANNING  Goal: Discharge to home or other facility with appropriate resources  Description: INTERVENTIONS:  - Identify barriers to discharge w/patient and caregiver  - Arrange for needed discharge resources and transportation as appropriate  - Identify discharge learning needs (meds, wound care, etc.)  - Arrange for interpretive services to assist at discharge as needed  - Refer to Case Management Department for coordinating discharge planning if the patient needs post-hospital services based on physician/advanced practitioner order or complex needs related to functional status, cognitive ability, or social support system  Outcome: Progressing     Problem: Knowledge Deficit  Goal: Patient/family/caregiver demonstrates understanding of disease process, treatment plan, medications, and discharge instructions  Description: Complete learning assessment and assess knowledge base.   Interventions:  - Provide teaching at level of understanding  - Provide teaching via preferred learning methods  Outcome: Progressing     Problem: CARDIOVASCULAR - ADULT  Goal: Maintains optimal cardiac output and hemodynamic stability  Description: INTERVENTIONS:  - Monitor I/O, vital signs and rhythm  - Monitor for S/S and trends of decreased cardiac output  - Administer and titrate ordered vasoactive medications to optimize hemodynamic stability  - Assess quality of pulses, skin color and temperature  - Assess for signs of decreased coronary artery perfusion  - Instruct patient to report change in severity of symptoms  Outcome: Progressing  Goal: Absence of cardiac dysrhythmias or at baseline rhythm  Description: INTERVENTIONS:  - Continuous cardiac monitoring, vital signs, obtain 12 lead EKG if ordered  - Administer antiarrhythmic and heart rate control medications as ordered  - Monitor electrolytes and administer replacement therapy as ordered  Outcome: Progressing     Problem: RESPIRATORY - ADULT  Goal: Achieves optimal ventilation and oxygenation  Description: INTERVENTIONS:  - Assess for changes in respiratory status  - Assess for changes in mentation and behavior  - Position to facilitate oxygenation and minimize respiratory effort  - Oxygen administered by appropriate delivery if ordered  - Initiate smoking cessation education as indicated  - Encourage broncho-pulmonary hygiene including cough, deep breathe, Incentive Spirometry  - Assess the need for suctioning and aspirate as needed  - Assess and instruct to report SOB or any respiratory difficulty  - Respiratory Therapy support as indicated  Outcome: Progressing     Problem: SAFETY,RESTRAINT: NV/NON-SELF DESTRUCTIVE BEHAVIOR  Goal: Remains free of harm/injury (restraint for non violent/non self-detsructive behavior)  Description: INTERVENTIONS:  - Instruct patient/family regarding restraint use   - Assess and monitor physiologic and psychological status   - Provide interventions and comfort measures to meet assessed patient needs   - Identify and implement measures to help patient regain control  - Assess readiness for release of restraint   Outcome: Progressing  Goal: Returns to optimal restraint-free functioning  Description: INTERVENTIONS:  - Assess the patient's behavior and symptoms that indicate continued need for restraint  - Identify and implement measures to help patient regain control  - Assess readiness for release of restraint   Outcome: Progressing     Problem: Prexisting or High Potential for Compromised Skin Integrity  Goal: Skin integrity is maintained or improved  Description: INTERVENTIONS:  - Identify patients at risk for skin breakdown  - Assess and monitor skin integrity  - Assess and monitor nutrition and hydration status  - Monitor labs   - Assess for incontinence   - Turn and reposition patient  - Assist with mobility/ambulation  - Relieve pressure over bony prominences  - Avoid friction and shearing  - Provide appropriate hygiene as needed including keeping skin clean and dry  - Evaluate need for skin moisturizer/barrier cream  - Collaborate with interdisciplinary team   - Patient/family teaching  - Consider wound care consult   Outcome: Progressing     Problem: Nutrition/Hydration-ADULT  Goal: Nutrient/Hydration intake appropriate for improving, restoring or maintaining nutritional needs  Description: Monitor and assess patient's nutrition/hydration status for malnutrition. Collaborate with interdisciplinary team and initiate plan and interventions as ordered. Monitor patient's weight and dietary intake as ordered or per policy. Utilize nutrition screening tool and intervene as necessary. Determine patient's food preferences and provide high-protein, high-caloric foods as appropriate.      INTERVENTIONS:  - Monitor oral intake, urinary output, labs, and treatment plans  - Assess nutrition and hydration status and recommend course of action  - Evaluate amount of meals eaten  - Assist patient with eating if necessary   - Allow adequate time for meals  - Recommend/ encourage appropriate diets, oral nutritional supplements, and vitamin/mineral supplements  - Order, calculate, and assess calorie counts as needed  - Recommend, monitor, and adjust tube feedings and TPN/PPN based on assessed needs  - Assess need for intravenous fluids  - Provide specific nutrition/hydration education as appropriate  - Include patient/family/caregiver in decisions related to nutrition  Outcome: Progressing     Problem: Potential for Falls  Goal: Patient will remain free of falls  Description: INTERVENTIONS:  - Educate patient/family on patient safety including physical limitations  - Instruct patient to call for assistance with activity   - Consult OT/PT to assist with strengthening/mobility   - Keep Call bell within reach  - Keep bed low and locked with side rails adjusted as appropriate  - Keep care items and personal belongings within reach  - Initiate and maintain comfort rounds  - Make Fall Risk Sign visible to staff  - Offer Toileting every 2 Hours, in advance of need  - Initiate/Maintain 2alarm  - Obtain necessary fall risk management equipment: 2  - Apply yellow socks and bracelet for high fall risk patients  - Consider moving patient to room near nurses station  Outcome: Progressing

## 2023-07-25 NOTE — PLAN OF CARE
Problem: OCCUPATIONAL THERAPY ADULT  Goal: Performs self-care activities at highest level of function for planned discharge setting. See evaluation for individualized goals. Description: Treatment Interventions: ADL retraining, Functional transfer training, Patient/family training, Endurance training, Equipment evaluation/education, Compensatory technique education, Energy conservation          See flowsheet documentation for full assessment, interventions and recommendations. Outcome: Progressing  Note: Limitation: Decreased ADL status, Decreased fine motor control, Decreased self-care trans, Decreased high-level ADLs, Decreased cognition, Decreased endurance  Prognosis: Fair  Assessment: Pt seen for OT tx session with focus on functional balance, functional mobility, ADL status, and transfer safety. Patient agreeable to OT treatment session. Pt received supine in bed. Performed sit>stand transfers with mod Ax 2. VC required for body mechanics to maximize quality if transfer. Performed static/standing activity for ~1 min with good tolerance. Performed functional mobility with min Ax 1 with RW. SpO2 84-96% t/o. Required max A for LB ADL task. Patient continues to be functioning below baseline level, occupational performance remains limited secondary to factors listed above, and pt at increased risk for falls and injury. The patient's raw score on the AM-PAC Daily Activity inpatient short form is 16, standardized score is 35.96, less than 39.4. Patients at this level are likely to benefit from DC to post-acute rehabilitation services. Please refer to the recommendation of the Occupational Therapist for safe DC planning. From OT standpoint, recommendation at time of d/c would be level 2 mod intensity resources. .  Patient to benefit from continued Occupational Therapy treatment while in the hospital to address deficits as defined above and maximize level of functional independence with ADLs and functional mobility. Pt left with call bell in reach, tray table in reach, needs met, chair alarm activated.

## 2023-07-25 NOTE — ASSESSMENT & PLAN NOTE
• Per chart patient wears 2L oxygen at SNF, however, family states the patient does not wear oxygen at home. Unsure if oxygen requirement is new since admission at [facility] last week.   • VBG pH 7.2/pCO2 79.9 -->Placed on continuous BIPAP in ED  • VBG pH 7.3/62.9 --> BIPAP HS       Plan  • Titrate oxygen with SpO2 goal > 90%  • Patient reveals to me that he is unable to use CPAP mask at night due to "poor fit"  • He is hoping that the individualized mask that he will use at home will result in a better compliance

## 2023-07-25 NOTE — ASSESSMENT & PLAN NOTE
• Per family, patient admitted at [facility] x1 week for UTI and DEANDRE, discharged 7/14 on 300mg Cefdinir BID. • UA negative     Plan  • IV Cefepime completed. DC vancomycin as MRSA testing neg.

## 2023-07-25 NOTE — ASSESSMENT & PLAN NOTE
• SIRS: tachypnec, leukocytosis  • CT CAP: Right upper lobe groundglass opacity and right lower lobe consolidation along with bilateral effusions  • Per family, patient admitted at [facility] x1 week for UTI and DEANDRE, discharged 7/14 on 300mg Cefdinir BID. • UA negative  • Received azithromycin and ceftriaxone in ED  • LA 1.4  • Procal 0.29     Plan  • Cefepime- complete today. . DC'd further vancomycin d/t neg MRSA swab. Plan 7 days. • Patient did have fever 7/23. No more fever recurrence in the last 24 hours.   • Blood cx neg for 5 days  • Possibly fevers due to pneumonitis  • Procalcitonin is negative   • Trend WBC and fever curve

## 2023-07-25 NOTE — CASE MANAGEMENT
Case Management Discharge Planning Note    Patient name Miguel Sweeney  Location [address] [facility] [id number]/-[id number] MRN [id number]  : 1955 Date 2023       Current Admission Date: 2023  Current Admission Diagnosis:Acute on chronic respiratory failure with hypoxia and hypercapnia [facility])   Patient Active Problem List    Diagnosis Date Noted   • Encephalopathy acute 2023   • Sepsis ([address]) 2023   • Urinary tract infection 2023   • Hypertension 2023   • Lymphedema 2023   • Type 2 diabetes mellitus ([address]) 2023   • Obesity 2023   • Acute on chronic respiratory failure with hypoxia and hypercapnia ([address] ) 2023   • Elevated troponin 2023      LOS (days): 8  Geometric Mean LOS (GMLOS) (days): 5.00  Days to GMLOS:-3     OBJECTIVE:  Risk of Unplanned Readmission Score: 11.7      Current admission status: Inpatient   Preferred Pharmacy:   [address] [place] [address]  Phone: [phone] Fax: [phone]    Primary Care Provider: Mendel Pert, DO    Primary Insurance: [facility]  Secondary Insurance:     DISCHARGE DETAILS:    Discharge planning discussed with[de-identified] pt and his family  Freedom of Choice: Yes     CM contacted family/caregiver?: Yes  Were Treatment Team discharge recommendations reviewed with patient/caregiver?: Yes  Did patient/caregiver verbalize understanding of patient care needs?: Yes  Were patient/caregiver advised of the risks associated with not following Treatment Team discharge recommendations?: Yes    Contacts  Patient Contacts: Miguel Sweeney: wife: [phone] Si: dtr: [phone]  Contact Method:  In Person  Reason/Outcome: Emergency Contact, Continuity of Care, Discharge  [facility] [address]         Is the patient interested in [facility] at discharge?: No    DME Referral Provided  Referral made for DME?: No    Other Referral/Resources/Interventions Provided:  Interventions: Acute Rehab    Treatment Team Recommendation: Acute Rehab  Discharge Destination Plan[de-identified] Acute Rehab  Transport at Discharge : BLS Ambulance  Dispatcher Contacted: Yes  Number/Name of Dispatcher: Rony Givens by Jaylin and Unit #): TBD  ETA of Transport (Date): 07/25/23  ETA of Transport (Time): 1000     Transfer Mode: Stretcher  Accompanied by: EMS personnel  Transfer Equipment: BLS devices  IMM Given (Date):: 07/25/23  IMM Given to[de-identified] Patient  Family notified[de-identified] Reviewed with pt and family. They are in agreement with d/c. Additional Comments: CM tried to secure transportation for the 6pm deadline to get to [facility] but no vendor was able to accomodate so cm scheduled BLS transport for 10 am tomorrow. Waiting for vendor to  ride. OOH DNR and Medical Necessity is in [facility] chart. CM will touch base with [facility] in the morning to identify where pt will admit and report number.

## 2023-07-25 NOTE — OCCUPATIONAL THERAPY NOTE
Occupational Therapy Tx Note     Patient Name: Rita REYNOLDSP Date: 7/25/2023  Problem List  Principal Problem:    Acute on chronic respiratory failure with hypoxia and hypercapnia (HCC)  Active Problems:    Encephalopathy acute    Sepsis ([address])    Urinary tract infection    Hypertension    Lymphedema    Type 2 diabetes mellitus (HCC)    Obesity    Elevated troponin          07/25/23 1156   OT Last Visit   OT Visit Date 07/25/23   Note Type   Note Type Treatment for insurance authorization   Pain Assessment   Pain Assessment Tool 0-10   Pain Score No Pain   Restrictions/Precautions   Weight Bearing Precautions Per Order No   Other Precautions Fall Risk; Chair Alarm; Bed Alarm;O2   ADL   LB Dressing Assistance 2  Maximal Assistance   LB Dressing Deficit Don/doff R sock; Don/doff L sock   Transfers   Sit to Stand 3  Moderate assistance   Additional items Assist x 2;Verbal cues;Armrests; Increased time required   Stand to Sit 4  Minimal assistance   Additional items Assist x 1;Verbal cues;Armrests   Functional Mobility   Functional Mobility 4  Minimal assistance   Additional Comments x 1, RW. ~20 feet x 2. Therapeutic Exercise - ROM   UE-ROM Yes   ROM- Right Upper Extremities   R Shoulder AROM   R Position Standing;Against gravity   R Weight/Reps/Sets x 6 reps   RUE ROM Comment Pt maintained unilteral support on RW while performing UE AROM. ROM - Left Upper Extremities    L Shoulder AROM   L Position Standing;Against gravity   L Weight/Reps/Sets x 6 reps   LUE ROM Comment Pt maintained unilteral support on RW while performing UE AROM. Subjective   Subjective Pt received in recliner. Daughter at bedside.    Cognition   Overall Cognitive Status WFL   Arousal/Participation Alert   Attention Within functional limits   Orientation Level Oriented X4   Memory Decreased recall of recent events   Following Commands Follows multistep commands with increased time or repetition   Activity Tolerance   Activity Tolerance Patient limited by fatigue  (SpO2 84-96% during session.)   Assessment   Assessment Pt seen for OT tx session with focus on functional balance, functional mobility, ADL status, and transfer safety. Patient agreeable to OT treatment session. Pt received supine in bed. Performed sit>stand transfers with mod Ax 2. VC required for body mechanics to maximize quality if transfer. Performed static/standing activity for ~1 min with good tolerance. Performed functional mobility with min Ax 1 with RW. SpO2 84-96% t/o. Required max A for LB ADL task. Patient continues to be functioning below baseline level, occupational performance remains limited secondary to factors listed above, and pt at increased risk for falls and injury. The patient's raw score on the AM-PAC Daily Activity inpatient short form is 16, standardized score is 35.96, less than 39.4. Patients at this level are likely to benefit from DC to post-acute rehabilitation services. Please refer to the recommendation of the Occupational Therapist for safe DC planning. From OT standpoint, recommendation at time of d/c would be level 2 mod intensity resources. .  Patient to benefit from continued Occupational Therapy treatment while in the hospital to address deficits as defined above and maximize level of functional independence with ADLs and functional mobility. Pt left with call bell in reach, tray table in reach, needs met, chair alarm activated. Plan   Treatment Interventions ADL retraining;Functional transfer training;Patient/family training; Endurance training;Equipment evaluation/education; Compensatory technique education; Energy conservation   Goal Expiration Date 07/28/23   OT Treatment Day 2   OT Frequency 3-5x/wk   Recommendation   UB Rehab Discharge Recommendation (PT/OT) Level 1   AM-PAC Daily Activity Inpatient   Lower Body Dressing 1   Bathing 2   Toileting 2   Upper Body Dressing 3   Grooming 4   Eating 4   Daily Activity Raw Score 16   Daily Activity Standardized Score (Calc for Raw Score >=11) 35.96   AM-PAC Applied Cognition Inpatient   Following a Speech/Presentation 2   Understanding Ordinary Conversation 3   Taking Medications 2   Remembering Where Things Are Placed or Put Away 2   Remembering List of 4-5 Errands 2   Taking Care of Complicated Tasks 2   Applied Cognition Raw Score 13   Applied Cognition Standardized Score 30.46   End of Consult   Education Provided Yes   Patient Position at End of Consult Bedside chair;Bed/Chair alarm activated; All needs within reach   Nurse Communication Nurse aware of consult           ALLA Chiang/PRIYANKA

## 2023-07-25 NOTE — PROGRESS NOTES
[facility]  Progress Note  Name: Siobhan Nguyen  MRN: [id number]  Unit/Bed#: -01 I Date of Admission: 7/17/2023   Date of Service: 7/25/2023 I Hospital Day: 8    Assessment/Plan   Dermatitis  Assessment & Plan  Noted multiple profuse rash on the back  It consists of blanching macules  Per patient, it was initially pruritic, but now is improved  Only localized on the back  Patient was started on 1% hydrocortisone cream, but will advance that to flucinonide  Dermatology consult is recommended    Elevated troponin  Assessment & Plan  • Tropon 177      Plan  • Trended down  • No ischemic changes on EKG    Obesity  Assessment & Plan  • Encourage diet and lifestyle modifications when medically stable    Type 2 diabetes mellitus [facility])  Assessment & Plan  Lab Results   Component Value Date    HGBA1C 6.5 (H) 07/17/2023       Recent Labs     07/24/23  2048 07/25/23  0708 07/25/23  1045 07/25/23  1517   POCGLU 162* 107 215* 184*       Blood Sugar Average: Last 72 hrs:  (P) 153.6     • History of DM2 listed in chart from SNF,however, no medication listed on MAR from SNF     Plan  • Continue insulin sliding scale  • Hgb A1C 6.5    Lymphedema  Assessment & Plan  • Home regimen: HCTZ     Plan  • Hold 40mg IV lasix daily due to persistent contraction alkalosis. S/p diamox. Monitor CO2 levels. Hypertension  Assessment & Plan  • Home regimen: lisinopril     •      Urinary tract infection  Assessment & Plan  • Per family, patient admitted at [facility] x1 week for UTI and DEANDRE, discharged 7/14 on 300mg Cefdinir BID. • UA negative     Plan  • IV Cefepime completed. DC vancomycin as MRSA testing neg. Sepsis ([address])  Assessment & Plan  • SIRS: tachypnec, leukocytosis  • CT CAP: Right upper lobe groundglass opacity and right lower lobe consolidation along with bilateral effusions  • Per family, patient admitted at [facility] x1 week for UTI and DEANDRE, discharged 7/14 on 300mg Cefdinir BID.   • UA negative  • Received azithromycin and ceftriaxone in ED  • LA 1.4  • Procal 0.29     Plan  • Cefepime- complete today. . DC'd further vancomycin d/t neg MRSA swab. Plan 7 days. • Patient did have fever 7/23. No more fever recurrence in the last 24 hours. • Blood cx neg for 5 days  • Possibly fevers due to pneumonitis  • Procalcitonin is negative   • Trend WBC and fever curve    Encephalopathy acute  Assessment & Plan  • Brought to ED from SNF after being found by roommate on the floor and altered compared to baseline. • Approximate downtime 1.5 hours  • CTH: no acute intracranial abnormality  • CT c-spine: no c-spine fracture or traumatic malalignment  • VBG pH 7.2/pCO2 79.9  • Received 2mg IV Narvcan in ED without improvement in mental status  • Ammonia 38      Plan  • RESOLVED. Was 2/2 hypercapnea  • continue melatonin  • OOB during the day to encourage proper sleep/wake cycle    * Acute on chronic respiratory failure with hypoxia and hypercapnia (HCC)  Assessment & Plan  • Per chart patient wears 2L oxygen at SNF, however, family states the patient does not wear oxygen at home. Unsure if oxygen requirement is new since admission at [facility] last week. • VBG pH 7.2/pCO2 79.9 -->Placed on continuous BIPAP in ED  • VBG pH 7.3/62.9 --> BIPAP HS       Plan  • Titrate oxygen with SpO2 goal > 90%  • Patient reveals to me that he is unable to use CPAP mask at night due to "poor fit"  • He is hoping that the individualized mask that he will use at home will result in a better compliance             VTE Pharmacologic Prophylaxis:   Pharmacologic: Heparin  Mechanical VTE Prophylaxis in Place: Yes    Patient Centered Rounds: I have performed bedside rounds with nursing staff today. Discussions with Specialists or Other Care Team Provider: nursing, CM    Education and Discussions with Family / Patient: daughter at the bedside    Time Spent for Care: 30 minutes.   More than 50% of total time spent on counseling and coordination of care as described above. Current Length of Stay: 8 day(s)    Current Patient Status: Inpatient   Certification Statement: The patient will continue to require additional inpatient hospital stay due to generalized rash and placement    Discharge Plan: 24 hrs    Code Status: Level 3 - DNAR and DNI      Subjective:   She was seen and examined. He reports having headache after he uses BiPAP machine at night. His daughter at the bedside is also concerned about the rash that he developed on his back. Objective:     Vitals:   Temp (24hrs), Av.3 °F (36.8 °C), Min:97.7 °F (36.5 °C), Max:99.4 °F (37.4 °C)    Temp:  [97.7 °F (36.5 °C)-99.4 °F (37.4 °C)] 97.7 °F (36.5 °C)  HR:  [] 100  Resp:  [15-17] 17  BP: (110-143)/(68-79) 143/78  SpO2:  [87 %-97 %] 93 %  Body mass index is 47.4 kg/m². Input and Output Summary (last 24 hours): Intake/Output Summary (Last 24 hours) at 2023 1637  Last data filed at 2023 0845  Gross per 24 hour   Intake 480 ml   Output 810 ml   Net -330 ml       Physical Exam:     Physical Exam  Constitutional:       Appearance: He is well-developed. He is obese. HENT:      Head: Normocephalic and atraumatic. Eyes:      Pupils: Pupils are equal, round, and reactive to light. Cardiovascular:      Rate and Rhythm: Normal rate and regular rhythm. Heart sounds: Normal heart sounds. Pulmonary:      Effort: Pulmonary effort is normal. No respiratory distress. Breath sounds: Examination of the right-lower field reveals rhonchi. Examination of the left-lower field reveals rhonchi. Decreased breath sounds and rhonchi present. Abdominal:      Palpations: Abdomen is soft. Tenderness: There is no abdominal tenderness. Musculoskeletal:         General: No deformity. Cervical back: Normal range of motion. Skin:     General: Skin is warm. Findings: Rash (generalized rash-blanching macules; not pruritic) present. No erythema. Neurological:      Mental Status: He is alert. Additional Data:     Labs:    Results from last 7 days   Lab Units 07/25/23  0405   WBC Thousand/uL 11.21*   HEMOGLOBIN g/dL 12.1   HEMATOCRIT % 38.0   PLATELETS Thousands/uL 344   NEUTROS PCT % 75   LYMPHS PCT % 10*   MONOS PCT % 11   EOS PCT % 3     Results from last 7 days   Lab Units 07/25/23  0554 07/24/23  0311   SODIUM mmol/L 132* 131*   POTASSIUM mmol/L 3.7 3.7   CHLORIDE mmol/L 93* 90*   CO2 mmol/L 37* 40*   BUN mg/dL 21 26*   CREATININE mg/dL 0.84 0.97   ANION GAP mmol/L 2 1   CALCIUM mg/dL 9.4 9.2   ALBUMIN g/dL  --  2.7*   TOTAL BILIRUBIN mg/dL  --  0.94   ALK PHOS U/L  --  73   ALT U/L  --  28   AST U/L  --  32   GLUCOSE RANDOM mg/dL 136 127         Results from last 7 days   Lab Units 07/25/23  1517 07/25/23  1045 07/25/23  0708 07/24/23  2048 07/24/23  1702 07/24/23  1131 07/24/23  0715 07/23/23  2104 07/23/23  1630 07/23/23  1158 07/23/23  0738 07/22/23  2123   POC GLUCOSE mg/dl 184* 215* 107 162* 127 164* 130 194* 128 156* 123 166*         Results from last 7 days   Lab Units 07/24/23  1024   PROCALCITONIN ng/ml 0.15           * I Have Reviewed All Lab Data Listed Above. * Additional Pertinent Lab Tests Reviewed:  All Labs Within Last 24 Hours Reviewed    Imaging:      Recent Cultures (last 7 days):           Last 24 Hours Medication List:   Current Facility-Administered Medications   Medication Dose Route Frequency Provider Last Rate   • acetaminophen  650 mg Oral Q6H PRN Bailey Masters MD     • albuterol  2.5 mg Nebulization Q4H PRN Bailey Masters MD     • fluocinonide   Topical BID Keira Maldonado MD     • glycerin-hypromellose-  1 drop Both Eyes Q4H PRN Susie Trujillo PA-C     • guaiFENesin  1,200 mg Oral Q12H John Tam MD     • heparin (porcine)  5,000 Units Subcutaneous [facility] Susie Trujillo PA-C     • hydrALAZINE  5 mg Intravenous Q6H PRN Susie Trujillo PA-C     • insulin lispro  2-12 Units Subcutaneous 4x Daily (AC & HS) Yash Garcia PA-C     • ipratropium  0.5 mg Nebulization TID Orlando Trent MD     • levalbuterol  1.25 mg Nebulization TID Orlando Trent MD     • lisinopril  10 mg Oral Daily Yash Garcia PA-C     • melatonin  6 mg Oral HS Yash Garcia PA-C     • DANNA ANTIFUNGAL   Topical BID Yash Garcia PA-C     • polyethylene glycol  17 g Oral Daily Maria Teresa Rodrigez PA-C          Today, Patient Was Seen By: John Vega MD    ** Please Note: Dictation voice to text software may have been used in the creation of this document.  **

## 2023-07-26 LAB
GLUCOSE SERPL-MCNC: 127 MG/DL (ref 65–140)
GLUCOSE SERPL-MCNC: 144 MG/DL (ref 65–140)
GLUCOSE SERPL-MCNC: 175 MG/DL (ref 65–140)
GLUCOSE SERPL-MCNC: 217 MG/DL (ref 65–140)

## 2023-07-26 PROCEDURE — 94760 N-INVAS EAR/PLS OXIMETRY 1: CPT

## 2023-07-26 PROCEDURE — 94668 MNPJ CHEST WALL SBSQ: CPT

## 2023-07-26 PROCEDURE — 99232 SBSQ HOSP IP/OBS MODERATE 35: CPT | Performed by: HOSPITALIST

## 2023-07-26 PROCEDURE — 82948 REAGENT STRIP/BLOOD GLUCOSE: CPT

## 2023-07-26 PROCEDURE — 94640 AIRWAY INHALATION TREATMENT: CPT

## 2023-07-26 RX ADMIN — MELATONIN 6 MG: at 21:29

## 2023-07-26 RX ADMIN — LEVALBUTEROL HYDROCHLORIDE 1.25 MG: 1.25 SOLUTION RESPIRATORY (INHALATION) at 14:04

## 2023-07-26 RX ADMIN — IPRATROPIUM BROMIDE 0.5 MG: 0.5 SOLUTION RESPIRATORY (INHALATION) at 07:38

## 2023-07-26 RX ADMIN — GLYCERIN, HYPROMELLOSE, POLYETHYLENE GLYCOL 1 DROP: .2; .2; 1 LIQUID OPHTHALMIC at 09:19

## 2023-07-26 RX ADMIN — IPRATROPIUM BROMIDE 0.5 MG: 0.5 SOLUTION RESPIRATORY (INHALATION) at 14:04

## 2023-07-26 RX ADMIN — LEVALBUTEROL HYDROCHLORIDE 1.25 MG: 1.25 SOLUTION RESPIRATORY (INHALATION) at 20:45

## 2023-07-26 RX ADMIN — LEVALBUTEROL HYDROCHLORIDE 1.25 MG: 1.25 SOLUTION RESPIRATORY (INHALATION) at 07:38

## 2023-07-26 RX ADMIN — INSULIN LISPRO 4 UNITS: 100 INJECTION, SOLUTION INTRAVENOUS; SUBCUTANEOUS at 12:11

## 2023-07-26 RX ADMIN — IPRATROPIUM BROMIDE 0.5 MG: 0.5 SOLUTION RESPIRATORY (INHALATION) at 20:45

## 2023-07-26 RX ADMIN — LISINOPRIL 10 MG: 10 TABLET ORAL at 08:02

## 2023-07-26 RX ADMIN — FLUOCINONIDE: 0.5 OINTMENT TOPICAL at 08:01

## 2023-07-26 RX ADMIN — GUAIFENESIN 1200 MG: 600 TABLET ORAL at 21:29

## 2023-07-26 RX ADMIN — GUAIFENESIN 1200 MG: 600 TABLET ORAL at 08:02

## 2023-07-26 RX ADMIN — INSULIN LISPRO 2 UNITS: 100 INJECTION, SOLUTION INTRAVENOUS; SUBCUTANEOUS at 21:30

## 2023-07-26 RX ADMIN — MICONAZOLE NITRATE: 20 CREAM TOPICAL at 08:02

## 2023-07-26 RX ADMIN — HEPARIN SODIUM 5000 UNITS: 5000 INJECTION INTRAVENOUS; SUBCUTANEOUS at 15:14

## 2023-07-26 RX ADMIN — HEPARIN SODIUM 5000 UNITS: 5000 INJECTION INTRAVENOUS; SUBCUTANEOUS at 21:29

## 2023-07-26 RX ADMIN — HEPARIN SODIUM 5000 UNITS: 5000 INJECTION INTRAVENOUS; SUBCUTANEOUS at 05:20

## 2023-07-26 NOTE — ASSESSMENT & PLAN NOTE
Noted multiple profuse rash on the back  It consists of blanching macules  Per patient, it was initially pruritic, but now is improved  Only localized on the back  Patient was started on 1% hydrocortisone cream, but will advance that to flucinonide  Improving.

## 2023-07-26 NOTE — PROGRESS NOTES
[facility]  Progress Note  Name: Siobhan Nguyen  MRN: [id number]  Unit/Bed#: -01 I Date of Admission: 7/17/2023   Date of Service: 7/26/2023 I Hospital Day: 9    Assessment/Plan   Dermatitis  Assessment & Plan  Noted multiple profuse rash on the back  It consists of blanching macules  Per patient, it was initially pruritic, but now is improved  Only localized on the back  Patient was started on 1% hydrocortisone cream, but will advance that to flucinonide  Improving. Elevated troponin  Assessment & Plan  • Tropon 177      Plan  • Trended down  • No ischemic changes on EKG    Obesity  Assessment & Plan  • Encourage diet and lifestyle modifications when medically stable    Type 2 diabetes mellitus [facility])  Assessment & Plan  Lab Results   Component Value Date    HGBA1C 6.5 (H) 07/17/2023       Recent Labs     07/25/23  1517 07/25/23  2041 07/26/23  0712 07/26/23  1046   POCGLU 184* 176* 144* 217*       Blood Sugar Average: Last 72 hrs:  (P) 607.7953951472462480     • History of DM2 listed in chart from SNF,however, no medication listed on MAR from SNF     Plan  • Continue insulin sliding scale  • Hgb A1C 6.5    Lymphedema  Assessment & Plan  • Home regimen: HCTZ     Plan  • Hold 40mg IV lasix daily due to persistent contraction alkalosis. S/p diamox. Monitor CO2 levels. Hypertension  Assessment & Plan  • Home regimen: lisinopril     •      Urinary tract infection  Assessment & Plan  • Per family, patient admitted at [facility] x1 week for UTI and DEANDRE, discharged 7/14 on 300mg Cefdinir BID. • UA negative     Plan  • IV Cefepime completed. DC vancomycin as MRSA testing neg. Sepsis ([address])  Assessment & Plan  • SIRS: tachypnec, leukocytosis  • CT CAP: Right upper lobe groundglass opacity and right lower lobe consolidation along with bilateral effusions  • Per family, patient admitted at [facility] x1 week for UTI and DEANDRE, discharged 7/14 on 300mg Cefdinir BID.   • UA negative  • Received azithromycin and ceftriaxone in ED  • LA 1.4  • Procal 0.29     Plan  • Cefepime- completed. . DC'd further vancomycin d/t neg MRSA swab. Plan 7 days. • Patient did have fever 7/23. No more fever recurrence in the last 24 hours. • Blood cx neg for 5 days  • Possibly fevers due to pneumonitis  • Procalcitonin is negative   • Trend WBC and fever curve    Encephalopathy acute  Assessment & Plan  • Brought to ED from SNF after being found by roommate on the floor and altered compared to baseline. • Approximate downtime 1.5 hours  • CTH: no acute intracranial abnormality  • CT c-spine: no c-spine fracture or traumatic malalignment  • VBG pH 7.2/pCO2 79.9  • Received 2mg IV Narvcan in ED without improvement in mental status  • Ammonia 38      Plan  • RESOLVED. Was 2/2 hypercapnea  • continue melatonin  • OOB during the day to encourage proper sleep/wake cycle    * Acute on chronic respiratory failure with hypoxia and hypercapnia (HCC)  Assessment & Plan  • Per chart patient wears 2L oxygen at SNF, however, family states the patient does not wear oxygen at home. Unsure if oxygen requirement is new since admission at [facility] last week. • VBG pH 7.2/pCO2 79.9 -->Placed on continuous BIPAP in ED  • VBG pH 7.3/62.9 --> BIPAP HS       Plan  • Titrate oxygen with SpO2 goal > 90%  • Patient reveals to me that he is unable to use CPAP mask at night due to "poor fit"  • He is hoping that the individualized mask that he will use at home will result in a better compliance           VTE  Prophylaxis:   Pharmacologic: in place  Mechanical VTE Prophylaxis in Place: Yes    Patient Centered Rounds: I have performed bedside rounds with nursing staff today.     Discussions with Specialists or Other Care Team Provider: case management    Education and Discussions with Family / Patient: pt wife      Current Length of Stay: 9 day(s)    Current Patient Status: Inpatient        Code Status: Level 3 - DNAR and DNI    Discharge Plan: Pt will require continued inpatient hospitalization. Subjective:      Pt without acute medical complaints  On o2    Patient is seen and examined at bedside. All other ROS are negative. Objective:     Vitals:   Temp (24hrs), Av °F (36.7 °C), Min:97.7 °F (36.5 °C), Max:98.4 °F (36.9 °C)    Temp:  [97.7 °F (36.5 °C)-98.4 °F (36.9 °C)] 97.9 °F (36.6 °C)  HR:  [] 75  Resp:  [15-17] 16  BP: (112-143)/(63-78) 112/63  SpO2:  [82 %-94 %] 94 %  Body mass index is 48.11 kg/m². Input and Output Summary (last 24 hours): Intake/Output Summary (Last 24 hours) at 2023 1402  Last data filed at 2023 0923  Gross per 24 hour   Intake 680 ml   Output 300 ml   Net 380 ml       Physical Exam:       GEN: No acute distress, comfortable, obese, on o2. HEEENT: No JVD, PERRLA, no scleral icterus  RESP: Lungs clear to auscultation bilaterally  CV: RRR, +s1/s2   ABD: SOFT NON TENDER, POSITIVE BOWEL SOUNDS, NO DISTENTION  PSYCH: CALM  NEURO: Mentation baseline, NO FOCAL DEFICITS  SKIN: improved rash on back.   EXTREM: NO EDEMA    Additional Data:     Labs:    Results from last 7 days   Lab Units 23  0405   WBC Thousand/uL 11.21*   HEMOGLOBIN g/dL 12.1   HEMATOCRIT % 38.0   PLATELETS Thousands/uL 344   NEUTROS PCT % 75   LYMPHS PCT % 10*   MONOS PCT % 11   EOS PCT % 3     Results from last 7 days   Lab Units 23  0554 23  0311   SODIUM mmol/L 132* 131*   POTASSIUM mmol/L 3.7 3.7   CHLORIDE mmol/L 93* 90*   CO2 mmol/L 37* 40*   BUN mg/dL 21 26*   CREATININE mg/dL 0.84 0.97   ANION GAP mmol/L 2 1   CALCIUM mg/dL 9.4 9.2   ALBUMIN g/dL  --  2.7*   TOTAL BILIRUBIN mg/dL  --  0.94   ALK PHOS U/L  --  73   ALT U/L  --  28   AST U/L  --  32   GLUCOSE RANDOM mg/dL 136 127         Results from last 7 days   Lab Units 23  1046 23  0712 23  2041 23  1517 23  1045 23  0708 23  2048 23  1702 23  1131 23  0715 23  2104 23  1630   POC GLUCOSE mg/dl 217* 144* 176* 184* 215* 107 162* 127 164* 130 194* 128         Results from last 7 days   Lab Units 07/24/23  1024   PROCALCITONIN ng/ml 0.15       Lines/Drains:  Invasive Devices     Peripheral Intravenous Line  Duration           Peripheral IV 07/25/23 Distal;Right;Upper;Ventral (anterior) Arm 1 day                Telemetry:        * I Have Reviewed All Lab Data Listed Above. Imaging:     Results for orders placed during the hospital encounter of 07/17/23    XR Trauma chest portable    Narrative  CHEST    INDICATION:   TRAUMA. Patient found unresponsive. Patient has suspected COVID-19. COMPARISON:  None    EXAM PERFORMED/VIEWS:  XR CHEST PORTABLE AP semierect  Images: 2    FINDINGS:    Heart shadow appears enlarged. Atherosclerotic aortic tortuosity is noted. Vascular congestion with a pulmonary edema pattern. More focal patchy opacity in the right mid lung suspect for pneumonia. No obvious effusion or pneumothorax. No acute osseous abnormalities. Impression  Focal opacity in the right mid lung suspect for pneumonia. Vascular congestion and pulmonary edema. The study was marked in [facility] for immediate notification. Workstation performed: [id number]    No results found for this or any previous visit.       *I have reviewed all imaging reports listed above      Recent Cultures (last 7 days):           Last 24 Hours Medication List:   Current Facility-Administered Medications   Medication Dose Route Frequency Provider Last Rate   • acetaminophen  650 mg Oral Q6H PRN Karthik Cohen MD     • albuterol  2.5 mg Nebulization Q4H PRN Karthik Cohen MD     • fluocinonide   Topical BID Nichole Sifuentes MD     • glycerin-hypromellose-  1 drop Both Eyes Q4H PRN Remington Chris PA-C     • guaiFENesin  1,200 mg Oral Q12H Nico Garland MD     • heparin (porcine)  5,000 Units Subcutaneous [facility] Remington Chris PA-C     • hydrALAZINE  5 mg Intravenous Q6H PRN Alonso Gonzalez PA-C     • insulin lispro  2-12 Units Subcutaneous 4x Daily (AC & HS) Alonso Gonzalez PA-C     • ipratropium  0.5 mg Nebulization TID Stephenie Cam MD     • levalbuterol  1.25 mg Nebulization TID Stephenie Cam MD     • lisinopril  10 mg Oral Daily Alonso Gonzalez PA-C     • melatonin  6 mg Oral HS Alonso Gonzalez PA-C     • DANNA ANTIFUNGAL   Topical BID Alonso Gonzalez PA-C     • polyethylene glycol  17 g Oral Daily Maryjo Manzanares PA-C          Today, Patient Was Seen By: Stephenie Cam MD    ** Please Note: Dictation voice to text software may have been used in the creation of this document.  **

## 2023-07-26 NOTE — TREATMENT PLAN
Myself and pulmonary SARA spoke to case management and home BIPAP will be ordered and supplied by patient's accepting rehab facility. No further recommendations from pulmonary.

## 2023-07-26 NOTE — ASSESSMENT & PLAN NOTE
• Per chart patient wears 2L oxygen at SNF, however, family states the patient does not wear oxygen at home. Unsure if oxygen requirement is new since admission at [facility] last week.   • VBG pH 7.2/pCO2 79.9 -->Placed on continuous BIPAP in ED  • VBG pH 7.3/62.9 --> BIPAP HS       Plan  • Titrate oxygen with SpO2 goal > 90%  • Patient reveals to me that he is unable to use CPAP mask at night due to "poor fit"  • He is hoping that the individualized mask that he will use at home will result in a better compliance

## 2023-07-26 NOTE — CASE MANAGEMENT
Case Management Progress Note    Patient name Skip Ruiz  Location [address] [facility] [id number]/-[id number] MRN [id number]  : 1955 Date 2023       LOS (days): 9  Geometric Mean LOS (GMLOS) (days): 5.00  Days to GMLOS:-4        OBJECTIVE:     Current admission status: Inpatient  Preferred Pharmacy:   [address] [place], [place] - [address]  [address]  [address] [place] [id number]  Phone: [phone] Fax: [phone]    Primary Care Provider: Jing Bangura DO    Primary Insurance: Joint [employer] between [facility] [employer]  Secondary Insurance:     PROGRESS NOTE:    Cm received notice that Dr. Kalyan Nielsen, Medical Director with pt's insurance wanted to speak to physician about medical situation of pt but not conduct a formal peer to peer. Dr. Mehdi Moe called. Then cm spoke to Dr. Kalyan Nielsen about case Dr. Kalyan Nielsen @ [facility] ([phone]). He asked to speak to PT. Aminata Gallegos, PT called him and the insurance is denying pt's acute rehab auth request. Pt wants to appeal the denial/ CM initiated appeal by calling [phone] and speaking with representative. Clinicals were faxed to [phone]. Pt's SO will also call appeal line but pt would be interested in [facility] if they will accept him. They approved pending medical stability, updated therapy notes showing O2 requirements & tolerance, updated CBC & BMP as CO2 & WBC is elevated. CM asked PT/OT to see pt in the morning and including O2 requirements in their notes and asked [facility] to order the additional blood work.  CM also updated Keisha from [facility] about family's plan to appeal the acute rehab denial.

## 2023-07-26 NOTE — CASE MANAGEMENT
Support Center has received denial.  Denial received for: Acute Rehab  Facility: [address] [place] [address]     Denial #: [id number]  Denial Reason: does not meet CMS guidelines, further treatment could be provided at less intensive care setting  [employer] Appeal phone#:  [phone]  Care Manager notified: Jyoti Olivera

## 2023-07-26 NOTE — PLAN OF CARE
Problem: MOBILITY - ADULT  Goal: Maintain or return to baseline ADL function  Description: INTERVENTIONS:  -  Assess patient's ability to carry out ADLs; assess patient's baseline for ADL function and identify physical deficits which impact ability to perform ADLs (bathing, care of mouth/teeth, toileting, grooming, dressing, etc.)  - Assess/evaluate cause of self-care deficits   - Assess range of motion  - Assess patient's mobility; develop plan if impaired  - Assess patient's need for assistive devices and provide as appropriate  - Encourage maximum independence but intervene and supervise when necessary  - Involve family in performance of ADLs  - Assess for home care needs following discharge   - Consider OT consult to assist with ADL evaluation and planning for discharge  - Provide patient education as appropriate  Outcome: Progressing  Goal: Maintains/Returns to pre admission functional level  Description: INTERVENTIONS:  - Perform BMAT or MOVE assessment daily.   - Set and communicate daily mobility goal to care team and patient/family/caregiver. - Collaborate with rehabilitation services on mobility goals if consulted  - Perform Range of Motion 3 times a day. - Reposition patient every 2 hours.   - Dangle patient 3 times a day  - Stand patient 3 times a day  - Ambulate patient 3 times a day  - Out of bed to chair 3 times a day   - Out of bed for meals 3 times a day  - Out of bed for toileting  - Record patient progress and toleration of activity level   Outcome: Progressing     Problem: PAIN - ADULT  Goal: Verbalizes/displays adequate comfort level or baseline comfort level  Description: Interventions:  - Encourage patient to monitor pain and request assistance  - Assess pain using appropriate pain scale  - Administer analgesics based on type and severity of pain and evaluate response  - Implement non-pharmacological measures as appropriate and evaluate response  - Consider cultural and social influences on pain and pain management  - Notify physician/advanced practitioner if interventions unsuccessful or patient reports new pain  Outcome: Progressing     Problem: INFECTION - ADULT  Goal: Absence or prevention of progression during hospitalization  Description: INTERVENTIONS:  - Assess and monitor for signs and symptoms of infection  - Monitor lab/diagnostic results  - Monitor all insertion sites, i.e. indwelling lines, tubes, and drains  - Monitor endotracheal if appropriate and nasal secretions for changes in amount and color  - [place] appropriate cooling/warming therapies per order  - Administer medications as ordered  - Instruct and encourage patient and family to use good hand hygiene technique  - Identify and instruct in appropriate isolation precautions for identified infection/condition  Outcome: Progressing       Problem: Nutrition/Hydration-ADULT  Goal: Nutrient/Hydration intake appropriate for improving, restoring or maintaining nutritional needs  Description: Monitor and assess patient's nutrition/hydration status for malnutrition. Collaborate with interdisciplinary team and initiate plan and interventions as ordered. Monitor patient's weight and dietary intake as ordered or per policy. Utilize nutrition screening tool and intervene as necessary. Determine patient's food preferences and provide high-protein, high-caloric foods as appropriate.      INTERVENTIONS:  - Monitor oral intake, urinary output, labs, and treatment plans  - Assess nutrition and hydration status and recommend course of action  - Evaluate amount of meals eaten  - Assist patient with eating if necessary   - Allow adequate time for meals  - Recommend/ encourage appropriate diets, oral nutritional supplements, and vitamin/mineral supplements  - Order, calculate, and assess calorie counts as needed  - Recommend, monitor, and adjust tube feedings and TPN/PPN based on assessed needs  - Assess need for intravenous fluids  - Provide specific nutrition/hydration education as appropriate  - Include patient/family/caregiver in decisions related to nutrition  Outcome: Progressing

## 2023-07-26 NOTE — ASSESSMENT & PLAN NOTE
• Per family, patient admitted at [facility] x1 week for UTI and DEANDRE, discharged 7/14 on 300mg Cefdinir BID. • UA negative     Plan  • IV Cefepime completed. DC vancomycin as MRSA testing neg.

## 2023-07-26 NOTE — ASSESSMENT & PLAN NOTE
Lab Results   Component Value Date    HGBA1C 6.5 (H) 07/17/2023       Recent Labs     07/25/23  1517 07/25/23  2041 07/26/23  0712 07/26/23  1046   POCGLU 184* 176* 144* 217*       Blood Sugar Average: Last 72 hrs:  (P) [id number]     • History of DM2 listed in chart from [facility],however, no medication listed on MAR from SNF     Plan  • Continue insulin sliding scale  • Hgb A1C 6.5

## 2023-07-26 NOTE — PLAN OF CARE
Problem: MOBILITY - ADULT  Goal: Maintain or return to baseline ADL function  Description: INTERVENTIONS:  -  Assess patient's ability to carry out ADLs; assess patient's baseline for ADL function and identify physical deficits which impact ability to perform ADLs (bathing, care of mouth/teeth, toileting, grooming, dressing, etc.)  - Assess/evaluate cause of self-care deficits   - Assess range of motion  - Assess patient's mobility; develop plan if impaired  - Assess patient's need for assistive devices and provide as appropriate  - Encourage maximum independence but intervene and supervise when necessary  - Involve family in performance of ADLs  - Assess for home care needs following discharge   - Consider OT consult to assist with ADL evaluation and planning for discharge  - Provide patient education as appropriate  Outcome: Progressing  Goal: Maintains/Returns to pre admission functional level  Description: INTERVENTIONS:  - Perform BMAT or MOVE assessment daily.   - Set and communicate daily mobility goal to care team and patient/family/caregiver. - Collaborate with rehabilitation services on mobility goals if consulted  - Perform Range of Motion 3 times a day. - Reposition patient every 2 hours.   - Dangle patient 3 times a day  - Stand patient 3 times a day  - Ambulate patient 3 times a day  - Out of bed to chair 3 times a day   - Out of bed for meals 3 times a day  - Out of bed for toileting  - Record patient progress and toleration of activity level   Outcome: Progressing     Problem: PAIN - ADULT  Goal: Verbalizes/displays adequate comfort level or baseline comfort level  Description: Interventions:  - Encourage patient to monitor pain and request assistance  - Assess pain using appropriate pain scale  - Administer analgesics based on type and severity of pain and evaluate response  - Implement non-pharmacological measures as appropriate and evaluate response  - Consider cultural and social influences on pain and pain management  - Notify physician/advanced practitioner if interventions unsuccessful or patient reports new pain  Outcome: Progressing     Problem: INFECTION - ADULT  Goal: Absence or prevention of progression during hospitalization  Description: INTERVENTIONS:  - Assess and monitor for signs and symptoms of infection  - Monitor lab/diagnostic results  - Monitor all insertion sites, i.e. indwelling lines, tubes, and drains  - Monitor endotracheal if appropriate and nasal secretions for changes in amount and color  - [place] appropriate cooling/warming therapies per order  - Administer medications as ordered  - Instruct and encourage patient and family to use good hand hygiene technique  - Identify and instruct in appropriate isolation precautions for identified infection/condition  Outcome: Progressing     Problem: DISCHARGE PLANNING  Goal: Discharge to home or other facility with appropriate resources  Description: INTERVENTIONS:  - Identify barriers to discharge w/patient and caregiver  - Arrange for needed discharge resources and transportation as appropriate  - Identify discharge learning needs (meds, wound care, etc.)  - Arrange for interpretive services to assist at discharge as needed  - Refer to Case Management Department for coordinating discharge planning if the patient needs post-hospital services based on physician/advanced practitioner order or complex needs related to functional status, cognitive ability, or social support system  Outcome: Progressing     Problem: Knowledge Deficit  Goal: Patient/family/caregiver demonstrates understanding of disease process, treatment plan, medications, and discharge instructions  Description: Complete learning assessment and assess knowledge base.   Interventions:  - Provide teaching at level of understanding  - Provide teaching via preferred learning methods  Outcome: Progressing     Problem: CARDIOVASCULAR - ADULT  Goal: Maintains optimal cardiac output and hemodynamic stability  Description: INTERVENTIONS:  - Monitor I/O, vital signs and rhythm  - Monitor for S/S and trends of decreased cardiac output  - Administer and titrate ordered vasoactive medications to optimize hemodynamic stability  - Assess quality of pulses, skin color and temperature  - Assess for signs of decreased coronary artery perfusion  - Instruct patient to report change in severity of symptoms  Outcome: Progressing  Goal: Absence of cardiac dysrhythmias or at baseline rhythm  Description: INTERVENTIONS:  - Continuous cardiac monitoring, vital signs, obtain 12 lead EKG if ordered  - Administer antiarrhythmic and heart rate control medications as ordered  - Monitor electrolytes and administer replacement therapy as ordered  Outcome: Progressing     Problem: RESPIRATORY - ADULT  Goal: Achieves optimal ventilation and oxygenation  Description: INTERVENTIONS:  - Assess for changes in respiratory status  - Assess for changes in mentation and behavior  - Position to facilitate oxygenation and minimize respiratory effort  - Oxygen administered by appropriate delivery if ordered  - Initiate smoking cessation education as indicated  - Encourage broncho-pulmonary hygiene including cough, deep breathe, Incentive Spirometry  - Assess the need for suctioning and aspirate as needed  - Assess and instruct to report SOB or any respiratory difficulty  - Respiratory Therapy support as indicated  Outcome: Progressing     Problem: SAFETY,RESTRAINT: NV/NON-SELF DESTRUCTIVE BEHAVIOR  Goal: Remains free of harm/injury (restraint for non violent/non self-detsructive behavior)  Description: INTERVENTIONS:  - Instruct patient/family regarding restraint use   - Assess and monitor physiologic and psychological status   - Provide interventions and comfort measures to meet assessed patient needs   - Identify and implement measures to help patient regain control  - Assess readiness for release of restraint   Outcome: Progressing  Goal: Returns to optimal restraint-free functioning  Description: INTERVENTIONS:  - Assess the patient's behavior and symptoms that indicate continued need for restraint  - Identify and implement measures to help patient regain control  - Assess readiness for release of restraint   Outcome: Progressing     Problem: Prexisting or High Potential for Compromised Skin Integrity  Goal: Skin integrity is maintained or improved  Description: INTERVENTIONS:  - Identify patients at risk for skin breakdown  - Assess and monitor skin integrity  - Assess and monitor nutrition and hydration status  - Monitor labs   - Assess for incontinence   - Turn and reposition patient  - Assist with mobility/ambulation  - Relieve pressure over bony prominences  - Avoid friction and shearing  - Provide appropriate hygiene as needed including keeping skin clean and dry  - Evaluate need for skin moisturizer/barrier cream  - Collaborate with interdisciplinary team   - Patient/family teaching  - Consider wound care consult   Outcome: Progressing     Problem: Nutrition/Hydration-ADULT  Goal: Nutrient/Hydration intake appropriate for improving, restoring or maintaining nutritional needs  Description: Monitor and assess patient's nutrition/hydration status for malnutrition. Collaborate with interdisciplinary team and initiate plan and interventions as ordered. Monitor patient's weight and dietary intake as ordered or per policy. Utilize nutrition screening tool and intervene as necessary. Determine patient's food preferences and provide high-protein, high-caloric foods as appropriate.      INTERVENTIONS:  - Monitor oral intake, urinary output, labs, and treatment plans  - Assess nutrition and hydration status and recommend course of action  - Evaluate amount of meals eaten  - Assist patient with eating if necessary   - Allow adequate time for meals  - Recommend/ encourage appropriate diets, oral nutritional supplements, and vitamin/mineral supplements  - Order, calculate, and assess calorie counts as needed  - Recommend, monitor, and adjust tube feedings and TPN/PPN based on assessed needs  - Assess need for intravenous fluids  - Provide specific nutrition/hydration education as appropriate  - Include patient/family/caregiver in decisions related to nutrition  Outcome: Progressing     Problem: Potential for Falls  Goal: Patient will remain free of falls  Description: INTERVENTIONS:  - Educate patient/family on patient safety including physical limitations  - Instruct patient to call for assistance with activity   - Consult OT/PT to assist with strengthening/mobility   - Keep Call bell within reach  - Keep bed low and locked with side rails adjusted as appropriate  - Keep care items and personal belongings within reach  - Initiate and maintain comfort rounds  - Make Fall Risk Sign visible to staff  - Offer Toileting every 2 Hours, in advance of need  - Initiate/Maintain 2alarm  - Obtain necessary fall risk management equipment: 2  - Apply yellow socks and bracelet for high fall risk patients  - Consider moving patient to room near nurses station  Outcome: Progressing

## 2023-07-26 NOTE — ASSESSMENT & PLAN NOTE
• SIRS: tachypnec, leukocytosis  • CT CAP: Right upper lobe groundglass opacity and right lower lobe consolidation along with bilateral effusions  • Per family, patient admitted at [facility] x1 week for UTI and DEANDRE, discharged 7/14 on 300mg Cefdinir BID. • UA negative  • Received azithromycin and ceftriaxone in ED  • LA 1.4  • Procal 0.29     Plan  • Cefepime- completed. . DC'd further vancomycin d/t neg MRSA swab. Plan 7 days. • Patient did have fever 7/23. No more fever recurrence in the last 24 hours.   • Blood cx neg for 5 days  • Possibly fevers due to pneumonitis  • Procalcitonin is negative   • Trend WBC and fever curve

## 2023-07-26 NOTE — CASE MANAGEMENT
Received message from Dr. Selina Walton @ [facility] ([phone])[id number] that he could see that pt was very ill upon admission but would like clarification and update on where pt stands from a medical standpoint that requires an acute rehab level of care. CM notified.

## 2023-07-27 VITALS
BODY MASS INDEX: 39.17 KG/M2 | DIASTOLIC BLOOD PRESSURE: 77 MMHG | RESPIRATION RATE: 22 BRPM | SYSTOLIC BLOOD PRESSURE: 125 MMHG | OXYGEN SATURATION: 89 % | WEIGHT: 315 LBS | TEMPERATURE: 97.7 F | HEART RATE: 82 BPM | HEIGHT: 75 IN

## 2023-07-27 LAB
ALBUMIN SERPL BCP-MCNC: 2.7 G/DL (ref 3.5–5)
ALP SERPL-CCNC: 83 U/L (ref 34–104)
ALT SERPL W P-5'-P-CCNC: 28 U/L (ref 7–52)
ANION GAP SERPL CALCULATED.3IONS-SCNC: 3 MMOL/L
AST SERPL W P-5'-P-CCNC: 25 U/L (ref 13–39)
BASOPHILS # BLD AUTO: 0.05 THOUSANDS/ÂΜL (ref 0–0.1)
BASOPHILS NFR BLD AUTO: 1 % (ref 0–1)
BILIRUB SERPL-MCNC: 0.79 MG/DL (ref 0.2–1)
BUN SERPL-MCNC: 22 MG/DL (ref 5–25)
CALCIUM ALBUM COR SERPL-MCNC: 10.3 MG/DL (ref 8.3–10.1)
CALCIUM SERPL-MCNC: 9.3 MG/DL (ref 8.4–10.2)
CHLORIDE SERPL-SCNC: 95 MMOL/L (ref 96–108)
CO2 SERPL-SCNC: 34 MMOL/L (ref 21–32)
CREAT SERPL-MCNC: 0.86 MG/DL (ref 0.6–1.3)
EOSINOPHIL # BLD AUTO: 0.24 THOUSAND/ÂΜL (ref 0–0.61)
EOSINOPHIL NFR BLD AUTO: 2 % (ref 0–6)
ERYTHROCYTE [DISTWIDTH] IN BLOOD BY AUTOMATED COUNT: 12.5 % (ref 11.6–15.1)
FLUAV RNA RESP QL NAA+PROBE: NEGATIVE
FLUBV RNA RESP QL NAA+PROBE: NEGATIVE
GFR SERPL CREATININE-BSD FRML MDRD: 88 ML/MIN/1.73SQ M
GLUCOSE SERPL-MCNC: 131 MG/DL (ref 65–140)
GLUCOSE SERPL-MCNC: 135 MG/DL (ref 65–140)
GLUCOSE SERPL-MCNC: 214 MG/DL (ref 65–140)
HCT VFR BLD AUTO: 36.3 % (ref 36.5–49.3)
HGB BLD-MCNC: 11.8 G/DL (ref 12–17)
IMM GRANULOCYTES # BLD AUTO: 0.08 THOUSAND/UL (ref 0–0.2)
IMM GRANULOCYTES NFR BLD AUTO: 1 % (ref 0–2)
LYMPHOCYTES # BLD AUTO: 1.38 THOUSANDS/ÂΜL (ref 0.6–4.47)
LYMPHOCYTES NFR BLD AUTO: 13 % (ref 14–44)
MCH RBC QN AUTO: 31.1 PG (ref 26.8–34.3)
MCHC RBC AUTO-ENTMCNC: 32.5 G/DL (ref 31.4–37.4)
MCV RBC AUTO: 96 FL (ref 82–98)
MONOCYTES # BLD AUTO: 1.21 THOUSAND/ÂΜL (ref 0.17–1.22)
MONOCYTES NFR BLD AUTO: 11 % (ref 4–12)
NEUTROPHILS # BLD AUTO: 7.97 THOUSANDS/ÂΜL (ref 1.85–7.62)
NEUTS SEG NFR BLD AUTO: 72 % (ref 43–75)
NRBC BLD AUTO-RTO: 0 /100 WBCS
PLATELET # BLD AUTO: 289 THOUSANDS/UL (ref 149–390)
PMV BLD AUTO: 8.9 FL (ref 8.9–12.7)
POTASSIUM SERPL-SCNC: 4.1 MMOL/L (ref 3.5–5.3)
PROT SERPL-MCNC: 6.1 G/DL (ref 6.4–8.4)
RBC # BLD AUTO: 3.8 MILLION/UL (ref 3.88–5.62)
RSV RNA RESP QL NAA+PROBE: NEGATIVE
SARS-COV-2 RNA RESP QL NAA+PROBE: NEGATIVE
SODIUM SERPL-SCNC: 132 MMOL/L (ref 135–147)
WBC # BLD AUTO: 10.93 THOUSAND/UL (ref 4.31–10.16)

## 2023-07-27 PROCEDURE — 97530 THERAPEUTIC ACTIVITIES: CPT

## 2023-07-27 PROCEDURE — 99239 HOSP IP/OBS DSCHRG MGMT >30: CPT | Performed by: HOSPITALIST

## 2023-07-27 PROCEDURE — 80053 COMPREHEN METABOLIC PANEL: CPT | Performed by: HOSPITALIST

## 2023-07-27 PROCEDURE — 85025 COMPLETE CBC W/AUTO DIFF WBC: CPT | Performed by: HOSPITALIST

## 2023-07-27 PROCEDURE — 97116 GAIT TRAINING THERAPY: CPT

## 2023-07-27 PROCEDURE — 94640 AIRWAY INHALATION TREATMENT: CPT

## 2023-07-27 PROCEDURE — 97535 SELF CARE MNGMENT TRAINING: CPT

## 2023-07-27 PROCEDURE — 0241U HB NFCT DS VIR RESP RNA 4 TRGT: CPT | Performed by: HOSPITALIST

## 2023-07-27 PROCEDURE — 94762 N-INVAS EAR/PLS OXIMTRY CONT: CPT

## 2023-07-27 PROCEDURE — 82948 REAGENT STRIP/BLOOD GLUCOSE: CPT

## 2023-07-27 RX ORDER — LEVALBUTEROL INHALATION SOLUTION 1.25 MG/3ML
1.25 SOLUTION RESPIRATORY (INHALATION)
Qty: 204 ML | Refills: 0 | Status: ON HOLD
Start: 2023-07-27 | End: 2023-08-19

## 2023-07-27 RX ADMIN — FLUOCINONIDE: 0.5 OINTMENT TOPICAL at 07:38

## 2023-07-27 RX ADMIN — HEPARIN SODIUM 5000 UNITS: 5000 INJECTION INTRAVENOUS; SUBCUTANEOUS at 05:00

## 2023-07-27 RX ADMIN — GLYCERIN, HYPROMELLOSE, POLYETHYLENE GLYCOL 1 DROP: .2; .2; 1 LIQUID OPHTHALMIC at 07:39

## 2023-07-27 RX ADMIN — GUAIFENESIN 1200 MG: 600 TABLET ORAL at 07:39

## 2023-07-27 RX ADMIN — IPRATROPIUM BROMIDE 0.5 MG: 0.5 SOLUTION RESPIRATORY (INHALATION) at 07:43

## 2023-07-27 RX ADMIN — MICONAZOLE NITRATE: 20 CREAM TOPICAL at 07:38

## 2023-07-27 RX ADMIN — INSULIN LISPRO 4 UNITS: 100 INJECTION, SOLUTION INTRAVENOUS; SUBCUTANEOUS at 11:59

## 2023-07-27 RX ADMIN — LEVALBUTEROL HYDROCHLORIDE 1.25 MG: 1.25 SOLUTION RESPIRATORY (INHALATION) at 07:41

## 2023-07-27 RX ADMIN — LISINOPRIL 10 MG: 10 TABLET ORAL at 07:39

## 2023-07-27 NOTE — CASE MANAGEMENT
Case Management Discharge Planning Note    Patient name Tim Henry  Location [address] [facility] [place] [id number]/-[id number] MRN [id number]  : 1955 Date 2023       Current Admission Date: 2023  Current Admission Diagnosis:Acute on chronic respiratory failure with hypoxia and hypercapnia [facility])   Patient Active Problem List    Diagnosis Date Noted   • Dermatitis 2023   • Encephalopathy acute 2023   • Sepsis ([address] ) 2023   • Urinary tract infection 2023   • Hypertension 2023   • Lymphedema 2023   • Type 2 diabetes mellitus ([address] ) 2023   • Obesity 2023   • Acute on chronic respiratory failure with hypoxia and hypercapnia ([address] [facility]) 2023   • Elevated troponin 2023      LOS (days): 10  Geometric Mean LOS (GMLOS) (days): 5.00  Days to GMLOS:-4.8     OBJECTIVE:  Risk of Unplanned Readmission Score: 13.23         Current admission status: Inpatient   Preferred Pharmacy:   [address] [place] [address]  Phone: [phone] Fax: [phone]    Primary Care Provider: Chastity Stanley DO    Primary Insurance: [address]:     [address] [place] [address] Number: [id number]

## 2023-07-27 NOTE — ASSESSMENT & PLAN NOTE
• Per chart patient wears 2L oxygen at SNF, however, family states the patient does not wear oxygen at home. Unsure if oxygen requirement is new since admission at [facility] last week.   • VBG pH 7.2/pCO2 79.9 -->Placed on continuous BIPAP in ED  • VBG pH 7.3/62.9 --> BIPAP HS       Plan  • Titrate oxygen with SpO2 goal > 90%  • Patient reveals to me that he is unable to use CPAP mask at night due to "poor fit"  • He is hoping that the individualized mask that he will use at home will result in a better compliance

## 2023-07-27 NOTE — DISCHARGE SUMMARY
[facility]  Discharge- Rigoberto Freedd 1955, 69 y.o. male MRN: [id number]  Unit/Bed#: MS Justin[facility]ilma Encounter: [id number]  Primary Care Provider: Jerry Arteaga DO   Date and time admitted to hospital: 7/17/2023  1:23 PM    Dermatitis  Assessment & Plan  Noted multiple profuse rash on the back  It consists of blanching macules  Per patient, it was initially pruritic, but now is improved  Only localized on the back  Patient was started on 1% hydrocortisone cream, but will advance that to flucinonide  Improving. Elevated troponin  Assessment & Plan  • Tropon 177      Plan  • Trended down  • No ischemic changes on EKG    Obesity  Assessment & Plan  • Encourage diet and lifestyle modifications when medically stable    Type 2 diabetes mellitus [facility])  Assessment & Plan  Lab Results   Component Value Date    HGBA1C 6.5 (H) 07/17/2023       Recent Labs     07/26/23  1607 07/26/23  2030 07/27/23  0736 07/27/23  1045   POCGLU 127 175* 135 214*       Blood Sugar Average: Last 72 hrs:  (P) [id number]     • History of DM2 listed in chart from SNF,however, no medication listed on MAR from SNF     Plan  • Continue insulin sliding scale  • Hgb A1C 6.5    Lymphedema  Assessment & Plan  • Home regimen: HCTZ     Plan  • Hold 40mg IV lasix daily due to persistent contraction alkalosis. S/p diamox. Monitor CO2 levels. Hypertension  Assessment & Plan  • Home regimen: lisinopril     •      Urinary tract infection  Assessment & Plan  • Per family, patient admitted at [facility] x1 week for UTI and DEANDRE, discharged 7/14 on 300mg Cefdinir BID. • UA negative     Plan  • IV Cefepime completed. DC vancomycin as MRSA testing neg.     Sepsis ([address])  Assessment & Plan  • SIRS: tachypnec, leukocytosis  • CT CAP: Right upper lobe groundglass opacity and right lower lobe consolidation along with bilateral effusions  • Per family, patient admitted at [facility] x1 week for UTI and DEANDRE, discharged 7/14 on 300mg Cefdinir BID.  • UA negative  • Received azithromycin and ceftriaxone in ED  • LA 1.4  • Procal 0.29     Plan  • Cefepime- completed. . DC'd further vancomycin d/t neg MRSA swab. Plan 7 days. • Patient did have fever 7/23. No more fever recurrence in the last 24 hours. • Blood cx neg for 5 days  • Possibly fevers due to pneumonitis  • Procalcitonin is negative   • Trend WBC and fever curve    Encephalopathy acute  Assessment & Plan  • Brought to ED from SNF after being found by roommate on the floor and altered compared to baseline. • Approximate downtime 1.5 hours  • CTH: no acute intracranial abnormality  • CT c-spine: no c-spine fracture or traumatic malalignment  • VBG pH 7.2/pCO2 79.9  • Received 2mg IV Narvcan in ED without improvement in mental status  • Ammonia 38      Plan  • RESOLVED. Was 2/2 hypercapnea  • continue melatonin  • OOB during the day to encourage proper sleep/wake cycle    * Acute on chronic respiratory failure with hypoxia and hypercapnia (HCC)  Assessment & Plan  • Per chart patient wears 2L oxygen at SNF, however, family states the patient does not wear oxygen at home. Unsure if oxygen requirement is new since admission at [facility] last week. • VBG pH 7.2/pCO2 79.9 -->Placed on continuous BIPAP in ED  • VBG pH 7.3/62.9 --> BIPAP HS       Plan  • Titrate oxygen with SpO2 goal > 90%  • Patient reveals to me that he is unable to use CPAP mask at night due to "poor fit"  • He is hoping that the individualized mask that he will use at home will result in a better compliance         Hospital Course:     Sabas Nunez is a 69 y.o. male patient who originally presented to the hospital on   Admission Orders (From admission, onward)     Ordered        07/17/23 [address]  Once                     due to hypercapnia and metabolic encephalopathy secondary to hypercapnia. Patient will be set up for BiPAP by pulmonary. Patient completed antibiotic therapy for pneumonia.   Patient will be discharged to SNF placement for physical reconditioning after release from the hospital.  Patient and family were interested in acute rehabilitation but insurance denied authorization for acute rehabilitation. Please see above list of diagnoses and related plan for additional information. Physical Exam:    GEN: No acute distress, comfortable  HEEENT: No JVD, PERRLA, no scleral icterus  RESP: Lungs clear to auscultation bilaterally  CV: RRR, +s1/s2   ABD: SOFT NON TENDER, POSITIVE BOWEL SOUNDS, NO DISTENTION  PSYCH: CALM  NEURO: Mentation baseline, NO FOCAL DEFICITS  SKIN: NO RASH  EXTREM: NO EDEMA    CONSULTING PROVIDERS   IP CONSULT TO CASE MANAGEMENT  IP CONSULT TO PHARMACY  IP CONSULT TO DERMATOLOGY    PROCEDURES PERFORMED  * No surgery found *    RADIOLOGY RESULTS  TRAUMA - CT chest abdomen pelvis w contrast    Result Date: 7/17/2023  Narrative: CT CHEST, ABDOMEN AND PELVIS WITH IV CONTRAST INDICATION:   TRAUMA. Found down on the ground. COMPARISON:  None. TECHNIQUE: CT examination of the chest, abdomen and pelvis was performed. Multiplanar 2D reformatted images were created from the source data. This examination, like all CT scans performed in the [facility], was performed utilizing techniques to minimize radiation dose exposure, including the use of iterative reconstruction and automated exposure control. Radiation dose length product (DLP) for this visit:  3176 mGy-cm IV Contrast:  100 mL of iohexol (OMNIPAQUE) Enteric Contrast: Enteric contrast was not administered. FINDINGS: CHEST LUNGS: Right upper lobe groundglass opacity and right lower lobe consolidation. Dependent atelectasis at the left base. No endotracheal or endobronchial lesion identified. PLEURA: Moderate right and small left pleural effusions. HEART/GREAT VESSELS: Heart is top normal size. No thoracic aortic aneurysm. Pulmonary artery measures 5 cm consistent with pulmonary arterial hypertension.  MEDIASTINUM AND KARIN: Unremarkable. CHEST WALL AND LOWER NECK: Right-sided chest wall anasarca. ABDOMEN LIVER/BILIARY TREE:  Unremarkable. GALLBLADDER: Gallstones are noted with pericholecystic inflammatory changes. SPLEEN:  Unremarkable. PANCREAS:  Unremarkable. ADRENAL GLANDS:  Unremarkable. KIDNEYS/URETERS:  Unremarkable. No hydronephrosis. STOMACH AND BOWEL: Status post gastric bypass. No bowel obstruction. APPENDIX:  No findings to suggest appendicitis. ABDOMINOPELVIC CAVITY:  No ascites. No pneumoperitoneum. No lymphadenopathy. VESSELS: Atherosclerotic changes are present. No evidence of aneurysm. PELVIS REPRODUCTIVE ORGANS:  Unremarkable for patient's age. URINARY BLADDER:  Unremarkable. ABDOMINAL WALL/INGUINAL REGIONS: Abdominal anasarca right greater than left. OSSEOUS STRUCTURES:  No acute fracture or destructive osseous lesion. Intact bilateral hip prostheses. Impression: No acute posttraumatic abnormality detected in the chest, abdomen or pelvis. Right upper lobe groundglass opacity and right lower lobe consolidation along with bilateral effusions. Correlate for possible pneumonia versus pulmonary edema. This could all be due to third spacing. Gallstones with surrounding inflammation highly suspicious for acute cholecystitis. Chest and abdominal wall anasarca. Workstation performed: [id number]     TRAUMA - CT spine cervical wo contrast    Result Date: 7/17/2023  Narrative: CT CERVICAL SPINE - WITHOUT CONTRAST INDICATION:   TRAUMA. Patient found on the ground. COMPARISON:  None. TECHNIQUE:  CT examination of the cervical spine was performed without intravenous contrast.  Contiguous axial images were obtained. Multiplanar 2D reformatted images were created from the source data. Radiation dose length product (DLP) for this visit:  542 mGy-cm .   This examination, like all CT scans performed in the [facility], was performed utilizing techniques to minimize radiation dose exposure, including the use of iterative reconstruction and automated exposure control. IMAGE QUALITY:  Diagnostic. FINDINGS: ALIGNMENT:  Normal alignment of the cervical spine. No subluxation. VERTEBRAE:  No acute fracture. Nonunion of the posterior ring of C1 could be old trauma or congenital. DEGENERATIVE CHANGES: Moderate multilevel cervical degenerative changes are noted. No critical central canal stenosis. PREVERTEBRAL AND PARASPINAL SOFT TISSUES: Unremarkable THORACIC INLET: Right upper lobe groundglass opacity with small effusion noted. Impression: No cervical spine fracture or traumatic malalignment. Right upper lobe groundglass opacity and small effusion noted. Please see concurrent report for CT scan including the chest. Workstation performed: [id number]     TRAUMA - CT head wo contrast    Result Date: 7/17/2023  Narrative: CT BRAIN - WITHOUT CONTRAST INDICATION:   TRAUMA. Patient found on the floor. COMPARISON:  None. TECHNIQUE:  CT examination of the brain was performed. Multiplanar 2D reformatted images were created from the source data. Radiation dose length product (DLP) for this visit:  Venkat Winslowney mGy-cm . This examination, like all CT scans performed in the [place], was performed utilizing techniques to minimize radiation dose exposure, including the use of iterative reconstruction and automated exposure control. IMAGE QUALITY:  Diagnostic. FINDINGS: PARENCHYMA: Decreased attenuation is noted in periventricular and subcortical white matter demonstrating an appearance that is statistically most likely to represent mild microangiopathic change. No CT signs of acute infarction. No intracranial mass, mass effect or midline shift. No acute parenchymal hemorrhage. Hypodensity in the right cerebellum likely representing old infarct. VENTRICLES AND EXTRA-AXIAL SPACES:  Normal for the patient's age. VISUALIZED ORBITS: Normal visualized orbits.  PARANASAL SINUSES: Mild mucosal thickening of the visualized paranasal sinuses. CALVARIUM AND EXTRACRANIAL SOFT TISSUES:  Normal.     Impression: No acute intracranial abnormality. 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 Workstation performed: [id number]     XR Trauma chest portable    Result Date: 7/17/2023  Narrative: CHEST INDICATION:   TRAUMA. Patient found unresponsive. Patient has suspected COVID-19. COMPARISON:  None EXAM PERFORMED/VIEWS:  XR CHEST PORTABLE AP semierect Images: 2 FINDINGS: Heart shadow appears enlarged. Atherosclerotic aortic tortuosity is noted. Vascular congestion with a pulmonary edema pattern. More focal patchy opacity in the right mid lung suspect for pneumonia. No obvious effusion or pneumothorax. No acute osseous abnormalities. Impression: Focal opacity in the right mid lung suspect for pneumonia. Vascular congestion and pulmonary edema. The study was marked in [facility] for immediate notification.  Workstation performed: [id number]       LABS  Results from last 7 days   Lab Units 07/27/23  0459 07/25/23  0405 07/24/23  0311 07/23/23  0356 07/22/23  0314 07/21/23  0224   WBC Thousand/uL 10.93* 11.21* 10.98* 11.49* 14.48* 15.16*   HEMOGLOBIN g/dL 11.8* 12.1 11.9* 11.8* 12.0 11.3*   HEMATOCRIT % 36.3* 38.0 37.5 36.6 36.8 34.9*   MCV fL 96 96 96 96 96 96   PLATELETS Thousands/uL 289 344 323 358 383 390     Results from last 7 days   Lab Units 07/27/23  0459 07/25/23  0554 07/24/23  0311 07/23/23  0356 07/22/23  0314 07/21/23  0224   SODIUM mmol/L 132* 132* 131* 134* 135 137   POTASSIUM mmol/L 4.1 3.7 3.7 3.5 3.6 3.5   CHLORIDE mmol/L 95* 93* 90* 91* 91* 94*   CO2 mmol/L 34* 37* 40* 40* 41* 39*   BUN mg/dL 22 21 26* 27* 26* 27*   CREATININE mg/dL 0.86 0.84 0.97 1.04 1.00 0.92   CALCIUM mg/dL 9.3 9.4 9.2 9.0 9.3 9.2   ALBUMIN g/dL 2.7*  --  2.7* 2.6* 2.6* 2.5*   TOTAL BILIRUBIN mg/dL 0.79  --  0.94 0.89 0.93 0.91   ALK PHOS U/L 83  --  73 68 74 75   ALT U/L 28  --  28 24 18 20   AST U/L 25  --  32 30 23 24   EGFR ml/min/1.73sq m 88 89 79 72 76 84   GLUCOSE RANDOM mg/dL 131 136 127 127 132 142*                  Results from last 7 days   Lab Units 07/27/23  1045 07/27/23  0736 07/26/23  2030 07/26/23  1607 07/26/23  1046 07/26/23  0712 07/25/23  2041 07/25/23  1517 07/25/23  1045 07/25/23  0708   POC GLUCOSE mg/dl 214* 135 175* 127 217* 144* 176* 184* 215* 107             Results from last 7 days   Lab Units 07/24/23  1024   PROCALCITONIN ng/ml 0.15           Cultures:         Invalid input(s): "Gloriann Poles"        Results from last 7 days   Lab Units 07/27/23  1202   INFLUENZA A PCR  Negative         Condition at Discharge:  good      Discharge instructions/Information to patient and family:   See after visit summary for information provided to patient and family. Provisions for Follow-Up Care:  See after visit summary for information related to follow-up care and any pertinent home health orders. Disposition:     Home       Discharge Statement:  I spent 36 minutes discharging the patient. This time was spent on the day of discharge. I had direct contact with the patient on the day of discharge. Greater than 50% of the total time was spent examining patient, answering all patient questions, arranging and discussing plan of care with patient as well as directly providing post-discharge instructions. Additional time then spent on discharge activities. Discharge Medications:  See after visit summary for reconciled discharge medications provided to patient and family.       ** Please Note: This note has been constructed using a voice recognition system **

## 2023-07-27 NOTE — OCCUPATIONAL THERAPY NOTE
Occupational Therapy Treatment Note    Patient Name: Liliana Epley  DSEZB'A Date: 7/27/2023 07/27/23 0925   OT Last Visit   OT Visit Date 07/27/23   Note Type   Note Type Treatment for insurance authorization   Pain Assessment   Pain Assessment Tool 0-10   Pain Score No Pain   Restrictions/Precautions   Weight Bearing Precautions Per Order No   Other Precautions Fall Risk; Chair Alarm; Bed Alarm;O2   Lifestyle   Intrinsic Gratification Son present and engaged during session. ADL   Toileting Assistance  2  Maximal Assistance   Toileting Deficit Perineal hygiene;Clothing management down;Clothing management up; Bedside commode   Bed Mobility   Additional Comments Pt received on commode   Transfers   Sit to Stand 3  Moderate assistance   Additional items Assist x 2   Toilet transfer 2  Maximal assistance   Additional items Assist x 2   Functional Mobility   Functional Mobility 4  Minimal assistance   Additional Comments x1 w RW   Cognition   Overall Cognitive Status [facility]   Arousal/Participation Alert; Cooperative   Attention Within functional limits   Orientation Level Oriented X4   Memory Decreased recall of recent events   Following Commands Follows multistep commands with increased time or repetition   Comments Pt pleasant and eager. Very receptive to education and motivated to improve. Activity Tolerance   Activity Tolerance (S)  Treatment limited secondary to medical complications (Comment); Patient limited by fatigue  (Pt on 3L, desat to mid 80's. Increased to 4L and pt able to recover into 90's. )   Assessment   Assessment Patient participated in Skilled OT session this date with interventions consisting of ADL re training with the use of correct body mechnaics, Energy Conservation techniques, safety awareness and fall prevention techniques,  functional transfers* and  therapeutic activities to: increase activity tolerance .  Patient agreeable to OT treatment session, upon arrival patient was found OOB on commode with nursing staff. Treatment session as follows: Pt required Max A x2 to stand from commode and Max A for ethan hygiene. Pt on 3L with O2 in mid 80s. Increased to 4L O2 and improved O2 sat to low 90's. Pt able to ambulate with RW and Min A, with assist of a 2nd for O2 line mgmt. Verbal cues and education provided throughout for safety & technique. Pt very receptive to education and able to apply to treatment session. Son present and is engaged and participating in his dads care. Performed additional sit to stand from recliner chair and able to perform with Mod A x2. After recovery, returned O2 to 3L and patient remains stable. Patient continues to be functioning below baseline level, occupational performance remains limited secondary to factors listed above and increased risk for falls and injury. The patient's raw score on the AM-PAC Daily Activity inpatient short form is 16, standardized score is 35.96, less than 39.4. Patients at this level are likely to benefit from DC to post-acute rehabilitation services. From OT standpoint, recommendation at time of d/c would be level 1. Patient to benefit from continued Occupational Therapy treatment while in the hospital to address deficits as defined above and maximize level of functional independence with ADLs and functional mobility. . Pt left with call bell in reach, tray table in reach, needs met, chair alarm activated, son present.    Plan   OT Treatment Day 3   Recommendation   UB Rehab Discharge Recommendation (PT/OT) Level 1   AM-PAC Daily Activity Inpatient   Lower Body Dressing 1   Bathing 2   Toileting 2   Upper Body Dressing 3   Grooming 4   Eating 4   Daily Activity Raw Score 16   Daily Activity Standardized Score (Calc for Raw Score >=11) 35.96   AM-PAC Applied Cognition Inpatient   Following a Speech/Presentation 3   Understanding Ordinary Conversation 4   Taking Medications 3   Remembering Where Things Are Placed or Put Away 3   Remembering List of 4-5 Errands 3   Taking Care of Complicated Tasks 2   Applied Cognition Raw Score 18   Applied Cognition Standardized Score 38.07     Room Choice, MS, OTR/L

## 2023-07-27 NOTE — PLAN OF CARE
Problem: MOBILITY - ADULT  Goal: Maintain or return to baseline ADL function  Description: INTERVENTIONS:  -  Assess patient's ability to carry out ADLs; assess patient's baseline for ADL function and identify physical deficits which impact ability to perform ADLs (bathing, care of mouth/teeth, toileting, grooming, dressing, etc.)  - Assess/evaluate cause of self-care deficits   - Assess range of motion  - Assess patient's mobility; develop plan if impaired  - Assess patient's need for assistive devices and provide as appropriate  - Encourage maximum independence but intervene and supervise when necessary  - Involve family in performance of ADLs  - Assess for home care needs following discharge   - Consider OT consult to assist with ADL evaluation and planning for discharge  - Provide patient education as appropriate  Outcome: Progressing     Problem: PAIN - ADULT  Goal: Verbalizes/displays adequate comfort level or baseline comfort level  Description: Interventions:  - Encourage patient to monitor pain and request assistance  - Assess pain using appropriate pain scale  - Administer analgesics based on type and severity of pain and evaluate response  - Implement non-pharmacological measures as appropriate and evaluate response  - Consider cultural and social influences on pain and pain management  - Notify physician/advanced practitioner if interventions unsuccessful or patient reports new pain  Outcome: Progressing     Problem: INFECTION - ADULT  Goal: Absence or prevention of progression during hospitalization  Description: INTERVENTIONS:  - Assess and monitor for signs and symptoms of infection  - Monitor lab/diagnostic results  - Monitor all insertion sites, i.e. indwelling lines, tubes, and drains  - Monitor endotracheal if appropriate and nasal secretions for changes in amount and color  - [place] appropriate cooling/warming therapies per order  - Administer medications as ordered  - Instruct and encourage patient and family to use good hand hygiene technique  - Identify and instruct in appropriate isolation precautions for identified infection/condition  Outcome: Progressing     Problem: RESPIRATORY - ADULT  Goal: Achieves optimal ventilation and oxygenation  Description: INTERVENTIONS:  - Assess for changes in respiratory status  - Assess for changes in mentation and behavior  - Position to facilitate oxygenation and minimize respiratory effort  - Oxygen administered by appropriate delivery if ordered  - Initiate smoking cessation education as indicated  - Encourage broncho-pulmonary hygiene including cough, deep breathe, Incentive Spirometry  - Assess the need for suctioning and aspirate as needed  - Assess and instruct to report SOB or any respiratory difficulty  - Respiratory Therapy support as indicated  Outcome: Progressing

## 2023-07-27 NOTE — PLAN OF CARE
Problem: OCCUPATIONAL THERAPY ADULT  Goal: Performs self-care activities at highest level of function for planned discharge setting. See evaluation for individualized goals. Description:  Outcome: Progressing  Note: Limitation: Decreased ADL status, Decreased fine motor control, Decreased self-care trans, Decreased high-level ADLs, Decreased cognition, Decreased endurance  Prognosis: Fair  Assessment: Patient participated in Skilled OT session this date with interventions consisting of ADL re training with the use of correct body mechnaics, Energy Conservation techniques, safety awareness and fall prevention techniques,  functional transfers* and  therapeutic activities to: increase activity tolerance . Patient agreeable to OT treatment session, upon arrival patient was found OOB on commode with nursing staff. Treatment session as follows: Pt required Max A x2 to stand from commode and Max A for ethan hygiene. Pt on 3L with O2 in mid 80s. Increased to 4L O2 and improved O2 sat to low 90's. Pt able to ambulate with RW and Min A, with assist of a 2nd for O2 line mgmt. Verbal cues and education provided throughout for safety & technique. Pt very receptive to education and able to apply to treatment session. Son present and is engaged and participating in his dads care. Performed additional sit to stand from recliner chair and able to perform with Mod A x2. After recovery, returned O2 to 3L and patient remains stable. Patient continues to be functioning below baseline level, occupational performance remains limited secondary to factors listed above and increased risk for falls and injury. The patient's raw score on the AM-PAC Daily Activity inpatient short form is 16, standardized score is 35.96, less than 39.4. Patients at this level are likely to benefit from DC to post-acute rehabilitation services. From OT standpoint, recommendation at time of d/c would be level 1.   Patient to benefit from continued Occupational Therapy treatment while in the hospital to address deficits as defined above and maximize level of functional independence with ADLs and functional mobility. . Pt left with call bell in reach, tray table in reach, needs met, chair alarm activated, son present.

## 2023-07-27 NOTE — PLAN OF CARE
Problem: MOBILITY - ADULT  Goal: Maintain or return to baseline ADL function  Description: INTERVENTIONS:  -  Assess patient's ability to carry out ADLs; assess patient's baseline for ADL function and identify physical deficits which impact ability to perform ADLs (bathing, care of mouth/teeth, toileting, grooming, dressing, etc.)  - Assess/evaluate cause of self-care deficits   - Assess range of motion  - Assess patient's mobility; develop plan if impaired  - Assess patient's need for assistive devices and provide as appropriate  - Encourage maximum independence but intervene and supervise when necessary  - Involve family in performance of ADLs  - Assess for home care needs following discharge   - Consider OT consult to assist with ADL evaluation and planning for discharge  - Provide patient education as appropriate  Outcome: Progressing  Goal: Maintains/Returns to pre admission functional level  Description: INTERVENTIONS:  - Perform BMAT or MOVE assessment daily.   - Set and communicate daily mobility goal to care team and patient/family/caregiver. - Collaborate with rehabilitation services on mobility goals if consulted  - Perform Range of Motion 3 times a day. - Reposition patient every 2 hours.   - Dangle patient 3 times a day  - Stand patient 3 times a day  - Ambulate patient 3 times a day  - Out of bed to chair 3 times a day   - Out of bed for meals 3 times a day  - Out of bed for toileting  - Record patient progress and toleration of activity level   Outcome: Progressing     Problem: PAIN - ADULT  Goal: Verbalizes/displays adequate comfort level or baseline comfort level  Description: Interventions:  - Encourage patient to monitor pain and request assistance  - Assess pain using appropriate pain scale  - Administer analgesics based on type and severity of pain and evaluate response  - Implement non-pharmacological measures as appropriate and evaluate response  - Consider cultural and social influences on pain and pain management  - Notify physician/advanced practitioner if interventions unsuccessful or patient reports new pain  Outcome: Progressing     Problem: INFECTION - ADULT  Goal: Absence or prevention of progression during hospitalization  Description: INTERVENTIONS:  - Assess and monitor for signs and symptoms of infection  - Monitor lab/diagnostic results  - Monitor all insertion sites, i.e. indwelling lines, tubes, and drains  - Monitor endotracheal if appropriate and nasal secretions for changes in amount and color  - [place] appropriate cooling/warming therapies per order  - Administer medications as ordered  - Instruct and encourage patient and family to use good hand hygiene technique  - Identify and instruct in appropriate isolation precautions for identified infection/condition  Outcome: Progressing

## 2023-07-27 NOTE — ASSESSMENT & PLAN NOTE
Lab Results   Component Value Date    HGBA1C 6.5 (H) 07/17/2023       Recent Labs     07/26/23  1607 07/26/23  2030 07/27/23  0736 07/27/23  1045   POCGLU 127 175* 135 214*       Blood Sugar Average: Last 72 hrs:  (P) [id number]     • History of DM2 listed in chart from SNF,however, no medication listed on MAR from SNF     Plan  • Continue insulin sliding scale  • Hgb A1C 6.5

## 2023-07-27 NOTE — CASE MANAGEMENT
[facility] received request for authorization from Care Manager.   Authorization request for: SNF  Facility Name: [address] [place] [address] Kemar Thompson [facility] NPI: [id number]  Facility MD: Dr. Gavino Banda: [phone]  Authorization initiated by contacting insurance: [employer]OLED Via: Phone  Clinicals submitted via: [id number] has received approved authorization from insurance:   [address]: [address] #: [id number]  Start of Care: 7/27  Next Review Date: 8/2  Continued Stay Care Coordinator: none assigned  P#: [phone]  Submit next review to: [phone]   Transport auth #: [id number]  Care Manager notified: Jade Ribera

## 2023-07-27 NOTE — NURSING NOTE
Gave report to Phillip Huitron from [facility]. She received report from this nurse. Information provided about how the patient left, including A&Ox4 and on 2L of o2 as per EMS. Discussed CC and rash on back and treatment for each. Discussed elimination habits. Answered all questions Phillip Huitron had at the time of this report and gave call back number.

## 2023-07-27 NOTE — ASSESSMENT & PLAN NOTE
• SIRS: tachypnec, leukocytosis  • CT CAP: Right upper lobe groundglass opacity and right lower lobe consolidation along with bilateral effusions  • Per family, patient admitted at [facility] x1 week for UTI and DEANDRE, discharged 7/14 on 300mg Cefdinir BID. • UA negative  • Received azithromycin and ceftriaxone in ED  • LA 1.4  • Procal 0.29     Plan  • Cefepime- completed. . DC'd further vancomycin d/t neg MRSA swab. Plan 7 days. • Patient did have fever 7/23. No more fever recurrence in the last 24 hours.   • Blood cx neg for 5 days  • Possibly fevers due to pneumonitis  • Procalcitonin is negative   • Trend WBC and fever curve

## 2023-07-27 NOTE — PHYSICAL THERAPY NOTE
PHYSICAL THERAPY TREATMENT NOTE    Patient Name: Yordan Anderson  BDKHF'E Date: 7/27/2023 07/27/23 0936   PT Last Visit   PT Visit Date 07/27/23   Note Type   Note Type Treatment for insurance authorization   Pain Assessment   Pain Assessment Tool 0-10   Pain Score No Pain   Restrictions/Precautions   Weight Bearing Precautions Per Order No   Other Precautions Multiple lines;O2;Fall Risk  (3L NC O2)   General   Chart Reviewed Yes   Additional Pertinent History Pt on 3-4L NC O2 throughout session. On 3L NC O2 for mobility, drop to mid 80s on 3L NC O2, provided education on diaphragmatic breathing, increased to 4L NC O2 to increased recovery time and pt improved to >90%. Dropped back to 3L NC O2 at end of session, pt sating 91%. Of note, pt does have cold hands (cold to touch, white), questionable reads at times due to this, recommended to RN he may benefit from an ear probe   Family/Caregiver Present Yes  (Son, Lindsey Rodrgíuez)   Cognition   Overall Cognitive Status WFL   Arousal/Participation Alert   Attention Within functional limits   Orientation Level Oriented X4   Memory Decreased recall of recent events   Following Commands Follows multistep commands with increased time or repetition   Comments Pt continues to be highly motivated, expresses frustration over not being able to go to rehab yet   Subjective   Subjective "I was about ready to hop on a mower and drive to the new facility"   Bed Mobility   Supine to Sit Unable to assess   Additional Comments Pt OOB on commode at start of session,   Transfers   Sit to Stand 3  Moderate assistance   Additional items Assist x 2; Increased time required;Verbal cues   Stand to Sit 3  Moderate assistance   Additional items Assist x 1; Increased time required;Verbal cues   Toilet transfer 2  Maximal assistance   Additional items Assist x 2; Increased time required;Commode   Additional Comments Pt performed STS x 2 during session, once from commode (max A x 2), and then once from recliner chair (mod A x 2)   Ambulation/Elevation   Gait pattern Decreased foot clearance   Gait Assistance   (CGA >> CS)   Additional items Assist x 1   Assistive Device Rolling walker   Distance 75 ft   Balance   Static Sitting Fair +   Static Standing Poor +   Ambulatory Poor +   Activity Tolerance   Activity Tolerance Patient tolerated treatment well   Medical Staff Made Aware OT Scar Dumont   Nurse Made Aware [facility]   Assessment   Problem List Decreased strength;Decreased endurance; Impaired balance;Decreased mobility;Obesity   Assessment Pt seen for PT intervention. Pt continues to be highly motivated and reports ambulating w/ RN staff during the day to improve his endurance and mobility. Pt also continues to have supportive and engaged family present during session. Valdemar Del Toro continues to require mod-max A x 2 for STS transfers off of low surfaces, due to both LE weakness/deconditioning, his height of 6'3", and previous B hip replacements. Spent time reviewing improved STS mechanics including nose over toes and head/hips ratio for momentum. Pt improved from max A x 2 from the commode to mod A x 2 from the recliner after implementation of this education. Valdemar Del Toro is also able to ambulate further than previous sessions, totaling 75 ft today during PT w/ CGA and use of RW. Pt reports he just ambulated 61 ft w/ RN staff prior to therapist's arrival. Pt w/ a short desat on 3L NC O2 after mobility, but recovers well w/ cues for diaphragmatic breathing. PT recommendation continues to be for Level 1 Resources (High Intensity Therapy Resources) upon DC.    Goals   Patient Goals to go to rehab SOON   STG Expiration Date 07/28/23   Short Term Goal #1 ADDENDUM: Patient will: Perform all bed mobility tasks w/ mod a x 1 to improve pt's independence w/ repositioning for decrease risk of skin breakdown, Perform all transfers w/ mod A x1 consistently from various height surfaces in order to improve I w/ engagement w/ real-world environments/situations, Ambulate at least 125 ft. with roller walker w/ supervision w/o LOB to facilitate return and engagement w/ previous living environment, Increase all balance 1/2 grade to decrease risk for falls and Tolerate 3 hr OOB to faciliate upright tolerance   PT Treatment Day 4   Plan   Treatment/Interventions Functional transfer training;LE strengthening/ROM; Therapeutic exercise; Endurance training;Cognitive reorientation;Patient/family training;Bed mobility; Equipment eval/education;Gait training   PT Frequency 3-5x/wk   Recommendation   UB Rehab Discharge Recommendation (PT/OT) Level 1   Equipment Recommended [facility] Recommended HD Bariatric wheeled walker   [facility] Mobility Inpatient   Turning in Flat Bed Without Bedrails 2   Lying on Back to Sitting on Edge of Flat Bed Without Bedrails 2   Moving Bed to Chair 3   Standing Up From Chair Using Arms 1   Walk in Room 3   Climb 3-5 Stairs With Railing 2   Basic Mobility Inpatient Raw Score 13   Basic Mobility Standardized Score 33.99   Turning Head Towards Sound 4   Follow Simple Instructions 4   Low Function Basic Mobility Raw Score  21   Low Function Basic Mobility Standardized Score  34.39   Highest Level Of Mobility   JH-HLM Goal 4: Move to chair/commode   JH-HLM Achieved 7: Walk 25 feet or more   Education   Education Provided Other  (STS technique)   Patient Demonstrates acceptance/verbal understanding   End of Consult   Patient Position at End of Consult Bedside chair; All needs within reach     Patient requires PT/OT co-treat due to signficant assistance with mobility, and to safely challenge activity tolerance. Both PT and OT goals were addressed separately during this session. The patient's AM-PAC Basic Mobility Inpatient Short Form Raw Score is 13. A Raw score of less than or equal to 16 suggests the patient may benefit from discharge to home. Please also refer to the recommendation of the Physical Therapist for safe discharge planning. Pt would continue to benefit from skilled PT during this admission in order to progress patient towards goals to decrease risk of falls and maximize independence.      Mardeen Boas, PT, DPT

## 2023-07-27 NOTE — PLAN OF CARE
Problem: PHYSICAL THERAPY ADULT  Goal: Performs mobility at highest level of function for planned discharge setting. See evaluation for individualized goals. Description: Treatment/Interventions: Functional transfer training, LE strengthening/ROM, Therapeutic exercise, Endurance training, Cognitive reorientation, Patient/family training, Equipment eval/education, Bed mobility, Gait training  Equipment Recommended: Case Sy       See flowsheet documentation for full assessment, interventions and recommendations. Note:    Problem List: Decreased strength, Decreased endurance, Impaired balance, Decreased mobility, Obesity  Assessment: Pt seen for PT intervention. Pt continues to be highly motivated and reports ambulating w/ RN staff during the day to improve his endurance and mobility. Pt also continues to have supportive and engaged family present during session. Julia White continues to require mod-max A x 2 for STS transfers off of low surfaces, due to both LE weakness/deconditioning, his height of 6'3", and previous B hip replacements. Spent time reviewing improved STS mechanics including nose over toes and head/hips ratio for momentum. Pt improved from max A x 2 from the commode to mod A x 2 from the recliner after implementation of this education. Julia White is also able to ambulate further than previous sessions, totaling 75 ft today during PT w/ CGA and use of RW. Pt reports he just ambulated 61 ft w/ RN staff prior to therapist's arrival. Pt w/ a short desat on 3L NC O2 after mobility, but recovers well w/ cues for diaphragmatic breathing. PT recommendation continues to be for Level 1 Resources (High Intensity Therapy Resources) upon DC. See flowsheet documentation for full assessment.

## 2023-07-27 NOTE — ASSESSMENT & PLAN NOTE
• Per family, patient admitted at [facility] x1 week for UTI and DEANDRE, discharged 7/14 on 300mg Cefdinir BID. • UA negative     Plan  • IV Cefepime completed. DC vancomycin as MRSA testing neg.

## 2023-07-27 NOTE — CASE MANAGEMENT
Case Management Discharge Planning Note    Patient name Tim Henry  Location [facility] 338/-[id number] MRN [id number]  : 1955 Date 2023       Current Admission Date: 2023  Current Admission Diagnosis:Acute on chronic respiratory failure with hypoxia and hypercapnia [facility])   Patient Active Problem List    Diagnosis Date Noted   • Dermatitis 2023   • Encephalopathy acute 2023   • Sepsis ([address]) 2023   • Urinary tract infection 2023   • Hypertension 2023   • Lymphedema 2023   • Type 2 diabetes mellitus ([address]) 2023   • Obesity 2023   • Acute on chronic respiratory failure with hypoxia and hypercapnia ([address]) 2023   • Elevated troponin 2023      LOS (days): 10  Geometric Mean LOS (GMLOS) (days): 5.00  Days to GMLOS:-4.9     OBJECTIVE:  Risk of Unplanned Readmission Score: 13.27      Current admission status: Inpatient   Preferred Pharmacy:   [address] [place] [address] [address] [place] [address]  Phone: [phone] Fax: [phone]    Primary Care Provider: Chastity Stanley DO    Primary Insurance: [facility]  Secondary Insurance:     DISCHARGE DETAILS:    Discharge planning discussed with[de-identified] pt and his family  Freedom of Choice: Yes     CM contacted family/caregiver?: Yes  Were Treatment Team discharge recommendations reviewed with patient/caregiver?: Yes  Did patient/caregiver verbalize understanding of patient care needs?: Yes  Were patient/caregiver advised of the risks associated with not following Treatment Team discharge recommendations?: Yes    Contacts  Patient Contacts: Tim Henry: wife: [phone], Eric Alpaugh: dtr: [phone]  Relationship to Patient[de-identified] Family  Contact Method:  In Person  Reason/Outcome: Emergency Contact, Continuity of Care, Discharge  [facility] [address]         Is the patient interested in [facility] at discharge?: No    DME Referral Provided  Referral made for DME?: No    Other Referral/Resources/Interventions Provided:  Interventions: Short Term Rehab  Referral Comments: [facility]    Treatment Team Recommendation: Short Term Rehab  Discharge Destination Plan[de-identified] Short Term Rehab  Transport at Discharge : [facility]  Dispatcher Contacted: Yes  Number/Name of Dispatcher: Kris Mosqueda and Unit #): Francoise Miller. ETA of Transport (Date): 07/27/23  ETA of Transport (Time): 1420     Transfer Mode: Stretcher  Accompanied by: EMS personnel  Transfer Equipment: BLS devices     Additional Comments: Pt ws approved by insurance for [facility]. Cm sent the information to Admissions there. Covid test was ran was negative. Report can be called to [phone] and fax to [phone]. Pt will be transported [facility] via [email or web address] [facility] [employer] up via [facility]. CM called pt's SO and cm alerted her as well.

## 2023-07-28 ENCOUNTER — NURSING HOME VISIT (OUTPATIENT)
Dept: GERIATRICS | Facility: OTHER | Age: 68
End: 2023-07-28
Payer: COMMERCIAL

## 2023-07-28 DIAGNOSIS — I10 PRIMARY HYPERTENSION: ICD-10-CM

## 2023-07-28 DIAGNOSIS — J96.21 ACUTE ON CHRONIC RESPIRATORY FAILURE WITH HYPOXIA AND HYPERCAPNIA (HCC): Primary | ICD-10-CM

## 2023-07-28 DIAGNOSIS — D50.9 IRON DEFICIENCY ANEMIA, UNSPECIFIED IRON DEFICIENCY ANEMIA TYPE: ICD-10-CM

## 2023-07-28 DIAGNOSIS — E11.9 TYPE 2 DIABETES MELLITUS WITHOUT COMPLICATION, WITHOUT LONG-TERM CURRENT USE OF INSULIN (HCC): ICD-10-CM

## 2023-07-28 DIAGNOSIS — J96.22 ACUTE ON CHRONIC RESPIRATORY FAILURE WITH HYPOXIA AND HYPERCAPNIA (HCC): Primary | ICD-10-CM

## 2023-07-28 PROBLEM — N21.0 URINARY BLADDER STONE: Status: ACTIVE | Noted: 2023-02-21

## 2023-07-28 PROBLEM — Q55.64 CONGENITAL BURIED PENIS: Status: ACTIVE | Noted: 2023-02-21

## 2023-07-28 PROBLEM — N43.3 HYDROCELE: Status: ACTIVE | Noted: 2023-02-06

## 2023-07-28 PROCEDURE — 99305 1ST NF CARE MODERATE MDM 35: CPT | Performed by: INTERNAL MEDICINE

## 2023-07-28 NOTE — UTILIZATION REVIEW
NOTIFICATION OF ADMISSION DISCHARGE   This is a Notification of Discharge from [address] [place] [address]. Please be advised that this patient has been discharge from our facility. Below you will find the admission and discharge date and time including the patient’s disposition. UTILIZATION REVIEW CONTACT:  Nuria Lopez  Utilization   Network Utilization Review Department  Phone: [phone] carefully listen to the prompts. All voicemails are confidential.  Email: [email or web address]     ADMISSION INFORMATION  PRESENTATION DATE: 7/17/2023  1:23 PM  OBERVATION ADMISSION DATE:   INPATIENT ADMISSION DATE: 7/17/23  3:44 PM   DISCHARGE DATE: 7/27/2023  2:44 PM   DISPOSITION:Released to SNF/TCU/SNU Facility    IMPORTANT INFORMATION:  Send all requests for admission clinical reviews, approved or denied determinations and any other requests to dedicated fax number below belonging to the campus where the patient is receiving treatment.  List of dedicated fax numbers:  [place] DENIALS (Administrative/Medical Necessity) [phone] [facility] (Maternity/NICU/Pediatrics) [phone]   [facility] [phone]   [facility] [phone] [facility] [phone] [facility] [phone]   [facility] [phone]   [address] [place] [address] 608 [facility] [phone] [facility] [phone] 3[facility] [phone] [facility] 3000 [facility] [phone]

## 2023-07-28 NOTE — PROGRESS NOTES
[facility]  [address] [address] [facility], [address]  [address] [facility] Admission    NAME: Felisha Cooper  AGE: 69 y.o. SEX: male [phone]    DATE OF ENCOUNTER: 7-    Assessment/Plan:     Patient’s care was coordinated with nursing facility staff. Recent vitals, labs and updated medications were [facility]. Past Medical, surgical, social, medication and allergy history and patient’s previous records reviewed. 1. Acute on chronic respiratory failure with hypoxia and hypercapnia (HCC)        2. Primary hypertension        3. Type 2 diabetes mellitus without complication, without long-term current use of insulin ([address])        4. Iron deficiency anemia, unspecified iron deficiency anemia type             Acute on chronic respiratory failure with hypoxia and hypercapnia (HCC)  Pt currently on 4 liters nasal cannula  Keep o2 sat >92%  CT thorax - Right upper lobe groundglass opacity and right lower lobe consolidation along with bilateral effusions.  Correlate for possible pneumonia versus pulmonary edema  Pt s/p course of antibiotic for pneumonia  Pt s/p bipap as inpatient  Pt says he does not like how the cpap air comes and declines to wear cpap/bipap at [facility]  Pt on levalbuterol 1.25/ipratropium neb TID  F/u outpatient with pulmonary    Hypertension  7/28/2023 07:54 114 / 74 mmHg   BP stable  Cont lisinopril 10 mg 1 tab po daily    Type 2 diabetes mellitus ([address])  Diet controlled  Lab Results   Component Value Date    HGBA1C 6.5 (H) 07/17/2023   monitor off meds  Check periodic sugars    Iron deficiency anemia  Stable  7/28/2023 Hg 11.9/hct 37.7  Cont ferrous sulfate 325 mg 1 tab po daily          Chief Complaint     Here for STR    HPI   Patient is a 69 y.o. male HTN, DMII, lymphedema and morbid obesity    Pt admitted to [address] [place] [address] from 7/17/2023-7/27/2023 after being found down next to his bed by his roommate at [facility]. His GCS was initially 10. Pt was found to have respiratory distress and placed on bipap. He had hypercarbia and multilobar pneumonia. He was then admitted to ICU and given IV antibiotics. Of note, pt had a recent admission to [facility] for UTI. He then was weaned down to 4 liter nasal cannula and transferred to the medical floor. Pt clinically improved after course of antibiotic and then was discharged to [address] [place] [address] on 7/27/2023. Pt seen and examined. Respiratory therapist at bedside. Pt just completed his breathing treatment and was placed back on 4 liters nasal cannula. Pt saturating 93-94%. Pt says he has a brother & son & daughter. He wants to get better and go home. Pt says he makes his own decisions. He does not like the bipap. He took it off when it was on at the hospital. Explained to pt he may need oxygen when he goes home. It would depend on his home oxygen trial closer to his discharge from [facility]. Pt noted to be coughing. He says he only coughs after neb tx.        Past Medical History:   Diagnosis Date   • Diabetes mellitus ([address])    • Hypertension    • Lymphedema        Past Surgical History:   Procedure Laterality Date   • FETAL SURGERY FOR CONGENITAL HERNIA      5 years ago   • GASTRIC BYPASS      20 years ago   • PANNICULECTOMY     • PENIS SURGERY      penile straightening procedure/evangelina tucks   • TOTAL HIP ARTHROPLASTY      10 years ago       Social History     Tobacco Use   Smoking Status Never   Smokeless Tobacco Never          Family History   Problem Relation Age of Onset   • Heart disease Mother         Allergies   Allergen Reactions   • Morphine Hallucinations     Levalbuterol HCl Nebulization Solution 1.25 MG/3ML   3 ml inhale orally via nebulizer three times a day related to ACUTE AND CHRONIC RESPIRATORY FAILURE WITH HYPOXIA (J96.21)    GuaiFENesin ER Tablet Extended Release 12 Hour 600 MG   Give 2 tablet by mouth every 12 hours related to ACUTE AND CHRONIC RESPIRATORY FAILURE WITH HYPOXIA (J96.21) DO NOT CRUSH    Fluocinonide External Ointment 0.05 % (Fluocinonide)   Apply to back topically two times a day related to DERMATITIS, UNSPECIFIED (L30.9)    Ferrous Sulfate Tablet 325 (65 Fe) MG   Give 1 tablet by mouth one time a day for Supplement    Ipratropium Bromide Inhalation Solution 0.02 % (Ipratropium Bromide)   0.5 mg inhale orally three times a day related to ACUTE AND CHRONIC RESPIRATORY FAILURE WITH HYPOXIA (J96.21)    Lisinopril Tablet 10 MG   Give 1 tablet by mouth one time a day for hypertension related to ESSENTIAL (PRIMARY)     Updated list was reviewed in [facility] system of facility. Vital signs were reviewed in [facility] care    Review of Systems   All other systems reviewed and are negative. Physical Exam  Constitutional:       General: He is not in acute distress. HENT:      Head: Normocephalic. Cardiovascular:      Rate and Rhythm: Regular rhythm. Pulses:           Dorsalis pedis pulses are 2+ on the right side and 2+ on the left side. Heart sounds: Normal heart sounds. Pulmonary:      Effort: No respiratory distress. Breath sounds: Normal breath sounds. Abdominal:      General: Bowel sounds are normal. There is no distension. Palpations: Abdomen is soft. Tenderness: There is no abdominal tenderness. Skin:     Comments: Pt with hx of lymphedema   Neurological:      Mental Status: He is alert and oriented to person, place, and time. Psychiatric:         Mood and Affect: Mood normal.         Behavior: Behavior normal.         Thought Content: Thought content normal.         Judgment: Judgment normal.           Diagnostic Data     Recent labs and imaging studies were reviewed. Code Status:    DNR/DNI    Additional notes:     Total time spent on H&P 37 minutes in reviewing discharge paperwork from the hospital, interpreting test results from hospital medical records, and documenting information in the medical record. In addition, I provided counseling to the patient and encouraged them to work with physical therapy. Gave orders.     This note was electronically signed by Dr. Lillian Hodge

## 2023-07-30 PROBLEM — D50.9 IRON DEFICIENCY ANEMIA: Status: ACTIVE | Noted: 2023-07-24

## 2023-07-30 NOTE — ASSESSMENT & PLAN NOTE
Diet controlled  Lab Results   Component Value Date    HGBA1C 6.5 (H) 07/17/2023   monitor off meds  Check periodic sugars

## 2023-07-30 NOTE — ASSESSMENT & PLAN NOTE
Pt currently on 4 liters nasal cannula  Keep o2 sat >92%  CT thorax - Right upper lobe groundglass opacity and right lower lobe consolidation along with bilateral effusions.  Correlate for possible pneumonia versus pulmonary edema  Pt s/p course of antibiotic for pneumonia  Pt s/p bipap as inpatient  Pt says he does not like how the cpap air comes and declines to wear cpap/bipap at [facility]  Pt on levalbuterol 1.25/ipratropium neb TID  F/u outpatient with pulmonary

## 2023-07-31 ENCOUNTER — NURSING HOME VISIT (OUTPATIENT)
Dept: TRANSFER UNIT | Facility: HOSPITAL | Age: 68
End: 2023-07-31
Payer: COMMERCIAL

## 2023-07-31 DIAGNOSIS — L30.9 DERMATITIS: ICD-10-CM

## 2023-07-31 DIAGNOSIS — J96.22 ACUTE ON CHRONIC RESPIRATORY FAILURE WITH HYPOXIA AND HYPERCAPNIA (HCC): Primary | ICD-10-CM

## 2023-07-31 DIAGNOSIS — G47.00 INSOMNIA: ICD-10-CM

## 2023-07-31 DIAGNOSIS — I10 PRIMARY HYPERTENSION: ICD-10-CM

## 2023-07-31 DIAGNOSIS — E11.9 TYPE 2 DIABETES MELLITUS WITHOUT COMPLICATION, WITHOUT LONG-TERM CURRENT USE OF INSULIN (HCC): ICD-10-CM

## 2023-07-31 DIAGNOSIS — J96.21 ACUTE ON CHRONIC RESPIRATORY FAILURE WITH HYPOXIA AND HYPERCAPNIA (HCC): Primary | ICD-10-CM

## 2023-07-31 DIAGNOSIS — K59.00 CONSTIPATION: ICD-10-CM

## 2023-07-31 PROCEDURE — 99309 SBSQ NF CARE MODERATE MDM 30: CPT | Performed by: FAMILY MEDICINE

## 2023-07-31 NOTE — PROGRESS NOTES
[address]  [phone]  [address] [place] [address] Service: nursing home place of service: [facility] Coverage      NAME: Mojgan Ferrera  AGE: 69 y.o. SEX: male [phone]    DATE OF ENCOUNTER: 7/31/2023    Assessment and Plan     Acute on chronic respiratory failure with hypoxia and hypercapnia (HCC)  Pt currently on 2 liters nasal cannula  Keep o2 sat >92%  CT thorax - Right upper lobe groundglass opacity and right lower lobe consolidation along with bilateral effusions. Correlate for possible pneumonia versus pulmonary edema  Pt s/p course of antibiotic for pneumonia  Pt s/p bipap as inpatient   Denies chest pain, SOB. Report cough, occasionally productive, with clear sputum (no blood). Plan:   - Continue Levalbuterol TID PRN   - Continue Atrovent TID PRN   - Guaifenesin q12 h omar for occasional productive cough   - Outpatient follow up with pulmonology   - Repeat CBC, CMP tomorrow       Hypertension  BP Readings from Last 3 Encounters:   07/27/23 125/77    BP today: 113/57 mmHg  Stable, at goal.   Home medication: Lisinopril 10 mg daily     Plan:   - Continue to monitor VS and home medication     Type 2 diabetes mellitus ([address])  Diet controlled, not on any home medication. Lab Results   Component Value Date    HGBA1C 6.5 (H) 07/17/2023       Plan:   - Monitor BG periodically and continue to manage with diet  - Outpatient follow up with PCP     Lymphedema  HCTZ home medication, currently held due to contraction alkalosis. Plan:   - Compression stockings     Constipation  Last BM 3 days ago, non bloody, no melena noted. Plan:   - Bowel regimen with Senna and prune juice       Dermatitis  Noted multiple profuse rash on the back  It consists of blanching macules. Initially treated with 1% hydrocortisone cream, later with fluocinonide.      Plan:   -  Continue Fluocinonide cream    Insomnia  Reports difficulty with sleeping Plan:   - Will start melatonin 3mg HS           Chief Complaint     Follow up. History of Present Illness     Jenny London is a 69 y.o. male who was seen today for follow up. Patient reports no overnight events. Reports steady improvement in strength. Denies fever, chills, headache, blurry vision, soar throat, chest pain, SOB, wheezing, n/v/d, abdominal pain or urinary symptoms. Reports his last BM was three days ago. The following portions of the patient's history were reviewed and updated as appropriate: allergies, current medications, past family history, past medical history, past social history, past surgical history and problem list.    Review of Systems     Review of Systems   Constitutional: Negative for activity change, appetite change, fatigue and fever. HENT: Negative for congestion, rhinorrhea, sinus pressure, sinus pain, sore throat and tinnitus. Eyes: Negative for visual disturbance. Respiratory: Negative for cough, chest tightness and shortness of breath. Cardiovascular: Negative for chest pain and leg swelling. Gastrointestinal: Positive for constipation. Negative for abdominal pain, blood in stool, diarrhea, nausea and vomiting. Genitourinary: Negative for difficulty urinating, flank pain, frequency, hematuria and urgency. Musculoskeletal: Negative for arthralgias and back pain. Bilateral lower extremity lymphedema   Skin: Negative for rash. Neurological: Negative for dizziness, seizures, syncope, light-headedness and headaches. All other systems reviewed and are negative.       Active Problem List     Patient Active Problem List   Diagnosis   • Encephalopathy acute   • Sepsis ([address])   • Urinary tract infection   • Hypertension   • Lymphedema   • Type 2 diabetes mellitus ([address])   • Obesity   • Acute on chronic respiratory failure with hypoxia and hypercapnia (HCC)   • Elevated troponin   • Dermatitis   • Benign essential hypertension   • Congenital buried penis   • Hydrocele   • Urinary bladder stone   • Iron deficiency anemia   • Constipation   • Insomnia       Objective     Vital Signs:   Weight 342.2lbs, /57 mmHg, Temp 97, Pulse 67 bmp, resp 18, Blood sugar 127, O2 92% on 2L via NC, Height 75 inches     Physical Exam  Constitutional:       General: He is not in acute distress. Appearance: He is normal weight. He is not ill-appearing or toxic-appearing. Comments: SpO2 92% on 2 L via NC    HENT:      Head: Normocephalic and atraumatic. Right Ear: External ear normal. There is no impacted cerumen. Left Ear: External ear normal. There is no impacted cerumen. Nose: Nose normal. No congestion or rhinorrhea. Mouth/Throat:      Mouth: Mucous membranes are moist.      Pharynx: Oropharynx is clear. No posterior oropharyngeal erythema. Eyes:      General: No scleral icterus. Extraocular Movements: Extraocular movements intact. Neck:      Vascular: No carotid bruit. Cardiovascular:      Rate and Rhythm: Normal rate and regular rhythm. Pulses: Normal pulses. Heart sounds: Normal heart sounds. No murmur heard. No friction rub. Pulmonary:      Effort: Pulmonary effort is normal. No respiratory distress. Breath sounds: Normal breath sounds. No stridor. No wheezing or rhonchi. Abdominal:      General: Abdomen is flat. Bowel sounds are normal. There is no distension. Palpations: Abdomen is soft. Tenderness: There is no abdominal tenderness. There is no rebound. Hernia: No hernia is present. Musculoskeletal:         General: Normal range of motion. Cervical back: No rigidity. Right lower leg: Edema present. Left lower leg: Edema present. Comments: Bilateral lower extremity lymphedema with hyperpigmentation up to the mid shins. Lymphadenopathy:      Cervical: No cervical adenopathy. Skin:     General: Skin is warm. Coloration: Skin is not jaundiced.       Findings: No erythema or rash. Neurological:      General: No focal deficit present. Mental Status: He is alert and oriented to person, place, and time. Psychiatric:         Mood and Affect: Mood normal.         Pertinent Laboratory/Diagnostic Studies:  Laboratory and Imaging studies reviewed. Full report in the paper chart. Current Medications   Medications reviewed and updated in facility chart.     Name: [employer]  : 1955  MRN: [id number]        Tala Schultz MD  2023 1:12 PM

## 2023-07-31 NOTE — ASSESSMENT & PLAN NOTE
Diet controlled, not on any home medication.    Lab Results   Component Value Date    HGBA1C 6.5 (H) 07/17/2023       Plan:   - Monitor BG periodically and continue to manage with diet  - Outpatient follow up with PCP

## 2023-07-31 NOTE — ASSESSMENT & PLAN NOTE
Noted multiple profuse rash on the back  It consists of blanching macules. Initially treated with 1% hydrocortisone cream, later with fluocinonide.      Plan:   -  Continue Fluocinonide cream

## 2023-07-31 NOTE — ASSESSMENT & PLAN NOTE
HCTZ home medication, currently held due to contraction alkalosis.      Plan:   - Compression stockings

## 2023-07-31 NOTE — ASSESSMENT & PLAN NOTE
BP Readings from Last 3 Encounters:   07/27/23 125/77    BP today: 113/57 mmHg  Stable, at goal.   Home medication: Lisinopril 10 mg daily     Plan:   - Continue to monitor VS and home medication

## 2023-07-31 NOTE — ASSESSMENT & PLAN NOTE
Last BM 3 days ago, non bloody, no melena noted.      Plan:   - Bowel regimen with Senna and prune juice

## 2023-07-31 NOTE — ASSESSMENT & PLAN NOTE
Pt currently on 2 liters nasal cannula  Keep o2 sat >92%  CT thorax - Right upper lobe groundglass opacity and right lower lobe consolidation along with bilateral effusions. Correlate for possible pneumonia versus pulmonary edema  Pt s/p course of antibiotic for pneumonia  Pt s/p bipap as inpatient   Denies chest pain, SOB. Report cough, occasionally productive, with clear sputum (no blood).      Plan:   - Continue Levalbuterol TID PRN   - Continue Atrovent TID PRN   - Guaifenesin q12 h omar for occasional productive cough   - Outpatient follow up with pulmonology   - Repeat CBC, CMP tomorrow

## 2023-08-02 ENCOUNTER — NURSING HOME VISIT (OUTPATIENT)
Dept: GERIATRICS | Facility: OTHER | Age: 68
End: 2023-08-02
Payer: COMMERCIAL

## 2023-08-02 DIAGNOSIS — J96.21 ACUTE ON CHRONIC RESPIRATORY FAILURE WITH HYPOXIA AND HYPERCAPNIA (HCC): Primary | ICD-10-CM

## 2023-08-02 DIAGNOSIS — J96.22 ACUTE ON CHRONIC RESPIRATORY FAILURE WITH HYPOXIA AND HYPERCAPNIA (HCC): Primary | ICD-10-CM

## 2023-08-02 DIAGNOSIS — L30.9 DERMATITIS: ICD-10-CM

## 2023-08-02 DIAGNOSIS — D50.9 IRON DEFICIENCY ANEMIA, UNSPECIFIED IRON DEFICIENCY ANEMIA TYPE: ICD-10-CM

## 2023-08-02 DIAGNOSIS — E11.9 TYPE 2 DIABETES MELLITUS WITHOUT COMPLICATION, WITHOUT LONG-TERM CURRENT USE OF INSULIN (HCC): ICD-10-CM

## 2023-08-02 DIAGNOSIS — I10 BENIGN ESSENTIAL HYPERTENSION: ICD-10-CM

## 2023-08-02 DIAGNOSIS — I89.0 LYMPHEDEMA: ICD-10-CM

## 2023-08-02 PROCEDURE — 99309 SBSQ NF CARE MODERATE MDM 30: CPT | Performed by: FAMILY MEDICINE

## 2023-08-02 NOTE — ASSESSMENT & PLAN NOTE
Baseline Hb around: 11.8-12  Current Hb at 10.8. He denies any hematuria, melena or hematochezia. Plan:  Continue ferrous sulfate 325 mg QD. Monitor Hb.

## 2023-08-02 NOTE — ASSESSMENT & PLAN NOTE
Chronic Lymphedema, more prominent in right leg and foot. Patient encouraged to keep feet elevated while sitting. Plan:  Compression stockings,  Ace Wrap RLE.

## 2023-08-02 NOTE — ASSESSMENT & PLAN NOTE
Lab Results   Component Value Date    HGBA1C 6.5 (H) 07/17/2023     Diet controlled. Not on any medications.  fasting today.     Plan:  Monitor off of meds  Periodic POC Glucose checks

## 2023-08-02 NOTE — PROGRESS NOTES
[address]  [phone]  [address] [place] [address] Service: nursing home place of service: [facility]-Part A Coverage      NAME: Janet Galo  AGE: 69 y.o. SEX: male [phone]    DATE OF ENCOUNTER: 8/2/2023    Assessment and Plan     Problem List Items Addressed This Visit        Endocrine    Type 2 diabetes mellitus ([address])       Lab Results   Component Value Date    HGBA1C 6.5 (H) 07/17/2023     Diet controlled. Not on any medications.  fasting today. Plan:  Monitor off of meds  Periodic POC Glucose checks                 Respiratory    Acute on chronic respiratory failure with hypoxia and hypercapnia (HCC) - Primary     Currently on 1 L O2 NC   Keep O2 Saturations > 92%  CT chest 7/17: RUL ground glass opacities and RLL consolidation along with bilateral effusions. Completed Course of antibiotics for pneumonia. S/P BIPAP inpatient. Leukocytosis has resolved. Patient denies any shortness of breath, chest pain or pressure. Has occasional cough productive of whitish/clear sputum. Plan:  Continue: Xopenex and Atrovent Nebs TID  Continue Mucinex BID  Incentive spirometry  Follow up with pulmonology outpatient. Cardiovascular and Mediastinum    Benign essential hypertension     Currently on Lisinopril 10 mg daily. BP stable. Plan:  Continue Lisinopril  Continue monitoring BP. Musculoskeletal and Integument    Dermatitis     Blanching rash on back, improving with Flucinonide ointment. Continue Flucinonide ointment            Other    Lymphedema     Chronic Lymphedema, more prominent in right leg and foot. Patient encouraged to keep feet elevated while sitting. Plan:  Compression stockings,  Ace Wrap RLE. Iron deficiency anemia     Baseline Hb around: 11.8-12  Current Hb at 10.8. He denies any hematuria, melena or hematochezia.       Plan:  Continue ferrous sulfate 325 mg QD.  Monitor Hb. Chief Complaint     Follow up No chief complaint on file. History of Present Illness     Everardo Ross is a 69 y.o. male who was seen today for follow up. His main concern today was RLE swelling. He is compliant with compression stockings but does not elevate his feet while seated. He denies any pain in the lower extremities bilaterally. He reports improvement in breathing and ambulation. He is still using walker but feels more steady on his feet. He has been weaned down to 1L of O2. He denies fevers, chills, shortness of breath, nausea, vomiting, constipation of diarrhea. The following portions of the patient's history were reviewed and updated as appropriate: allergies, current medications, past family history, past medical history, past social history, past surgical history and problem list.    Review of Systems     Review of Systems   Constitutional: Negative for activity change, chills and fever. HENT: Negative for congestion, postnasal drip and sore throat. Respiratory: Positive for cough. Negative for choking, chest tightness, shortness of breath and wheezing. Cardiovascular: Positive for leg swelling. Negative for chest pain and palpitations. Gastrointestinal: Negative for abdominal distention, abdominal pain, blood in stool, constipation, diarrhea, nausea and vomiting. Genitourinary: Negative for dysuria, frequency and urgency. Musculoskeletal: Positive for arthralgias. Negative for myalgias. Skin: Positive for rash (back, improving). Neurological: Negative for weakness and headaches.        Active Problem List     Patient Active Problem List   Diagnosis   • Encephalopathy acute   • Sepsis ([address])   • Urinary tract infection   • Hypertension   • Lymphedema   • Type 2 diabetes mellitus ([address])   • Obesity   • Acute on chronic respiratory failure with hypoxia and hypercapnia (HCC)   • Elevated troponin   • Dermatitis   • Benign essential hypertension   • Congenital buried penis   • Hydrocele   • Urinary bladder stone   • Iron deficiency anemia   • Constipation   • Insomnia       Objective     Vital Signs:     Blood pressure 108/63 Heart Rate: 66 Respiratory Rate 19   Temperature 97.5 F Oxygen Saturation 99% Weight 342.2 lb    Physical Exam  Vitals reviewed. Constitutional:       General: He is not in acute distress. Appearance: Normal appearance. He is obese. He is not ill-appearing. HENT:      Head: Normocephalic and atraumatic. Nose: Nose normal. No congestion or rhinorrhea. Comments: NC in place on 1L O2     Mouth/Throat:      Mouth: Mucous membranes are moist.      Pharynx: Oropharynx is clear. No oropharyngeal exudate or posterior oropharyngeal erythema. Eyes:      General: No scleral icterus. Extraocular Movements: Extraocular movements intact. Conjunctiva/sclera: Conjunctivae normal.   Cardiovascular:      Rate and Rhythm: Normal rate and regular rhythm. Pulmonary:      Effort: Pulmonary effort is normal. No respiratory distress. Breath sounds: Normal breath sounds. No stridor. No wheezing. Abdominal:      General: Bowel sounds are normal. There is no distension. Palpations: Abdomen is soft. There is no mass. Tenderness: There is no abdominal tenderness. There is no guarding. Musculoskeletal:         General: No tenderness. Right lower leg: Edema (1+) present. Left lower leg: Edema (2+) present. Skin:     General: Skin is warm. Capillary Refill: Capillary refill takes 2 to 3 seconds. Coloration: Skin is not jaundiced or pale. Findings: Rash (Back, crusted lesions, no erythema noted.) present. No bruising or erythema. Comments: Chronic venous stasis changes on bilateral lower extremity. Neurological:      Mental Status: He is alert and oriented to person, place, and time. Mental status is at baseline.       Gait: Gait normal.   Psychiatric:         Mood and Affect: Mood normal. Behavior: Behavior normal.         Thought Content: Thought content normal.         Judgment: Judgment normal.         Pertinent Laboratory/Diagnostic Studies:  Laboratory and Imaging studies reviewed. Full report in the paper chart. Current Medications   Medications reviewed and updated in facility chart.     Name: Cedric Canales  : 1955  MRN: [id number]        Aravind Collado MD  Geriatric Medicine  2023 12:13 PM

## 2023-08-02 NOTE — ASSESSMENT & PLAN NOTE
Currently on 1 L O2 NC   Keep O2 Saturations > 92%  CT chest 7/17: RUL ground glass opacities and RLL consolidation along with bilateral effusions. Completed Course of antibiotics for pneumonia. S/P BIPAP inpatient. Leukocytosis has resolved. Patient denies any shortness of breath, chest pain or pressure. Has occasional cough productive of whitish/clear sputum. Plan:  Continue: Xopenex and Atrovent Nebs TID  Continue Mucinex BID  Incentive spirometry  Follow up with pulmonology outpatient.

## 2023-08-08 ENCOUNTER — NURSING HOME VISIT (OUTPATIENT)
Dept: GERIATRICS | Facility: OTHER | Age: 68
End: 2023-08-08
Payer: COMMERCIAL

## 2023-08-08 DIAGNOSIS — E11.9 TYPE 2 DIABETES MELLITUS WITHOUT COMPLICATION, WITHOUT LONG-TERM CURRENT USE OF INSULIN (HCC): ICD-10-CM

## 2023-08-08 DIAGNOSIS — K59.00 CONSTIPATION, UNSPECIFIED CONSTIPATION TYPE: ICD-10-CM

## 2023-08-08 DIAGNOSIS — I10 PRIMARY HYPERTENSION: ICD-10-CM

## 2023-08-08 DIAGNOSIS — D50.9 IRON DEFICIENCY ANEMIA, UNSPECIFIED IRON DEFICIENCY ANEMIA TYPE: ICD-10-CM

## 2023-08-08 DIAGNOSIS — J96.22 ACUTE ON CHRONIC RESPIRATORY FAILURE WITH HYPOXIA AND HYPERCAPNIA (HCC): Primary | ICD-10-CM

## 2023-08-08 DIAGNOSIS — I89.0 LYMPHEDEMA: ICD-10-CM

## 2023-08-08 DIAGNOSIS — G47.09 OTHER INSOMNIA: ICD-10-CM

## 2023-08-08 DIAGNOSIS — J96.21 ACUTE ON CHRONIC RESPIRATORY FAILURE WITH HYPOXIA AND HYPERCAPNIA (HCC): Primary | ICD-10-CM

## 2023-08-08 PROCEDURE — 99309 SBSQ NF CARE MODERATE MDM 30: CPT | Performed by: INTERNAL MEDICINE

## 2023-08-08 NOTE — ASSESSMENT & PLAN NOTE
Goal hga1c in elderly population greater than 7.5  Lab Results   Component Value Date    HGBA1C 6.5 (H) 07/17/2023   diet controlled  Monitor off meds  Check periodic sugars

## 2023-08-08 NOTE — ASSESSMENT & PLAN NOTE
Pt with chronic b/l lymphedema Rt>Lt  Cont compression stockings  Cont ace wrap Rt LE  F/u outpatient with vascular surgery re: see if pt qualifies for lymphedema pump

## 2023-08-08 NOTE — ASSESSMENT & PLAN NOTE
Resolved  Pt saturating well on room air  Pt with clear lungs  Pt's pneumonia appears to have fully resolved  Pt on xopenex TID  Cont mucinex  Cont incentive spirometer  F/u outpatient with pulmonary

## 2023-08-08 NOTE — PROGRESS NOTES
[address]  [phone]  [facility]  POS 31      NAME: Edgar Hernandez  AGE: 69 y.o. SEX: male [phone]    DATE OF ENCOUNTER: 8/8/2023    Assessment and Plan     1. Acute on chronic respiratory failure with hypoxia and hypercapnia (HCC)        2. Type 2 diabetes mellitus without complication, without long-term current use of insulin ([address])        3. Primary hypertension        4. Lymphedema        5. Constipation, unspecified constipation type        6. Other insomnia        7. Iron deficiency anemia, unspecified iron deficiency anemia type           Acute on chronic respiratory failure with hypoxia and hypercapnia (HCC)  Resolved  Pt saturating well on room air  Pt with clear lungs  Pt's pneumonia appears to have fully resolved  Pt on xopenex TID  Cont mucinex  Cont incentive spirometer  F/u outpatient with pulmonary    Type 2 diabetes mellitus ([address])  Goal hga1c in elderly population greater than 7.5  Lab Results   Component Value Date    HGBA1C 6.5 (H) 07/17/2023   diet controlled  Monitor off meds  Check periodic sugars    Hypertension  Blood Pressure: 139 /  74 mmHg  BP controlled  Cont lisinopril 10 mg 1 tab po daily    Lymphedema  Pt with chronic b/l lymphedema Rt>Lt  Cont compression stockings  Cont ace wrap Rt LE  F/u outpatient with vascular surgery re: see if pt qualifies for lymphedema pump    Constipation  Pt with regular bm  Cont senna at qhs    Insomnia  Stable  Cont melatonin 3 mg 1 tab po QHS    Iron deficiency anemia  H/h stable 10.3/32.9  Cont ferrous sulfate 325 mg 1 tab po daily       Code status: DNR    Chief Complaint     STR follow-up visit. History of Present Illness   Pt seen and examined as part of STR f/u visit. Pt has much improved since I last saw him. His lungs are now clear. He no longer requires oxygen. Pt hydrating well.      The following portions of the patient's history were reviewed and updated as appropriate: allergies, current medications, past family history, past medical history, past social history, past surgical history and problem list.    Review of Systems     Review of Systems   All other systems reviewed and are negative. Active Problem List     Patient Active Problem List   Diagnosis   • Encephalopathy acute   • Sepsis ([address])   • Urinary tract infection   • Hypertension   • Lymphedema   • Type 2 diabetes mellitus (HCC)   • Obesity   • Acute on chronic respiratory failure with hypoxia and hypercapnia (HCC)   • Elevated troponin   • Dermatitis   • Benign essential hypertension   • Congenital buried penis   • Hydrocele   • Urinary bladder stone   • Iron deficiency anemia   • Constipation   • Insomnia       Objective     Vitals: Reviewed in [facility] system. VSS    General: Awake, alert & oriented x 3  Head: atraumatic, normocephalic  Cardiovascular: RRR  Lungs: Clear to auscultation bilaterally  Abdomen: nontender/nondistended, +BS  Legs: no cyanosis, clubbing; pt with b/l lymphedema; Rt foot toes good color  Skin: clean, dry, intact  Psych: calm, cooperative    Pertinent Laboratory/Diagnostic Studies:  Refer to facility chart. Current Medications   Medications reviewed and updated in facility chart.     Levalbuterol HCl Nebulization Solution 1.25 MG/3ML   3 ml inhale orally via nebulizer three times a day related to ACUTE AND CHRONIC RESPIRATORY FAILURE WITH HYPOXIA (J96.21)    GuaiFENesin ER Tablet Extended Release 12 Hour 600 MG   Give 2 tablet by mouth every 12 hours related to ACUTE AND CHRONIC RESPIRATORY FAILURE WITH HYPOXIA (J96.21) DO NOT CRUSH    Fluocinonide External Ointment 0.05 % (Fluocinonide)   Apply to back topically two times a day related to DERMATITIS, UNSPECIFIED (L30.9)    Ferrous Sulfate Tablet 325 (65 Fe) MG   Give 1 tablet by mouth one time a day for Supplement    Ipratropium Bromide Inhalation Solution 0.02 % (Ipratropium Bromide)   0.5 mg inhale orally three times a day related to ACUTE AND CHRONIC RESPIRATORY FAILURE WITH HYPOXIA (J96.21)    Lisinopril Tablet 10 MG   Give 1 tablet by mouth one time a day for hypertension related to ESSENTIAL (PRIMARY) HYPERTENSION (I10) Hold for SBP less than 100 HR less than 50    Senna Oral Tablet 8.6 MG (Sennosides)   Give 2 tablet by mouth at bedtime for constipation    Melatonin Oral Tablet 3 MG (Melatonin)   Give 1 tablet by mouth at bedtime for insomnia    Enoxaparin Sodium Solution 40 MG/0.4ML   Inject 40 mg subcutaneously at bedtime for blood clotting prevention    Jeff Aguirre MD  Internal Medicine  Senior Care Physician

## 2023-08-10 ENCOUNTER — NURSING HOME VISIT (OUTPATIENT)
Dept: GERIATRICS | Facility: OTHER | Age: 68
End: 2023-08-10
Payer: COMMERCIAL

## 2023-08-10 VITALS
OXYGEN SATURATION: 92 % | HEART RATE: 68 BPM | SYSTOLIC BLOOD PRESSURE: 136 MMHG | WEIGHT: 315 LBS | RESPIRATION RATE: 18 BRPM | DIASTOLIC BLOOD PRESSURE: 74 MMHG | BODY MASS INDEX: 41.22 KG/M2 | TEMPERATURE: 96.8 F

## 2023-08-10 DIAGNOSIS — D50.9 IRON DEFICIENCY ANEMIA, UNSPECIFIED IRON DEFICIENCY ANEMIA TYPE: ICD-10-CM

## 2023-08-10 DIAGNOSIS — K59.00 CONSTIPATION, UNSPECIFIED CONSTIPATION TYPE: ICD-10-CM

## 2023-08-10 DIAGNOSIS — I10 PRIMARY HYPERTENSION: ICD-10-CM

## 2023-08-10 DIAGNOSIS — I89.0 LYMPHEDEMA: ICD-10-CM

## 2023-08-10 DIAGNOSIS — J96.22 ACUTE ON CHRONIC RESPIRATORY FAILURE WITH HYPOXIA AND HYPERCAPNIA (HCC): Primary | ICD-10-CM

## 2023-08-10 DIAGNOSIS — G47.09 OTHER INSOMNIA: ICD-10-CM

## 2023-08-10 DIAGNOSIS — J96.21 ACUTE ON CHRONIC RESPIRATORY FAILURE WITH HYPOXIA AND HYPERCAPNIA (HCC): Primary | ICD-10-CM

## 2023-08-10 DIAGNOSIS — L30.9 DERMATITIS: ICD-10-CM

## 2023-08-10 PROCEDURE — 99316 NF DSCHRG MGMT 30 MIN+: CPT

## 2023-08-10 RX ORDER — SENNOSIDES A AND B 8.6 MG/1
2 TABLET, FILM COATED ORAL
Status: ON HOLD | COMMUNITY

## 2023-08-10 RX ORDER — LANOLIN ALCOHOL/MO/W.PET/CERES
3 CREAM (GRAM) TOPICAL
Status: ON HOLD | COMMUNITY

## 2023-08-10 NOTE — ASSESSMENT & PLAN NOTE
Encourage medication adherence   Continue daily blood pressure checks  Encourage heart healthy diet  Continue lisinopril 10 mg po daily   Patient states at home he was taking lisinopril 10 mg po BID   Blood pressures have been controlled on daily   Patient to follow up with PCP next week   Asked him to record and take results of blood pressures to follow up appointment to better evaluate   BP's   08//74  08//64  08//63  08//74  08//74

## 2023-08-10 NOTE — ASSESSMENT & PLAN NOTE
· Patient on RA during the day  · Requires 2 L O2 at bedtime  · Encourage incentive spirometer  · Continue neb treatments  · Continue mucinex 1200 mg po Q12h

## 2023-08-10 NOTE — ASSESSMENT & PLAN NOTE
· Continue ferrous sulfate 325 (65 Fe) mg po daily  · Hemoglobin 10.3 (08/03)  · Repeat labs tomorrow

## 2023-08-10 NOTE — ASSESSMENT & PLAN NOTE
· Continue melatonin 3 mg po daily at bedtime  First line is behavioral therapy  Avoid sedative hypnotics such as benzodiazepines and benadryl  Encourage staying awake during the day  Encourage daytime activity, morning exercise  Decrease or eliminate day time naps  Avoid caffiene, alcohol especially during late afternoon and evening hours  Establish a night time routine

## 2023-08-10 NOTE — PROGRESS NOTES
[address], [address] [place] [address] [address]  [phone]    NAME: Everardo Ross  AGE: 69 y.o. SEX: male    Discharge Summary    Location: [facility]  POS: 32 (SNF)      Chief complaint / Reason for visit:  Discharge    Assessment/Plan:    Acute on chronic respiratory failure with hypoxia and hypercapnia (HCC)  · Patient on RA during the day  · Requires 2 L O2 at bedtime  · Encourage incentive spirometer  · Continue neb treatments  · Continue mucinex 1200 mg po Q12h    Hypertension  Encourage medication adherence   Continue daily blood pressure checks  Encourage heart healthy diet  Continue lisinopril 10 mg po daily   Patient states at home he was taking lisinopril 10 mg po BID   Blood pressures have been controlled on daily   Patient to follow up with PCP next week   Asked him to record and take results of blood pressures to follow up appointment to better evaluate   BP's   08//74  08//64  08//63  08//74  08//74    Dermatitis  · Continue flucinonide ointment application to back    Constipation  · Patient with regular bowel movements  · Denies issues with bowel movements  · Continue senna 17.2 mg po daily at bedtime  · Encourage increased ambulation  · Encourage increased po hydration    Insomnia  · Continue melatonin 3 mg po daily at bedtime  First line is behavioral therapy  Avoid sedative hypnotics such as benzodiazepines and benadryl  Encourage staying awake during the day  Encourage daytime activity, morning exercise  Decrease or eliminate day time naps  Avoid caffiene, alcohol especially during late afternoon and evening hours  Establish a night time routine    Iron deficiency anemia  · Continue ferrous sulfate 325 (65 Fe) mg po daily  · Hemoglobin 10.3 (08/03)  · Repeat labs tomorrow     Lymphedema  · Chronic b/l lymphedema  · Continue compression stockings and ace wrap  · Encourage elevation of extremities with out of bed      This is a 69 y.o. male seen today at [address] [place] [address]. Medical history includes, not limited to, type 2 DM, lymphedema, iron deficiency, hypertension, dermatitis, and acute on chronic respiratory failure with hypoxia. Patient with recent hospital admission from 07/17-07/27. He presented to ED with hypercapnia and metabolic encephalopathy secondary to hypercapnia. While hospitalized he completed course of antibiotics (cefepime) for pneumonia. Patient was transferred to [facility] for rehab. Upon entering patient's room he is sitting OOB in recliner. He recently finished his therapy session. He is alert and oriented x 4. Able to answer all questions appropriately. Currently denies pain. States his breathing feels much better, denies shortness of breath. Currently on room air. He has been completing his incentive spirometer and flutter valve. States he is tired today because he had an overnight pulse oximetry study completed last night and he heard the alarm go off multiple times. Patient will be required to wear oxygen at night, case management with arrangements for concentrator at home. Patient to be discharged home tomorrow. He feels ready to go home. He has been ambulating with therapy, using a walker. Patient is medically cleared and cleared by therapy for home. Repeat labs ordered, CBC, CMP. Nursing and prior provider notes reviewed on this visit. Discussed visit with PCP and nursing staff/ supervisor. Review of Systems   Constitutional: Positive for activity change. Negative for appetite change, fatigue and fever. HENT: Negative for congestion and rhinorrhea. Eyes: Negative for pain and visual disturbance. Respiratory: Negative for cough and shortness of breath. Slight crackles    Cardiovascular: Negative for chest pain and leg swelling. Gastrointestinal: Negative for abdominal pain and constipation. Genitourinary: Negative for difficulty urinating and dysuria. Musculoskeletal: Positive for gait problem. Negative for arthralgias. Skin: Positive for rash. Negative for color change. Neurological: Negative for dizziness, syncope and weakness. Psychiatric/Behavioral: Negative for behavioral problems and confusion. All other systems reviewed and are negative. ALLERGY: Reviewed, unchanged  Allergies   Allergen Reactions   • Morphine Hallucinations       HISTORY:  Medical Hx: Reviewed, unchanged  Family Hx: Reviewed, unchanged  Soc Hx: Reviewed,  unchanged      Physical Exam  Vitals reviewed. Constitutional:       General: He is not in acute distress. Appearance: Normal appearance. He is obese. He is not ill-appearing. HENT:      Head: Normocephalic. Right Ear: Tympanic membrane normal.      Left Ear: Tympanic membrane normal.      Nose: Nose normal. No congestion. Mouth/Throat:      Mouth: Mucous membranes are moist.      Pharynx: Oropharynx is clear. Eyes:      General:         Right eye: No discharge. Left eye: No discharge. Extraocular Movements: Extraocular movements intact. Conjunctiva/sclera: Conjunctivae normal.      Pupils: Pupils are equal, round, and reactive to light. Cardiovascular:      Rate and Rhythm: Normal rate. Rhythm irregular. Pulses: Normal pulses. Pulmonary:      Effort: Pulmonary effort is normal. No respiratory distress. Breath sounds: Normal breath sounds. Chest:      Chest wall: No tenderness. Abdominal:      General: Bowel sounds are normal.      Palpations: Abdomen is soft. Tenderness: There is no abdominal tenderness. Musculoskeletal:         General: No swelling. Normal range of motion. Cervical back: Normal range of motion. Right lower leg: Edema present. Left lower leg: Edema present. Comments: Chronic b/l lymphedema    Skin:     General: Skin is warm and dry. Findings: Rash present.    Neurological:      Mental Status: He is alert and oriented to person, place, and time. Mental status is at baseline. Motor: No weakness. Psychiatric:         Mood and Affect: Mood normal.         Behavior: Behavior normal.         Thought Content: Thought content normal.         Judgment: Judgment normal.            Laboratory results / Imaging reviewed: Hard copy/ies in medical chart:    Vitals:    08/10/23 1224   BP: 136/74   Pulse: 68   Resp: 18   Temp: (!) 96.8 °F (36 °C)   SpO2: 92%       Current Medications:  Current Outpatient Medications   Medication Instructions   • albuterol (PROVENTIL HFA,VENTOLIN HFA) 90 mcg/act inhaler 2 puffs, Inhalation, Every 6 hours PRN   • ferrous sulfate 325 mg, Oral, Daily with breakfast   • fluocinonide (LIDEX) 0.05 % ointment Topical, 2 times daily   • Guaifenesin 1,200 mg, Oral, Every 12 hours scheduled   • ipratropium (ATROVENT) 0.5 mg, Nebulization, 3 times daily (RESP)   • levalbuterol (XOPENEX) 1.25 mg, Nebulization, 3 times daily (RESP)   • lisinopril (ZESTRIL) 10 mg, Oral, Daily   • melatonin 3 mg, Oral, Daily at bedtime   • senna (SENOKOT) 8.6 MG tablet 2 tablets, Oral, Daily at bedtime         Please note: This note was completed in part utilizing a voice-recognition software may have been used in the preparation of this document. Grammatical errors, random word insertion, spelling mistakes, and incomplete sentences may be an occasional consequence of the system secondary to software limitations, ambient noise and hardware issues. Occasional wrong word or "sound-alike" substitutions may have occurred due to the inherent limitations of voice recognition software. At the time of dictation, efforts were made to edit, clarify and/or correct errors. Interpretation should be guided by context. Please read the chart carefully and recognize, using context, where substitutions have occurred.  If you have any questions or concerns about the context, text or information contained within the body of this dictation, please contact myself, the provider, for further clarification. Time spent greater than 30 minutes with coordination of care, direct patient care, patient examination, teaching, and chart review.       RALPH Ambriz  8/11/2023

## 2023-08-10 NOTE — ASSESSMENT & PLAN NOTE
· Chronic b/l lymphedema  · Continue compression stockings and ace wrap  · Encourage elevation of extremities with out of bed

## 2023-08-10 NOTE — ASSESSMENT & PLAN NOTE
· Patient with regular bowel movements  · Denies issues with bowel movements  · Continue senna 17.2 mg po daily at bedtime  · Encourage increased ambulation  · Encourage increased po hydration

## 2023-08-11 RX ORDER — ALBUTEROL SULFATE 90 UG/1
2 AEROSOL, METERED RESPIRATORY (INHALATION) EVERY 6 HOURS PRN
Status: ON HOLD | COMMUNITY
End: 2023-08-11 | Stop reason: SDUPTHER

## 2023-08-11 RX ORDER — ALBUTEROL SULFATE 90 UG/1
2 AEROSOL, METERED RESPIRATORY (INHALATION) EVERY 6 HOURS PRN
Qty: 8 G | Refills: 0 | Status: ON HOLD | OUTPATIENT
Start: 2023-08-11

## 2023-08-15 ENCOUNTER — TELEPHONE (OUTPATIENT)
Age: 68
End: 2023-08-15

## 2023-08-15 NOTE — TELEPHONE ENCOUNTER
[phone] called to advised patient received a script from provider for PT and no Diagnosis was put on the script. Patient has appointment on Thursday. Patient needs script by Thursday for appointment.      Please e-mail new script to :  [email or web address]

## 2023-08-18 NOTE — TELEPHONE ENCOUNTER
Spoke with Velma Vergara of [address] [place] [address] ([phone]) to advise we do not have an order on record. Apparently patient was given hand written hardcopy. Advised to contact  of [facility].

## 2023-08-20 ENCOUNTER — HOSPITAL ENCOUNTER (INPATIENT)
Facility: HOSPITAL | Age: 68
LOS: 5 days | Discharge: HOME | DRG: 854 | End: 2023-08-25
Attending: INTERNAL MEDICINE | Admitting: FAMILY MEDICINE
Payer: COMMERCIAL

## 2023-08-20 ENCOUNTER — HOSPITAL ENCOUNTER (INPATIENT)
Facility: HOSPITAL | Age: 68
LOS: 1 days | End: 2023-08-20
Attending: EMERGENCY MEDICINE | Admitting: HOSPITALIST
Payer: COMMERCIAL

## 2023-08-20 ENCOUNTER — ANESTHESIA (INPATIENT)
Dept: PERIOP | Facility: HOSPITAL | Age: 68
DRG: 854 | End: 2023-08-20
Payer: COMMERCIAL

## 2023-08-20 ENCOUNTER — ANESTHESIA EVENT (INPATIENT)
Dept: PERIOP | Facility: HOSPITAL | Age: 68
DRG: 854 | End: 2023-08-20
Payer: COMMERCIAL

## 2023-08-20 ENCOUNTER — APPOINTMENT (EMERGENCY)
Dept: CT IMAGING | Facility: HOSPITAL | Age: 68
End: 2023-08-20
Payer: COMMERCIAL

## 2023-08-20 ENCOUNTER — APPOINTMENT (INPATIENT)
Dept: RADIOLOGY | Facility: HOSPITAL | Age: 68
DRG: 854 | End: 2023-08-20
Payer: COMMERCIAL

## 2023-08-20 VITALS
DIASTOLIC BLOOD PRESSURE: 79 MMHG | TEMPERATURE: 97.9 F | WEIGHT: 315 LBS | BODY MASS INDEX: 39.17 KG/M2 | HEART RATE: 89 BPM | HEIGHT: 75 IN | RESPIRATION RATE: 20 BRPM | SYSTOLIC BLOOD PRESSURE: 137 MMHG | OXYGEN SATURATION: 98 %

## 2023-08-20 DIAGNOSIS — A41.9 SEPSIS (HCC): Primary | ICD-10-CM

## 2023-08-20 DIAGNOSIS — B96.5 BACTEREMIA DUE TO PSEUDOMONAS: ICD-10-CM

## 2023-08-20 DIAGNOSIS — N13.2 HYDRONEPHROSIS WITH URINARY OBSTRUCTION DUE TO URETERAL CALCULUS: ICD-10-CM

## 2023-08-20 DIAGNOSIS — N39.0 URINARY TRACT INFECTION WITHOUT HEMATURIA, SITE UNSPECIFIED: ICD-10-CM

## 2023-08-20 DIAGNOSIS — K59.03 DRUG-INDUCED CONSTIPATION: ICD-10-CM

## 2023-08-20 DIAGNOSIS — R78.81 BACTEREMIA DUE TO GRAM-NEGATIVE BACTERIA: ICD-10-CM

## 2023-08-20 DIAGNOSIS — N39.0 URINARY TRACT INFECTION: ICD-10-CM

## 2023-08-20 DIAGNOSIS — N10 ACUTE PYELONEPHRITIS: ICD-10-CM

## 2023-08-20 DIAGNOSIS — A41.9 SEPSIS (HCC): ICD-10-CM

## 2023-08-20 DIAGNOSIS — R78.81 BACTEREMIA DUE TO PSEUDOMONAS: ICD-10-CM

## 2023-08-20 DIAGNOSIS — Q62.11 HYDRONEPHROSIS WITH URETEROPELVIC JUNCTION (UPJ) OBSTRUCTION: Primary | ICD-10-CM

## 2023-08-20 PROBLEM — E87.1 HYPONATREMIA: Status: ACTIVE | Noted: 2023-08-20

## 2023-08-20 PROBLEM — J96.12 CHRONIC RESPIRATORY FAILURE WITH HYPOXIA AND HYPERCAPNIA (HCC): Status: ACTIVE | Noted: 2023-07-17

## 2023-08-20 PROBLEM — E66.01 MORBID OBESITY WITH BMI OF 40.0-44.9, ADULT (HCC): Status: ACTIVE | Noted: 2023-08-20

## 2023-08-20 PROBLEM — J96.11 CHRONIC RESPIRATORY FAILURE WITH HYPOXIA AND HYPERCAPNIA (HCC): Status: ACTIVE | Noted: 2023-07-17

## 2023-08-20 PROBLEM — R26.2 AMBULATORY DYSFUNCTION: Status: ACTIVE | Noted: 2023-08-20

## 2023-08-20 LAB
ALBUMIN SERPL BCP-MCNC: 3.5 G/DL (ref 3.5–5)
ALP SERPL-CCNC: 118 U/L (ref 34–104)
ALT SERPL W P-5'-P-CCNC: 12 U/L (ref 7–52)
ANION GAP SERPL CALCULATED.3IONS-SCNC: 3 MMOL/L
ANION GAP SERPL CALCULATED.3IONS-SCNC: 6 MMOL/L
APTT PPP: 30 SECONDS (ref 23–37)
AST SERPL W P-5'-P-CCNC: 16 U/L (ref 13–39)
ATRIAL RATE: 100 BPM
BACTERIA UR QL AUTO: ABNORMAL /HPF
BASOPHILS # BLD AUTO: 0.04 THOUSANDS/ÂΜL (ref 0–0.1)
BASOPHILS NFR BLD AUTO: 0 % (ref 0–1)
BILIRUB SERPL-MCNC: 1.21 MG/DL (ref 0.2–1)
BILIRUB UR QL STRIP: NEGATIVE
BUN SERPL-MCNC: 18 MG/DL (ref 5–25)
BUN SERPL-MCNC: 22 MG/DL (ref 5–25)
CALCIUM SERPL-MCNC: 8.3 MG/DL (ref 8.3–10.1)
CALCIUM SERPL-MCNC: 9.2 MG/DL (ref 8.4–10.2)
CHLORIDE SERPL-SCNC: 101 MMOL/L (ref 96–108)
CHLORIDE SERPL-SCNC: 95 MMOL/L (ref 96–108)
CLARITY UR: ABNORMAL
CO2 SERPL-SCNC: 28 MMOL/L (ref 21–32)
CO2 SERPL-SCNC: 28 MMOL/L (ref 21–32)
COLOR UR: YELLOW
CREAT SERPL-MCNC: 1.04 MG/DL (ref 0.6–1.3)
CREAT SERPL-MCNC: 1.14 MG/DL (ref 0.6–1.3)
EOSINOPHIL # BLD AUTO: 0.01 THOUSAND/ÂΜL (ref 0–0.61)
EOSINOPHIL NFR BLD AUTO: 0 % (ref 0–6)
ERYTHROCYTE [DISTWIDTH] IN BLOOD BY AUTOMATED COUNT: 13.2 % (ref 11.6–15.1)
FLUAV RNA RESP QL NAA+PROBE: NEGATIVE
FLUBV RNA RESP QL NAA+PROBE: NEGATIVE
GFR SERPL CREATININE-BSD FRML MDRD: 65 ML/MIN/1.73SQ M
GFR SERPL CREATININE-BSD FRML MDRD: 72 ML/MIN/1.73SQ M
GLUCOSE SERPL-MCNC: 148 MG/DL (ref 65–140)
GLUCOSE SERPL-MCNC: 150 MG/DL (ref 65–140)
GLUCOSE SERPL-MCNC: 153 MG/DL (ref 65–140)
GLUCOSE SERPL-MCNC: 169 MG/DL (ref 65–140)
GLUCOSE SERPL-MCNC: 210 MG/DL (ref 65–140)
GLUCOSE SERPL-MCNC: 247 MG/DL (ref 65–140)
GLUCOSE UR STRIP-MCNC: NEGATIVE MG/DL
HCT VFR BLD AUTO: 37.8 % (ref 36.5–49.3)
HGB BLD-MCNC: 12.3 G/DL (ref 12–17)
HGB UR QL STRIP.AUTO: ABNORMAL
IMM GRANULOCYTES # BLD AUTO: 0.24 THOUSAND/UL (ref 0–0.2)
IMM GRANULOCYTES NFR BLD AUTO: 1 % (ref 0–2)
INR PPP: 1.04 (ref 0.84–1.19)
KETONES UR STRIP-MCNC: NEGATIVE MG/DL
LACTATE SERPL-SCNC: 1 MMOL/L (ref 0.5–2)
LEUKOCYTE ESTERASE UR QL STRIP: ABNORMAL
LIPASE SERPL-CCNC: 18 U/L (ref 11–82)
LYMPHOCYTES # BLD AUTO: 0.7 THOUSANDS/ÂΜL (ref 0.6–4.47)
LYMPHOCYTES NFR BLD AUTO: 3 % (ref 14–44)
MCH RBC QN AUTO: 30 PG (ref 26.8–34.3)
MCHC RBC AUTO-ENTMCNC: 32.5 G/DL (ref 31.4–37.4)
MCV RBC AUTO: 92 FL (ref 82–98)
MONOCYTES # BLD AUTO: 1.31 THOUSAND/ÂΜL (ref 0.17–1.22)
MONOCYTES NFR BLD AUTO: 6 % (ref 4–12)
NEUTROPHILS # BLD AUTO: 20.14 THOUSANDS/ÂΜL (ref 1.85–7.62)
NEUTS SEG NFR BLD AUTO: 90 % (ref 43–75)
NITRITE UR QL STRIP: NEGATIVE
NON-SQ EPI CELLS URNS QL MICRO: ABNORMAL /HPF
NRBC BLD AUTO-RTO: 0 /100 WBCS
P AXIS: 55 DEGREES
PH UR STRIP.AUTO: 6.5 [PH]
PLATELET # BLD AUTO: 281 THOUSANDS/UL (ref 149–390)
PLATELET # BLD AUTO: 346 THOUSANDS/UL (ref 149–390)
PLATELET BLD QL SMEAR: ADEQUATE
PMV BLD AUTO: 8.3 FL (ref 8.9–12.7)
PMV BLD AUTO: 8.6 FL (ref 8.9–12.7)
POTASSIUM SERPL-SCNC: 4.3 MMOL/L (ref 3.5–5.3)
POTASSIUM SERPL-SCNC: 4.5 MMOL/L (ref 3.5–5.3)
PR INTERVAL: 194 MS
PROCALCITONIN SERPL-MCNC: 0.21 NG/ML
PROT SERPL-MCNC: 7.7 G/DL (ref 6.4–8.4)
PROT UR STRIP-MCNC: ABNORMAL MG/DL
PROTHROMBIN TIME: 14.4 SECONDS (ref 11.6–14.5)
QRS AXIS: 93 DEGREES
QRSD INTERVAL: 106 MS
QT INTERVAL: 376 MS
QTC INTERVAL: 485 MS
RBC # BLD AUTO: 4.1 MILLION/UL (ref 3.88–5.62)
RBC #/AREA URNS AUTO: ABNORMAL /HPF
RBC MORPH BLD: NORMAL
RSV RNA RESP QL NAA+PROBE: NEGATIVE
SARS-COV-2 RNA RESP QL NAA+PROBE: NEGATIVE
SODIUM SERPL-SCNC: 129 MMOL/L (ref 135–147)
SODIUM SERPL-SCNC: 132 MMOL/L (ref 135–147)
SP GR UR STRIP.AUTO: 1.01 (ref 1–1.03)
T WAVE AXIS: 42 DEGREES
UROBILINOGEN UR STRIP-ACNC: 4 MG/DL
VENTRICULAR RATE: 100 BPM
WBC # BLD AUTO: 22.44 THOUSAND/UL (ref 4.31–10.16)
WBC #/AREA URNS AUTO: ABNORMAL /HPF

## 2023-08-20 PROCEDURE — C1769 GUIDE WIRE: HCPCS | Performed by: UROLOGY

## 2023-08-20 PROCEDURE — 93010 ELECTROCARDIOGRAM REPORT: CPT | Performed by: INTERNAL MEDICINE

## 2023-08-20 PROCEDURE — 94760 N-INVAS EAR/PLS OXIMETRY 1: CPT

## 2023-08-20 PROCEDURE — 74177 CT ABD & PELVIS W/CONTRAST: CPT

## 2023-08-20 PROCEDURE — 82948 REAGENT STRIP/BLOOD GLUCOSE: CPT

## 2023-08-20 PROCEDURE — 94640 AIRWAY INHALATION TREATMENT: CPT

## 2023-08-20 PROCEDURE — 85049 AUTOMATED PLATELET COUNT: CPT | Performed by: FAMILY MEDICINE

## 2023-08-20 PROCEDURE — C2617 STENT, NON-COR, TEM W/O DEL: HCPCS | Performed by: UROLOGY

## 2023-08-20 PROCEDURE — 87077 CULTURE AEROBIC IDENTIFY: CPT | Performed by: EMERGENCY MEDICINE

## 2023-08-20 PROCEDURE — 87154 CUL TYP ID BLD PTHGN 6+ TRGT: CPT | Performed by: EMERGENCY MEDICINE

## 2023-08-20 PROCEDURE — 84145 PROCALCITONIN (PCT): CPT | Performed by: EMERGENCY MEDICINE

## 2023-08-20 PROCEDURE — 52332 CYSTOSCOPY AND TREATMENT: CPT | Performed by: UROLOGY

## 2023-08-20 PROCEDURE — 0241U HB NFCT DS VIR RESP RNA 4 TRGT: CPT | Performed by: EMERGENCY MEDICINE

## 2023-08-20 PROCEDURE — 99285 EMERGENCY DEPT VISIT HI MDM: CPT

## 2023-08-20 PROCEDURE — 87186 SC STD MICRODIL/AGAR DIL: CPT | Performed by: UROLOGY

## 2023-08-20 PROCEDURE — NC001 PR NO CHARGE: Performed by: INTERNAL MEDICINE

## 2023-08-20 PROCEDURE — 87086 URINE CULTURE/COLONY COUNT: CPT | Performed by: UROLOGY

## 2023-08-20 PROCEDURE — 94664 DEMO&/EVAL PT USE INHALER: CPT

## 2023-08-20 PROCEDURE — 99223 1ST HOSP IP/OBS HIGH 75: CPT | Performed by: UROLOGY

## 2023-08-20 PROCEDURE — 74420 UROGRAPHY RTRGR +-KUB: CPT

## 2023-08-20 PROCEDURE — 71260 CT THORAX DX C+: CPT

## 2023-08-20 PROCEDURE — 87186 SC STD MICRODIL/AGAR DIL: CPT | Performed by: EMERGENCY MEDICINE

## 2023-08-20 PROCEDURE — 85610 PROTHROMBIN TIME: CPT | Performed by: EMERGENCY MEDICINE

## 2023-08-20 PROCEDURE — 85025 COMPLETE CBC W/AUTO DIFF WBC: CPT | Performed by: EMERGENCY MEDICINE

## 2023-08-20 PROCEDURE — 99223 1ST HOSP IP/OBS HIGH 75: CPT | Performed by: INTERNAL MEDICINE

## 2023-08-20 PROCEDURE — 83690 ASSAY OF LIPASE: CPT | Performed by: EMERGENCY MEDICINE

## 2023-08-20 PROCEDURE — G1004 CDSM NDSC: HCPCS

## 2023-08-20 PROCEDURE — 87040 BLOOD CULTURE FOR BACTERIA: CPT | Performed by: EMERGENCY MEDICINE

## 2023-08-20 PROCEDURE — 83605 ASSAY OF LACTIC ACID: CPT | Performed by: EMERGENCY MEDICINE

## 2023-08-20 PROCEDURE — 93005 ELECTROCARDIOGRAM TRACING: CPT

## 2023-08-20 PROCEDURE — 80053 COMPREHEN METABOLIC PANEL: CPT | Performed by: EMERGENCY MEDICINE

## 2023-08-20 PROCEDURE — 81001 URINALYSIS AUTO W/SCOPE: CPT | Performed by: EMERGENCY MEDICINE

## 2023-08-20 PROCEDURE — 80048 BASIC METABOLIC PNL TOTAL CA: CPT | Performed by: INTERNAL MEDICINE

## 2023-08-20 PROCEDURE — 87077 CULTURE AEROBIC IDENTIFY: CPT | Performed by: UROLOGY

## 2023-08-20 PROCEDURE — BT1F1ZZ FLUOROSCOPY OF LEFT KIDNEY, URETER AND BLADDER USING LOW OSMOLAR CONTRAST: ICD-10-PCS | Performed by: UROLOGY

## 2023-08-20 PROCEDURE — 0T778DZ DILATION OF LEFT URETER WITH INTRALUMINAL DEVICE, VIA NATURAL OR ARTIFICIAL OPENING ENDOSCOPIC: ICD-10-PCS | Performed by: UROLOGY

## 2023-08-20 PROCEDURE — 85730 THROMBOPLASTIN TIME PARTIAL: CPT | Performed by: EMERGENCY MEDICINE

## 2023-08-20 PROCEDURE — 99285 EMERGENCY DEPT VISIT HI MDM: CPT | Performed by: EMERGENCY MEDICINE

## 2023-08-20 PROCEDURE — 99235 HOSP IP/OBS SAME DATE MOD 70: CPT | Performed by: UROLOGY

## 2023-08-20 PROCEDURE — 36415 COLL VENOUS BLD VENIPUNCTURE: CPT | Performed by: EMERGENCY MEDICINE

## 2023-08-20 PROCEDURE — 99223 1ST HOSP IP/OBS HIGH 75: CPT | Performed by: FAMILY MEDICINE

## 2023-08-20 PROCEDURE — 87086 URINE CULTURE/COLONY COUNT: CPT | Performed by: EMERGENCY MEDICINE

## 2023-08-20 PROCEDURE — C1758 CATHETER, URETERAL: HCPCS | Performed by: UROLOGY

## 2023-08-20 DEVICE — INLAY VERSA FIT URETERAL STENT W/O GUIDEWIRE
Type: IMPLANTABLE DEVICE | Site: URETER | Status: FUNCTIONAL
Brand: BARD® INLAY® VERSA FIT® URETERAL STENT

## 2023-08-20 RX ORDER — HYDRALAZINE HYDROCHLORIDE 20 MG/ML
5 INJECTION INTRAMUSCULAR; INTRAVENOUS
Status: DISCONTINUED | OUTPATIENT
Start: 2023-08-20 | End: 2023-08-20 | Stop reason: HOSPADM

## 2023-08-20 RX ORDER — GUAIFENESIN 600 MG/1
1200 TABLET, EXTENDED RELEASE ORAL EVERY 12 HOURS SCHEDULED
Status: DISCONTINUED | OUTPATIENT
Start: 2023-08-20 | End: 2023-08-25 | Stop reason: HOSPADM

## 2023-08-20 RX ORDER — PHENYLEPHRINE HCL IN 0.9% NACL 1 MG/10 ML
SYRINGE (ML) INTRAVENOUS AS NEEDED
Status: DISCONTINUED | OUTPATIENT
Start: 2023-08-20 | End: 2023-08-20

## 2023-08-20 RX ORDER — LEVALBUTEROL INHALATION SOLUTION 1.25 MG/3ML
1.25 SOLUTION RESPIRATORY (INHALATION)
Status: DISCONTINUED | OUTPATIENT
Start: 2023-08-20 | End: 2023-08-20

## 2023-08-20 RX ORDER — LEVALBUTEROL INHALATION SOLUTION 1.25 MG/3ML
1.25 SOLUTION RESPIRATORY (INHALATION) EVERY 6 HOURS PRN
Status: DISCONTINUED | OUTPATIENT
Start: 2023-08-20 | End: 2023-08-25

## 2023-08-20 RX ORDER — METOCLOPRAMIDE HYDROCHLORIDE 5 MG/ML
10 INJECTION INTRAMUSCULAR; INTRAVENOUS ONCE AS NEEDED
Status: DISCONTINUED | OUTPATIENT
Start: 2023-08-20 | End: 2023-08-20 | Stop reason: HOSPADM

## 2023-08-20 RX ORDER — ACETAMINOPHEN 325 MG/1
650 TABLET ORAL EVERY 6 HOURS PRN
Status: CANCELLED | OUTPATIENT
Start: 2023-08-20

## 2023-08-20 RX ORDER — INSULIN LISPRO 100 [IU]/ML
1-5 INJECTION, SOLUTION INTRAVENOUS; SUBCUTANEOUS
Status: DISCONTINUED | OUTPATIENT
Start: 2023-08-20 | End: 2023-08-25 | Stop reason: HOSPADM

## 2023-08-20 RX ORDER — NYSTATIN 100000 [USP'U]/G
POWDER TOPICAL 2 TIMES DAILY
Status: DISCONTINUED | OUTPATIENT
Start: 2023-08-20 | End: 2023-08-25 | Stop reason: HOSPADM

## 2023-08-20 RX ORDER — ACETAMINOPHEN 325 MG/1
650 TABLET ORAL EVERY 6 HOURS PRN
Status: DISCONTINUED | OUTPATIENT
Start: 2023-08-20 | End: 2023-08-25 | Stop reason: HOSPADM

## 2023-08-20 RX ORDER — HEPARIN SODIUM 5000 [USP'U]/ML
5000 INJECTION, SOLUTION INTRAVENOUS; SUBCUTANEOUS EVERY 8 HOURS SCHEDULED
Status: CANCELLED | OUTPATIENT
Start: 2023-08-20

## 2023-08-20 RX ORDER — OXYCODONE HYDROCHLORIDE 5 MG/1
5 TABLET ORAL EVERY 6 HOURS PRN
Status: DISCONTINUED | OUTPATIENT
Start: 2023-08-20 | End: 2023-08-20

## 2023-08-20 RX ORDER — ALBUTEROL SULFATE 90 UG/1
2 AEROSOL, METERED RESPIRATORY (INHALATION) EVERY 6 HOURS PRN
Status: DISCONTINUED | OUTPATIENT
Start: 2023-08-20 | End: 2023-08-20 | Stop reason: HOSPADM

## 2023-08-20 RX ORDER — LEVALBUTEROL INHALATION SOLUTION 1.25 MG/3ML
1.25 SOLUTION RESPIRATORY (INHALATION) EVERY 6 HOURS PRN
Status: DISCONTINUED | OUTPATIENT
Start: 2023-08-20 | End: 2023-08-20 | Stop reason: HOSPADM

## 2023-08-20 RX ORDER — INSULIN LISPRO 100 [IU]/ML
1-5 INJECTION, SOLUTION INTRAVENOUS; SUBCUTANEOUS
Status: CANCELLED | OUTPATIENT
Start: 2023-08-20

## 2023-08-20 RX ORDER — OXYCODONE HYDROCHLORIDE 5 MG/1
5 TABLET ORAL EVERY 4 HOURS PRN
Status: DISCONTINUED | OUTPATIENT
Start: 2023-08-20 | End: 2023-08-25 | Stop reason: HOSPADM

## 2023-08-20 RX ORDER — INSULIN LISPRO 100 [IU]/ML
1-5 INJECTION, SOLUTION INTRAVENOUS; SUBCUTANEOUS
Status: DISCONTINUED | OUTPATIENT
Start: 2023-08-20 | End: 2023-08-20 | Stop reason: HOSPADM

## 2023-08-20 RX ORDER — HEPARIN SODIUM 5000 [USP'U]/ML
5000 INJECTION, SOLUTION INTRAVENOUS; SUBCUTANEOUS EVERY 8 HOURS SCHEDULED
Status: DISCONTINUED | OUTPATIENT
Start: 2023-08-20 | End: 2023-08-25 | Stop reason: HOSPADM

## 2023-08-20 RX ORDER — GUAIFENESIN 600 MG/1
1200 TABLET, EXTENDED RELEASE ORAL EVERY 12 HOURS SCHEDULED
Status: CANCELLED | OUTPATIENT
Start: 2023-08-20

## 2023-08-20 RX ORDER — LEVALBUTEROL INHALATION SOLUTION 1.25 MG/3ML
1.25 SOLUTION RESPIRATORY (INHALATION) EVERY 6 HOURS PRN
Status: CANCELLED | OUTPATIENT
Start: 2023-08-20

## 2023-08-20 RX ORDER — CEFTRIAXONE 1 G/50ML
1000 INJECTION, SOLUTION INTRAVENOUS EVERY 24 HOURS
Status: DISCONTINUED | OUTPATIENT
Start: 2023-08-20 | End: 2023-08-20 | Stop reason: HOSPADM

## 2023-08-20 RX ORDER — ONDANSETRON 2 MG/ML
INJECTION INTRAMUSCULAR; INTRAVENOUS AS NEEDED
Status: DISCONTINUED | OUTPATIENT
Start: 2023-08-20 | End: 2023-08-20

## 2023-08-20 RX ORDER — NYSTATIN 100000 [USP'U]/G
POWDER TOPICAL 2 TIMES DAILY
Status: CANCELLED | OUTPATIENT
Start: 2023-08-20

## 2023-08-20 RX ORDER — FENTANYL CITRATE/PF 50 MCG/ML
25 SYRINGE (ML) INJECTION
Status: DISCONTINUED | OUTPATIENT
Start: 2023-08-20 | End: 2023-08-20 | Stop reason: HOSPADM

## 2023-08-20 RX ORDER — HYDROMORPHONE HCL/PF 1 MG/ML
0.5 SYRINGE (ML) INJECTION
Status: DISCONTINUED | OUTPATIENT
Start: 2023-08-20 | End: 2023-08-20 | Stop reason: HOSPADM

## 2023-08-20 RX ORDER — GUAIFENESIN 600 MG/1
1200 TABLET, EXTENDED RELEASE ORAL EVERY 12 HOURS SCHEDULED
Status: DISCONTINUED | OUTPATIENT
Start: 2023-08-20 | End: 2023-08-20 | Stop reason: HOSPADM

## 2023-08-20 RX ORDER — NYSTATIN 100000 [USP'U]/G
POWDER TOPICAL 2 TIMES DAILY
Status: DISCONTINUED | OUTPATIENT
Start: 2023-08-20 | End: 2023-08-20 | Stop reason: HOSPADM

## 2023-08-20 RX ORDER — CEFTRIAXONE 2 G/50ML
2000 INJECTION, SOLUTION INTRAVENOUS ONCE
Status: COMPLETED | OUTPATIENT
Start: 2023-08-20 | End: 2023-08-20

## 2023-08-20 RX ORDER — CEFTRIAXONE 1 G/50ML
1000 INJECTION, SOLUTION INTRAVENOUS EVERY 24 HOURS
Status: CANCELLED | OUTPATIENT
Start: 2023-08-20

## 2023-08-20 RX ORDER — ALBUTEROL SULFATE 90 UG/1
2 AEROSOL, METERED RESPIRATORY (INHALATION) EVERY 6 HOURS PRN
Status: DISCONTINUED | OUTPATIENT
Start: 2023-08-20 | End: 2023-08-25 | Stop reason: HOSPADM

## 2023-08-20 RX ORDER — FENTANYL CITRATE 50 UG/ML
INJECTION, SOLUTION INTRAMUSCULAR; INTRAVENOUS AS NEEDED
Status: DISCONTINUED | OUTPATIENT
Start: 2023-08-20 | End: 2023-08-20

## 2023-08-20 RX ORDER — SODIUM CHLORIDE, SODIUM LACTATE, POTASSIUM CHLORIDE, CALCIUM CHLORIDE 600; 310; 30; 20 MG/100ML; MG/100ML; MG/100ML; MG/100ML
100 INJECTION, SOLUTION INTRAVENOUS CONTINUOUS
Status: DISCONTINUED | OUTPATIENT
Start: 2023-08-20 | End: 2023-08-20

## 2023-08-20 RX ORDER — LIDOCAINE HCL/PF 100 MG/5ML
SYRINGE (ML) INJECTION AS NEEDED
Status: DISCONTINUED | OUTPATIENT
Start: 2023-08-20 | End: 2023-08-20

## 2023-08-20 RX ORDER — LABETALOL HYDROCHLORIDE 5 MG/ML
10 INJECTION, SOLUTION INTRAVENOUS
Status: DISCONTINUED | OUTPATIENT
Start: 2023-08-20 | End: 2023-08-20 | Stop reason: HOSPADM

## 2023-08-20 RX ORDER — PROPOFOL 10 MG/ML
INJECTION, EMULSION INTRAVENOUS AS NEEDED
Status: DISCONTINUED | OUTPATIENT
Start: 2023-08-20 | End: 2023-08-20

## 2023-08-20 RX ORDER — LISINOPRIL 10 MG/1
10 TABLET ORAL DAILY
Status: DISCONTINUED | OUTPATIENT
Start: 2023-08-20 | End: 2023-08-20 | Stop reason: HOSPADM

## 2023-08-20 RX ORDER — ALBUTEROL SULFATE 90 UG/1
2 AEROSOL, METERED RESPIRATORY (INHALATION) EVERY 6 HOURS PRN
Status: CANCELLED | OUTPATIENT
Start: 2023-08-20

## 2023-08-20 RX ORDER — LISINOPRIL 10 MG/1
10 TABLET ORAL DAILY
Status: DISCONTINUED | OUTPATIENT
Start: 2023-08-21 | End: 2023-08-20

## 2023-08-20 RX ORDER — SODIUM CHLORIDE, SODIUM LACTATE, POTASSIUM CHLORIDE, CALCIUM CHLORIDE 600; 310; 30; 20 MG/100ML; MG/100ML; MG/100ML; MG/100ML
INJECTION, SOLUTION INTRAVENOUS CONTINUOUS PRN
Status: DISCONTINUED | OUTPATIENT
Start: 2023-08-20 | End: 2023-08-20

## 2023-08-20 RX ORDER — ALBUTEROL SULFATE 2.5 MG/3ML
2.5 SOLUTION RESPIRATORY (INHALATION) ONCE AS NEEDED
Status: DISCONTINUED | OUTPATIENT
Start: 2023-08-20 | End: 2023-08-20 | Stop reason: HOSPADM

## 2023-08-20 RX ORDER — PROMETHAZINE HYDROCHLORIDE 25 MG/ML
12.5 INJECTION, SOLUTION INTRAMUSCULAR; INTRAVENOUS ONCE AS NEEDED
Status: DISCONTINUED | OUTPATIENT
Start: 2023-08-20 | End: 2023-08-20 | Stop reason: HOSPADM

## 2023-08-20 RX ORDER — ACETAMINOPHEN 325 MG/1
650 TABLET ORAL EVERY 6 HOURS PRN
Status: DISCONTINUED | OUTPATIENT
Start: 2023-08-20 | End: 2023-08-20 | Stop reason: HOSPADM

## 2023-08-20 RX ORDER — ONDANSETRON 2 MG/ML
4 INJECTION INTRAMUSCULAR; INTRAVENOUS ONCE AS NEEDED
Status: DISCONTINUED | OUTPATIENT
Start: 2023-08-20 | End: 2023-08-20 | Stop reason: HOSPADM

## 2023-08-20 RX ORDER — HEPARIN SODIUM 5000 [USP'U]/ML
5000 INJECTION, SOLUTION INTRAVENOUS; SUBCUTANEOUS EVERY 8 HOURS SCHEDULED
Status: DISCONTINUED | OUTPATIENT
Start: 2023-08-20 | End: 2023-08-20 | Stop reason: HOSPADM

## 2023-08-20 RX ORDER — HYDROMORPHONE HCL IN WATER/PF 6 MG/30 ML
0.2 PATIENT CONTROLLED ANALGESIA SYRINGE INTRAVENOUS
Status: DISCONTINUED | OUTPATIENT
Start: 2023-08-20 | End: 2023-08-20 | Stop reason: HOSPADM

## 2023-08-20 RX ORDER — LISINOPRIL 10 MG/1
10 TABLET ORAL DAILY
Status: CANCELLED | OUTPATIENT
Start: 2023-08-20

## 2023-08-20 RX ORDER — OXYCODONE HYDROCHLORIDE 10 MG/1
10 TABLET ORAL EVERY 6 HOURS PRN
Status: DISCONTINUED | OUTPATIENT
Start: 2023-08-20 | End: 2023-08-25 | Stop reason: HOSPADM

## 2023-08-20 RX ORDER — SODIUM CHLORIDE, SODIUM LACTATE, POTASSIUM CHLORIDE, CALCIUM CHLORIDE 600; 310; 30; 20 MG/100ML; MG/100ML; MG/100ML; MG/100ML
125 INJECTION, SOLUTION INTRAVENOUS CONTINUOUS
Status: DISCONTINUED | OUTPATIENT
Start: 2023-08-20 | End: 2023-08-20

## 2023-08-20 RX ADMIN — FENTANYL CITRATE 25 MCG: 50 INJECTION INTRAMUSCULAR; INTRAVENOUS at 14:32

## 2023-08-20 RX ADMIN — FENTANYL CITRATE 25 MCG: 50 INJECTION INTRAMUSCULAR; INTRAVENOUS at 12:23

## 2023-08-20 RX ADMIN — FENTANYL CITRATE 25 MCG: 50 INJECTION INTRAMUSCULAR; INTRAVENOUS at 12:08

## 2023-08-20 RX ADMIN — FENTANYL CITRATE 25 MCG: 50 INJECTION INTRAMUSCULAR; INTRAVENOUS at 12:19

## 2023-08-20 RX ADMIN — INSULIN LISPRO 1 UNITS: 100 INJECTION, SOLUTION INTRAVENOUS; SUBCUTANEOUS at 08:47

## 2023-08-20 RX ADMIN — HEPARIN SODIUM 5000 UNITS: 5000 INJECTION INTRAVENOUS; SUBCUTANEOUS at 21:09

## 2023-08-20 RX ADMIN — HEPARIN SODIUM 5000 UNITS: 5000 INJECTION INTRAVENOUS; SUBCUTANEOUS at 05:46

## 2023-08-20 RX ADMIN — LEVALBUTEROL HYDROCHLORIDE 1.25 MG: 1.25 SOLUTION RESPIRATORY (INHALATION) at 07:47

## 2023-08-20 RX ADMIN — Medication 100 MCG: at 12:27

## 2023-08-20 RX ADMIN — ONDANSETRON 4 MG: 2 INJECTION INTRAMUSCULAR; INTRAVENOUS at 12:18

## 2023-08-20 RX ADMIN — CEFAZOLIN 3000 MG: 10 INJECTION, POWDER, FOR SOLUTION INTRAVENOUS at 12:01

## 2023-08-20 RX ADMIN — IPRATROPIUM BROMIDE 0.5 MG: 0.5 SOLUTION RESPIRATORY (INHALATION) at 07:47

## 2023-08-20 RX ADMIN — PROPOFOL 200 MG: 10 INJECTION, EMULSION INTRAVENOUS at 12:08

## 2023-08-20 RX ADMIN — IOHEXOL 100 ML: 350 INJECTION, SOLUTION INTRAVENOUS at 03:56

## 2023-08-20 RX ADMIN — NYSTATIN: 100000 POWDER TOPICAL at 21:17

## 2023-08-20 RX ADMIN — OXYCODONE HYDROCHLORIDE 10 MG: 10 TABLET ORAL at 18:26

## 2023-08-20 RX ADMIN — INSULIN LISPRO 2 UNITS: 100 INJECTION, SOLUTION INTRAVENOUS; SUBCUTANEOUS at 17:19

## 2023-08-20 RX ADMIN — Medication 100 MCG: at 12:12

## 2023-08-20 RX ADMIN — NYSTATIN 1 APPLICATION: 100000 POWDER TOPICAL at 08:48

## 2023-08-20 RX ADMIN — GUAIFENESIN 1200 MG: 600 TABLET, EXTENDED RELEASE ORAL at 21:09

## 2023-08-20 RX ADMIN — CEFTRIAXONE 2000 MG: 2 INJECTION, SOLUTION INTRAVENOUS at 03:52

## 2023-08-20 RX ADMIN — SODIUM CHLORIDE, SODIUM LACTATE, POTASSIUM CHLORIDE, AND CALCIUM CHLORIDE: .6; .31; .03; .02 INJECTION, SOLUTION INTRAVENOUS at 11:59

## 2023-08-20 RX ADMIN — LIDOCAINE HYDROCHLORIDE 100 MG: 20 INJECTION INTRAVENOUS at 12:08

## 2023-08-20 RX ADMIN — SODIUM CHLORIDE, SODIUM LACTATE, POTASSIUM CHLORIDE, AND CALCIUM CHLORIDE 125 ML/HR: .6; .31; .03; .02 INJECTION, SOLUTION INTRAVENOUS at 16:18

## 2023-08-20 RX ADMIN — FENTANYL CITRATE 25 MCG: 50 INJECTION INTRAMUSCULAR; INTRAVENOUS at 12:15

## 2023-08-20 NOTE — H&P
[address]  H&P  Name: Balwinder Garcia 69 y.o. male I MRN: [id number]  Unit/Bed#: -[facility] I Date of Admission: 8/20/2023   Date of Service: 8/20/2023 I Hospital Day: 0      Assessment/Plan   * Sepsis [facility])  Assessment & Plan  · Presents from home due to low-grade fever. Recent admission from 7/17-7/27 due to PNA, sepsis and hypoxia. Discharged to [address] [place] [address]. He was discharged back to home from there on 8/11. · SIRS: WBC 22.44, tachycardia, tachypnea  · Procalcitonin and lactic WNL  · SOI: UTI  · Urine sample  · UA: Large amount of leukocytes, negative nitrite  · Micro: Large amount of WBC and bacteria  · CT chest abdomen pelvis   · No clear identifiable source of infection. · Mild left hydronephrosis, new from prior. Stable left posterolateral bladder hyperdensity which may represent a left ureterovesicular junction calculus which measures 8 mm. · Cholelithiasis. · COVID/flu/RSV negative  · Abx: Received IV ceftriaxone in the ER. Continue   · Recent UTI about 1 month ago. Admitted at [facility]. DC on cefdinir and then was readmitted at [address] [place] [address] on 7/17. · Blood cultures and urine cultures pending  · Consult Urology     Ambulatory dysfunction  Assessment & Plan  · Discharged from [address] [place] [address] on 8/11. Per patient rehab was sucessful at [place] and has been feeling stronger. · Home PT is to start later this week. Patient did some exercises on his own during the day on 8/19. Suspects he pushed himself too far thus resulting in his increased weakness   · Found to be meeting sepsis with UTI   · Fall precautions   · Consult PT/OT and CM     Hyponatremia  Assessment & Plan  · Na 129 on admission   · . Na correction 130  · Dec intake.    · Continue to monitor     UTI (urinary tract infection)  Assessment & Plan  · Urine sample   · UA: Large amount of leukocytes, negative nitrite   · Micro: Large amount of WBC and bacteria  · Urine culture pending   · Urinary retention protocol   · IV ceftriaxone     Chronic respiratory failure with hypoxia and hypercapnia (HCC)  Assessment & Plan  · Baseline: room air during the day and 2 L nasal cannula at night  · During recent admission recommended to wear CPAP mask at night but patient unable to tolerate  · Home regimen: Mucinex 1200 mg twice daily, Atrovent/Xopenex 3 times daily, albuterol as needed  · Has not followed up with pulmonology outpatient yet. Encouraged to do so. · Respiratory protocol    Benign essential hypertension  Assessment & Plan  · Home regimen: Lisinopril 10 mg daily    Obesity  Assessment & Plan  · Body mass index is 41.12 kg/m². · Consult nutrition    Type 2 diabetes mellitus [facility])  Assessment & Plan    Lab Results   Component Value Date    HGBA1C 6.5 (H) 07/17/2023     · Home regimen: Diet controlled  · SSI       VTE Pharmacologic Prophylaxis: VTE Score: 4 Moderate Risk (Score 3-4) - Pharmacological DVT Prophylaxis Ordered: heparin. Code Status: Level 3 - DNAR and DNI   Discussion with family: Updated  (wife) at bedside. Anticipated Length of Stay: Patient will be admitted on an inpatient basis with an anticipated length of stay of greater than 2 midnights secondary to Sepsis, UTI, ambulatory dysfunction. Total Time Spent on Date of Encounter in care of patient: 65 minutes This time was spent on one or more of the following: performing physical exam; counseling and coordination of care; obtaining or reviewing history; documenting in the medical record; reviewing/ordering tests, medications or procedures; communicating with other healthcare professionals and discussing with patient's family/caregivers. Chief Complaint: weakness     History of Present Illness:  Kim Graves is a 69 y.o. male with a PMH of HTN, chronic respiratory failure, obesity, dm2 who presents from home due to weakness, low grade fevers and decreased urinary output.  Per patient he had been doing well since being discharged from [address] [place] [address] on 8/11. He is scheduled to start home PT later this week. He had been given exercises by [facility] to do himself. He had done some of these exercises and had gone to the store. Upon returning home felt he felt much weaker and once he had sat down he was unable to get back up. In the ER met sepsis criteria due to UTI infection. Over the past 48 hours he reports he was peeing only small amounts frequently during the day. Denies burning. Recent admission at [facility] about 1 month ago due to UTI. Was discharged on cednifir and then was admitted at [facility] under sepsis, UTI, and pneumonia infection. Patient reports he is prone to UTIs. Denies abdominal pain, N/V/D, sob or cp. Review of Systems:  Review of Systems   Constitutional: Positive for fever. Negative for fatigue. HENT: Negative for sore throat. Respiratory: Negative for cough, chest tightness and shortness of breath. Cardiovascular: Negative for chest pain. Gastrointestinal: Negative for abdominal distention, abdominal pain, diarrhea, nausea and vomiting. Genitourinary: Positive for difficulty urinating. Musculoskeletal: Negative for arthralgias. Neurological: Positive for weakness. Negative for headaches. Psychiatric/Behavioral: Negative for agitation and behavioral problems. All other systems reviewed and are negative. Past Medical and Surgical History:   Past Medical History:   Diagnosis Date   • Hypertension    • Kidney stone    • Lymphedema        Past Surgical History:   Procedure Laterality Date   • FETAL SURGERY FOR CONGENITAL HERNIA      5 years ago   • GASTRIC BYPASS      20 years ago   • PANNICULECTOMY     • PENIS SURGERY      penile straightening procedure/evangelina tucks   • TOTAL HIP ARTHROPLASTY      10 years ago       Meds/Allergies:  Prior to Admission medications    Medication Sig Start Date End Date Taking?  Authorizing Provider   albuterol (PROVENTIL HFA,VENTOLIN HFA) 90 mcg/act inhaler Inhale 2 puffs every 6 (six) hours as needed for wheezing 8/11/23   RALPH Houston   ferrous sulfate 325 (65 Fe) mg tablet Take 325 mg by mouth daily with breakfast    Historical Provider, MD   fluocinonide (LIDEX) 0.05 % ointment Apply topically 2 (two) times a day 7/25/23   Christal Schaumann, MD   guaiFENesin 1200 MG TB12 Take 1 tablet (1,200 mg total) by mouth every 12 (twelve) hours 7/25/23   Christal Schaumann, MD   ipratropium (ATROVENT) 0.02 % nebulizer solution Take 2.5 mL (0.5 mg total) by nebulization 3 (three) times a day for 68 doses 7/27/23 8/19/23  Divya Singleton MD   levalbuterol (XOPENEX) 1.25 mg/3 mL nebulizer solution Take 3 mL (1.25 mg total) by nebulization 3 (three) times a day for 68 doses 7/27/23 8/19/23  Divya Singleton MD   lisinopril (ZESTRIL) 10 mg tablet Take 10 mg by mouth daily    Historical Provider, MD   melatonin 3 mg Take 3 mg by mouth daily at bedtime    Historical Provider, MD   senna (SENOKOT) 8.6 MG tablet Take 2 tablets by mouth daily at bedtime    Historical Provider, MD     I have reviewed home medications with patient personally. Allergies:    Allergies   Allergen Reactions   • Morphine Hallucinations       Social History:  Marital Status:    Occupation: retired   Patient Pre-hospital Living Situation: Home  Patient Pre-hospital Level of Mobility: walks with walker  Patient Pre-hospital Diet Restrictions: diabetic   Substance Use History:   Social History     Substance and Sexual Activity   Alcohol Use Never     Social History     Tobacco Use   Smoking Status Never   Smokeless Tobacco Never     Social History     Substance and Sexual Activity   Drug Use Never       Family History:  Family History   Problem Relation Age of Onset   • Heart disease Mother        Physical Exam:     Vitals:   Blood Pressure: 137/70 (08/20/23 0436)  Pulse: 90 (08/20/23 0436)  Temperature: 99.4 °F (37.4 °C) (08/20/23 0100)  Temp Source: Oral (08/20/23 0100)  Respirations: (!) 26 (08/20/23 0400)  Height: 6' 3" (190.5 cm) (08/20/23 0047)  Weight - Scale: (!) 149 kg (329 lb) (08/20/23 0047)  SpO2: 97 % (08/20/23 0436)    Physical Exam  Vitals and nursing note reviewed. Constitutional:       Appearance: Normal appearance. He is obese. HENT:      Head: Normocephalic. Eyes:      Extraocular Movements: Extraocular movements intact. Pupils: Pupils are equal, round, and reactive to light. Cardiovascular:      Rate and Rhythm: Normal rate and regular rhythm. Heart sounds: No murmur heard. No gallop. Pulmonary:      Effort: No respiratory distress. Breath sounds: Normal breath sounds. No wheezing. Abdominal:      General: Bowel sounds are normal. There is no distension. Tenderness: There is no abdominal tenderness. Musculoskeletal:         General: Normal range of motion. Cervical back: Normal range of motion. Right lower leg: Edema (trace) present. Left lower leg: Edema (trace) present. Skin:     General: Skin is warm. Comments: Chronic discoloration of bilateral lower extremities   Neurological:      General: No focal deficit present. Mental Status: He is alert and oriented to person, place, and time. Mental status is at baseline. Psychiatric:         Mood and Affect: Mood normal.         Behavior: Behavior normal.         Thought Content:  Thought content normal.         Additional Data:     Lab Results:  Results from last 7 days   Lab Units 08/20/23  0052   WBC Thousand/uL 22.44*   HEMOGLOBIN g/dL 12.3   HEMATOCRIT % 37.8   PLATELETS Thousands/uL 346   NEUTROS PCT % 90*   LYMPHS PCT % 3*   MONOS PCT % 6   EOS PCT % 0     Results from last 7 days   Lab Units 08/20/23  0052   SODIUM mmol/L 129*   POTASSIUM mmol/L 4.5   CHLORIDE mmol/L 95*   CO2 mmol/L 28   BUN mg/dL 22   CREATININE mg/dL 1.04   ANION GAP mmol/L 6   CALCIUM mg/dL 9.2   ALBUMIN g/dL 3.5   TOTAL BILIRUBIN mg/dL 1.21*   ALK PHOS U/L 118*   ALT U/L 12   AST U/L 16 GLUCOSE RANDOM mg/dL 169*     Results from last 7 days   Lab Units 08/20/23  0052   INR  1.04             Results from last 7 days   Lab Units 08/20/23  0052   LACTIC ACID mmol/L 1.0   PROCALCITONIN ng/ml 0.21       Lines/Drains:  Invasive Devices     Peripheral Intravenous Line  Duration           Peripheral IV 08/20/23 Left;Ventral (anterior) Forearm <1 day    Peripheral IV 08/20/23 Right Antecubital <1 day                    Imaging: Reviewed radiology reports from this admission including: chest CT scan and abdominal/pelvic CT  CT chest abdomen pelvis w contrast   Final Result by Ifeoma Garrett DO (08/20 0245)      No clear identifiable source of infection. Mild left hydronephrosis, new from prior. Stable left posterolateral bladder hyperdensity which may represent a left ureterovesicular junction calculus which measures 8 mm. Cholelithiasis. See full report for additional findings. Workstation performed: [id number]             EKG and Other Studies Reviewed on Admission:   · EKG: Sinus Tachycardia. . ** Please Note: This note has been constructed using a voice recognition system.  **

## 2023-08-20 NOTE — MALNUTRITION/BMI
This medical record reflects one or more clinical indicators suggestive of malnutrition and/or morbid obesity. Malnutrition Findings:                                 BMI Findings:  Adult BMI Classifications: Morbid Obesity 40-44.9        Body mass index is 41.12 kg/m². See Nutrition note dated 08/20/2023 for additional details. Completed nutrition assessment is viewable in the nutrition documentation.

## 2023-08-20 NOTE — PLAN OF CARE
Problem: Potential for Falls  Goal: Patient will remain free of falls  Description: INTERVENTIONS:  - Educate patient/family on patient safety including physical limitations  - Instruct patient to call for assistance with activity   - Consult OT/PT to assist with strengthening/mobility   - Keep Call bell within reach  - Keep bed low and locked with side rails adjusted as appropriate  - Keep care items and personal belongings within reach  - Initiate and maintain comfort rounds  - Make Fall Risk Sign visible to staff  - Offer Toileting every x Hours, in advance of need  - Initiate/Maintain bedalarm  - Obtain necessary fall risk management equipment:   - Apply yellow socks and bracelet for high fall risk patients  - Consider moving patient to room near nurses station  Outcome: Progressing     Problem: MOBILITY - ADULT  Goal: Maintain or return to baseline ADL function  Description: INTERVENTIONS:  -  Assess patient's ability to carry out ADLs; assess patient's baseline for ADL function and identify physical deficits which impact ability to perform ADLs (bathing, care of mouth/teeth, toileting, grooming, dressing, etc.)  - Assess/evaluate cause of self-care deficits   - Assess range of motion  - Assess patient's mobility; develop plan if impaired  - Assess patient's need for assistive devices and provide as appropriate  - Encourage maximum independence but intervene and supervise when necessary  - Involve family in performance of ADLs  - Assess for home care needs following discharge   - Consider OT consult to assist with ADL evaluation and planning for discharge  - Provide patient education as appropriate  Outcome: Progressing  Goal: Maintains/Returns to pre admission functional level  Description: INTERVENTIONS:  - Perform BMAT or MOVE assessment daily.   - Set and communicate daily mobility goal to care team and patient/family/caregiver.    - Collaborate with rehabilitation services on mobility goals if consulted  - Perform Range of Motion x times a day. - Reposition patient every x hours.   - Dangle patient x times a day  - Stand patient x times a day  - Ambulate patient x times a day  - Out of bed to chair x times a day   - Out of bed for meals x times a day  - Out of bed for toileting  - Record patient progress and toleration of activity level   Outcome: Progressing     Problem: PAIN - ADULT  Goal: Verbalizes/displays adequate comfort level or baseline comfort level  Description: Interventions:  - Encourage patient to monitor pain and request assistance  - Assess pain using appropriate pain scale  - Administer analgesics based on type and severity of pain and evaluate response  - Implement non-pharmacological measures as appropriate and evaluate response  - Consider cultural and social influences on pain and pain management  - Notify physician/advanced practitioner if interventions unsuccessful or patient reports new pain  Outcome: Progressing     Problem: INFECTION - ADULT  Goal: Absence or prevention of progression during hospitalization  Description: INTERVENTIONS:  - Assess and monitor for signs and symptoms of infection  - Monitor lab/diagnostic results  - Monitor all insertion sites, i.e. indwelling lines, tubes, and drains  - Monitor endotracheal if appropriate and nasal secretions for changes in amount and color  - [place] appropriate cooling/warming therapies per order  - Administer medications as ordered  - Instruct and encourage patient and family to use good hand hygiene technique  - Identify and instruct in appropriate isolation precautions for identified infection/condition  Outcome: Progressing  Goal: Absence of fever/infection during neutropenic period  Description: INTERVENTIONS:  - Monitor WBC    Outcome: Progressing     Problem: SAFETY ADULT  Goal: Patient will remain free of falls  Description: INTERVENTIONS:  - Educate patient/family on patient safety including physical limitations  - Instruct patient to call for assistance with activity   - Consult OT/PT to assist with strengthening/mobility   - Keep Call bell within reach  - Keep bed low and locked with side rails adjusted as appropriate  - Keep care items and personal belongings within reach  - Initiate and maintain comfort rounds  - Make Fall Risk Sign visible to staff  - Offer Toileting every x Hours, in advance of need  - Initiate/Maintain bedalarm  - Obtain necessary fall risk management equipment: x  - Apply yellow socks and bracelet for high fall risk patients  - Consider moving patient to room near nurses station  Outcome: Progressing  Goal: Maintain or return to baseline ADL function  Description: INTERVENTIONS:  -  Assess patient's ability to carry out ADLs; assess patient's baseline for ADL function and identify physical deficits which impact ability to perform ADLs (bathing, care of mouth/teeth, toileting, grooming, dressing, etc.)  - Assess/evaluate cause of self-care deficits   - Assess range of motion  - Assess patient's mobility; develop plan if impaired  - Assess patient's need for assistive devices and provide as appropriate  - Encourage maximum independence but intervene and supervise when necessary  - Involve family in performance of ADLs  - Assess for home care needs following discharge   - Consider OT consult to assist with ADL evaluation and planning for discharge  - Provide patient education as appropriate  Outcome: Progressing  Goal: Maintains/Returns to pre admission functional level  Description: INTERVENTIONS:  - Perform BMAT or MOVE assessment daily.   - Set and communicate daily mobility goal to care team and patient/family/caregiver. - Collaborate with rehabilitation services on mobility goals if consulted  - Perform Range of Motion x times a day. - Reposition patient every x hours.   - Dangle patient x times a day  - Stand patient x times a day  - Ambulate patient x times a day  - Out of bed to chair x times a day   - Out of bed for meals x times a day  - Out of bed for toileting  - Record patient progress and toleration of activity level   Outcome: Progressing     Problem: DISCHARGE PLANNING  Goal: Discharge to home or other facility with appropriate resources  Description: INTERVENTIONS:  - Identify barriers to discharge w/patient and caregiver  - Arrange for needed discharge resources and transportation as appropriate  - Identify discharge learning needs (meds, wound care, etc.)  - Arrange for interpretive services to assist at discharge as needed  - Refer to Case Management Department for coordinating discharge planning if the patient needs post-hospital services based on physician/advanced practitioner order or complex needs related to functional status, cognitive ability, or social support system  Outcome: Progressing     Problem: Knowledge Deficit  Goal: Patient/family/caregiver demonstrates understanding of disease process, treatment plan, medications, and discharge instructions  Description: Complete learning assessment and assess knowledge base.   Interventions:  - Provide teaching at level of understanding  - Provide teaching via preferred learning methods  Outcome: Progressing

## 2023-08-20 NOTE — ASSESSMENT & PLAN NOTE
· Baseline: room air during the day and 2 L nasal cannula at night  · During recent admission recommended to wear CPAP mask at night but patient unable to tolerate  · Home regimen: Mucinex 1200 mg twice daily, Atrovent/Xopenex 3 times daily, albuterol as needed  · Has not followed up with pulmonology outpatient yet. Encouraged to do so.    · Respiratory protocol

## 2023-08-20 NOTE — ASSESSMENT & PLAN NOTE
Lab Results   Component Value Date    HGBA1C 6.5 (H) 07/17/2023     · Home regimen: Diet controlled  · SSI

## 2023-08-20 NOTE — OP NOTE
OPERATIVE REPORT  PATIENT NAME: Raul Euceda    :  1955  MRN: [id number]  Pt Location: [facility] ROOM 01    SURGERY DATE: 2023    Surgeon(s) and Role:     * Andie Moreira MD - Primary    Preop Diagnosis:  Hydronephrosis with urinary obstruction due to ureteral calculus [N13.2]    Post-Op Diagnosis Codes: * Hydronephrosis with urinary obstruction due to ureteral calculus [N13.2]    Procedure(s):  Left - CYSTOSCOPY RETROGRADE PYELOGRAM WITH INSERTION STENT URETERAL    Specimen(s):  ID Type Source Tests Collected by Time Destination   A : kidney-left Urine Urine, Catheter URINE CULTURE Andie Moreira MD 2023 1220        Estimated Blood Loss:   Minimal    Drains:  Urethral Catheter Latex 16 Fr. (Active)   Reasons to continue Urinary Catheter  Post-operative urological requirements 23 1330   Goal for Removal Will consult urology 23 1330   Site Assessment Clean;Skin intact 23 1330   Collection Container Standard drainage bag 23 1330   Securement Method Securing device (Describe) 23 1330   Number of days: 0       Anesthesia Type:   General    Operative Indications:  Hydronephrosis with urinary obstruction due to ureteral calculus [N13.2]      Operative Findings:  1. 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 Tortuosity of the urethra  3. Abnormal course of the left ureter with 360 degree dilated loop in the distal segment making passage of wire and catheter is extremely difficult  4. Dilated renal pelvis, moderate hydronephrosis  5.  7 Welsh multilength stent placed in the left system, 12 [place] tip placed in the bladder    Complications:   None    Procedure and Technique:  Raul Euceda is a 69 y.o. male with history of significant morbid obesity, prior hernia repairs, buried phallus status post reconstruction with significant leukocytosis and appearance of left distal stone.     The patient was counseled regarding their options and ultimately opted to proceed with cystoscopy with left retrograde pyelogram and stent placement. Risks and benefits of the procedure were described and the patient signed an informed consent. On 8/20/2023 , the patient proceeded to the operating room. They were laid supine on the operating room table and a pre-procedure time out was performed with all parties present and in agreement of the procedure planned and laterality. Patient received intravenous antibiotics in the form of Ancef and sequential compression devices were placed on bilateral lower extremities. They were then induced with a LMA anesthetic. Patient was placed in dorsolithotomy position with care to pad all pressure points. Given the patient's size and obesity, positioning was carefully performed. Patient was placed in slight Trendelenburg to elevate his pannus. Patient had a significantly buried phallus with poor hygiene. Area was cleaned with chlorhexidine. Patient was draped in a sterile fashion and appropriate timeout was performed. 25 Romanian cystoscope sheath and 30 degree lens entered into the patient's urethra. Patient's body habitus led to tortuosity of the urethra which made passage of the scope initially challenging. Once inside the bladder, pan cystoscopy was performed. It was difficult to pass the Bard Solo plus wire into the left ureteral orifice and once past, it appeared that the wire was looping on distal radiography. 5 Romanian opening catheter was passed and the wire was removed. Retrograde pyelogram demonstrated a 360 degree loop of the distal ureter in the deep pelvis. It was challenging to navigate the Bard Solo plus wire into a more proximal position. Multiple attempts were made with Bard Solo plus wire and 5 Romanian tiger tip catheter.   Ultimately I was able to navigate into the mid to proximal ureter and a retrograde pyelogram was repeated showing moderate dilation of the renal pelvis and hydronephrosis. Was challenging to advance either a Bard Solo plus wire Super Stiff wire into the renal pelvis but ultimately this was achieved. Attempts were made to pass a 7 [place] multilength Contour stent and then a 7 [place] multilength Bard stent. Ultimately the Bard stent was passed with a good proximal coil at the level of the renal pelvis and a distal coil visualized in the bladder. The appearance on fluoroscopy was extremely abnormal however the stent did appear well-positioned    Because of the tortuosity of the ureter, a Bard Solo plus wire was passed back through the scope and a 16 [place] tip Usng was placed with 10 cc of sterile water in the balloon. At the completion of the procedure, the patient was extubated and transferred to PACU in good condition. Plan-the patient will be admitted to the medical service for observation given his leukocytosis. He should remain on antibiotics. I will repeat a CT scan this hospital stay to assess for the appropriate position of the stent and to assess whether or not the distal stone is still present.     Patient Disposition:  PACU         SIGNATURE: Jess Ramos MD  DATE: August 20, 2023  TIME: 1:50 PM

## 2023-08-20 NOTE — CONSULTS
Consultation -General Surgery  Sánchez Jackson 69 y.o. male MRN: [id number]  Unit/Bed#: -01 Encounter: [id number]            ASSESSMENT:  Patient is a 69year old male with PMH of DM2, HTN, obesity admitted with urosepsis with left hydronephrosis and possible 8 mm stone at the UVJ. WBC - 22.44  UA - large leukocytes with innumerable bacteria    CTAP - Mild left hydronephrosis, new from prior. Stable left posterolateral bladder hyperdensity which may represent a left ureterovesicular junction calculus which measures 8 mm. Cholelithiasis. Plan:  - continue IV antibiotics  - continue IV fluid hydration and supportive care  - NPO  - will require transfer to [facility] for urgent cystoscopy      Reason for Consult / Principal Problem:    HPI: Patient is a 69year old male with PMH of DM2, HTN, obesity, congenitally buried penis admitted with urosepsis with left hydronephrosis and possible 8 mm stone at the UVJ. Patient states that he began having symptoms 2 days ago of increased urine frequency, fevers, weakness. Patient denies abdominal pain, nausea, vomiting, flank pain. Patient has history of UTI and was admitted 1 month ago for UTI. Review of Systems   Constitutional: Positive for fever. Respiratory: Negative for shortness of breath. Cardiovascular: Negative for chest pain. Gastrointestinal: Negative for abdominal pain, nausea and vomiting. Genitourinary: Positive for difficulty urinating and frequency. Negative for flank pain. Neurological: Positive for weakness. All other systems reviewed and are negative.       Historical Information   Past Medical History:   Diagnosis Date   • Hypertension    • Kidney stone    • Lymphedema      Past Surgical History:   Procedure Laterality Date   • FETAL SURGERY FOR CONGENITAL HERNIA      5 years ago   • GASTRIC BYPASS      20 years ago   • PANNICULECTOMY     • PENIS SURGERY      penile straightening procedure/evangelina tucks   • TOTAL HIP ARTHROPLASTY      10 years ago     Social History   Social History     Substance and Sexual Activity   Alcohol Use Never     Social History     Substance and Sexual Activity   Drug Use Never     Social History     Tobacco Use   Smoking Status Never   Smokeless Tobacco Never     Family History   Problem Relation Age of Onset   • Heart disease Mother        Meds/Allergies     Medications Prior to Admission   Medication   • albuterol (PROVENTIL HFA,VENTOLIN HFA) 90 mcg/act inhaler   • ferrous sulfate 325 (65 Fe) mg tablet   • fluocinonide (LIDEX) 0.05 % ointment   • guaiFENesin 1200 MG TB12   • ipratropium (ATROVENT) 0.02 % nebulizer solution   • [] levalbuterol (XOPENEX) 1.25 mg/3 mL nebulizer solution   • lisinopril (ZESTRIL) 10 mg tablet   • melatonin 3 mg   • senna (SENOKOT) 8.6 MG tablet     Current Facility-Administered Medications   Medication Dose Route Frequency   • acetaminophen (TYLENOL) tablet 650 mg  650 mg Oral Q6H PRN   • albuterol (PROVENTIL HFA,VENTOLIN HFA) inhaler 2 puff  2 puff Inhalation Q6H PRN   • cefTRIAXone (ROCEPHIN) IVPB (premix in dextrose) 1,000 mg 50 mL  1,000 mg Intravenous Q24H   • guaiFENesin (MUCINEX) 12 hr tablet 1,200 mg  1,200 mg Oral Q12H MARIO   • heparin (porcine) subcutaneous injection 5,000 Units  5,000 Units Subcutaneous Q8H [address]   • insulin lispro (HumaLOG) 100 units/mL subcutaneous injection 1-5 Units  1-5 Units Subcutaneous TID AC   • insulin lispro (HumaLOG) 100 units/mL subcutaneous injection 1-5 Units  1-5 Units Subcutaneous HS   • iohexol (OMNIPAQUE) 240 MG/ML solution 50 mL  50 mL Oral Once in imaging   • ipratropium (ATROVENT) 0.02 % inhalation solution 0.5 mg  0.5 mg Nebulization TID   • levalbuterol (XOPENEX) inhalation solution 1.25 mg  1.25 mg Nebulization TID   • lisinopril (ZESTRIL) tablet 10 mg  10 mg Oral Daily   • nystatin (MYCOSTATIN) powder   Topical BID       Allergies   Allergen Reactions   • Morphine Hallucinations       Objective     Blood pressure 137/70, pulse 90, temperature 99.4 °F (37.4 °C), temperature source Oral, resp. rate (!) 26, height 6' 3" (1.905 m), weight (!) 149 kg (329 lb), SpO2 97 %. No intake or output data in the 24 hours ending 08/20/23 6720    PHYSICAL EXAM  Physical Exam  Vitals reviewed. Constitutional:       Appearance: Normal appearance. HENT:      Head: Normocephalic and atraumatic. Right Ear: External ear normal.      Left Ear: External ear normal.      Nose: Nose normal.   Eyes:      Pupils: Pupils are equal, round, and reactive to light. Cardiovascular:      Rate and Rhythm: Normal rate and regular rhythm. Pulses: Normal pulses. Heart sounds: Normal heart sounds. Pulmonary:      Effort: Pulmonary effort is normal.      Breath sounds: Normal breath sounds. Abdominal:      General: Bowel sounds are normal.      Palpations: Abdomen is soft. Genitourinary:     Comments: Congenitally buried penis  Skin:     General: Skin is warm. Capillary Refill: Capillary refill takes less than 2 seconds. Neurological:      General: No focal deficit present. Mental Status: He is alert and oriented to person, place, and time. Psychiatric:         Mood and Affect: Mood normal.         Behavior: Behavior normal.         Thought Content: Thought content normal.         Judgment: Judgment normal.           Lab Results:   I have personally reviewed pertinent lab results.   , CBC:   Lab Results   Component Value Date    WBC 22.44 (H) 08/20/2023    HGB 12.3 08/20/2023    HCT 37.8 08/20/2023    MCV 92 08/20/2023     08/20/2023    RBC 4.10 08/20/2023    MCH 30.0 08/20/2023    MCHC 32.5 08/20/2023    RDW 13.2 08/20/2023    MPV 8.6 (L) 08/20/2023    NRBC 0 08/20/2023   , CMP:   Lab Results   Component Value Date    SODIUM 129 (L) 08/20/2023    K 4.5 08/20/2023    CL 95 (L) 08/20/2023    CO2 28 08/20/2023    BUN 22 08/20/2023    CREATININE 1.04 08/20/2023    CALCIUM 9.2 08/20/2023    AST 16 08/20/2023    ALT 12 08/20/2023    ALKPHOS 118 (H) 08/20/2023    EGFR 72 08/20/2023     Imaging: I have personally reviewed pertinent reports. Counseling / Coordination of Care  Total time spent today  20 minutes. Greater than 50% of total time was spent with the patient and / or family counseling and / or coordination of care.          Ruddy Chen PA-C  8/20/2023 6:28 AM

## 2023-08-20 NOTE — ANESTHESIA PREPROCEDURE EVALUATION
Procedure:  CYSTOSCOPY RETROGRADE PYELOGRAM WITH INSERTION STENT URETERAL (Left: Bladder)    Relevant Problems   ANESTHESIA (within normal limits)      CARDIO   (+) Benign essential hypertension   (+) Hypertension      ENDO   (+) Type 2 diabetes mellitus (HCC)      HEMATOLOGY   (+) Iron deficiency anemia      PULMONARY   (+) Chronic respiratory failure with hypoxia and hypercapnia (HCC)      Genitourinary   (+) Hydrocele   (+) UTI (urinary tract infection)   (+) Urinary bladder stone      Other   (+) Ambulatory dysfunction   (+) Morbid obesity with BMI of 40.0-44.9, adult (HCC)   (+) Sepsis (HCC)        Physical Exam    Airway    Mallampati score: II  TM Distance: >3 FB  Neck ROM: full     Dental       Cardiovascular  Rhythm: regular, Rate: normal, Cardiovascular exam normal    Pulmonary  Pulmonary exam normal Breath sounds clear to auscultation,     Other Findings        Anesthesia Plan  ASA Score- 3     Anesthesia Type- general with ASA Monitors. Additional Monitors:   Airway Plan: LMA. Plan Factors-Exercise tolerance (METS): <4 METS. Exercise comment: Denies orthopnea/PND. Chart reviewed. EKG reviewed. Existing labs reviewed. Patient summary reviewed. Patient is not a current smoker. Induction- intravenous. Postoperative Plan- Plan for postoperative opioid use. Planned trial extubation    Informed Consent- Anesthetic plan and risks discussed with patient. I personally reviewed this patient with the CRNA. Discussed and agreed on the Anesthesia Plan with the CRNA. Neville Carr

## 2023-08-20 NOTE — H&P
[facility] [facility]  H&P  Name: Caleb Sepulveda 69 y.o. male I MRN: [id number]  Unit/Bed#: OR POOL I Date of Admission: 8/20/2023   Date of Service: 8/20/2023 I Hospital Day: 0      Assessment/Plan   * Sepsis [facility])  Assessment & Plan  · Patient presented to [facility] ED with complaint of fever, found to have tachycardia, tachypnea and leukocytosis meeting sepsis criteria  · WBC 22  · Procalcitonin and lactic WNL   · urinalysis is positive  · CT chest abdomen pelvis shows left hydronephrosis from left UVJ calculus measuring 8 mm  · Continue IV Rocephin  · Blood cultures and urine cultures pending  · Transferred to [address] [place] [address] for cystoscopy, underwent cystoscopy with stent placement today  · As per urology, difficult procedure due to abnormal course of ureter. · Need repeat CT abd/pelvis to evaluate stent position. Hydronephrosis with ureteropelvic junction (UPJ) obstruction  Assessment & Plan  Plan as per above    Morbid obesity with BMI of 40.0-44.9, adult (HCC)  Assessment & Plan  · BMI 41  · Lifestyle modification counseling on discharge    Ambulatory dysfunction  Assessment & Plan  · Discharged from [address] [place] [address] on 8/11. Per patient rehab was sucessful at [place] and has been feeling stronger. · Fall precautions   · Consult PT/OT and CM     Hyponatremia  Assessment & Plan  · Na 129 on admission   · Likely secondary to poor oral intake    Benign essential hypertension  Assessment & Plan  · Home regimen: Lisinopril 10 mg daily currently on hold due to concern for DEANDRE in the setting of hydronephrosis obstructive uropathy    Chronic respiratory failure with hypoxia and hypercapnia ([facility])  Assessment & Plan  · Baseline: room air during the day and 2 L nasal cannula at night  · Atrovent/Xopenex 3 times daily, albuterol as needed  · Has not followed up with pulmonology outpatient yet.     · Respiratory protocol    Type 2 diabetes mellitus [facility])  Assessment & Plan  Lab Results   Component Value Date    HGBA1C 6.5 (H) 07/17/2023       Recent Labs     08/20/23  0730 08/20/23  1310   POCGLU 150* 153*       Blood Sugar Average: Last 72 hrs:  (P) 153     Lab Results   Component Value Date    HGBA1C 6.5 (H) 07/17/2023     · Home regimen: Diet controlled  · SSI    UTI (urinary tract infection)  Assessment & Plan    · Positive urinalysis   · urine culture pending   · Urinary retention protocol   · IV ceftriaxone        VTE Prophylaxis: Heparin  / sequential compression device   Code Status: Level 3 DNR/DNI       Anticipated Length of Stay:  Patient will be admitted on an Inpatient basis with an anticipated length of stay of  >   2 midnights. Justification for Hospital Stay: hydronephrisos, stone, sepsis, UTI    Total Time for Visit, including Counseling / Coordination of Care: 60 minutes. Greater than 50% of this total time spent on direct patient counseling and coordination of care. Chief Complaint:   fever    History of Present Illness:    Heaven Easton is a 69 y.o. male originally presented to [facility] ED with complaint of fever, met criteria for sepsis secondary to urinary tract infection. CT abdomen pelvis obtained that revealed mild hydronephrosis secondary to left  UVJ calculus. Urology was consulted and recommended patient to be transferred to [facility] for cystoscopy. Patient went to the OR directly and underwent cystoscopy with stent placement. On my exam after OR, patient reports significant pain in the lower abdomen, dysuria with the silva catheter. No fever. No nausea, vomiting. Patient ate his dinner without any issues. Review of Systems:    Review of Systems   Constitutional: Positive for fever. Negative for chills. HENT: Negative for ear pain and sore throat. Eyes: Negative for pain and visual disturbance. Respiratory: Negative for cough and shortness of breath. Cardiovascular: Negative for chest pain and palpitations. Gastrointestinal: Positive for abdominal pain. Negative for vomiting. Genitourinary: Negative for dysuria and hematuria. Musculoskeletal: Negative for arthralgias and back pain. Skin: Negative for color change and rash. Neurological: Negative for seizures and syncope. All other systems reviewed and are negative. Past Medical and Surgical History:     Past Medical History:   Diagnosis Date   • Hypertension    • Kidney stone    • Lymphedema        Past Surgical History:   Procedure Laterality Date   • FETAL SURGERY FOR CONGENITAL HERNIA      5 years ago   • GASTRIC BYPASS      20 years ago   • PANNICULECTOMY     • PENIS SURGERY      penile straightening procedure/evangelina tucks   • TOTAL HIP ARTHROPLASTY      10 years ago       Meds/Allergies:    Prior to Admission medications    Medication Sig Start Date End Date Taking?  Authorizing Provider   albuterol (PROVENTIL HFA,VENTOLIN HFA) 90 mcg/act inhaler Inhale 2 puffs every 6 (six) hours as needed for wheezing 8/11/23   RALPH Gonzalez   ferrous sulfate 325 (65 Fe) mg tablet Take 325 mg by mouth daily with breakfast    Historical Provider, MD   fluocinonide (LIDEX) 0.05 % ointment Apply topically 2 (two) times a day 7/25/23   Shabnam Mi MD   guaiFENesin 1200 MG TB12 Take 1 tablet (1,200 mg total) by mouth every 12 (twelve) hours 7/25/23   Shabnam Mi MD   ipratropium (ATROVENT) 0.02 % nebulizer solution Take 2.5 mL (0.5 mg total) by nebulization 3 (three) times a day for 68 doses 7/27/23 8/19/23  Joyce Garcia MD   levalbuterol (XOPENEX) 1.25 mg/3 mL nebulizer solution Take 3 mL (1.25 mg total) by nebulization 3 (three) times a day for 68 doses 7/27/23 8/19/23  Joyce Garcia MD   lisinopril (ZESTRIL) 10 mg tablet Take 10 mg by mouth daily    Historical Provider, MD   melatonin 3 mg Take 3 mg by mouth daily at bedtime    Historical Provider, MD   senna (SENOKOT) 8.6 MG tablet Take 2 tablets by mouth daily at bedtime Historical Provider, MD     I have reviewed home medications using allscripts. Allergies: Allergies   Allergen Reactions   • Morphine Hallucinations       Social History:     Marital Status:       Substance Use History:   Social History     Substance and Sexual Activity   Alcohol Use Never     Social History     Tobacco Use   Smoking Status Never   Smokeless Tobacco Never     Social History     Substance and Sexual Activity   Drug Use Never       Family History:    Family History   Problem Relation Age of Onset   • Heart disease Mother        Physical Exam:     Vitals:   Blood Pressure: 105/53 (08/20/23 1400)  Pulse: 86 (08/20/23 1400)  Temperature: 99 °F (37.2 °C) (08/20/23 1330)  Respirations: 22 (08/20/23 1400)  SpO2: 97 % (08/20/23 1400)    Physical Exam  Vitals and nursing note reviewed. Constitutional:       General: He is not in acute distress. Appearance: Normal appearance. He is obese. HENT:      Head: Normocephalic and atraumatic. Right Ear: External ear normal.      Left Ear: External ear normal.      Nose: Nose normal.   Eyes:      Extraocular Movements: Extraocular movements intact. Pulmonary:      Effort: Pulmonary effort is normal.   Musculoskeletal:      Cervical back: Normal range of motion. Neurological:      Mental Status: He is alert. Mental status is at baseline. Psychiatric:         Mood and Affect: Mood normal.          Additional Data:     Lab Results: I have personally reviewed pertinent reports.       Results from last 7 days   Lab Units 08/20/23  0052   WBC Thousand/uL 22.44*   HEMOGLOBIN g/dL 12.3   HEMATOCRIT % 37.8   PLATELETS Thousands/uL 346   NEUTROS PCT % 90*   LYMPHS PCT % 3*   MONOS PCT % 6   EOS PCT % 0     Results from last 7 days   Lab Units 08/20/23  0052   SODIUM mmol/L 129*   POTASSIUM mmol/L 4.5   CHLORIDE mmol/L 95*   CO2 mmol/L 28   BUN mg/dL 22   CREATININE mg/dL 1.04   ANION GAP mmol/L 6   CALCIUM mg/dL 9.2   ALBUMIN g/dL 3.5   TOTAL BILIRUBIN mg/dL 1.21*   ALK PHOS U/L 118*   ALT U/L 12   AST U/L 16   GLUCOSE RANDOM mg/dL 169*     Results from last 7 days   Lab Units 08/20/23  0052   INR  1.04     Results from last 7 days   Lab Units 08/20/23  1310 08/20/23  0730   POC GLUCOSE mg/dl 153* 150*         Results from last 7 days   Lab Units 08/20/23  0052   LACTIC ACID mmol/L 1.0   PROCALCITONIN ng/ml 0.21       Imaging: I have personally reviewed pertinent reports. CT abdomen pelvis wo contrast    (Results Pending)          ** Please Note: This note has been constructed using a voice recognition system.  **

## 2023-08-20 NOTE — PLAN OF CARE
Problem: Potential for Falls  Goal: Patient will remain free of falls  Description: INTERVENTIONS:  - Educate patient/family on patient safety including physical limitations  - Instruct patient to call for assistance with activity   - Consult OT/PT to assist with strengthening/mobility   - Keep Call bell within reach  - Keep bed low and locked with side rails adjusted as appropriate  - Keep care items and personal belongings within reach  - Initiate and maintain comfort rounds  - Make Fall Risk Sign visible to staff  - Offer Toileting every 2 Hours, in advance of need  - Initiate/Maintain bed alarm  - Apply yellow socks and bracelet for high fall risk patients  - Consider moving patient to room near nurses station  Outcome: Adequate for Discharge

## 2023-08-20 NOTE — ASSESSMENT & PLAN NOTE
· Baseline: room air during the day and 2 L nasal cannula at night  · Atrovent/Xopenex 3 times daily, albuterol as needed  · Has not followed up with pulmonology outpatient yet.     · Respiratory protocol

## 2023-08-20 NOTE — ASSESSMENT & PLAN NOTE
· [place] [facility] [place] with complaint of fever, found to have tachycardia, tachypnea and leukocytosis meeting sepsis criteria  · WBC 22  · Procalcitonin and lactic WNL   · urinalysis is positive  · CT chest abdomen pelvis shows left hydronephrosis from left UVJ calculus measuring 8 mm  · Continue IV Rocephin  · Blood cultures and urine cultures pending  · Transferred to [address] [place] [address] for cystoscopy, underwent cystoscopy with stent placement today  · As per urology, difficult procedure due to abnormal course of ureter. · Need repeat CT abd/pelvis to evaluate stent position.

## 2023-08-20 NOTE — CASE MANAGEMENT
Case Management Discharge Planning Note    Patient name Melissa Shah  Location [address] [facility] [place] 229/-[id number] MRN [id number]  : 1955 Date 2023       Current Admission Date: 2023  Current Admission Diagnosis:Sepsis [facility])   Patient Active Problem List    Diagnosis Date Noted   • Hyponatremia 2023   • Ambulatory dysfunction 2023   • Constipation 2023   • Insomnia 2023   • Dermatitis 2023   • Iron deficiency anemia 2023   • Encephalopathy acute 2023   • Sepsis ([address]) 2023   • UTI (urinary tract infection) 2023   • Hypertension 2023   • Lymphedema 2023   • Type 2 diabetes mellitus ([address]) 2023   • Obesity 2023   • Chronic respiratory failure with hypoxia and hypercapnia ([address]) 2023   • Elevated troponin 2023   • Congenital buried penis 2023   • Urinary bladder stone 2023   • Hydrocele 2023   • Benign essential hypertension 2022      LOS (days): 0  Geometric Mean LOS (GMLOS) (days):   Days to GMLOS:     OBJECTIVE:  Risk of Unplanned Readmission Score: 17.61         Current admission status: Inpatient   Preferred Pharmacy:   [address] [place] [id number]  Phone: [phone] Fax: [phone]    Primary Care Provider: Stepan Maxwell DO    Primary Insurance: [facility]  Secondary Insurance:     DISCHARGE DETAILS:     CM notified by house supervisor that pt is to be transferred [facility] today. CM put in medical necessity and updated RN to print, sign, and send it with pt. CM saw a 1030am  time in the chart and notified RN as well.  Confirmed by another RN that pt's RN, Marisela Villarreal is aware of  time and need to complete medical necessity

## 2023-08-20 NOTE — ED PROVIDER NOTES
History  Chief Complaint   Patient presents with   • Fever     This 64-year-old male with history of chronic respiratory failure, hypertension, diabetes and obesity was recently discharged from hospital after treatment of pneumonia with sepsis. Went to rehab and then has been back home. He feels tired and weak. Concerned that he overexerted himself today. After returning from the store he sat down in a chair but was too weak to get up from again. Patient does endorse recent urinary urgency and frequency but no dysuria. Felt subjective fever. Denies chest pain, cough, dyspnea, URI symptoms          Prior to Admission Medications   Prescriptions Last Dose Informant Patient Reported? Taking?    albuterol (PROVENTIL HFA,VENTOLIN HFA) 90 mcg/act inhaler   No No   Sig: Inhale 2 puffs every 6 (six) hours as needed for wheezing   ferrous sulfate 325 (65 Fe) mg tablet   Yes No   Sig: Take 325 mg by mouth daily with breakfast   fluocinonide (LIDEX) 0.05 % ointment   No No   Sig: Apply topically 2 (two) times a day   guaiFENesin 1200 MG TB12   No No   Sig: Take 1 tablet (1,200 mg total) by mouth every 12 (twelve) hours   Patient not taking: Reported on 8/20/2023   ipratropium (ATROVENT) 0.02 % nebulizer solution   No No   Sig: Take 2.5 mL (0.5 mg total) by nebulization 3 (three) times a day for 68 doses   levalbuterol (XOPENEX) 1.25 mg/3 mL nebulizer solution   No No   Sig: Take 3 mL (1.25 mg total) by nebulization 3 (three) times a day for 68 doses   lisinopril (ZESTRIL) 10 mg tablet   Yes No   Sig: Take 10 mg by mouth daily   melatonin 3 mg   Yes No   Sig: Take 3 mg by mouth daily at bedtime   senna (SENOKOT) 8.6 MG tablet   Yes No   Sig: Take 2 tablets by mouth daily at bedtime      Facility-Administered Medications: None       Past Medical History:   Diagnosis Date   • Hypertension    • Kidney stone    • Lymphedema        Past Surgical History:   Procedure Laterality Date   • FETAL SURGERY FOR CONGENITAL HERNIA 5 years ago   • FL CYSTOGRAM  8/21/2023   • FL RETROGRADE PYELOGRAM  8/20/2023   • GASTRIC BYPASS      20 years ago   • PANNICULECTOMY     • PENIS SURGERY      penile straightening procedure/evangelina tucks   • RI CYSTO BLADDER W/URETERAL CATHETERIZATION Left 8/20/2023    Procedure: CYSTOSCOPY RETROGRADE PYELOGRAM WITH INSERTION STENT URETERAL;  Surgeon: Mehdi Hendrickson MD;  Location: [facility];  Service: Urology   • TOTAL HIP ARTHROPLASTY      10 years ago       Family History   Problem Relation Age of Onset   • Heart disease Mother      I have reviewed and agree with the history as documented. E-Cigarette/Vaping   • E-Cigarette Use Never User      E-Cigarette/Vaping Substances     Social History     Tobacco Use   • Smoking status: Never   • Smokeless tobacco: Never   Vaping Use   • Vaping Use: Never used   Substance Use Topics   • Alcohol use: Never   • Drug use: Never       Review of Systems   Constitutional: Positive for activity change, fatigue and fever. HENT: Negative for congestion and sore throat. Respiratory: Positive for cough. Negative for shortness of breath. Cardiovascular: Negative for chest pain and palpitations. Gastrointestinal: Negative for abdominal pain, blood in stool, diarrhea and vomiting. Musculoskeletal: Positive for myalgias. Physical Exam  Physical Exam  Vitals and nursing note reviewed. Constitutional:       General: He is not in acute distress. Appearance: He is well-developed. He is obese. He is ill-appearing. He is not diaphoretic. HENT:      Head: Normocephalic and atraumatic. Right Ear: External ear normal.      Left Ear: External ear normal.      Nose: Nose normal.      Mouth/Throat:      Pharynx: No oropharyngeal exudate or posterior oropharyngeal erythema. Eyes:      General: No scleral icterus. Conjunctiva/sclera: Conjunctivae normal.      Pupils: Pupils are equal, round, and reactive to light. Neck:      Vascular: No JVD. Cardiovascular:      Rate and Rhythm: Normal rate and regular rhythm. Pulses: Normal pulses. Heart sounds: Normal heart sounds. No murmur heard. Pulmonary:      Effort: Pulmonary effort is normal.      Breath sounds: Normal breath sounds. Abdominal:      General: Bowel sounds are normal.      Palpations: Abdomen is soft. There is no mass. Tenderness: There is no abdominal tenderness. There is no guarding or rebound. Musculoskeletal:         General: No tenderness. Normal range of motion. Cervical back: Normal range of motion and neck supple. Lymphadenopathy:      Cervical: No cervical adenopathy. Skin:     General: Skin is warm and dry. Capillary Refill: Capillary refill takes less than 2 seconds. Findings: No rash. Neurological:      General: No focal deficit present. Mental Status: He is alert and oriented to person, place, and time. Mental status is at baseline. Cranial Nerves: No cranial nerve deficit. Coordination: Coordination normal.      Deep Tendon Reflexes: Reflexes are normal and symmetric.    Psychiatric:         Mood and Affect: Mood normal.         Behavior: Behavior normal.         Vital Signs  ED Triage Vitals   Temperature Pulse Respirations Blood Pressure SpO2   08/20/23 0056 08/20/23 0056 08/20/23 0047 08/20/23 0056 08/20/23 0056   98.6 °F (37 °C) 99 20 148/73 95 %      Temp Source Heart Rate Source Patient Position - Orthostatic VS BP Location FiO2 (%)   08/20/23 0056 08/20/23 0047 -- -- --   Oral Monitor         Pain Score       08/20/23 0436       No Pain           Vitals:    08/20/23 0400 08/20/23 0436 08/20/23 0734 08/20/23 0736   BP: 134/66 137/70 137/79 137/79   Pulse: 91 90 96 89         Visual Acuity      ED Medications  Medications   cefTRIAXone (ROCEPHIN) IVPB (premix in dextrose) 2,000 mg 50 mL (2,000 mg Intravenous New Bag 8/20/23 0352)   iohexol (OMNIPAQUE) 350 MG/ML injection (SINGLE-DOSE) 100 mL (100 mL Intravenous Given 8/20/23 0356)       Diagnostic Studies  Results Reviewed     Procedure Component Value Units Date/Time    Urine culture [[id number]]  (Abnormal) Collected: 08/20/23 0211    Lab Status: Preliminary result Specimen: Urine, Other Updated: 08/21/23 1127     Urine Culture >100,000 cfu/ml Pseudomonas aeruginosa    Blood culture #2 [[id number]] Collected: 08/20/23 0100    Lab Status: Preliminary result Specimen: Blood from Arm, Right Updated: 08/21/23 0801     Blood Culture No Growth at 24 hrs. Blood culture #1 [[id number]] Collected: 08/20/23 0052    Lab Status: Preliminary result Specimen: Blood from Arm, Right Updated: 08/21/23 0801     Blood Culture No Growth at 24 hrs. Urine Microscopic [[id number]]  (Abnormal) Collected: 08/20/23 0211    Lab Status: Final result Specimen: Urine, Other Updated: 08/20/23 0228     RBC, UA       Field obscured, unable to enumerate     /hpf     WBC, UA Innumerable /hpf      Epithelial Cells Occasional /hpf      Bacteria, UA Innumerable /hpf     FLU/RSV/COVID - if FLU/RSV clinically relevant [[id number]]  (Normal) Collected: 08/20/23 0052    Lab Status: Final result Specimen: Nares from Nose Updated: 08/20/23 0223     SARS-CoV-2 Negative     INFLUENZA A PCR Negative     INFLUENZA B PCR Negative     RSV PCR Negative    Narrative:      FOR PEDIATRIC PATIENTS - copy/paste COVID Guidelines URL to browser: [email or web address]    SARS-CoV-2 assay is a Nucleic Acid Amplification assay intended for the  qualitative detection of nucleic acid from SARS-CoV-2 in nasopharyngeal  swabs. Results are for the presumptive identification of SARS-CoV-2 RNA. Positive results are indicative of infection with SARS-CoV-2, the virus  causing COVID-19, but do not rule out bacterial infection or co-infection  with other viruses.  Laboratories within the [facility] and its  territories are required to report all positive results to the appropriate  public health authorities. Negative results do not preclude SARS-CoV-2  infection and should not be used as the sole basis for treatment or other  patient management decisions. Negative results must be combined with  clinical observations, patient history, and epidemiological information. This test has not been FDA cleared or approved. This test has been authorized by FDA under an Emergency Use Authorization  (EUA). This test is only authorized for the duration of time the  declaration that circumstances exist justifying the authorization of the  emergency use of an in vitro diagnostic tests for detection of SARS-CoV-2  virus and/or diagnosis of COVID-19 infection under section 564(b)(1) of  the Act, 21 U. S.C. 554THZ-4(G)(9), unless the authorization is terminated  or revoked sooner. The test has been validated but independent review by FDA  and CLIA is pending. Test performed using [employer]: This RT-PCR assay targets N2,  a region unique to SARS-CoV-2. A conserved region in the E-gene was chosen  for pan-Sarbecovirus detection which includes SARS-CoV-2. According to CMS-2020-01-R, this platform meets the definition of high-throughput technology.     UA w Reflex to Microscopic w Reflex to Culture [[id number]]  (Abnormal) Collected: 08/20/23 0211    Lab Status: Final result Specimen: Urine, Other Updated: 08/20/23 0222     Color, UA Yellow     Clarity, UA Cloudy     Specific Gravity, UA 1.010     pH, UA 6.5     Leukocytes, UA Large     Nitrite, UA Negative     Protein, UA 30 (1+) mg/dl      Glucose, UA Negative mg/dl      Ketones, UA Negative mg/dl      Urobilinogen, UA 4.0 mg/dl      Bilirubin, UA Negative     Occult Blood, UA Small    Procalcitonin [[id number]]  (Normal) Collected: 08/20/23 0052    Lab Status: Final result Specimen: Blood from Arm, Right Updated: 08/20/23 0212     Procalcitonin 0.21 ng/ml     Lactic acid [[id number]]  (Normal) Collected: 08/20/23 0052    Lab Status: Final result Specimen: Blood from Arm, Right Updated: 08/20/23 0204     LACTIC ACID 1.0 mmol/L     Narrative:      Result may be elevated if tourniquet was used during collection. CBC and differential [[id number]]  (Abnormal) Collected: 08/20/23 0052    Lab Status: Final result Specimen: Blood from Arm, Right Updated: 08/20/23 0204     WBC 22.44 Thousand/uL      RBC 4.10 Million/uL      Hemoglobin 12.3 g/dL      Hematocrit 37.8 %      MCV 92 fL      MCH 30.0 pg      MCHC 32.5 g/dL      RDW 13.2 %      MPV 8.6 fL      Platelets 311 Thousands/uL      nRBC 0 /100 WBCs      Neutrophils Relative 90 %      Immat GRANS % 1 %      Lymphocytes Relative 3 %      Monocytes Relative 6 %      Eosinophils Relative 0 %      Basophils Relative 0 %      Neutrophils Absolute 20.14 Thousands/µL      Immature Grans Absolute 0.24 Thousand/uL      Lymphocytes Absolute 0.70 Thousands/µL      Monocytes Absolute 1.31 Thousand/µL      Eosinophils Absolute 0.01 Thousand/µL      Basophils Absolute 0.04 Thousands/µL     Narrative: This is an appended report. These results have been appended to a previously verified report.     Smear Review(Phlebs Do Not Order) [[id number]] Collected: 08/20/23 0052    Lab Status: Final result Specimen: Blood from Arm, Right Updated: 08/20/23 0204     RBC Morphology Normal     Platelet Estimate Adequate    Comprehensive metabolic panel [[id number]]  (Abnormal) Collected: 08/20/23 0052    Lab Status: Final result Specimen: Blood from Arm, Right Updated: 08/20/23 0202     Sodium 129 mmol/L      Potassium 4.5 mmol/L      Chloride 95 mmol/L      CO2 28 mmol/L      ANION GAP 6 mmol/L      BUN 22 mg/dL      Creatinine 1.04 mg/dL      Glucose 169 mg/dL      Calcium 9.2 mg/dL      AST 16 U/L      ALT 12 U/L      Alkaline Phosphatase 118 U/L      Total Protein 7.7 g/dL      Albumin 3.5 g/dL      Total Bilirubin 1.21 mg/dL      eGFR 72 ml/min/1.73sq m     Narrative:      [place] guidelines for Chronic Kidney Disease (CKD):   •  Stage 1 with normal or high GFR (GFR > 90 mL/min/1.73 square meters)  •  Stage 2 Mild CKD (GFR = 60-89 mL/min/1.73 square meters)  •  Stage 3A Moderate CKD (GFR = 45-59 mL/min/1.73 square meters)  •  Stage 3B Moderate CKD (GFR = 30-44 mL/min/1.73 square meters)  •  Stage 4 Severe CKD (GFR = 15-29 mL/min/1.73 square meters)  •  Stage 5 End Stage CKD (GFR <15 mL/min/1.73 square meters)  Note: GFR calculation is accurate only with a steady state creatinine    Lipase [[id number]]  (Normal) Collected: 08/20/23 0052    Lab Status: Final result Specimen: Blood from Arm, Right Updated: 08/20/23 0202     Lipase 18 u/L     Protime-INR [[id number]]  (Normal) Collected: 08/20/23 0052    Lab Status: Final result Specimen: Blood from Arm, Right Updated: 08/20/23 0201     Protime 14.4 seconds      INR 1.04    APTT [[id number]]  (Normal) Collected: 08/20/23 0052    Lab Status: Final result Specimen: Blood from Arm, Right Updated: 08/20/23 0201     PTT 30 seconds                  FL retrograde pyelogram   Final Result by Tahmina Hernandez DO (08/21 6965)      Fluoroscopic guidance provided for left retrograde pyelogram and stent placement. Please see procedure report for further details. Workstation performed: [id number]         CT chest abdomen pelvis w contrast   Final Result by Homero Harada, DO (08/20 7663)      No clear identifiable source of infection. Mild left hydronephrosis, new from prior. Stable left posterolateral bladder hyperdensity which may represent a left ureterovesicular junction calculus which measures 8 mm. Cholelithiasis. See full report for additional findings.          Workstation performed: [id number]                    Procedures  ECG 12 Lead Documentation Only    Date/Time: 8/20/2023 12:51 AM    Performed by: Dirk Zepeda DO  Authorized by: Dirk Zepeda DO    ECG reviewed by me, the ED Provider: yes    Patient location:  ED  Previous ECG:     Previous ECG:  Compared to current    Comparison ECG info:  Premature atrial complexes and incomplete right bundle branch block no longer present. Similarity:  Changes noted    Comparison to cardiac monitor: Yes    Rate:     ECG rate assessment: normal    Rhythm:     Rhythm: sinus rhythm    Ectopy:     Ectopy: none    QRS:     QRS axis:  Right    QRS intervals:  Normal  Conduction:     Conduction: normal    ST segments:     ST segments:  Normal  T waves:     T waves: inverted      Inverted:  V2, V3 and V4  Other findings:     Other findings: prolonged qTc interval               ED Course                            Initial Sepsis Screening     Row Name 08/20/23 0218                Is the patient's history suggestive of a new or worsening infection? Yes (Proceed)  -MF        Suspected source of infection suspect infection, source unknown  -MF        Indicate SIRS criteria Tachycardia > 90 bpm;Leukocytosis (WBC > 97170 IJL) OR Leukopenia (WBC <4000 IJL) OR Bandemia (WBC >10% bands)  -MF        Are two or more of the above signs & symptoms of infection both present and new to the patient? Yes (Proceed)  -MF        Assess for evidence of organ dysfunction: Are any of the below criteria present within 6 hours of suspected infection and SIRS criteria that are NOT considered to be chronic conditions? --              User Key  (r) = Recorded By, (t) = Taken By, (c) = Cosigned By    [address] [place] [address] Name Provider Type     Michael Huerta DO Physician                SBIRT 22yo+    Flowsheet Row Most Recent Value   Initial Alcohol Screen: US AUDIT-C     1. How often do you have a drink containing alcohol? 0 Filed at: 08/20/2023 0057   2. How many drinks containing alcohol do you have on a typical day you are drinking? 0 Filed at: 08/20/2023 0057   3a. Male UNDER 65: How often do you have five or more drinks on one occasion? 0 Filed at: 08/20/2023 0057   3b. FEMALE Any Age, or MALE 65+: How often do you have 4 or more drinks on one occassion?  0 Filed at: 08/20/2023 0057   Audit-C Score 0 Filed at: 08/20/2023 9306   MARYURI: How many times in the past year have you. .. Used an illegal drug or used a prescription medication for non-medical reasons? Never Filed at: 08/20/2023 0057                    Medical Decision Making  64-year-old male with profound fatigue, recurrent urinary symptoms status post recent admission for sepsis with pneumonia. Concern for sepsis, UTI, pneumonia, electrode abnormality, acute kidney injury, dehydration. No evidence for meningitis, ACS or acute surgical abdominal emergency. Will check labs and imaging. Sepsis work-up ordered. Patient with apparent UTI with sepsis. Will start IV Rocephin and admit for further treatment. Remained stable here with good blood pressures. Sepsis [facility]): acute illness or injury  Urinary tract infection: acute illness or injury  Amount and/or Complexity of Data Reviewed  Independent Historian: spouse and EMS  External Data Reviewed: labs, ECG and notes. Labs: ordered. Radiology: ordered. ECG/medicine tests: ordered and independent interpretation performed. Discussion of management or test interpretation with external provider(s): SLIM AP    Risk  Prescription drug management. Decision regarding hospitalization.           Disposition  Final diagnoses:   Sepsis ([address])   Urinary tract infection     Time reflects when diagnosis was documented in both MDM as applicable and the Disposition within this note     Time User Action Codes Description Comment    8/20/2023  2:21 AM Santa Rye Add [A41.9] Sepsis ([address])     8/20/2023  3:29 AM [place] Add [N39.0] Urinary tract infection     8/20/2023  6:38 AM Amelia Sher Add [N13.2] Hydronephrosis with urinary obstruction due to ureteral calculus       ED Disposition     ED Disposition   Admit    Condition   Stable    Date/Time   Sun Aug 20, 2023  3:41 AM    Comment   Case was discussed with NABILA GRISSOM and the patient's admission status was agreed to be Admission Status: inpatient status to the service of Dr. Zoe Bella . Follow-up Information    None         Discharge Medication List as of 8/20/2023 11:14 AM      CONTINUE these medications which have NOT CHANGED    Details   albuterol (PROVENTIL HFA,VENTOLIN HFA) 90 mcg/act inhaler Inhale 2 puffs every 6 (six) hours as needed for wheezing, Starting Fri 8/11/2023, Normal      ferrous sulfate 325 (65 Fe) mg tablet Take 325 mg by mouth daily with breakfast, Historical Med      fluocinonide (LIDEX) 0.05 % ointment Apply topically 2 (two) times a day, Starting Tue 7/25/2023, Normal      guaiFENesin 1200 MG TB12 Take 1 tablet (1,200 mg total) by mouth every 12 (twelve) hours, Starting Tue 7/25/2023, Normal      ipratropium (ATROVENT) 0.02 % nebulizer solution Take 2.5 mL (0.5 mg total) by nebulization 3 (three) times a day for 68 doses, Starting u 7/27/2023, Until Sat 8/19/2023, No Print      lisinopril (ZESTRIL) 10 mg tablet Take 10 mg by mouth daily, Historical Med      melatonin 3 mg Take 3 mg by mouth daily at bedtime, Historical Med      senna (SENOKOT) 8.6 MG tablet Take 2 tablets by mouth daily at bedtime, Historical Med         STOP taking these medications       levalbuterol (XOPENEX) 1.25 mg/3 mL nebulizer solution Comments:   Reason for Stopping:               No discharge procedures on file.     PDMP Review     None          ED Provider  Electronically Signed by           Wilma Kearney DO  08/22/23 3383

## 2023-08-20 NOTE — CONSULTS
UROLOGY CONSULT NOTE     CHIEF COMPLAINT   Dio Ruiz is a 69 y.o. male with a complaint of No chief complaint on file. History of Present Illness:   Dio Ruiz is a 69 y.o. male presenting at an outside campus with leukocytosis. CT scan demonstrated a left ureteral stone with hydronephrosis. 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  He has difficulty urinating now with frequency and spraying of urine. Patient was transferred for surgical intervention given his leukocytosis and obstructing stone.     Past Medical History:     Past Medical History:   Diagnosis Date   • Hypertension    • Kidney stone    • Lymphedema        PAST SURGICAL HISTORY:     Past Surgical History:   Procedure Laterality Date   • FETAL SURGERY FOR CONGENITAL HERNIA      5 years ago   • GASTRIC BYPASS      20 years ago   • PANNICULECTOMY     • PENIS SURGERY      penile straightening procedure/evangelina tucks   • TOTAL HIP ARTHROPLASTY      10 years ago       CURRENT MEDICATIONS:     Current Facility-Administered Medications   Medication Dose Route Frequency Provider Last Rate Last Admin   • ceFAZolin (ANCEF) 3,000 mg in dextrose 5% 100 ml IVPB  2,000 mg Intravenous On Call To OR Meng Sher PA-C           ALLERGIES:     Allergies   Allergen Reactions   • Morphine Hallucinations       SOCIAL HISTORY:     Social History     Socioeconomic History   • Marital status:      Spouse name: Not on file   • Number of children: Not on file   • Years of education: Not on file   • Highest education level: Not on file   Occupational History   • Not on file   Tobacco Use   • Smoking status: Never   • Smokeless tobacco: Never   Vaping Use   • Vaping Use: Never used   Substance and Sexual Activity   • Alcohol use: Never   • Drug use: Never   • Sexual activity: Not Currently   Other Topics Concern   • Not on file   Social History Narrative   • Not on file     Social Determinants of Health     Financial Resource Strain: Not on file   Food Insecurity: No Food Insecurity (7/18/2023)    Hunger Vital Sign    • Worried About Running Out of Food in the Last Year: Never true    • Ran Out of Food in the Last Year: Never true   Transportation Needs: No Transportation Needs (7/18/2023)    PRAPARE - Transportation    • Lack of Transportation (Medical): No    • Lack of Transportation (Non-Medical): No   Physical Activity: Not on file   Stress: Not on file   Social Connections: Not on file   Intimate Partner Violence: Not on file   Housing Stability: Low Risk  (7/18/2023)    Housing Stability Vital Sign    • Unable to Pay for Housing in the Last Year: No    • Number of Places Lived in the Last Year: 1    • Unstable Housing in the Last Year: No       SOCIAL HISTORY:     Family History   Problem Relation Age of Onset   • Heart disease Mother        REVIEW OF SYSTEMS:     Review of Systems   Constitutional: Negative for chills and fever. HENT: Negative for ear pain and sore throat. Eyes: Negative for pain and visual disturbance. Respiratory: Negative for cough and shortness of breath. Cardiovascular: Negative for chest pain and palpitations. Gastrointestinal: Positive for abdominal pain. Negative for vomiting. Genitourinary: Positive for difficulty urinating, dysuria and frequency. Negative for hematuria. Musculoskeletal: Negative for arthralgias and back pain. Skin: Negative for color change and rash. Neurological: Negative for seizures and syncope. All other systems reviewed and are negative. PHYSICAL EXAM:     99.4 HR 99 /76 RR 20 O2 Sat 97RA    Physical Exam  Vitals reviewed. Constitutional:       General: He is not in acute distress. Appearance: He is well-developed. He is obese. He is ill-appearing and diaphoretic. He is not toxic-appearing. HENT:      Head: Normocephalic and atraumatic. Eyes:      Pupils: Pupils are equal, round, and reactive to light. Cardiovascular:      Rate and Rhythm: Normal rate. Pulmonary:      Effort: Pulmonary effort is normal. No respiratory distress. Breath sounds: Normal breath sounds. Abdominal:      General: There is no distension. Palpations: Abdomen is soft. Tenderness: There is no abdominal tenderness. Musculoskeletal:         General: Normal range of motion. Cervical back: Normal range of motion and neck supple. Skin:     General: Skin is warm. Neurological:      Mental Status: He is alert and oriented to person, place, and time. Psychiatric:         Behavior: Behavior normal.         LABS:     CBC:   Lab Results   Component Value Date    WBC 22.44 (H) 2023    HGB 12.3 2023    HCT 37.8 2023    MCV 92 2023     2023       BMP:   Lab Results   Component Value Date    GLUCOSE 130 2023    CALCIUM 9.2 2023    K 4.5 2023    CO2 28 2023    CL 95 (L) 2023    BUN 22 2023    CREATININE 1.04 2023     IMAGIN/20/23  CT CHEST, ABDOMEN AND PELVIS WITH IV CONTRAST     INDICATION:   Sepsis  Sepsis, unclear etiology.     COMPARISON: CT chest abdomen pelvis dated 2023     TECHNIQUE: CT examination of the chest, abdomen and pelvis was performed. Multiplanar 2D reformatted images were created from the source data.     This examination, like all CT scans performed in the [facility], was performed utilizing techniques to minimize radiation dose exposure, including the use of iterative reconstruction and automated exposure control. Radiation dose length   product (DLP) for this visit:  2403.85 mGy-cm     IV Contrast:  100 mL of iohexol (OMNIPAQUE)  Enteric Contrast: Enteric contrast was not administered.     FINDINGS:     CHEST     LUNGS: Mild dependent atelectasis.     PLEURA:  Unremarkable.     HEART/GREAT VESSELS: Coronary atherosclerosis.  No thoracic aortic aneurysm.     MEDIASTINUM AND KARIN: Shotty mediastinal lymph nodes.     CHEST WALL AND LOWER NECK:  Unremarkable.     ABDOMEN     LIVER/BILIARY TREE:  Unremarkable.     GALLBLADDER: Cholelithiasis.     SPLEEN: Accessory splenule.     PANCREAS: Mild atrophy.     ADRENAL GLANDS:  Unremarkable.     KIDNEYS/URETERS: There is mild left hydronephrosis. Possible left ureterovesicular junction calculus measuring 8 mm as discussed below. Part of the left ureter appears to protrude into the patient's left inguinal hernia, of uncertain clinical   significance.     STOMACH AND BOWEL:  Unremarkable.     APPENDIX:  No findings to suggest appendicitis.     ABDOMINOPELVIC CAVITY:  No ascites. No pneumoperitoneum. No lymphadenopathy.     VESSELS: Abdominal aortic atherosclerosis without aneurysm.     PELVIS     REPRODUCTIVE ORGANS:  Unremarkable for patient's age.     URINARY BLADDER: There is a left-sided posterior lateral bladder focal hyperdensity again seen (series 2 image 228, 604 image 101) which either represents a bladder calcification in the bladder wall or an 8 mm calculus at the left ureterovesicular   junction.     ABDOMINAL WALL/INGUINAL REGIONS: Fat-containing left inguinal hernia which appears to contain part of the distal left ureter.     OSSEOUS STRUCTURES:  No acute fracture or destructive osseous lesion. Degenerative changes of the spine. Bilateral hip arthroplasties.     IMPRESSION:     No clear identifiable source of infection. Mild left hydronephrosis, new from prior. Stable left posterolateral bladder hyperdensity which may represent a left ureterovesicular junction calculus which measures 8 mm. Cholelithiasis. See full report for additional findings. ASSESSMENT:     69 y.o. male  with LEFT distal ureteral stone    PLAN:     I discussed with the patient and his power of  my concern about his leukocytosis of 22,000.   Although the patient is not actively febrile he did not hemodynamically unstable, I do feel that urgent decompression of his kidney stone is prudent. Patient has had prior urosepsis. Decompression of the kidney around the kidney stone will allow for antibiotic therapy and improvement of potentially infectious condition. Patient understands that is not permanent and he will require a secondary surgical procedure at a later date and time. Given the patient's history of dysuria and urinary complaints, I will likely leave a Sung catheter after surgery. Patient reports a history of buried phallus and if there are any urethral strictures or need to dilate, this may mean longer duration urethral catheterization. Risk and benefits of the surgery were discussed and described in both the patient is power of  signed consent.

## 2023-08-20 NOTE — ASSESSMENT & PLAN NOTE
· Urine sample   · UA: Large amount of leukocytes, negative nitrite   · Micro: Large amount of WBC and bacteria  · Urine culture pending   · Urinary retention protocol   · IV ceftriaxone

## 2023-08-20 NOTE — ASSESSMENT & PLAN NOTE
· Home regimen: Lisinopril 10 mg daily currently on hold due to concern for DEANDRE in the setting of hydronephrosis obstructive uropathy

## 2023-08-20 NOTE — ASSESSMENT & PLAN NOTE
· Discharged from [address] [place] [address] on 8/11. Per patient rehab was sucessful at [place] and has been feeling stronger. · Home PT is to start later this week. Patient did some exercises on his own during the day on 8/19.  Suspects he pushed himself too far thus resulting in his increased weakness   · Found to be meeting sepsis with UTI   · Fall precautions   · Consult PT/OT and CM

## 2023-08-20 NOTE — ASSESSMENT & PLAN NOTE
· Discharged from [address] [place] [address] on 8/11. Per patient rehab was sucessful at [place] and has been feeling stronger. · Home PT is to start later this week. Patient did some exercises on his own during the day on 8/19.  Suspects he pushed himself too far thus resulting in his increased weakness   · Found to be meeting sepsis with UTI   · Fall precautions   · Consult PT/OT and CM

## 2023-08-20 NOTE — ASSESSMENT & PLAN NOTE
Lab Results   Component Value Date    HGBA1C 6.5 (H) 07/17/2023       Recent Labs     08/20/23  0730 08/20/23  1310   POCGLU 150* 153*       Blood Sugar Average: Last 72 hrs:  (P) 153     Lab Results   Component Value Date    HGBA1C 6.5 (H) 07/17/2023     · Home regimen: Diet controlled  · SSI

## 2023-08-20 NOTE — DISCHARGE SUMMARY
[facility]  Discharge- Raul Euceda 1955, 69 y.o. male MRN: [id number]  Unit/Bed#: -[facility] Encounter: [id number]  Primary Care Provider: Mandeep Davis DO   Date and time admitted to hospital: 8/20/2023 12:44 AM    * Sepsis [facility])  Assessment & Plan  · Presents from home due to low-grade fever. Recent admission from 7/17-7/27 due to PNA, sepsis and hypoxia. Discharged to [address] [place] [address]. He was discharged back to home from there on 8/11. · SIRS: WBC 22.44, tachycardia, tachypnea  · Procalcitonin and lactic WNL  · SOI: UTI  · Urine sample  · UA: Large amount of leukocytes, negative nitrite  · Micro: Large amount of WBC and bacteria  · CT chest abdomen pelvis   · No clear identifiable source of infection. · Mild left hydronephrosis, new from prior. Stable left posterolateral bladder hyperdensity which may represent a left ureterovesicular junction calculus which measures 8 mm. · Cholelithiasis. · COVID/flu/RSV negative  · Abx: Received IV ceftriaxone in the ER. Continue   · Recent UTI about 1 month ago. Admitted at [facility]. DC on cefdinir and then was readmitted at [address] [place] [address] on 7/17. · Blood cultures and urine cultures pending  · Consult Urology   · Urology recommending cysto and possible stent placement. Transfer to \[facility]\""     Ambulatory dysfunction  Assessment & Plan  · Discharged from [address] [place] [address] on 8/11. Per patient rehab was sucessful at [place] and has been feeling stronger. · Home PT is to start later this week. Patient did some exercises on his own during the day on 8/19. Suspects he pushed himself too far thus resulting in his increased weakness   · Found to be meeting sepsis with UTI   · Fall precautions   · Consult PT/OT and CM     Hyponatremia  Assessment & Plan  · Na 129 on admission   · . Na correction 130  · Dec intake.    · Continue to monitor     UTI (urinary tract infection)  Assessment & Plan  · Urine sample   · UA: Large amount of leukocytes, negative nitrite   · Micro: Large amount of WBC and bacteria  · Urine culture pending   · Urinary retention protocol   · IV ceftriaxone     Chronic respiratory failure with hypoxia and hypercapnia (HCC)  Assessment & Plan  · Baseline: room air during the day and 2 L nasal cannula at night  · During recent admission recommended to wear CPAP mask at night but patient unable to tolerate  · Home regimen: Mucinex 1200 mg twice daily, Atrovent/Xopenex 3 times daily, albuterol as needed  · Has not followed up with pulmonology outpatient yet. Encouraged to do so. · Respiratory protocol    Benign essential hypertension  Assessment & Plan  · Home regimen: Lisinopril 10 mg daily    Obesity  Assessment & Plan  Body mass index is 41.12 kg/m². · Consult nutrition    Type 2 diabetes mellitus [facility])  Assessment & Plan    Lab Results   Component Value Date    HGBA1C 6.5 (H) 07/17/2023     · Home regimen: Diet controlled  · SSI    Discharging Physician / Practitioner: Melony Domínguez PA-C  PCP: Ledy Delvalle DO  Admission Date:   Admission Orders (From admission, onward)     Ordered        08/20/23 0341  INPATIENT ADMISSION  Once                      Discharge Date: 08/20/23    Medical Problems     Resolved Problems  Date Reviewed: 8/20/2023   None         Consultations During Hospital Stay:  · Urology     Procedures Performed:   · None     Significant Findings / Test Results:   · CT abdomen pelvis   · No clear identifiable source of infection. · Mild left hydronephrosis, new from prior. Stable left posterolateral bladder hyperdensity which may represent a left ureterovesicular junction calculus which measures 8 mm. · Cholelithiasis. · Urine infection       Test Results Pending at Discharge (will require follow up): · None. Transfer to [facility]       Complications:  Requiring transfer to [facility] for cysto and possible stent placement.      Reason for Admission: Sepsis     Hospital Course:     Brianda Kiran is a 69 y.o. male patient who originally presented to the hospital on 8/20/2023 due to weakness and low grade fevers. Met sepsis criteria in the ER with WBC 22.44, tachycardia, and tachypnea. Found to have urine infection on labs. CT abdomen pelvis performed showing mild left hydronephrosis and possible 8 mm UVJ calculus. Started on IV antibiotics. Seen in consult by urology. Recommending transfer to [facility] for cysto and possible stent placement. Please see above list of diagnoses and related plan for additional information. Condition at Discharge: fair     Discharge Day Visit / Exam:     * Please refer to separate progress note for these details *    Discharge instructions/Information to patient and family:   See after visit summary for information provided to patient and family. Provisions for Follow-Up Care:  See after visit summary for information related to follow-up care and any pertinent home health orders. Disposition:     Transfer to [facility]     Planned Readmission: Yes at [facility] AND [facility]      Discharge Statement:  I spent 50 minutes discharging the patient. This time was spent on the day of discharge. I had direct contact with the patient on the day of discharge. Greater than 50% of the total time was spent examining patient, answering all patient questions, arranging and discussing plan of care with patient as well as directly providing post-discharge instructions. Additional time then spent on discharge activities. Discharge Medications:  See after visit summary for reconciled discharge medications provided to patient and family.       ** Please Note: This note has been constructed using a voice recognition system **

## 2023-08-20 NOTE — ASSESSMENT & PLAN NOTE
· Presents from home due to low-grade fever. Recent admission from 7/17-7/27 due to PNA, sepsis and hypoxia. Discharged to [address] [place] [address]. He was discharged back to home from there on 8/11. · SIRS: WBC 22.44, tachycardia, tachypnea  · Procalcitonin and lactic WNL  · SOI: UTI  · Urine sample  · UA: Large amount of leukocytes, negative nitrite  · Micro: Large amount of WBC and bacteria  · CT chest abdomen pelvis   · No clear identifiable source of infection. · Mild left hydronephrosis, new from prior. Stable left posterolateral bladder hyperdensity which may represent a left ureterovesicular junction calculus which measures 8 mm. · Cholelithiasis. · COVID/flu/RSV negative  · Abx: Received IV ceftriaxone in the ER. Continue   · Recent UTI about 1 month ago. Admitted at [facility]. DC on cefdinir and then was readmitted at [address] [place] [address] on 7/17. · Blood cultures and urine cultures pending  · Consult Urology   · Urology recommending cysto and possible stent placement.  Transfer to [facility]

## 2023-08-20 NOTE — PLAN OF CARE
Problem: MOBILITY - ADULT  Goal: Maintain or return to baseline ADL function  Description: INTERVENTIONS:  -  Assess patient's ability to carry out ADLs; assess patient's baseline for ADL function and identify physical deficits which impact ability to perform ADLs (bathing, care of mouth/teeth, toileting, grooming, dressing, etc.)  - Assess/evaluate cause of self-care deficits   - Assess range of motion  - Assess patient's mobility; develop plan if impaired  - Assess patient's need for assistive devices and provide as appropriate  - Encourage maximum independence but intervene and supervise when necessary  - Involve family in performance of ADLs  - Assess for home care needs following discharge   - Consider OT consult to assist with ADL evaluation and planning for discharge  - Provide patient education as appropriate  8/20/2023 1811 by Serge Nolan RN  Outcome: Progressing  8/20/2023 1810 by Serge Nolan RN  Outcome: Progressing  Goal: Maintains/Returns to pre admission functional level  Description: INTERVENTIONS:  - Perform BMAT or MOVE assessment daily.   - Set and communicate daily mobility goal to care team and patient/family/caregiver.    - Collaborate with rehabilitation services on mobility goals if consulted  - Record patient progress and toleration of activity level   8/20/2023 1811 by Serge Nolan RN  Outcome: Progressing  8/20/2023 1810 by Serge Nolan RN  Outcome: Progressing     Problem: PAIN - ADULT  Goal: Verbalizes/displays adequate comfort level or baseline comfort level  Description: Interventions:  - Encourage patient to monitor pain and request assistance  - Assess pain using appropriate pain scale  - Administer analgesics based on type and severity of pain and evaluate response  - Implement non-pharmacological measures as appropriate and evaluate response  - Consider cultural and social influences on pain and pain management  - Notify physician/advanced practitioner if interventions unsuccessful or patient reports new pain  8/20/2023 1811 by Serge Nolan RN  Outcome: Progressing  8/20/2023 1810 by Serge Nolan RN  Outcome: Progressing     Problem: INFECTION - ADULT  Goal: Absence or prevention of progression during hospitalization  Description: INTERVENTIONS:  - Assess and monitor for signs and symptoms of infection  - Monitor lab/diagnostic results  - Monitor all insertion sites, i.e. indwelling lines, tubes, and drains  - Monitor endotracheal if appropriate and nasal secretions for changes in amount and color  - [place] appropriate cooling/warming therapies per order  - Administer medications as ordered  - Instruct and encourage patient and family to use good hand hygiene technique  - Identify and instruct in appropriate isolation precautions for identified infection/condition  8/20/2023 1811 by Serge Nolan RN  Outcome: Progressing  8/20/2023 1810 by Serge Nolan RN  Outcome: Progressing  Goal: Absence of fever/infection during neutropenic period  Description: INTERVENTIONS:  - Monitor WBC    8/20/2023 1811 by Serge Nolan RN  Outcome: Progressing  8/20/2023 1810 by Serge Nolan RN  Outcome: Progressing     Problem: SAFETY ADULT  Goal: Patient will remain free of falls  Description: INTERVENTIONS:  - Educate patient/family on patient safety including physical limitations  - Instruct patient to call for assistance with activity   - Consult OT/PT to assist with strengthening/mobility   - Keep Call bell within reach  - Keep bed low and locked with side rails adjusted as appropriate  - Keep care items and personal belongings within reach  - Initiate and maintain comfort rounds  - Make Fall Risk Sign visible to staff  - Apply yellow socks and bracelet for high fall risk patients  - Consider moving patient to room near nurses station  8/20/2023 1811 by Serge Nolan RN  Outcome: Progressing  8/20/2023 1810 by Serge Nolan RN  Outcome: Progressing     Problem: DISCHARGE PLANNING  Goal: Discharge to home or other facility with appropriate resources  Description: INTERVENTIONS:  - Identify barriers to discharge w/patient and caregiver  - Arrange for needed discharge resources and transportation as appropriate  - Identify discharge learning needs (meds, wound care, etc.)  - Arrange for interpretive services to assist at discharge as needed  - Refer to Case Management Department for coordinating discharge planning if the patient needs post-hospital services based on physician/advanced practitioner order or complex needs related to functional status, cognitive ability, or social support system  8/20/2023 1811 by Gibson Cabezas RN  Outcome: Progressing  8/20/2023 1810 by Gibson Cabezas RN  Outcome: Progressing     Problem: Knowledge Deficit  Goal: Patient/family/caregiver demonstrates understanding of disease process, treatment plan, medications, and discharge instructions  Description: Complete learning assessment and assess knowledge base.   Interventions:  - Provide teaching at level of understanding  - Provide teaching via preferred learning methods  8/20/2023 1811 by Gibson Cabezas RN  Outcome: Progressing  8/20/2023 1810 by Gibson Cabezas RN  Outcome: Progressing

## 2023-08-20 NOTE — ANESTHESIA POSTPROCEDURE EVALUATION
Post-Op Assessment Note    CV Status:  Stable  Pain Score: 0    Pain management: adequate     Mental Status:  Alert and awake   Hydration Status:  Euvolemic   PONV Controlled:  Controlled   Airway Patency:  Patent      Post Op Vitals Reviewed: Yes      Staff: CRNA         No notable events documented.     /64 (08/20/23 1303)    Temp 99.7 °F (37.6 °C) (08/20/23 1303)    Pulse 97 (08/20/23 1303)   Resp 18 (08/20/23 1303)    SpO2 97 % (08/20/23 1303)

## 2023-08-20 NOTE — ASSESSMENT & PLAN NOTE
· Discharged from [address] [place] [address] on 8/11. Per patient rehab was sucessful at [place] and has been feeling stronger.    · Fall precautions   · Consult PT/OT and CM

## 2023-08-20 NOTE — ASSESSMENT & PLAN NOTE
· Presents from home due to low-grade fever. Recent admission from 7/17-7/27 due to PNA, sepsis and hypoxia. Discharged to [address] [place] [address]. He was discharged back to home from there on 8/11. · SIRS: WBC 22.44, tachycardia, tachypnea  · Procalcitonin and lactic WNL  · SOI: UTI  · Urine sample  · UA: Large amount of leukocytes, negative nitrite  · Micro: Large amount of WBC and bacteria  · CT chest abdomen pelvis   · No clear identifiable source of infection. · Mild left hydronephrosis, new from prior. Stable left posterolateral bladder hyperdensity which may represent a left ureterovesicular junction calculus which measures 8 mm. · Cholelithiasis. · COVID/flu/RSV negative  · Abx: Received IV ceftriaxone in the ER. Continue   · Recent UTI about 1 month ago. Admitted at [facility]. DC on cefdinir and then was readmitted at [address] [place] [address] on 7/17.    · Blood cultures and urine cultures pending  · Consult Urology

## 2023-08-20 NOTE — TRANSPORTATION MEDICAL NECESSITY
Section I - General Information    Name of Patient: Wesley Snow                 : 1955    Medicare #: [id number]  Transport Date: 23 (PCS is valid for round trips on this date and for all repetitive trips in the 60-day range as noted below.)  Origin: [facility]                                                         Destination: [address]  Is the pt's stay covered under Medicare Part A (PPS/DRG)   []     Closest appropriate facility? If no, why is transport to more distant facility required? Yes  If hospice pt, is this transport related to pt's terminal illness? NA       Section II - Medical Necessity Questionnaire  Ambulance transportation is medically necessary only if other means of transport are contraindicated or would be potentially harmful to the patient. To meet this requirement, the patient must either be "bed confined" or suffer from a condition such that transport by means other than ambulance is contraindicated by the patient's condition. The following questions must be answered by the medical professional signing below for this form to be valid:    1)  Describe the MEDICAL CONDITION (physical and/or mental) of this patient  [address] that requires the patient to be transported in an ambulance and why transport by other means is contraindicated by the patient's condition:     2) Is the patient "bed confined" as defined below? No  To be "be confined" the patient must satisfy all three of the following conditions: (1) unable to get up from bed without Assistance; AND (2) unable to ambulate; AND (3) unable to sit in a chair or wheelchair. 3) Can this patient safely be transported by car or wheelchair van (i.e., seated during transport without a medical attendant or monitoring)?    No    4) In addition to completing questions 1-3 above, please check any of the following conditions that apply*:   *Note: supporting documentation for any boxes checked must be maintained in the patient's medical records. If hosp-hosp transfer, describe services needed at 2nd facility not available at 1st facility? Medical attendant required   Requires oxygen-unable to self administer      Section III - Signature of Physician or Healthcare Professional  I certify that the above information is true and correct based on my evaluation of this patient, and represent that the patient requires transport by ambulance and that other forms of transport are contraindicated. I understand that this information will be used by the Centers for Medicare and Medicaid Services (CMS) to support the determination of medical necessity for ambulance services, and I represent that I have personal knowledge of the patient's condition at time of transport. []  If this box is checked, I also certify that the patient is physically or mentally incapable of signing the ambulance service's claim and that the institution with which I am affiliated has furnished care, services, or assistance to the patient. My signature below is made on behalf of the patient pursuant to 42 CFR §424.36(b)(4). In accordance with 42 CFR §424.37, the specific reason(s) that the patient is physically or mentally incapable of signing the claim form is as follows: Denise Woody of Physician* or Healthcare Professional______Kortney Donato RN___________  Signature Date 08/20/23 (For scheduled repetitive transports, this form is not valid for transports performed more than 60 days after this date)    Printed Name & Credentials of Physician or Healthcare Professional (MD, DO, RN, etc.)________________________________  *Form must be signed by patient's attending physician for scheduled, repetitive transports.  For non-repetitive, unscheduled ambulance transports, if unable to obtain the signature of the attending physician, any of the following may sign (choose appropriate option below)  [] Physician Assistant []  Clinical Nurse Specialist [x]  Registered Nurse  []  Nurse Practitioner  [] Discharge Planner

## 2023-08-21 ENCOUNTER — APPOINTMENT (INPATIENT)
Dept: RADIOLOGY | Facility: HOSPITAL | Age: 68
DRG: 854 | End: 2023-08-21
Payer: COMMERCIAL

## 2023-08-21 LAB
ALBUMIN SERPL BCP-MCNC: 2 G/DL (ref 3.5–5)
ALP SERPL-CCNC: 116 U/L (ref 46–116)
ALT SERPL W P-5'-P-CCNC: 12 U/L (ref 12–78)
ANION GAP SERPL CALCULATED.3IONS-SCNC: 3 MMOL/L
AST SERPL W P-5'-P-CCNC: 12 U/L (ref 5–45)
BASOPHILS # BLD AUTO: 0.06 THOUSANDS/ÂΜL (ref 0–0.1)
BASOPHILS NFR BLD AUTO: 0 % (ref 0–1)
BILIRUB SERPL-MCNC: 0.7 MG/DL (ref 0.2–1)
BUN SERPL-MCNC: 19 MG/DL (ref 5–25)
CALCIUM ALBUM COR SERPL-MCNC: 10 MG/DL (ref 8.3–10.1)
CALCIUM SERPL-MCNC: 8.4 MG/DL (ref 8.3–10.1)
CHLORIDE SERPL-SCNC: 100 MMOL/L (ref 96–108)
CO2 SERPL-SCNC: 29 MMOL/L (ref 21–32)
CREAT SERPL-MCNC: 0.95 MG/DL (ref 0.6–1.3)
EOSINOPHIL # BLD AUTO: 0.13 THOUSAND/ÂΜL (ref 0–0.61)
EOSINOPHIL NFR BLD AUTO: 1 % (ref 0–6)
ERYTHROCYTE [DISTWIDTH] IN BLOOD BY AUTOMATED COUNT: 13.5 % (ref 11.6–15.1)
GFR SERPL CREATININE-BSD FRML MDRD: 81 ML/MIN/1.73SQ M
GLUCOSE SERPL-MCNC: 110 MG/DL (ref 65–140)
GLUCOSE SERPL-MCNC: 133 MG/DL (ref 65–140)
GLUCOSE SERPL-MCNC: 146 MG/DL (ref 65–140)
GLUCOSE SERPL-MCNC: 166 MG/DL (ref 65–140)
GLUCOSE SERPL-MCNC: 180 MG/DL (ref 65–140)
HCT VFR BLD AUTO: 34.6 % (ref 36.5–49.3)
HGB BLD-MCNC: 11 G/DL (ref 12–17)
IMM GRANULOCYTES # BLD AUTO: 0.31 THOUSAND/UL (ref 0–0.2)
IMM GRANULOCYTES NFR BLD AUTO: 1 % (ref 0–2)
LYMPHOCYTES # BLD AUTO: 1.38 THOUSANDS/ÂΜL (ref 0.6–4.47)
LYMPHOCYTES NFR BLD AUTO: 6 % (ref 14–44)
MCH RBC QN AUTO: 30.6 PG (ref 26.8–34.3)
MCHC RBC AUTO-ENTMCNC: 31.8 G/DL (ref 31.4–37.4)
MCV RBC AUTO: 96 FL (ref 82–98)
MONOCYTES # BLD AUTO: 1.25 THOUSAND/ÂΜL (ref 0.17–1.22)
MONOCYTES NFR BLD AUTO: 5 % (ref 4–12)
NEUTROPHILS # BLD AUTO: 22.05 THOUSANDS/ÂΜL (ref 1.85–7.62)
NEUTS SEG NFR BLD AUTO: 87 % (ref 43–75)
NRBC BLD AUTO-RTO: 0 /100 WBCS
PLATELET # BLD AUTO: 278 THOUSANDS/UL (ref 149–390)
PMV BLD AUTO: 8.9 FL (ref 8.9–12.7)
POTASSIUM SERPL-SCNC: 4.3 MMOL/L (ref 3.5–5.3)
PROCALCITONIN SERPL-MCNC: 0.34 NG/ML
PROT SERPL-MCNC: 6.4 G/DL (ref 6.4–8.4)
RBC # BLD AUTO: 3.59 MILLION/UL (ref 3.88–5.62)
SODIUM SERPL-SCNC: 132 MMOL/L (ref 135–147)
WBC # BLD AUTO: 25.18 THOUSAND/UL (ref 4.31–10.16)

## 2023-08-21 PROCEDURE — 99233 SBSQ HOSP IP/OBS HIGH 50: CPT | Performed by: STUDENT IN AN ORGANIZED HEALTH CARE EDUCATION/TRAINING PROGRAM

## 2023-08-21 PROCEDURE — 97167 OT EVAL HIGH COMPLEX 60 MIN: CPT

## 2023-08-21 PROCEDURE — 97163 PT EVAL HIGH COMPLEX 45 MIN: CPT

## 2023-08-21 PROCEDURE — 82948 REAGENT STRIP/BLOOD GLUCOSE: CPT

## 2023-08-21 PROCEDURE — 84145 PROCALCITONIN (PCT): CPT | Performed by: FAMILY MEDICINE

## 2023-08-21 PROCEDURE — G1004 CDSM NDSC: HCPCS

## 2023-08-21 PROCEDURE — 94664 DEMO&/EVAL PT USE INHALER: CPT

## 2023-08-21 PROCEDURE — 74176 CT ABD & PELVIS W/O CONTRAST: CPT

## 2023-08-21 PROCEDURE — 85025 COMPLETE CBC W/AUTO DIFF WBC: CPT | Performed by: FAMILY MEDICINE

## 2023-08-21 PROCEDURE — 99232 SBSQ HOSP IP/OBS MODERATE 35: CPT | Performed by: UROLOGY

## 2023-08-21 PROCEDURE — 80053 COMPREHEN METABOLIC PANEL: CPT | Performed by: FAMILY MEDICINE

## 2023-08-21 PROCEDURE — 74430 CONTRAST X-RAY BLADDER: CPT

## 2023-08-21 RX ORDER — POLYETHYLENE GLYCOL 3350 17 G/17G
17 POWDER, FOR SOLUTION ORAL DAILY
Status: DISCONTINUED | OUTPATIENT
Start: 2023-08-21 | End: 2023-08-25 | Stop reason: HOSPADM

## 2023-08-21 RX ORDER — LISINOPRIL 10 MG/1
10 TABLET ORAL DAILY
Status: DISCONTINUED | OUTPATIENT
Start: 2023-08-22 | End: 2023-08-25 | Stop reason: HOSPADM

## 2023-08-21 RX ORDER — SODIUM CHLORIDE 9 MG/ML
50 INJECTION, SOLUTION INTRAVENOUS CONTINUOUS
Status: DISCONTINUED | OUTPATIENT
Start: 2023-08-21 | End: 2023-08-22

## 2023-08-21 RX ADMIN — GUAIFENESIN 1200 MG: 600 TABLET, EXTENDED RELEASE ORAL at 21:13

## 2023-08-21 RX ADMIN — SODIUM CHLORIDE 50 ML/HR: 0.9 INJECTION, SOLUTION INTRAVENOUS at 10:33

## 2023-08-21 RX ADMIN — OXYCODONE HYDROCHLORIDE 10 MG: 10 TABLET ORAL at 15:33

## 2023-08-21 RX ADMIN — OXYCODONE HYDROCHLORIDE 10 MG: 10 TABLET ORAL at 00:54

## 2023-08-21 RX ADMIN — SODIUM CHLORIDE 50 ML/HR: 0.9 INJECTION, SOLUTION INTRAVENOUS at 10:34

## 2023-08-21 RX ADMIN — GUAIFENESIN 1200 MG: 600 TABLET, EXTENDED RELEASE ORAL at 08:35

## 2023-08-21 RX ADMIN — POLYETHYLENE GLYCOL 3350 17 G: 17 POWDER, FOR SOLUTION ORAL at 10:36

## 2023-08-21 RX ADMIN — IOHEXOL 200 ML: 350 INJECTION, SOLUTION INTRAVENOUS at 12:41

## 2023-08-21 RX ADMIN — CEFTRIAXONE SODIUM 2000 MG: 10 INJECTION, POWDER, FOR SOLUTION INTRAVENOUS at 04:25

## 2023-08-21 RX ADMIN — HEPARIN SODIUM 5000 UNITS: 5000 INJECTION INTRAVENOUS; SUBCUTANEOUS at 21:13

## 2023-08-21 RX ADMIN — HEPARIN SODIUM 5000 UNITS: 5000 INJECTION INTRAVENOUS; SUBCUTANEOUS at 13:11

## 2023-08-21 RX ADMIN — INSULIN LISPRO 1 UNITS: 100 INJECTION, SOLUTION INTRAVENOUS; SUBCUTANEOUS at 17:53

## 2023-08-21 RX ADMIN — NYSTATIN: 100000 POWDER TOPICAL at 15:34

## 2023-08-21 RX ADMIN — PIPERACILLIN SODIUM AND TAZOBACTAM SODIUM 3.38 G: 36; 4.5 INJECTION, POWDER, LYOPHILIZED, FOR SOLUTION INTRAVENOUS at 18:33

## 2023-08-21 RX ADMIN — INSULIN LISPRO 1 UNITS: 100 INJECTION, SOLUTION INTRAVENOUS; SUBCUTANEOUS at 13:10

## 2023-08-21 RX ADMIN — NYSTATIN: 100000 POWDER TOPICAL at 08:35

## 2023-08-21 RX ADMIN — HEPARIN SODIUM 5000 UNITS: 5000 INJECTION INTRAVENOUS; SUBCUTANEOUS at 05:22

## 2023-08-21 NOTE — ASSESSMENT & PLAN NOTE
· POD#2 cystoscopy, left stent insertion   · WBC 13.88, trending down  · Creat 0.88  · fluoroscopy cystogram without urinary leak  · Medical optimization per primary team  · Continue to trend labs  · Empiric antibiotics, Tailor antibiotics to cultures  · Do NOT remove silva catheter, this will be removed in the outpatient setting  · Plan for follow-up in the office for cystoscopy stent removal in approximately 3 weeks with CT scan prior.   · We will begin Levaquin the day before cystoscopy stent removal, this was sent to pharmacy

## 2023-08-21 NOTE — OCCUPATIONAL THERAPY NOTE
Occupational Therapy Evaluation     Patient Name: Caleb Sepulveda  LXGPY'S Date: 8/21/2023  Problem List  Principal Problem:    Sepsis ([address])  Active Problems:    UTI (urinary tract infection)    Type 2 diabetes mellitus ([address])    Chronic respiratory failure with hypoxia and hypercapnia (HCC)    Benign essential hypertension    Hyponatremia    Ambulatory dysfunction    Morbid obesity with BMI of 40.0-44.9, adult ([address])    Hydronephrosis with ureteropelvic junction (UPJ) obstruction    Past Medical History  Past Medical History:   Diagnosis Date    Hypertension     Kidney stone     Lymphedema      Past Surgical History  Past Surgical History:   Procedure Laterality Date    FETAL SURGERY FOR CONGENITAL HERNIA      5 years ago    FL CYSTOGRAM  8/21/2023    FL RETROGRADE PYELOGRAM  8/20/2023    GASTRIC BYPASS      20 years ago    PANNICULECTOMY      PENIS SURGERY      penile straightening procedure/evangelina tucks    ME CYSTO BLADDER W/URETERAL CATHETERIZATION Left 8/20/2023    Procedure: CYSTOSCOPY RETROGRADE PYELOGRAM WITH INSERTION STENT URETERAL;  Surgeon: Demetra Marvin MD;  Location: [facility];  Service: Urology    TOTAL HIP ARTHROPLASTY      10 years ago           08/21/23 0938   OT Last Visit   OT Visit Date 08/21/23   Note Type   Note type Evaluation   Pain Assessment   Pain Assessment Tool 0-10   Pain Score No Pain   Effect of Pain on Daily Activities Pt reports only having pain with urination   Restrictions/Precautions   Weight Bearing Precautions Per Order No   Other Precautions Multiple lines; Fall Risk;O2  (1LO2)   Home Living   Type of [address] [place] [address]  Two level  (bi-level, 5STE + 5 to main living area)   Bathroom Shower/Tub Walk-in shower   Bathroom Toilet Raised   Bathroom Equipment Grab bars in shower; Shower chair;Grab bars around toilet   Carolyn Kumari  (was using PTA but was not using at baseline)   Additional Comments Pt recently d/c from [facility].  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 3  Moderate [address]  4  Minimal Assistance   Bed Mobility   Supine to Sit 3  Moderate assistance   Additional items Assist x 1;HOB elevated; Bedrails; Increased time required;LE management   Sit to Supine Unable to assess   Transfers   Sit to Stand 4  Minimal assistance   Additional items Assist x 1; Increased time required   Stand to Sit 4  Minimal assistance   Additional items Assist x 1; Increased time required   Additional Comments transfers with RW   Functional Mobility   Functional Mobility   (CGA)   Additional items Rolling walker   Balance   Static Sitting Fair +   Dynamic Sitting Fair   Static Standing Fair -   Dynamic Standing Poor +   Ambulatory Poor +   Activity Tolerance   Activity Tolerance Patient tolerated treatment well   Medical Staff Made Aware PT Cherie   Nurse Made Aware RN clearance for session   RUE Assessment   RUE Assessment WFL   LUE Assessment   LUE Assessment WFL   Cognition   Overall Cognitive Status WFL   Arousal/Participation Responsive; Cooperative   Attention Attends with cues to redirect   Orientation Level Oriented X4   Memory Within functional limits   Following Commands Follows one step commands with increased time or repetition   Comments Pt cooperative during session, cues to redirect at times   Assessment   Limitation Decreased ADL status; Decreased Safe judgement during ADL;Decreased endurance;Decreased self-care trans;Decreased high-level ADLs   Prognosis Good   Assessment Pt is a 69 y.o. male admitted to [facility] on 8/20/2023 w/ Sepsis ([address]). Pt s/p cystoscopy with stent placement. has a past medical history of Hypertension, Kidney stone, UTI, Chronic respiratory failure with hypoxia, Urinary bladder stone, and Lymphedema. Pt with active OT orders and up and OOB as tolerated orders. Pt seen as a co-evaluation with PT due to the patient's co-morbidities, clinically unstable presentation/clinical complexity, and present impairments. As per pt report, pta, resides alone in a 2STH, 5STE. Pt was I w/  ADLS and IADLS, (+) drove. Upon evaluation, pt currently requires MIN A with RW for transfers and CGA for mobility. Pt currently requires I eating, I grooming, S UB ADLs, MOD A LB ADLs, and MIN A toileting. Pt is limited at this time 2*: endurance, activity tolerance, functional mobility, balance, functional standing tolerance, decreased I w/ ADLS/IADLS and strength. The following Occupational Performance Areas to address include: bathing/shower, toilet hygiene, dressing, health maintenance, functional mobility, community mobility and clothing management. Pt to benefit from immediate acute skilled OT to address above deficits, improve overall functional independence, maximize fnxl mobility and reduce caregiver burden. From OT standpoint, recommendation at time of d/c would be home with skilled therapy and increased social support. Pt was left after session with all current needs met. The patient's raw score on the AM-PAC Daily Activity Inpatient Short Form is 19.  A raw score of greater than or equal to 19 suggests the patient may benefit from discharge to home. Please refer to the recommendation of the Occupational Therapist for safe discharge planning. Goals   LTG Time Frame 10-14   Plan   Treatment Interventions ADL retraining;Functional transfer training;UE strengthening/ROM; Endurance training;Patient/family training;Equipment evaluation/education; Compensatory technique education;Continued evaluation; Energy conservation; Activityengagement   Goal Expiration Date 09/04/23   OT Frequency 2-3x/wk   Recommendation   OT Discharge Recommendation Home with home health rehabilitation   AM-PAC Daily Activity Inpatient   Lower Body Dressing 2   Bathing 3   Toileting 3   Upper Body Dressing 3   Grooming 4   Eating 4   Daily Activity Raw Score 19   Daily Activity Standardized Score (Calc for Raw Score >=11) 40.22   AM-PAC Applied Cognition Inpatient   Following a Speech/Presentation 3   Understanding Ordinary Conversation 4   Taking Medications 4   Remembering Where Things Are Placed or Put Away 4   Remembering List of 4-5 Errands 4   Taking Care of Complicated Tasks 3   Applied Cognition Raw Score 22   Applied Cognition Standardized Score 47.83        GOALS    - Pt will complete UB dressing/self care/bathing w/ mod I using adaptive device and DME as needed    - Pt will complete LB dressing/self care/bathing w/ mod I using adaptive device and DME as needed    - Pt will complete toileting w/ mod I w/ G hygiene/thoroughness using DME as needed    - Pt will improve functional transfers to Mod I on/off all surfaces using DME as needed w/ G balance/safety     - Pt will improve functional mobility during ADL/IADL/leisure tasks to Mod I using DME as needed w/ G balance/safety     - Pt will demonstrate G carryover of pt/caregiver education and training as appropriate.     - Pt will demonstrate 100% carryover of energy conservation techniques t/o functional I/ADL/leisure tasks w/o cues s/p skilled education    - Pt will engage in ongoing cognitive assessment w/ G participation to assist w/ safe d/c planning/recommendations    - Pt will increase static and dynamic standing/sitting balance to F+  using AD/DME as needed to increase safety and I during fnxl transfers and ADLs    - Pt will maintain upright sitting position for at least 20 min during dynamic fnxl activity with F+  balance and endurance to improve ADL performance and independence, as well as reduce risk of falls     - Pt will participate in simulated IADL management task with DME as needed to increase independence to  w/ G safety and endurance    Duc Marcos MS, OTR/L

## 2023-08-21 NOTE — QUICK NOTE
Patient wife updated on results of fluoroscopy cystogram.  They are made aware that there was no leakage of urine.   Due to elevated WBC we will continue to monitor for now

## 2023-08-21 NOTE — ASSESSMENT & PLAN NOTE
· Discharged from [address] [place] [address] on 8/11. Per patient rehab was sucessful at [facility] and has been feeling stronger.    · Fall precautions   · Consult PT/OT and CM - home with home pt/ot

## 2023-08-21 NOTE — CASE MANAGEMENT
Case Management Assessment & Discharge Planning Note    Patient name Shaquille Mcdowell  Location [address] [place] [address] [address] 931-01 MRN [id number]  : 1955 Date 2023       Current Admission Date: 2023  Current Admission Diagnosis:Sepsis [facility])   Patient Active Problem List    Diagnosis Date Noted   • Hyponatremia 2023   • Ambulatory dysfunction 2023   • Morbid obesity with BMI of 40.0-44.9, adult ([address]) 2023   • Hydronephrosis with ureteropelvic junction (UPJ) obstruction 2023   • Constipation 2023   • Insomnia 2023   • Dermatitis 2023   • Iron deficiency anemia 2023   • Encephalopathy acute 2023   • Sepsis ([address]) 2023   • UTI (urinary tract infection) 2023   • Hypertension 2023   • Lymphedema 2023   • Type 2 diabetes mellitus ([address]) 2023   • Obesity 2023   • Chronic respiratory failure with hypoxia and hypercapnia ([address]) 2023   • Elevated troponin 2023   • Congenital buried penis 2023   • Urinary bladder stone 2023   • Hydrocele 2023   • Benign essential hypertension 2022      LOS (days): 1  Geometric Mean LOS (GMLOS) (days):   Days to GMLOS:     OBJECTIVE:    Risk of Unplanned Readmission Score: 21.22         Current admission status: Inpatient       Preferred Pharmacy:   [address] [place] [address]  Phone: [phone] Fax: [phone]    Primary Care Provider: Brigitte Almeida DO    Primary Insurance: [facility]  Secondary Insurance:     ASSESSMENT:  [place] Proxies    There are no active Health Care Proxies on file. Patient Information  Admitted from[de-identified] Home  Mental Status: Alert  During Assessment patient was accompanied by: Not accompanied during assessment  Assessment information provided by[de-identified] Patient  Primary Caregiver: Self  Support Systems: Self  Home entry access options.  Select all that apply.: Stairs  Number of steps to enter home.: 5  Do the steps have railings?: Yes  Type of Current Residence: Bi-level  Upon entering residence, is there a bedroom on the main floor (no further steps)?: No  Upon entering residence, is there a bathroom on the main floor (no further steps)?: No  In the last 12 months, was there a time when you were not able to pay the mortgage or rent on time?: No  In the last 12 months, was there a time when you did not have a steady place to sleep or slept in a shelter (including now)?: Yes  Living Arrangements: Lives Alone  Is patient a ?: No    Activities of Daily Living Prior to Admission  Functional Status: Independent  Completes ADLs independently?: Yes  Ambulates independently?: Yes  Does patient use assisted devices?: Yes  Assisted Devices (DME) used: Walker, Home Oxygen concentrator  Does patient currently own DME?: Yes  What DME does the patient currently own?: Home Oxygen concentrator, Portable Oxygen concentrator, Walker  Does patient have a history of Outpatient Therapy (PT/OT)? :  (good landin)  Does the patient have a history of Short-Term Rehab?: Yes  Does patient have a history of HHC?: No  Does patient currently have [facility]?: No         Patient Information Continued  Does patient have prescription coverage?: Yes  Within the past 12 months, you worried that your food would run out before you got the money to buy more.: Never true  Within the past 12 months, the food you bought just didn't last and you didn't have money to get more.: Never true  Food insecurity resource given?: N/A  Does patient receive dialysis treatments?: No                  DISCHARGE DETAILS:  Met at bedside with patient, lives alone in bi-level. Ambulates with a walker, works fulltime, states he was supposed to go to outpatient therapy at [facility] and does not need anything from CM. Explained that PT/OT will eval and can discuss recs at that time, patient agreeable.  CM to continue to support. Called to attend this repeat  at 39 weeks, mother is , negative serologies, GBS+, blood type A+. Prenatal sono concerning for fetal hydronephrosis and possible horseshoe kidney. Baby emerged with good tone, vigorous cry. He was warmed dried and stimulated and voided and stooled in DR. Apgars 9/. Will need renal sono, and possible nephrology consult in well baby nursery.

## 2023-08-21 NOTE — PLAN OF CARE
Problem: MOBILITY - ADULT  Goal: Maintain or return to baseline ADL function  Description: INTERVENTIONS:  -  Assess patient's ability to carry out ADLs; assess patient's baseline for ADL function and identify physical deficits which impact ability to perform ADLs (bathing, care of mouth/teeth, toileting, grooming, dressing, etc.)  - Assess/evaluate cause of self-care deficits   - Assess range of motion  - Assess patient's mobility; develop plan if impaired  - Assess patient's need for assistive devices and provide as appropriate  - Encourage maximum independence but intervene and supervise when necessary  - Involve family in performance of ADLs  - Assess for home care needs following discharge   - Consider OT consult to assist with ADL evaluation and planning for discharge  - Provide patient education as appropriate  Outcome: Progressing  Goal: Maintains/Returns to pre admission functional level  Description: INTERVENTIONS:  - Perform BMAT or MOVE assessment daily.   - Set and communicate daily mobility goal to care team and patient/family/caregiver.    - Collaborate with rehabilitation services on mobility goals if consulted  - Record patient progress and toleration of activity level   Outcome: Progressing     Problem: PAIN - ADULT  Goal: Verbalizes/displays adequate comfort level or baseline comfort level  Description: Interventions:  - Encourage patient to monitor pain and request assistance  - Assess pain using appropriate pain scale  - Administer analgesics based on type and severity of pain and evaluate response  - Implement non-pharmacological measures as appropriate and evaluate response  - Consider cultural and social influences on pain and pain management  - Notify physician/advanced practitioner if interventions unsuccessful or patient reports new pain  Outcome: Progressing     Problem: INFECTION - ADULT  Goal: Absence or prevention of progression during hospitalization  Description: INTERVENTIONS:  - Assess and monitor for signs and symptoms of infection  - Monitor lab/diagnostic results  - Monitor all insertion sites, i.e. indwelling lines, tubes, and drains  - Monitor endotracheal if appropriate and nasal secretions for changes in amount and color  - [place] appropriate cooling/warming therapies per order  - Administer medications as ordered  - Instruct and encourage patient and family to use good hand hygiene technique  - Identify and instruct in appropriate isolation precautions for identified infection/condition  Outcome: Progressing  Goal: Absence of fever/infection during neutropenic period  Description: INTERVENTIONS:  - Monitor WBC    Outcome: Progressing     Problem: SAFETY ADULT  Goal: Patient will remain free of falls  Description: INTERVENTIONS:  - Educate patient/family on patient safety including physical limitations  - Instruct patient to call for assistance with activity   - Consult OT/PT to assist with strengthening/mobility   - Keep Call bell within reach  - Keep bed low and locked with side rails adjusted as appropriate  - Keep care items and personal belongings within reach  - Initiate and maintain comfort rounds  - Make Fall Risk Sign visible to staff  - Apply yellow socks and bracelet for high fall risk patients  - Consider moving patient to room near nurses station  Outcome: Progressing     Problem: DISCHARGE PLANNING  Goal: Discharge to home or other facility with appropriate resources  Description: INTERVENTIONS:  - Identify barriers to discharge w/patient and caregiver  - Arrange for needed discharge resources and transportation as appropriate  - Identify discharge learning needs (meds, wound care, etc.)  - Arrange for interpretive services to assist at discharge as needed  - Refer to Case Management Department for coordinating discharge planning if the patient needs post-hospital services based on physician/advanced practitioner order or complex needs related to functional status, cognitive ability, or social support system  Outcome: Progressing     Problem: Knowledge Deficit  Goal: Patient/family/caregiver demonstrates understanding of disease process, treatment plan, medications, and discharge instructions  Description: Complete learning assessment and assess knowledge base.   Interventions:  - Provide teaching at level of understanding  - Provide teaching via preferred learning methods  Outcome: Progressing     Problem: Prexisting or High Potential for Compromised Skin Integrity  Goal: Skin integrity is maintained or improved  Description: INTERVENTIONS:  - Identify patients at risk for skin breakdown  - Assess and monitor skin integrity  - Assess and monitor nutrition and hydration status  - Monitor labs   - Assess for incontinence   - Turn and reposition patient  - Assist with mobility/ambulation  - Relieve pressure over bony prominences  - Avoid friction and shearing  - Provide appropriate hygiene as needed including keeping skin clean and dry  - Evaluate need for skin moisturizer/barrier cream  - Collaborate with interdisciplinary team   - Patient/family teaching  - Consider wound care consult   Outcome: Progressing

## 2023-08-21 NOTE — UTILIZATION REVIEW
Initial Clinical Review    Admission: Date/Time/Statement:   Admission Orders (From admission, onward)     Ordered        08/20/23 1649  Inpatient Admission  Once                      Orders Placed This Encounter   Procedures   • Inpatient Admission     Standing Status:   Standing     Number of Occurrences:   1     Order Specific Question:   Level of Care     Answer:   Med Surg [16]     Order Specific Question:   Estimated length of stay     Answer:   More than 2 Midnights     Order Specific Question:   Certification     Answer:   I certify that inpatient services are medically necessary for this patient for a duration of greater than two midnights. See H&P and MD Progress Notes for additional information about the patient's course of treatment. Initial Presentation: 69 y.o. male presents to [facility] as a transfer form [address] where he initially presented with fever and tachycardia and dx'd with Sepsis 2/2 to UTI. CT a/p revealed mild hydronephrosis 2/2 left UVJ calculus. Tx'd to [facility] for Urology eval and further tx. Admitted inpatient to MS unit -- npo. IVFs, analgesics -- to OR by Urology. OPERATIVE REPORT  SURGERY DATE: 8/20/2023  Procedure(s):  Left - CYSTOSCOPY RETROGRADE PYELOGRAM WITH INSERTION STENT URETERAL    Anesthesia Type:   General    Operative Findings:  1. 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 Tortuosity of the urethra  3. Abnormal course of the left ureter with 360 degree dilated loop in the distal segment making passage of wire and catheter is extremely difficult  4. Dilated renal pelvis, moderate hydronephrosis  5.  7 [place] multilength stent placed in the left system, 12 [place] tip placed in the bladder       Date: 8/21   Day 2: pt with some c/o discomfort at tip of penis from catheter. OOB to chair. Sung cath with bloody uop. Ray diet. Continue IV abx. F/u pending cxs. Trend labs. Continue pta po meds. SCDs. OOB/ambulate.       Wt Readings from Last 1 Encounters:   08/20/23 (!) 149 kg (329 lb)     Additional Vital Signs:   Date/Time Temp Pulse Resp BP MAP (mmHg) SpO2 Calculated FIO2 (%) - Nasal Cannula O2 Flow Rate (L/min) Nasal Cannula O2 Flow Rate (L/min) O2 Device   08/21/23 15:51:50 97.9 °F (36.6 °C) -- 15 131/66 88 -- -- -- -- --   08/21/23 1000 -- -- -- -- -- -- -- -- -- None (Room air)   08/21/23 08:37:31 -- -- -- 134/64 87 -- -- -- -- --   08/21/23 07:07:58 98 °F (36.7 °C) 72 -- 136/61 86 93 % -- -- -- --   08/20/23 21:16:31 98.5 °F (36.9 °C) 87 -- 114/69 84 92 % -- -- -- --   08/20/23 2100 -- -- -- -- -- -- 24 -- 1 L/min Nasal cannula   08/20/23 16:53:39 98.3 °F (36.8 °C) 83 -- 115/65 82 91 % -- -- -- --   08/20/23 1600 -- 92 24 Abnormal  107/61 74 95 % -- -- -- --   08/20/23 1530 -- 88 13 118/66 83 93 % -- -- -- --   08/20/23 1500 -- 84 20 110/62 81 95 % -- -- -- --   08/20/23 1430 -- 88 23 Abnormal  115/61 79 95 % -- -- -- --   08/20/23 1415 -- 88 23 Abnormal  101/57 74 96 % -- -- -- --   08/20/23 1400 -- 86 22 105/53 71 97 % -- -- -- --   08/20/23 1345 -- 90 21 100/59 69 95 % -- -- -- --   08/20/23 1330 99 °F (37.2 °C) 90 22 112/60 78 96 % 28 -- 2 L/min Nasal cannula   08/20/23 1315 -- 98 20 118/67 85 97 % 28 -- 2 L/min Nasal cannula   08/20/23 1303 99.7 °F (37.6 °C) 97 18 114/64 -- 97 % -- 6 L/min -- Simple mask   08/20/23 1300 99.7 °F (37.6 °C) 98 -- 114/64 81 99 % -- 6 L/min -- Simple mask     Pertinent Labs/Diagnostic Test Results:   FL cystogram   Final Result by Rahul Escalona MD (08/21 8759)   Indwelling left nephroureteral stent with looping of the distal portion and slight outpouching of distal ureteral contrast at the inferior aspect of the loop, which may be related to tethering suggested on same-day CT. No bladder leak identified.          CT abdomen pelvis wo contrast   Final Result by Suzan Choe MD (08/21 5971)      The left ureteral stent is in place with proximal tip in the renal pelvis near the UP junction and the distal loop in the urinary bladder      Marked tortuosity of the pelvic portion of the left ureter with a 360 degree loop. The looping left ureter is seen extending towards the left inguinal region near the surgical suture line.  This may be congenital looping or may be acquired due to    tethering of the left ureter to the surgical site         The calculus which was seen in the urinary bladder area not visualized although evaluation is limited due to presence of catheters and ureteral stent      Infiltration and high attenuation in the pericolonic region in the lower abdomen ,  medial to the area of looping ureter and superior to the bladder, (seen in image 71 series 604), consider cystogram to evaluate for contrast extravasation and exclude    bladder injury      Tortuosity and looping of the right mid ureter also noted without any obstruction        Results from last 7 days   Lab Units 08/20/23  0052   SARS-COV-2  Negative     Results from last 7 days   Lab Units 08/21/23  0444 08/20/23  1723 08/20/23  0052   WBC Thousand/uL 25.18*  --  22.44*   HEMOGLOBIN g/dL 11.0*  --  12.3   HEMATOCRIT % 34.6*  --  37.8   PLATELETS Thousands/uL 278 281 346   NEUTROS ABS Thousands/µL 22.05*  --  20.14*     Results from last 7 days   Lab Units 08/21/23  0444 08/20/23  1614 08/20/23  0052   SODIUM mmol/L 132* 132* 129*   POTASSIUM mmol/L 4.3 4.3 4.5   CHLORIDE mmol/L 100 101 95*   CO2 mmol/L 29 28 28   ANION GAP mmol/L 3 3 6   BUN mg/dL 19 18 22   CREATININE mg/dL 0.95 1.14 1.04   EGFR ml/min/1.73sq m 81 65 72   CALCIUM mg/dL 8.4 8.3 9.2     Results from last 7 days   Lab Units 08/21/23  0444 08/20/23  0052   AST U/L 12 16   ALT U/L 12 12   ALK PHOS U/L 116 118*   TOTAL PROTEIN g/dL 6.4 7.7   ALBUMIN g/dL 2.0* 3.5   TOTAL BILIRUBIN mg/dL 0.70 1.21*     Results from last 7 days   Lab Units 08/21/23  1702 08/21/23  1135 08/21/23  0708 08/20/23  2115 08/20/23  1656 08/20/23  1310 08/20/23  0730   POC GLUCOSE mg/dl 166* 180* 133 148* 210* 153* 150*     Results from last 7 days   Lab Units 08/21/23  0444 08/20/23  1614 08/20/23  0052   GLUCOSE RANDOM mg/dL 110 247* 169*     Results from last 7 days   Lab Units 08/20/23  0052   PROTIME seconds 14.4   INR  1.04   PTT seconds 30     Results from last 7 days   Lab Units 08/21/23  0444 08/20/23  0052   PROCALCITONIN ng/ml 0.34* 0.21     Results from last 7 days   Lab Units 08/20/23  0052   LACTIC ACID mmol/L 1.0     Results from last 7 days   Lab Units 08/20/23  0052   LIPASE u/L 18     Results from last 7 days   Lab Units 08/20/23  0211   CLARITY UA  Cloudy   COLOR UA  Yellow   SPEC GRAV UA  1.010   PH UA  6.5   GLUCOSE UA mg/dl Negative   KETONES UA mg/dl Negative   BLOOD UA  Small*   PROTEIN UA mg/dl 30 (1+)*   NITRITE UA  Negative   BILIRUBIN UA  Negative   UROBILINOGEN UA (BE) mg/dl 4.0*   LEUKOCYTES UA  Large*   WBC UA /hpf Innumerable*   RBC UA /hpf Field obscured, unable to enumerate*   BACTERIA UA /hpf Innumerable*   EPITHELIAL CELLS WET PREP /hpf Occasional     Results from last 7 days   Lab Units 08/20/23  0052   INFLUENZA A PCR  Negative   INFLUENZA B PCR  Negative   RSV PCR  Negative       Results from last 7 days   Lab Units 08/20/23  1220 08/20/23  0211 08/20/23  0100 08/20/23  0052   BLOOD CULTURE   --   --  No Growth at 24 hrs. No Growth at 24 hrs.    URINE CULTURE  30,000-39,000 cfu/ml Pseudomonas aeruginosa* >100,000 cfu/ml Pseudomonas aeruginosa*  --   --        Past Medical History:   Diagnosis Date   • Hypertension    • Kidney stone    • Lymphedema      Present on Admission:  • Benign essential hypertension  • Ambulatory dysfunction  • Chronic respiratory failure with hypoxia and hypercapnia (HCC)  • Hyponatremia  • Type 2 diabetes mellitus (HCC)  • UTI (urinary tract infection)  • Sepsis ([facility])      Admitting Diagnosis: Sepsis ([address]) [A41.9]  Age/Sex: 69 y.o. male  Admission Orders:  Scheduled Medications:  cefTRIAXone, 2,000 mg, Intravenous, Q24H  guaiFENesin, 1,200 mg, Oral, Q12H [address]  heparin (porcine), 5,000 Units, Subcutaneous, Q8H [address]  insulin lispro, 1-5 Units, Subcutaneous, TID AC  insulin lispro, 1-5 Units, Subcutaneous, HS  [START ON 8/22/2023] lisinopril, 10 mg, Oral, Daily  nystatin, , Topical, BID  polyethylene glycol, 17 g, Oral, Daily    Continuous IV Infusions:  sodium chloride, 50 mL/hr, Intravenous, Continuous    PRN Meds:  acetaminophen, 650 mg, Oral, Q6H PRN  albuterol, 2 puff, Inhalation, Q6H PRN  iohexol, 50 mL, Oral, Once in imaging  ipratropium, 0.5 mg, Nebulization, Q6H PRN  levalbuterol, 1.25 mg, Nebulization, Q6H PRN  oxyCODONE, 10 mg, Oral, Q6H PRN 8/20 x1, 8/21 x2  oxyCODONE, 5 mg, Oral, Q4H PRN        Network Utilization Review Department  ATTENTION: Please call with any questions or concerns to [phone] and carefully listen to the prompts so that you are directed to the right person. All voicemails are confidential.  Wes Betancur all requests for admission clinical reviews, approved or denied determinations and any other requests to dedicated fax number below belonging to the campus where the patient is receiving treatment.  List of dedicated fax numbers for the Facilities:  [place] DENIALS (Administrative/Medical Necessity) [phone] [address] (Maternity/NICU/Pediatrics) [phone]   [address] [place] [address] [phone]   [facility] [address] [facility] [phone]   [facility] [address] [facility] [address] [address] [facility] [phone]   [address] [facility] [address] [place] [address]  [phone]

## 2023-08-21 NOTE — PHYSICAL THERAPY NOTE
Physical Therapy Evaluation     Patient's Name: Kim Graves    Admitting Diagnosis  Sepsis ([address]) [A41.9]    Problem List  Patient Active Problem List   Diagnosis    Encephalopathy acute    Sepsis ([address])    UTI (urinary tract infection)    Hypertension    Lymphedema    Type 2 diabetes mellitus ([address])    Obesity    Chronic respiratory failure with hypoxia and hypercapnia (HCC)    Elevated troponin    Dermatitis    Benign essential hypertension    Congenital buried penis    Hydrocele    Urinary bladder stone    Iron deficiency anemia    Constipation    Insomnia    Hyponatremia    Ambulatory dysfunction    Morbid obesity with BMI of 40.0-44.9, adult ([address])    Hydronephrosis with ureteropelvic junction (UPJ) obstruction       Past Medical History  Past Medical History:   Diagnosis Date    Hypertension     Kidney stone     Lymphedema        Past Surgical History  Past Surgical History:   Procedure Laterality Date    FETAL SURGERY FOR CONGENITAL HERNIA      5 years ago    FL RETROGRADE PYELOGRAM  8/20/2023    GASTRIC BYPASS      20 years ago    PANNICULECTOMY      PENIS SURGERY      penile straightening procedure/evangelina tucks    NH CYSTO BLADDER W/URETERAL CATHETERIZATION Left 8/20/2023    Procedure: CYSTOSCOPY RETROGRADE PYELOGRAM WITH INSERTION STENT URETERAL;  Surgeon: Enid Avilez MD;  Location: [facility];  Service: Urology    TOTAL HIP ARTHROPLASTY      10 years ago        08/21/23 0937   PT Last Visit   PT Visit Date 08/21/23   Note Type   Note type Evaluation   Pain Assessment   Pain Assessment Tool 0-10   Pain Score No Pain   Restrictions/Precautions   Weight Bearing Precautions Per Order No   Other Precautions Multiple lines; Fall Risk   Home Living   Type of [address] [place] [address] Dr Two level;Bed/bath upstairs  (bi-level; 5 SEAN + additional 5 steps to main living area)   Carolyn Price  (was using PTA; at baseline pt does not use DME. Reports sleeping in recliner.  home O2 at night (1L))   Prior Function   Level of [place] Independent with ADLs; Independent with functional mobility; Independent with IADLS   Lives With Alone   Receives Help From Family;Friend(s)  (SO comes daily to assista as needed)   IADLs Independent with driving; Independent with meal prep; Independent with medication management   Falls in the last 6 months 1 to 4  (1 per pt report)   General   Family/Caregiver Present No   Cognition   Arousal/Participation Alert   Orientation Level Oriented X4   Following Commands Follows one step commands without difficulty   Comments pt with flat affect, cooperative to participate in therapy session   RLE Assessment   RLE Assessment   (functionally 3+/5)   LLE Assessment   LLE Assessment   (functionally 3+/5)   Bed Mobility   Supine to Sit 3  Moderate assistance   Additional items Assist x 1; Increased time required;Verbal cues   Sit to Supine Unable to assess   Additional Comments pt supine in bed upon arrival. pt left sitting OOB on BSC [facility]   Transfers   Sit to Stand 4  Minimal assistance  (CGA)   Additional items Assist x 1; Increased time required;Verbal cues   Stand to Sit 5  Supervision   Additional items Assist x 1; Increased time required;Verbal cues   Additional Comments transfers with elevated bed, cues for hand placement and sequencing   Ambulation/Elevation   Gait pattern Excessively slow; Short stride;Decreased foot clearance; Improper Weight shift   Gait Assistance 4  Minimal assist  (CGA)   Additional items Assist x 1;Verbal cues; Tactile cues   Assistive Device Rolling walker   Distance 80ft   Stair Management Assistance Not tested   Balance   Static Sitting Fair   Dynamic Sitting Fair   Static Standing Fair -   Dynamic Standing Poor +   Ambulatory Poor +   Activity Tolerance   Activity Tolerance Patient tolerated treatment well   Medical Staff Made Aware BLAKE bundy performed this date 2* increased medical complexity and multiple co-morbidities   Nurse Made Aware RN cleared pt to participate in therapy session   Assessment   Prognosis Good   Problem List Decreased strength;Decreased endurance; Impaired balance;Decreased mobility;Pain   Assessment Pt is a 69 y.o. male seen for PT evaluation s/p admit to [address] [place] [address] on 8/20/2023. Pt was admitted with a primary dx of: sepsis s/p cystoscopy + ureteral stent placement. PT now consulted for assessment of mobility and d/c needs. Pt with Up and OOB as tolerated  orders. Pts current comorbidities effecting treatment include: UTI, type 2 DM, respiratory failure, HTN, hyponatremia, ambulatory dysfunction, obesity, and hydronephrosis. Pts current clinical presentation is Unstable/ Unpredictable (high complexity) due to Ongoing medical management for primary dx, Increased reliance on more restrictive AD compared to baseline, Decreased activity tolerance compared to baseline, Fall risk, Increased assistance needed from caregiver at current time, Trending lab values, Continuous pulse oximetry monitoring , s/p surgical intervention. Prior to admission, pt was residing alone in bi-level home with 5+5 steps to main living area and was using A RW for fnx mobility. Upon evaluation, pt currently is requiring mod A for bed mobility; min A for transfers; min A for ambulation 80 ft w/ RW. Pt presents at PT eval functioning below baseline and currently w/ overall mobility deficits 2* to: BLE weakness, decreased ROM, impaired balance, decreased endurance, impaired coordination, gait deviations, pain, decreased activity tolerance compared to baseline, decreased functional mobility tolerance compared to baseline, fall risk. Pt currently at a fall risk 2* to impairments listed above. Pt will continue to benefit from skilled acute PT interventions to address stated impairments; to maximize functional mobility; for ongoing pt/ family training; and DME needs.  At conclusion of PT session pt returned back in chair with phone and call bell within reach. Pt denies any further questions at this time. The patient's AM-PAC Basic Mobility Inpatient Short Form Raw Score is 16. A Raw score of less than or equal to 16 suggests the patient may benefit from discharge to post-acute rehabilitation services. Please also refer to the recommendation of the Physical Therapist for safe discharge planning. Recommend home with HHPT pending progress upon hospital D/C. Goals   Patient Goals to go to the bathroom   STG Expiration Date 09/04/23   Short Term Goal #1 STG 1. Pt will be able to perform bed mobility tasks with mod I level in order to improve overall functional mobility and assist in safe d/c. STG 2. Pt with sit EOB for at least 25 minutes at mod I  level in order to strengthen abdominal musculature and assist in future transfers/ ambulation. STG 3. Pt will be able to perform functional transfer with mod I level in order to improve overall functional mobility and assist in safe d/c. STG 4. Pt will be able to ambulate at least 250 feet with least restrictive device with mod I level A in order to improve overall functional mobility and assist in safe d/c. STG 5. Pt will improve sitting/standing static/dynamic balance 1/2 grade in order to improve functional mobility and assist in safe d/c. STG 6. Pt will improve LE strength by 1/2 grade in order to improve functional mobility and assist in safe d/c. STG 7. Pt will be able to negotiate at least 10 stairs with least restrictive device with mod I level A in order to improve overall functional mobility and assist in safe d/c.   PT Treatment Day 0   Plan   Treatment/Interventions Functional transfer training;LE strengthening/ROM; Elevations; Therapeutic exercise; Endurance training;Patient/family training;Equipment eval/education; Bed mobility;Gait training;Spoke to nursing;Spoke to case management;OT   PT Frequency 3-5x/wk   Recommendation   PT Discharge Recommendation Home with home health rehabilitation  (+ increased social support pending progress)   Equipment Recommended Walker  (pt reports owning)   AM-PAC Basic Mobility Inpatient   Turning in Flat Bed Without Bedrails 3   Lying on Back to Sitting on Edge of Flat Bed Without Bedrails 2   Moving Bed to Chair 3   Standing Up From Chair Using Arms 3   Walk in Room 3   Climb 3-5 Stairs With Railing 2   Basic Mobility Inpatient Raw Score 16   Basic Mobility Standardized Score 38.32   Highest Level Of Mobility   JH-HLM Goal 5: Stand one or more mins   JH-HLM Achieved 7: Walk 25 feet or more   Modified Brinson Scale   Modified Carlos Scale 4           Violetta Hernandez, PT DPT

## 2023-08-21 NOTE — PLAN OF CARE
Problem: OCCUPATIONAL THERAPY ADULT  Goal: Performs self-care activities at highest level of function for planned discharge setting. See evaluation for individualized goals. Description: Treatment Interventions: ADL retraining, Functional transfer training, UE strengthening/ROM, Endurance training, Patient/family training, Equipment evaluation/education, Compensatory technique education, Continued evaluation, Energy conservation, Activityengagement          See flowsheet documentation for full assessment, interventions and recommendations. Note: Limitation: Decreased ADL status, Decreased Safe judgement during ADL, Decreased endurance, Decreased self-care trans, Decreased high-level ADLs  Prognosis: Good  Assessment: Pt is a 69 y.o. male admitted to [facility] on 8/20/2023 w/ Sepsis ([address] Pt s/p cystoscopy with stent placement. has a past medical history of Hypertension, Kidney stone, UTI, Chronic respiratory failure with hypoxia, Urinary bladder stone, and Lymphedema. Pt with active OT orders and up and OOB as tolerated orders. Pt seen as a co-evaluation with PT due to the patient's co-morbidities, clinically unstable presentation/clinical complexity, and present impairments. As per pt report, pta, resides alone in a 2STH, 5STE. Pt was I w/  ADLS and IADLS, (+) drove. Upon evaluation, pt currently requires MIN A with RW for transfers and CGA for mobility. Pt currently requires I eating, I grooming, S UB ADLs, MOD A LB ADLs, and MIN A toileting. Pt is limited at this time 2*: endurance, activity tolerance, functional mobility, balance, functional standing tolerance, decreased I w/ ADLS/IADLS and strength. The following Occupational Performance Areas to address include: bathing/shower, toilet hygiene, dressing, health maintenance, functional mobility, community mobility and clothing management.  Pt to benefit from immediate acute skilled OT to address above deficits, improve overall functional independence, maximize fnxl mobility and reduce caregiver burden. From OT standpoint, recommendation at time of d/c would be home with skilled therapy and increased social support. Pt was left after session with all current needs met. The patient's raw score on the AM-PAC Daily Activity Inpatient Short Form is 19. A raw score of greater than or equal to 19 suggests the patient may benefit from discharge to home. Please refer to the recommendation of the Occupational Therapist for safe discharge planning.      OT Discharge Recommendation: Home with home health rehabilitation

## 2023-08-21 NOTE — PLAN OF CARE
Problem: MOBILITY - ADULT  Goal: Maintain or return to baseline ADL function  Description: INTERVENTIONS:  -  Assess patient's ability to carry out ADLs; assess patient's baseline for ADL function and identify physical deficits which impact ability to perform ADLs (bathing, care of mouth/teeth, toileting, grooming, dressing, etc.)  - Assess/evaluate cause of self-care deficits   - Assess range of motion  - Assess patient's mobility; develop plan if impaired  - Assess patient's need for assistive devices and provide as appropriate  - Encourage maximum independence but intervene and supervise when necessary  - Involve family in performance of ADLs  - Assess for home care needs following discharge   - Consider OT consult to assist with ADL evaluation and planning for discharge  - Provide patient education as appropriate  Outcome: Progressing  Goal: Maintains/Returns to pre admission functional level  Description: INTERVENTIONS:  - Perform BMAT or MOVE assessment daily.   - Set and communicate daily mobility goal to care team and patient/family/caregiver.    - Collaborate with rehabilitation services on mobility goals if consulted  - Record patient progress and toleration of activity level   Outcome: Progressing     Problem: PAIN - ADULT  Goal: Verbalizes/displays adequate comfort level or baseline comfort level  Description: Interventions:  - Encourage patient to monitor pain and request assistance  - Assess pain using appropriate pain scale  - Administer analgesics based on type and severity of pain and evaluate response  - Implement non-pharmacological measures as appropriate and evaluate response  - Consider cultural and social influences on pain and pain management  - Notify physician/advanced practitioner if interventions unsuccessful or patient reports new pain  Outcome: Progressing     Problem: INFECTION - ADULT  Goal: Absence or prevention of progression during hospitalization  Description: INTERVENTIONS:  - Assess and monitor for signs and symptoms of infection  - Monitor lab/diagnostic results  - Monitor all insertion sites, i.e. indwelling lines, tubes, and drains  - Monitor endotracheal if appropriate and nasal secretions for changes in amount and color  - [place] appropriate cooling/warming therapies per order  - Administer medications as ordered  - Instruct and encourage patient and family to use good hand hygiene technique  - Identify and instruct in appropriate isolation precautions for identified infection/condition  Outcome: Progressing  Goal: Absence of fever/infection during neutropenic period  Description: INTERVENTIONS:  - Monitor WBC    Outcome: Progressing     Problem: SAFETY ADULT  Goal: Patient will remain free of falls  Description: INTERVENTIONS:  - Educate patient/family on patient safety including physical limitations  - Instruct patient to call for assistance with activity   - Consult OT/PT to assist with strengthening/mobility   - Keep Call bell within reach  - Keep bed low and locked with side rails adjusted as appropriate  - Keep care items and personal belongings within reach  - Initiate and maintain comfort rounds  - Make Fall Risk Sign visible to staff  - Apply yellow socks and bracelet for high fall risk patients  - Consider moving patient to room near nurses station  Outcome: Progressing     Problem: DISCHARGE PLANNING  Goal: Discharge to home or other facility with appropriate resources  Description: INTERVENTIONS:  - Identify barriers to discharge w/patient and caregiver  - Arrange for needed discharge resources and transportation as appropriate  - Identify discharge learning needs (meds, wound care, etc.)  - Arrange for interpretive services to assist at discharge as needed  - Refer to Case Management Department for coordinating discharge planning if the patient needs post-hospital services based on physician/advanced practitioner order or complex needs related to functional status, cognitive ability, or social support system  Outcome: Progressing     Problem: Knowledge Deficit  Goal: Patient/family/caregiver demonstrates understanding of disease process, treatment plan, medications, and discharge instructions  Description: Complete learning assessment and assess knowledge base.   Interventions:  - Provide teaching at level of understanding  - Provide teaching via preferred learning methods  Outcome: Progressing     Problem: Prexisting or High Potential for Compromised Skin Integrity  Goal: Skin integrity is maintained or improved  Description: INTERVENTIONS:  - Identify patients at risk for skin breakdown  - Assess and monitor skin integrity  - Assess and monitor nutrition and hydration status  - Monitor labs   - Assess for incontinence   - Turn and reposition patient  - Assist with mobility/ambulation  - Relieve pressure over bony prominences  - Avoid friction and shearing  - Provide appropriate hygiene as needed including keeping skin clean and dry  - Evaluate need for skin moisturizer/barrier cream  - Collaborate with interdisciplinary team   - Patient/family teaching  - Consider wound care consult   Outcome: Progressing

## 2023-08-21 NOTE — RESTORATIVE TECHNICIAN NOTE
Restorative Technician Note      Patient Name: Darina Hodgkin     Restorative Tech Visit Date: 08/21/23  Note Type: Mobility  Patient Position Upon Consult: Bedside chair  Activity Performed: Ambulated  Assistive Device: Roller walker  Patient Position at End of Consult:  Other (comment) (Supine on transport stretcher; left in care of transport team)    Mark Ashley, Restorative Technician

## 2023-08-21 NOTE — ASSESSMENT & PLAN NOTE
Lab Results   Component Value Date    HGBA1C 6.5 (H) 07/17/2023       Recent Labs     08/20/23  2115 08/21/23  0708 08/21/23  1135 08/21/23  1702   POCGLU 148* 133 180* 166*       Blood Sugar Average: Last 72 hrs:  (P) 165     Lab Results   Component Value Date    HGBA1C 6.5 (H) 07/17/2023     · Home regimen: Diet controlled  · SSI

## 2023-08-21 NOTE — RESPIRATORY THERAPY NOTE
RT Protocol Note  Darina Hodgkin 69 y.o. male MRN: [id number]  Unit/Bed#: [facility] 931-01 Encounter: [id number]    Assessment    Principal Problem:    Sepsis ([address])  Active Problems:    UTI (urinary tract infection)    Type 2 diabetes mellitus (HCC)    Chronic respiratory failure with hypoxia and hypercapnia (HCC)    Benign essential hypertension    Hyponatremia    Ambulatory dysfunction    Morbid obesity with BMI of 40.0-44.9, adult (HCC)    Hydronephrosis with ureteropelvic junction (UPJ) obstruction      Home Pulmonary Medications:  Albuterol MDI   Home Devices/Therapy: Home O2 (1 lpm nc at night)    Past Medical History:   Diagnosis Date    Hypertension     Kidney stone     Lymphedema      Social History     Socioeconomic History    Marital status:      Spouse name: Not on file    Number of children: Not on file    Years of education: Not on file    Highest education level: Not on file   Occupational History    Not on file   Tobacco Use    Smoking status: Never    Smokeless tobacco: Never   Vaping Use    Vaping Use: Never used   Substance and Sexual Activity    Alcohol use: Never    Drug use: Never    Sexual activity: Not Currently   Other Topics Concern    Not on file   Social History Narrative    Not on file     Social Determinants of Health     Financial Resource Strain: Not on file   Food Insecurity: No Food Insecurity (7/18/2023)    Hunger Vital Sign     Worried About Running Out of Food in the Last Year: Never true     Ran Out of Food in the Last Year: Never true   Transportation Needs: No Transportation Needs (7/18/2023)    PRAPARE - Transportation     Lack of Transportation (Medical): No     Lack of Transportation (Non-Medical):  No   Physical Activity: Not on file   Stress: Not on file   Social Connections: Not on file   Intimate Partner Violence: Not on file   Housing Stability: Low Risk  (7/18/2023)    Housing Stability Vital Sign     Unable to Pay for Housing in the Last Year: No     Number of Places Lived in the Last Year: 1     Unstable Housing in the Last Year: No       Subjective         Objective    Physical Exam:   Assessment Type: Assess only  General Appearance: Alert, Awake  Chest Assessment: Chest expansion symmetrical  Bilateral Breath Sounds: Clear, Diminished    Vitals:  Blood pressure 134/64, pulse 72, temperature 98 °F (36.7 °C), resp. rate (!) 24, SpO2 93 %. Imaging and other studies: I have personally reviewed pertinent reports. Plan    Respiratory Plan: No distress/Pulmonary history        Resp Comments: Pt assessed per respiratory protocol order. No documented pulm hx. Pt takes Albuterol MDI as needed. PRN Albuterol MDI is already ordered. Pt states they also take nebs at home as needed. PRN txs already ordered as well. Pt wears 1 lpm of oxygen at night. BS clear at this time and pt offers no resp complaints.

## 2023-08-21 NOTE — PROGRESS NOTES
Progress Note - Urology  Trang Pond 1955, 69 y.o. male MRN: [id number]    Unit/Bed#: [facility] 931-01 Encounter: [id number]    * Sepsis ([address])  Assessment & Plan  · POD#1 cystoscopy, left stent insertion   · WBC 25.18  · procalcitonin 0.34  · Creat 0.95  · Ct: Marked tortuosity of the pelvic portion of the left ureter with a 360 degree loop. The looping left ureter is seen extending towards the left inguinal region near the surgical suture line. This may be congenital looping or may be acquired due to tethering of the left ureter to the surgical site, possible extravasation of contrast in abdomen. · fluoroscopy cystogram ordered  · Medical optimization per primary team  · Continue to trend labs  · Empiric antibiotics, Tailor antibiotics to cultures  · Do NOT remove silva catheter until cleared by urology        Subjective:  Sitting out of bed in chair. Silva catheter with bloody urine output. Denies fevers, chills or abdominal pain. Tolerating diet. Has discomfort to tip of penis from catheter    24 HR EVENTS:   no significant events. Patient has  complaints of discomfort at tip of penis. Review of Systems    Objective:  Nursing Rounds:   Vitals: Blood pressure 134/64, pulse 72, temperature 98 °F (36.7 °C), resp. rate (!) 24, SpO2 93 %. ,There is no height or weight on file to calculate BMI. INS & OUTS:  I/O last 24 hours: In: 625 [I.V.:625]  Out: 975 [Urine:975]    Physical Exam    Imaging:  CT ABDOMEN AND PELVIS WITHOUT IV CONTRAST     INDICATION:   LEFT ureteral stent position, evaluate for position of previously seen distal LEFT ureteral stone.  Previous history of panniculectomy congenital hernia repair     COMPARISON: August 20, 2023  TECHNIQUE:  CT examination of the abdomen and pelvis was performed without intravenous contrast. Multiplanar 2D reformatted images were created from the source data.     This examination, like all CT scans performed in the [facility], was performed utilizing techniques to minimize radiation dose exposure, including the use of iterative reconstruction and automated exposure control. Radiation dose length   product (DLP) for this visit:  5533.57 mGy-cm     Enteric contrast was administered.     FINDINGS:     ABDOMEN     LOWER CHEST: Bibasilar densities seen due to atelectasis.     LIVER/BILIARY TREE:  Unremarkable.     GALLBLADDER: Cholelithiasis seen     SPLEEN:  Unremarkable.     PANCREAS:  Unremarkable.     ADRENAL GLANDS:  Unremarkable.     KIDNEYS/URETERS: There is opacification of the right pelvicalyceal system and right ureter there is mild dilatation of the right renal pelvis. There is no evidence of right ureteric obstruction     Marked tortuosity of the right ureter seen at the level of the iliac crest where it makes a sharp kink. Left system:  Mild prominence of the left renal pelvis without any calyceal dilatation. A left ureteral stent is seen with the proximal loop near the UP junction and the distal loop in the bladder. Tortuosity and kinking of the left ureter in the mid ureteric portion   where it makes a 360 degree sharp kink. The kinking is noted at the level of the previously treated surgical site adjacent to the suture line of the previous hernia repair     STOMACH AND BOWEL: Moderate amount of fecal debris in the rectum  No abnormal dilatation of the small bowel loops seen  Postsurgical changes in the stomach     APPENDIX:  No findings to suggest appendicitis.     ABDOMINOPELVIC CAVITY: There is new area of soft tissue infiltration in the left lower abdominal adjacent to the the colon.  This demonstrates attenuation of 21 cm adjacent to the surgical suture line, new from the previous, seen in image 73 series 604   and measures 3.5 cm, this is located superior to the urinary bladder, possibly is a small area of extravasation     VESSELS:  Unremarkable for patient's age.     PELVIS     REPRODUCTIVE ORGANS: Prostate appears unremarkable  Evaluation for of the pelvis are limited due to bilateral streak artifact  URINARY BLADDER: Urinary bladder evaluation is limited  The previously noted calcification in the urinary bladder not visualized ABDOMINAL WALL/INGUINAL REGIONS: Large pannus seen     OSSEOUS STRUCTURES:  No acute fracture or destructive osseous lesion.     IMPRESSION:     The left ureteral stent is in place with proximal tip in the renal pelvis near the UP junction and the distal loop in the urinary bladder     Marked tortuosity of the pelvic portion of the left ureter with a 360 degree loop. The looping left ureter is seen extending towards the left inguinal region near the surgical suture line. This may be congenital looping or may be acquired due to   tethering of the left ureter to the surgical site        The calculus which was seen in the urinary bladder area not visualized although evaluation is limited due to presence of catheters and ureteral stent     Infiltration and high attenuation in the pericolonic region in the lower abdomen ,  medial to the area of looping ureter and superior to the bladder, (seen in image 71 series 604), consider cystogram to evaluate for contrast extravasation and exclude   bladder injury     Tortuosity and looping of the right mid ureter also noted without any obstruction     I personally discussed this study Marvin Tipton on 8/21/2023 9:50 AM.           Workstation performed: [id number]  Imaging reviewed - both report and images personally reviewed.      Labs:  Recent Labs     08/20/23  0052 08/21/23  0444   WBC 22.44* 25.18*       Recent Labs     08/20/23  0052 08/21/23  0444   HGB 12.3 11.0*     Recent Labs     08/20/23  0052 08/21/23  0444   HCT 37.8 34.6*     Recent Labs     08/20/23  0052 08/20/23  1614 08/21/23  0444   CREATININE 1.04 1.14 0.95     Lab Results   Component Value Date    HGB 11.0 (L) 08/21/2023    HCT 34.6 (L) 08/21/2023    WBC 25.18 (H) 08/21/2023     08/21/2023 Lab Results   Component Value Date    K 4.3 08/21/2023     08/21/2023    CO2 29 08/21/2023    BUN 19 08/21/2023    CREATININE 0.95 08/21/2023    CALCIUM 8.4 08/21/2023    GLUCOSE 130 07/20/2023     Urine Culture: Growth: >810089 gram negative maria l enteric like and Sensitivities Pending    History:    Past Medical History:   Diagnosis Date   • Hypertension    • Kidney stone    • Lymphedema      Past Surgical History:   Procedure Laterality Date   • FETAL SURGERY FOR CONGENITAL HERNIA      5 years ago   • FL RETROGRADE PYELOGRAM  8/20/2023   • GASTRIC BYPASS      20 years ago   • PANNICULECTOMY     • PENIS SURGERY      penile straightening procedure/evangelina tucks   • TOTAL HIP ARTHROPLASTY      10 years ago     Family History   Problem Relation Age of Onset   • Heart disease Mother      Social History     Socioeconomic History   • Marital status:      Spouse name: Not on file   • Number of children: Not on file   • Years of education: Not on file   • Highest education level: Not on file   Occupational History   • Not on file   Tobacco Use   • Smoking status: Never   • Smokeless tobacco: Never   Vaping Use   • Vaping Use: Never used   Substance and Sexual Activity   • Alcohol use: Never   • Drug use: Never   • Sexual activity: Not Currently   Other Topics Concern   • Not on file   Social History Narrative   • Not on file     Social Determinants of Health     Financial Resource Strain: Not on file   Food Insecurity: No Food Insecurity (8/21/2023)    Hunger Vital Sign    • Worried About Running Out of Food in the Last Year: Never true    • Ran Out of Food in the Last Year: Never true   Transportation Needs: No Transportation Needs (7/18/2023)    PRAPARE - Transportation    • Lack of Transportation (Medical): No    • Lack of Transportation (Non-Medical):  No   Physical Activity: Not on file   Stress: Not on file   Social Connections: Not on file   Intimate Partner Violence: Not on file   Housing Stability: High Risk (8/21/2023)    Housing Stability Vital Sign    • Unable to Pay for Housing in the Last Year: No    • Number of Places Lived in the Last Year: 1    • Unstable Housing in the Last Year: Yes       The following portions of the patient's history were reviewed and updated as appropriate: allergies, current medications, past family history, past medical history, past social history, past surgical history and problem list    RALPH Swanson  Date: 8/21/2023 Time: 11:04 AM

## 2023-08-21 NOTE — PLAN OF CARE
Problem: PHYSICAL THERAPY ADULT  Goal: Performs mobility at highest level of function for planned discharge setting. See evaluation for individualized goals. Description: Treatment/Interventions: Functional transfer training, LE strengthening/ROM, Elevations, Therapeutic exercise, Endurance training, Patient/family training, Equipment eval/education, Bed mobility, Gait training, Spoke to nursing, Spoke to case management, OT  Equipment Recommended: Pa Manzano (pt reports owning)       See flowsheet documentation for full assessment, interventions and recommendations. Note: Prognosis: Good  Problem List: Decreased strength, Decreased endurance, Impaired balance, Decreased mobility, Pain  Assessment: Pt is a 69 y.o. male seen for PT evaluation s/p admit to [facility] on 8/20/2023. Pt was admitted with a primary dx of: sepsis s/p cystoscopy + ureteral stent placement. PT now consulted for assessment of mobility and d/c needs. Pt with Up and OOB as tolerated  orders. Pts current comorbidities effecting treatment include: UTI, type 2 DM, respiratory failure, HTN, hyponatremia, ambulatory dysfunction, obesity, and hydronephrosis. Pts current clinical presentation is Unstable/ Unpredictable (high complexity) due to Ongoing medical management for primary dx, Increased reliance on more restrictive AD compared to baseline, Decreased activity tolerance compared to baseline, Fall risk, Increased assistance needed from caregiver at current time, Trending lab values, Continuous pulse oximetry monitoring , s/p surgical intervention. Prior to admission, pt was residing alone in bi-level home with 5+5 steps to main living area and was using A RW for fnx mobility. Upon evaluation, pt currently is requiring mod A for bed mobility; min A for transfers; min A for ambulation 80 ft w/ RW.  Pt presents at PT eval functioning below baseline and currently w/ overall mobility deficits 2* to: BLE weakness, decreased ROM, impaired balance, decreased endurance, impaired coordination, gait deviations, pain, decreased activity tolerance compared to baseline, decreased functional mobility tolerance compared to baseline, fall risk. Pt currently at a fall risk 2* to impairments listed above. Pt will continue to benefit from skilled acute PT interventions to address stated impairments; to maximize functional mobility; for ongoing pt/ family training; and DME needs. At conclusion of PT session pt returned back in chair with phone and call bell within reach. Pt denies any further questions at this time. The patient's AM-PAC Basic Mobility Inpatient Short Form Raw Score is 16. A Raw score of less than or equal to 16 suggests the patient may benefit from discharge to post-acute rehabilitation services. Please also refer to the recommendation of the Physical Therapist for safe discharge planning. Recommend home with HHPT pending progress upon hospital D/C. PT Discharge Recommendation: Home with home health rehabilitation (+ increased social support pending progress)    See flowsheet documentation for full assessment.

## 2023-08-21 NOTE — ASSESSMENT & PLAN NOTE
· Patient presented to [facility] ED with complaint of fever, found to have tachycardia, tachypnea and leukocytosis meeting sepsis criteria  · WBC 22  · urinalysis is positive  · CT chest abdomen pelvis shows left hydronephrosis from left UVJ calculus measuring 8 mm  · Continue IV Rocephin  · Blood cultures and urine cultures pending  · Transferred to [facility] for cystoscopy, underwent cystoscopy with stent placement 8/20  · As per urology, difficult procedure due to abnormal course of ureter. · Repeat Fluoro cystogram 8/21  showed: Indwelling left nephroureteral stent with looping of the distal portion and slight outpouching of distal ureteral contrast at the inferior aspect of the loop, which may be related to tethering suggested on same-day CT.   · Transition to po abx based on sensitivities

## 2023-08-21 NOTE — PROGRESS NOTES
[facility]  Progress Note  Name: Matthew Reece  MRN: [id number]  Unit/Bed#: PPHP 931-01 I Date of Admission: 8/20/2023   Date of Service: 8/21/2023 I Hospital Day: 1    Assessment/Plan   * Sepsis [facility])  Assessment & Plan  · Patient presented to [facility] ED with complaint of fever, found to have tachycardia, tachypnea and leukocytosis meeting sepsis criteria  · WBC 22  · urinalysis is positive  · CT chest abdomen pelvis shows left hydronephrosis from left UVJ calculus measuring 8 mm  · Blood cultures and urine cultures pending  · Transferred to [facility] for cystoscopy, underwent cystoscopy with stent placement 8/20  · As per urology, difficult procedure due to abnormal course of ureter. · Repeat Fluoro cystogram 8/21  showed: Indwelling left nephroureteral stent with looping of the distal portion and slight outpouching of distal ureteral contrast at the inferior aspect of the loop, which may be related to tethering suggested on same-day CT.  · UA + pseudamonas  · D/C ceftriaxone, start zosyn. F/U Sensitivities         UTI (urinary tract infection)  Assessment & Plan    · Positive urinalysis   · Urinary retention protocol   · UA + pseudamonas  · D/C ceftriaxone, start zosyn. F/U Sensitivities    Hydronephrosis with ureteropelvic junction (UPJ) obstruction  Assessment & Plan  Plan as per above    Morbid obesity with BMI of 40.0-44.9, adult (HCC)  Assessment & Plan  · BMI 41  · Lifestyle modification counseling on discharge    Ambulatory dysfunction  Assessment & Plan  · Discharged from [address] [place] [address] on 8/11. Per patient rehab was sucessful at [place] and has been feeling stronger.    · Fall precautions   · Consult PT/OT and CM - home with home pt/ot    Hyponatremia  Assessment & Plan  · Na 129 on admission   · Likely secondary to poor oral intake  · Improved to 132    Benign essential hypertension  Assessment & Plan  · Resume home lisinopril now that [facility] resolved    Chronic respiratory failure with hypoxia and hypercapnia (HCC)  Assessment & Plan  · Baseline: room air during the day and 2 L nasal cannula at night  · Atrovent/Xopenex 3 times daily, albuterol as needed  · Has not followed up with pulmonology outpatient yet. · Respiratory protocol    Type 2 diabetes mellitus [facility])  Assessment & Plan  Lab Results   Component Value Date    HGBA1C 6.5 (H) 07/17/2023       Recent Labs     08/20/23  2115 08/21/23  0708 08/21/23  1135 08/21/23  1702   POCGLU 148* 133 180* 166*       Blood Sugar Average: Last 72 hrs:  (P) 165     Lab Results   Component Value Date    HGBA1C 6.5 (H) 07/17/2023     · Home regimen: Diet controlled  · SSI               VTE Pharmacologic Prophylaxis:   Moderate Risk (Score 3-4) - Pharmacological DVT Prophylaxis Ordered: heparin. Patient Centered Rounds: I performed bedside rounds with nursing staff today. Discussions with Specialists or Other Care Team Provider: case management    Education and Discussions with Family / Patient: Patient declined call to . Total Time Spent on Date of Encounter in care of patient: 45 minutes This time was spent on one or more of the following: performing physical exam; counseling and coordination of care; obtaining or reviewing history; documenting in the medical record; reviewing/ordering tests, medications or procedures; communicating with other healthcare professionals and discussing with patient's family/caregivers. Current Length of Stay: 1 day(s)  Current Patient Status: Inpatient   Certification Statement: The patient will continue to require additional inpatient hospital stay due to pending urine culture  Discharge Plan: Anticipate discharge in 24-48 hrs to home with home services. Code Status: Level 3 - DNAR and DNI    Subjective:   Patient seen examined at bedside. Currently offers no acute complaints states that he is feeling better.   Admits to having some mild pain    Objective:     Vitals:   Temp (24hrs), Av.1 °F (36.7 °C), Min:97.9 °F (36.6 °C), Max:98.5 °F (36.9 °C)    Temp:  [97.9 °F (36.6 °C)-98.5 °F (36.9 °C)] 97.9 °F (36.6 °C)  HR:  [72-87] 72  Resp:  [15] 15  BP: (114-136)/(61-69) 131/66  SpO2:  [92 %-93 %] 93 %  There is no height or weight on file to calculate BMI. Input and Output Summary (last 24 hours): Intake/Output Summary (Last 24 hours) at 2023 1719  Last data filed at 2023 1501  Gross per 24 hour   Intake --   Output 1250 ml   Net -1250 ml       Physical Exam:   Physical Exam  Vitals and nursing note reviewed. Constitutional:       General: He is not in acute distress. Appearance: He is well-developed. HENT:      Head: Normocephalic and atraumatic. Eyes:      Conjunctiva/sclera: Conjunctivae normal.   Cardiovascular:      Rate and Rhythm: Normal rate and regular rhythm. Heart sounds: No murmur heard. Pulmonary:      Effort: Pulmonary effort is normal. No respiratory distress. Breath sounds: Normal breath sounds. Abdominal:      Palpations: Abdomen is soft. Tenderness: There is no abdominal tenderness. Genitourinary:     Comments: Sung with hematuria  Musculoskeletal:         General: No swelling. Cervical back: Neck supple. Skin:     General: Skin is warm and dry. Capillary Refill: Capillary refill takes less than 2 seconds. Neurological:      Mental Status: He is alert.    Psychiatric:         Mood and Affect: Mood normal.          Additional Data:     Labs:  Results from last 7 days   Lab Units 23  0444   WBC Thousand/uL 25.18*   HEMOGLOBIN g/dL 11.0*   HEMATOCRIT % 34.6*   PLATELETS Thousands/uL 278   NEUTROS PCT % 87*   LYMPHS PCT % 6*   MONOS PCT % 5   EOS PCT % 1     Results from last 7 days   Lab Units 23  0444   SODIUM mmol/L 132*   POTASSIUM mmol/L 4.3   CHLORIDE mmol/L 100   CO2 mmol/L 29   BUN mg/dL 19   CREATININE mg/dL 0.95   ANION GAP mmol/L 3   CALCIUM mg/dL 8.4 ALBUMIN g/dL 2.0*   TOTAL BILIRUBIN mg/dL 0.70   ALK PHOS U/L 116   ALT U/L 12   AST U/L 12   GLUCOSE RANDOM mg/dL 110     Results from last 7 days   Lab Units 08/20/23  0052   INR  1.04     Results from last 7 days   Lab Units 08/21/23  1702 08/21/23  1135 08/21/23  0708 08/20/23  2115 08/20/23  1656 08/20/23  1310 08/20/23  0730   POC GLUCOSE mg/dl 166* 180* 133 148* 210* 153* 150*         Results from last 7 days   Lab Units 08/21/23  0444 08/20/23  0052   LACTIC ACID mmol/L  --  1.0   PROCALCITONIN ng/ml 0.34* 0.21       Lines/Drains:  Invasive Devices     Peripheral Intravenous Line  Duration           Peripheral IV 08/20/23 Right Antecubital 1 day          Drain  Duration           Urethral Catheter Latex 16 Fr. 1 day              Urinary Catheter:  Goal for removal: Defer to urology               Imaging: Reviewed radiology reports from this admission including: procedure reports    Recent Cultures (last 7 days):   Results from last 7 days   Lab Units 08/20/23  1220 08/20/23  0211 08/20/23  0100 08/20/23  0052   BLOOD CULTURE   --   --  No Growth at 24 hrs. No Growth at 24 hrs.    URINE CULTURE  30,000-39,000 cfu/ml Pseudomonas aeruginosa* >100,000 cfu/ml Pseudomonas aeruginosa*  --   --        Last 24 Hours Medication List:   Current Facility-Administered Medications   Medication Dose Route Frequency Provider Last Rate   • acetaminophen  650 mg Oral Q6H PRN Jose Carlos Keyes MD     • albuterol  2 puff Inhalation Q6H PRN Jose Carlos Keyes MD     • cefTRIAXone  2,000 mg Intravenous Q24H Jose Carlos Keyes MD 2,000 mg (08/21/23 0425)   • guaiFENesin  1,200 mg Oral Q12H Diana Ridley MD     • heparin (porcine)  5,000 Units Subcutaneous Q8H [address] Jose Carlos Keyes MD     • insulin lispro  1-5 Units Subcutaneous TID AC Jose Carlos Keyes MD     • insulin lispro  1-5 Units Subcutaneous HS Jose Carlos Keyes MD     • iohexol  50 mL Oral Once in imaging Jose Carlos Keyes MD     • ipratropium  0.5 mg Nebulization Q6H PRN Jose Carlos Keyes MD     • levalbuterol  1.25 mg Nebulization Q6H PRN Hubert Matthew MD     • [START ON 8/22/2023] lisinopril  10 mg Oral Daily Galen Church DO     • nystatin   Topical BID Hubert Matthew MD     • oxyCODONE  10 mg Oral Q6H PRN Hubert Matthew MD     • oxyCODONE  5 mg Oral Q4H PRN Hubert Matthew MD     • polyethylene glycol  17 g Oral Daily Galen Church DO     • sodium chloride  50 mL/hr Intravenous Continuous Galen Church DO 50 mL/hr (08/21/23 1034)        Today, Patient Was Seen By: Kellie Crooks DO    **Please Note: This note may have been constructed using a voice recognition system. **

## 2023-08-22 ENCOUNTER — TELEPHONE (OUTPATIENT)
Dept: OTHER | Facility: HOSPITAL | Age: 68
End: 2023-08-22

## 2023-08-22 PROBLEM — E87.1 HYPONATREMIA: Status: RESOLVED | Noted: 2023-08-20 | Resolved: 2023-08-22

## 2023-08-22 LAB
ALBUMIN SERPL BCP-MCNC: 1.9 G/DL (ref 3.5–5)
ANION GAP SERPL CALCULATED.3IONS-SCNC: 5 MMOL/L
BACTERIA UR CULT: ABNORMAL
BACTERIA UR CULT: ABNORMAL
BUN SERPL-MCNC: 17 MG/DL (ref 5–25)
CALCIUM ALBUM COR SERPL-MCNC: 10.2 MG/DL (ref 8.3–10.1)
CALCIUM SERPL-MCNC: 8.5 MG/DL (ref 8.3–10.1)
CHLORIDE SERPL-SCNC: 103 MMOL/L (ref 96–108)
CO2 SERPL-SCNC: 27 MMOL/L (ref 21–32)
CREAT SERPL-MCNC: 0.88 MG/DL (ref 0.6–1.3)
ERYTHROCYTE [DISTWIDTH] IN BLOOD BY AUTOMATED COUNT: 13.5 % (ref 11.6–15.1)
GFR SERPL CREATININE-BSD FRML MDRD: 87 ML/MIN/1.73SQ M
GLUCOSE SERPL-MCNC: 111 MG/DL (ref 65–140)
GLUCOSE SERPL-MCNC: 124 MG/DL (ref 65–140)
GLUCOSE SERPL-MCNC: 125 MG/DL (ref 65–140)
GLUCOSE SERPL-MCNC: 131 MG/DL (ref 65–140)
GLUCOSE SERPL-MCNC: 135 MG/DL (ref 65–140)
HCT VFR BLD AUTO: 31.5 % (ref 36.5–49.3)
HGB BLD-MCNC: 10.3 G/DL (ref 12–17)
MAGNESIUM SERPL-MCNC: 2.4 MG/DL (ref 1.6–2.6)
MCH RBC QN AUTO: 30.9 PG (ref 26.8–34.3)
MCHC RBC AUTO-ENTMCNC: 32.7 G/DL (ref 31.4–37.4)
MCV RBC AUTO: 95 FL (ref 82–98)
PHOSPHATE SERPL-MCNC: 3.1 MG/DL (ref 2.3–4.1)
PLATELET # BLD AUTO: 257 THOUSANDS/UL (ref 149–390)
PMV BLD AUTO: 8.7 FL (ref 8.9–12.7)
POTASSIUM SERPL-SCNC: 4.4 MMOL/L (ref 3.5–5.3)
RBC # BLD AUTO: 3.33 MILLION/UL (ref 3.88–5.62)
SODIUM SERPL-SCNC: 135 MMOL/L (ref 135–147)
WBC # BLD AUTO: 13.88 THOUSAND/UL (ref 4.31–10.16)

## 2023-08-22 PROCEDURE — 83735 ASSAY OF MAGNESIUM: CPT | Performed by: STUDENT IN AN ORGANIZED HEALTH CARE EDUCATION/TRAINING PROGRAM

## 2023-08-22 PROCEDURE — 80069 RENAL FUNCTION PANEL: CPT | Performed by: STUDENT IN AN ORGANIZED HEALTH CARE EDUCATION/TRAINING PROGRAM

## 2023-08-22 PROCEDURE — 94664 DEMO&/EVAL PT USE INHALER: CPT

## 2023-08-22 PROCEDURE — 85027 COMPLETE CBC AUTOMATED: CPT | Performed by: STUDENT IN AN ORGANIZED HEALTH CARE EDUCATION/TRAINING PROGRAM

## 2023-08-22 PROCEDURE — 99232 SBSQ HOSP IP/OBS MODERATE 35: CPT | Performed by: UROLOGY

## 2023-08-22 PROCEDURE — 82948 REAGENT STRIP/BLOOD GLUCOSE: CPT

## 2023-08-22 PROCEDURE — 99232 SBSQ HOSP IP/OBS MODERATE 35: CPT | Performed by: INTERNAL MEDICINE

## 2023-08-22 RX ORDER — TAMSULOSIN HYDROCHLORIDE 0.4 MG/1
0.4 CAPSULE ORAL
Qty: 30 CAPSULE | Refills: 0 | OUTPATIENT
Start: 2023-08-22

## 2023-08-22 RX ORDER — OXYBUTYNIN CHLORIDE 5 MG/1
5 TABLET ORAL 3 TIMES DAILY PRN
Qty: 10 TABLET | Refills: 0 | OUTPATIENT
Start: 2023-08-22

## 2023-08-22 RX ORDER — PHENAZOPYRIDINE HYDROCHLORIDE 200 MG/1
200 TABLET, FILM COATED ORAL 3 TIMES DAILY PRN
Qty: 10 TABLET | Refills: 0 | OUTPATIENT
Start: 2023-08-22

## 2023-08-22 RX ORDER — LEVOFLOXACIN 500 MG/1
500 TABLET, FILM COATED ORAL EVERY 24 HOURS
Qty: 3 TABLET | Refills: 0 | Status: SHIPPED | OUTPATIENT
Start: 2023-08-22 | End: 2023-08-25

## 2023-08-22 RX ADMIN — SODIUM CHLORIDE 50 ML/HR: 0.9 INJECTION, SOLUTION INTRAVENOUS at 07:48

## 2023-08-22 RX ADMIN — GUAIFENESIN 1200 MG: 600 TABLET, EXTENDED RELEASE ORAL at 21:04

## 2023-08-22 RX ADMIN — PIPERACILLIN SODIUM AND TAZOBACTAM SODIUM 3.38 G: 36; 4.5 INJECTION, POWDER, LYOPHILIZED, FOR SOLUTION INTRAVENOUS at 05:27

## 2023-08-22 RX ADMIN — HEPARIN SODIUM 5000 UNITS: 5000 INJECTION INTRAVENOUS; SUBCUTANEOUS at 21:05

## 2023-08-22 RX ADMIN — HEPARIN SODIUM 5000 UNITS: 5000 INJECTION INTRAVENOUS; SUBCUTANEOUS at 14:44

## 2023-08-22 RX ADMIN — PIPERACILLIN SODIUM AND TAZOBACTAM SODIUM 3.38 G: 36; 4.5 INJECTION, POWDER, LYOPHILIZED, FOR SOLUTION INTRAVENOUS at 00:01

## 2023-08-22 RX ADMIN — OXYCODONE HYDROCHLORIDE 5 MG: 5 TABLET ORAL at 00:00

## 2023-08-22 RX ADMIN — NYSTATIN: 100000 POWDER TOPICAL at 16:14

## 2023-08-22 RX ADMIN — GUAIFENESIN 1200 MG: 600 TABLET, EXTENDED RELEASE ORAL at 08:08

## 2023-08-22 RX ADMIN — HEPARIN SODIUM 5000 UNITS: 5000 INJECTION INTRAVENOUS; SUBCUTANEOUS at 05:27

## 2023-08-22 RX ADMIN — OXYCODONE HYDROCHLORIDE 10 MG: 10 TABLET ORAL at 08:08

## 2023-08-22 RX ADMIN — CEFEPIME 2000 MG: 2 INJECTION, POWDER, FOR SOLUTION INTRAVENOUS at 11:06

## 2023-08-22 RX ADMIN — CEFEPIME 2000 MG: 2 INJECTION, POWDER, FOR SOLUTION INTRAVENOUS at 18:21

## 2023-08-22 RX ADMIN — POLYETHYLENE GLYCOL 3350 17 G: 17 POWDER, FOR SOLUTION ORAL at 08:08

## 2023-08-22 RX ADMIN — OXYCODONE HYDROCHLORIDE 10 MG: 10 TABLET ORAL at 18:21

## 2023-08-22 RX ADMIN — LISINOPRIL 10 MG: 10 TABLET ORAL at 08:08

## 2023-08-22 RX ADMIN — NYSTATIN: 100000 POWDER TOPICAL at 08:09

## 2023-08-22 NOTE — ASSESSMENT & PLAN NOTE
· Urine culture growing Pseudomonas  · Now on IV cefepime; will continue through tomorrow 8/23  · Plan to transition to p.o.  Levaquin on discharge to complete a 10-day course of therapy through 8/30

## 2023-08-22 NOTE — TRANSPORTATION MEDICAL NECESSITY
Section I - General Information    Name of Patient: Dio Ruiz                 : 1955    Medicare #: [id number]  Transport Date: 2022 (PCS is valid for round trips on this date and for all repetitive trips in the 60-day range as noted below.)  Origin: [facility] MED SURG UNIT                                                         Destination: [address]  Is the pt's stay covered under Medicare Part A (PPS/DRG)   []     Closest appropriate facility? If no, why is transport to more distant facility required? Yes  If hospice pt, is this transport related to pt's terminal illness? NA       Section II - Medical Necessity Questionnaire  Ambulance transportation is medically necessary only if other means of transport are contraindicated or would be potentially harmful to the patient. To meet this requirement, the patient must either be "bed confined" or suffer from a condition such that transport by means other than ambulance is contraindicated by the patient's condition. The following questions must be answered by the medical professional signing below for this form to be valid:    1)  Describe the MEDICAL CONDITION (physical and/or mental) of this patient  [address] that requires the patient to be transported in an ambulance and why transport by other means is contraindicated by the patient's condition: Urology transfer; No isolation issues, NO telem, 149kg, 1 Liter N/C    2) Is the patient "bed confined" as defined below? Yes  To be "be confined" the patient must satisfy all three of the following conditions: (1) unable to get up from bed without Assistance; AND (2) unable to ambulate; AND (3) unable to sit in a chair or wheelchair. 3) Can this patient safely be transported by car or wheelchair van (i.e., seated during transport without a medical attendant or monitoring)?    No    4) In addition to completing questions 1-3 above, please check any of the following conditions that apply*:   *Note: supporting documentation for any boxes checked must be maintained in the patient's medical records. If hosp-hosp transfer, describe services needed at 2nd facility not available at 1st facility? Medical attendant required   Requires oxygen-unable to self administer      Section III - Signature of Physician or Healthcare Professional  I certify that the above information is true and correct based on my evaluation of this patient, and represent that the patient requires transport by ambulance and that other forms of transport are contraindicated. I understand that this information will be used by the Centers for Medicare and Medicaid Services (CMS) to support the determination of medical necessity for ambulance services, and I represent that I have personal knowledge of the patient's condition at time of transport. []  If this box is checked, I also certify that the patient is physically or mentally incapable of signing the ambulance service's claim and that the institution with which I am affiliated has furnished care, services, or assistance to the patient. My signature below is made on behalf of the patient pursuant to 42 CFR §424.36(b)(4). In accordance with 42 CFR §424.37, the specific reason(s) that the patient is physically or mentally incapable of signing the claim form is as follows: Reji Cifuentesing of Physician* or Healthcare Professional______________________________________________________________  Signature Date 08/22/23 (For scheduled repetitive transports, this form is not valid for transports performed more than 60 days after this date)    Printed Name & Credentials of Physician or Healthcare Professional (MD, , RN, etc.)_____Marta Emerson RN_______  *Form must be signed by patient's attending physician for scheduled, repetitive transports.  For non-repetitive, unscheduled ambulance transports, if unable to obtain the signature of the attending physician, any of the following may sign (choose appropriate option below)  [] Physician Assistant []  Clinical Nurse Specialist []  Registered Nurse  []  Nurse Practitioner  [x] Discharge Planner

## 2023-08-22 NOTE — RESTORATIVE TECHNICIAN NOTE
Restorative Technician Note      Patient Name: Dio Ruiz     Restorative Tech Visit Date: 08/22/23  Note Type: Mobility  Patient Position Upon Consult: Supine  Activity Performed: Ambulated  Assistive Device: Roller walker  Patient Position at End of Consult: Supine;  All needs within reach    Gael Nunez, Restorative Technician

## 2023-08-22 NOTE — DISCHARGE INSTR - AVS FIRST PAGE
Please call [phone] to schedule your CAT scan at least 1 week prior to your follow-up appointment  Please begin Levaquin the day before your cystoscopy and stent removal in the office, you will take this the day of your cystoscopy and stent removal as well as the day after  The office should call you to schedule the removal of your stent and catheter, if you do not hear from them please call [phone] option 3    You underwent cystoscopy (looking inside the bladder) and  ureteroscopy (looking inside the  ureter) with laser lithotripsy to break apart a kidney stone and placement of ureteral stent. WHAT IS A STENT? At the end of the procedure, your doctor may place a stent into your ureter. A stent is a thin, flexible piece of plastic that will hold open your ureter while the remaining small pieces of stone pass. This allows your kidney to drain easily and prevents you from having to ``pass´´ these small stone pieces on your own, which could be painful. The stent is about 12 inches long and looks and feels like a thin piece of spaghetti. AFTER THE PROCEDURE  After the procedure you may experience the following symptoms. All of these are normal and should resolve within 1 or 2 days after your stent is removed.   1) Urinary frequency (urinating more often than usual)  2) Urinary urgency (the sensation that you need to urinate right away)  3) Painful urination (this can be pain in your bladder or in your back when  you urinate)  4) Blood in your urine ( a stent can irritate the lining of your bladder causing it to bleed)  5) Back/Flank pain, especially with urination    ° Your stent may cause you a lot of discomfort or blood in the urine this is normal  ° Please take oxybutynin 3 times a day as needed for discomfort  ° You can take Pyridium 3 times a day as needed for discomfort, this will color your urine yellow-orange  ° Please take Flomax daily to help relax the ureter  ° You can take additional ibuprofen if needed for any discomfort  ° Make sure you are drinking at least 2 L of water a day unless you have a fluid restriction    General Instructions   1. Stent: You have a stent in your  ureter. 2. Bathing: You may shower and bathe as per usual.   3. Diet: You may resume your pre-operative diet   4. Activity: Walk as much as possible. No strenuous exercise or work for the first 5 days. From then on, slowly increase your level of activity back to normal.   5. If you currently smoke or use tobacco product, consider quitting. There are resources available to help you stop. Please ask your provider.    6. Report fever 101.5 or higher, pain not controlled by medications, or anything you believe warrants medical attention

## 2023-08-22 NOTE — UTILIZATION REVIEW
NOTIFICATION OF INPATIENT ADMISSION   AUTHORIZATION REQUEST   SERVICING FACILITY:   [address] [place] [address]  Address: [address] [place] [address]  Tax ID: [id number]  NPI: [id number] ATTENDING PROVIDER:  Attending Name and NPI#: Murray Levin [[id number]]  Address: [address] [place] [address]  Phone: [phone]   ADMISSION INFORMATION:  Place of Service: Inpatient [address] [facility]  Place of Service Code: 21  Inpatient Admission Date/Time: 8/20/23 11:27 AM  Discharge Date/Time: No discharge date for patient encounter. Admitting Diagnosis Code/Description:  Sepsis ([facility]) [A41.9]     UTILIZATION REVIEW CONTACT:  Delois Maize "Alois Shone, Utilization   Network Utilization Review Department  Phone: [phone]  Fax: [phone]  Email: [email or web address]  Contact for approvals/pending authorizations, clinical reviews, and discharge. PHYSICIAN ADVISORY SERVICES:  Medical Necessity Denial & [id number] Review  Phone: [phone]  Fax: [phone]  Email: [email or web address]

## 2023-08-22 NOTE — ASSESSMENT & PLAN NOTE
· Discharged from [address] [place] [address] on 8/11. Per patient rehab was sucessful at [place] and has been feeling stronger.    · Fall precautions   · Consult PT/OT and CM - home with home pt/ot

## 2023-08-22 NOTE — UTILIZATION REVIEW
Date: 8/22    Day 3: Has surpassed a 2nd midnight with active treatments and services, which include IV ABX, fu on labs and cxs maintain Sung, IVF.         Satish Csatellon, UR RN  8/22/2023  8:11 AM

## 2023-08-22 NOTE — TELEPHONE ENCOUNTER
Please schedule patient for a cystoscopy with stent removal and Sung catheter removal in approximately 3 weeks. He should have a CAT scan approximately 1 week prior.   Levaquin was sent to his pharmacy to begin the day before his cystoscopy stent removal and catheter removal

## 2023-08-22 NOTE — CASE MANAGEMENT
Case Management Discharge Planning Note    Patient name Nam Hendricks  Location [address] [facility] [address]/[facility] 931-01 MRN [id number]  : 1955 Date 2023       Current Admission Date: 2023  Current Admission Diagnosis:Sepsis [facility])   Patient Active Problem List    Diagnosis Date Noted   • Hyponatremia 2023   • Ambulatory dysfunction 2023   • Morbid obesity with BMI of 40.0-44.9, adult ([address]) 2023   • Hydronephrosis with ureteropelvic junction (UPJ) obstruction 2023   • Constipation 2023   • Insomnia 2023   • Dermatitis 2023   • Iron deficiency anemia 2023   • Encephalopathy acute 2023   • Sepsis ([address]) 2023   • UTI (urinary tract infection) 2023   • Hypertension 2023   • Lymphedema 2023   • Type 2 diabetes mellitus ([address]) 2023   • Obesity 2023   • Chronic respiratory failure with hypoxia and hypercapnia ([address]) 2023   • Elevated troponin 2023   • Congenital buried penis 2023   • Urinary bladder stone 2023   • Hydrocele 2023   • Benign essential hypertension 2022      LOS (days): 2  Geometric Mean LOS (GMLOS) (days):   Days to GMLOS:     OBJECTIVE:  Risk of Unplanned Readmission Score: 22.11         Current admission status: Inpatient   Preferred Pharmacy:   [address] [place] [address], [address] [place] [address] [address] [place] [address]  Phone: [phone] Fax: [phone]    Primary Care Provider: Isamar Gonzalez DO    Primary Insurance: [facility]  Secondary Insurance:     DISCHARGE DETAILS:  Met at bedside with patient to discuss therapy recs of [address] [facility]. Patient declines stating that he is already set up with therapy. Explained that per chart review, referral was canceled. He states that he was evaluated for outpatient therapy at [facility] and does not want HH and owns a walker. CM will continue to support.

## 2023-08-22 NOTE — UTILIZATION REVIEW
Initial Clinical Review    Admission: Date/Time/Statement:   Admission Orders (From admission, onward)     Ordered        08/20/23 0341  INPATIENT ADMISSION  Once                      Orders Placed This Encounter   Procedures   • INPATIENT ADMISSION     Standing Status:   Standing     Number of Occurrences:   1     Order Specific Question:   Level of Care     Answer:   Med Surg [16]     Order Specific Question:   Estimated length of stay     Answer:   More than 2 Midnights     Order Specific Question:   Certification     Answer:   I certify that inpatient services are medically necessary for this patient for a duration of greater than two midnights. See H&P and MD Progress Notes for additional information about the patient's course of treatment. ED Arrival Information     Expected   -    Arrival   8/20/2023 00:43    Acuity   Emergent            Means of arrival   Ambulance    Escorted by   [address] [place], [place] [address]ist    Admission type   Emergency            Arrival complaint   fever           Chief Complaint   Patient presents with   • Fever       Initial Presentation: 69 y.o. male   To [facility] ER via EMS:   [facility]  hypertension, chronic respiratory failure, obesity, diabetes who presented to the emergency department with weakness, low-grade fever and decreased urine output - difficulty urinating   (over past 48 hrs)   Treated for UTI 1 mo ago w/outpatient abx. WBC - 22.44      UA - large leukocytes with innumerable bacteria.  CTAP - Mild left hydronephrosis, new from prior. Stable left posterolateral bladder hyperdensity which may represent a left ureterovesicular junction calculus which measures 8 mm. Cholelithiasis.     8/20     [address],  [place] Level of care  For  Treatment of  Sepsis secondary to UTI, hyponatremia, left hydronephrosis with possible 8 mm stone at UVJ. Initiate IV rocephin,  Cont IVF,  Provide IV pain/nausea control.   Consult urology, Keep NPO for now. Follow urine culture. 8/20    UROLOGY CONSULT:    Continue Rocephin    Patient to be transferred to [facility] [place] for cystoscopy  N.p.o. for procedure   (urology services not available at [facility] on weekends)            ED Triage Vitals   Temperature Pulse Respirations Blood Pressure SpO2   08/20/23 0056 08/20/23 0056 08/20/23 0047 08/20/23 0056 08/20/23 0056   98.6 °F (37 °C) 99 20 148/73 95 %      Temp Source Heart Rate Source Patient Position - Orthostatic VS BP Location FiO2 (%)   08/20/23 0056 08/20/23 0047 -- -- --   Oral Monitor         Pain Score       08/20/23 0436       No Pain          Wt Readings from Last 1 Encounters:   08/20/23 (!) 149 kg (329 lb)     Additional Vital Signs:   08/20/23 0900 -- -- -- -- -- -- 24 1 L/min Nasal cannula   08/20/23 0750 -- -- -- -- -- -- 24 1 L/min Nasal cannula   08/20/23 07:36:06 97.9 °F 89 20 137/79 98 98 % 24 1 L/min Nasal cannula   08/20/23 07:34:13 97.9 °F 96 20 137/79 98 97 % -- -- --   08/20/23 0641 97.7 °F -- -- -- -- -- -- -- --   08/20/23 04:36:01 -- 90 -- 137/70 92 97 % 24 1 L/min Nasal cannula   08/20/23 0400 -- 91 26 Abnormal  134/66 93 94 % 24 1 L/min Nasal cannula   08/20/23 0300 -- 93 24 Abnormal  142/73 101 96 % 32 3 L/min Nasal cannula   08/20/23 0200 -- 93 26 Abnormal  137/65 93 98 % 32 3 L/min Nasal cannula   08/20/23 0100 99.4 °F  99 20 142/76 101 97 % -- -- None (Room air)   08/20/23 0056 98.6 °F 99 20 148/73 -- 95 % -- -- None (Room air)     Pertinent Labs/Diagnostic Test Results:   8/20  EKG   NSR;  Prolonged qTc interval       CT chest abdomen pelvis w contrast   Final Result by Myah Valadez DO (08/20 0989)      No clear identifiable source of infection. Mild left hydronephrosis, new from prior. Stable left posterolateral bladder hyperdensity which may represent a left ureterovesicular junction calculus which measures 8 mm. Cholelithiasis.         Results from last 7 days   Lab Units 08/20/23  0056 SARS-COV-2  Negative     Results from last 7 days   Lab Units 08/21/23  0444 08/20/23  1723 08/20/23  0052   WBC Thousand/uL 25.18*  --  22.44*   HEMOGLOBIN g/dL 11.0*  --  12.3   HEMATOCRIT % 34.6*  --  37.8   PLATELETS Thousands/uL 278 281 346   NEUTROS ABS Thousands/µL 22.05*  --  20.14*         Results from last 7 days   Lab Units 08/21/23  0444 08/20/23  1614 08/20/23  0052   SODIUM mmol/L 132* 132* 129*   POTASSIUM mmol/L 4.3 4.3 4.5   CHLORIDE mmol/L 100 101 95*   CO2 mmol/L 29 28 28   ANION GAP mmol/L 3 3 6   BUN mg/dL 19 18 22   CREATININE mg/dL 0.95 1.14 1.04   EGFR ml/min/1.73sq m 81 65 72   CALCIUM mg/dL 8.4 8.3 9.2     Results from last 7 days   Lab Units 08/21/23 0444 08/20/23  0052   AST U/L 12 16   ALT U/L 12 12   ALK PHOS U/L 116 118*   TOTAL PROTEIN g/dL 6.4 7.7   ALBUMIN g/dL 2.0* 3.5   TOTAL BILIRUBIN mg/dL 0.70 1.21*     Results from last 7 days   Lab Units 08/21/23  2116 08/21/23  1702 08/21/23  1135 08/21/23  0708 08/20/23  2115 08/20/23  1656 08/20/23  1310 08/20/23  0730   POC GLUCOSE mg/dl 146* 166* 180* 133 148* 210* 153* 150*     Results from last 7 days   Lab Units 08/21/23  0444 08/20/23  1614 08/20/23  0052   GLUCOSE RANDOM mg/dL 110 247* 169*     Results from last 7 days   Lab Units 08/20/23  0052   PROTIME seconds 14.4   INR  1.04   PTT seconds 30         Results from last 7 days   Lab Units 08/21/23  0444 08/20/23  0052   PROCALCITONIN ng/ml 0.34* 0.21     Results from last 7 days   Lab Units 08/20/23  0052   LACTIC ACID mmol/L 1.0     Results from last 7 days   Lab Units 08/20/23  0052   LIPASE u/L 18                 Results from last 7 days   Lab Units 08/20/23  0211   CLARITY UA  Cloudy   COLOR UA  Yellow   SPEC GRAV UA  1.010   PH UA  6.5   GLUCOSE UA mg/dl Negative   KETONES UA mg/dl Negative   BLOOD UA  Small*   PROTEIN UA mg/dl 30 (1+)*   NITRITE UA  Negative   BILIRUBIN UA  Negative   UROBILINOGEN UA (BE) mg/dl 4.0*   LEUKOCYTES UA  Large*   WBC UA /hpf Innumerable*   RBC UA /hpf Field obscured, unable to enumerate*   BACTERIA UA /hpf Innumerable*   EPITHELIAL CELLS WET PREP /hpf Occasional     Results from last 7 days   Lab Units 08/20/23  0052   INFLUENZA A PCR  Negative   INFLUENZA B PCR  Negative   RSV PCR  Negative     Results from last 7 days   Lab Units 08/20/23  1220 08/20/23  0211 08/20/23  0100 08/20/23  0052   BLOOD CULTURE   --   --  No Growth at 24 hrs. No Growth at 24 hrs. URINE CULTURE  30,000-39,000 cfu/ml Pseudomonas aeruginosa* >100,000 cfu/ml Pseudomonas aeruginosa*  --   --      ED Treatment:   Medication Administration from 08/20/2023 0043 to 08/20/2023 0418       Date/Time Order Dose Route Action     08/20/2023 0352 EDT cefTRIAXone (ROCEPHIN) IVPB (premix in dextrose) 2,000 mg 50 mL 2,000 mg Intravenous New Bag        Past Medical History:   Diagnosis Date   • Hypertension    • Kidney stone    • Lymphedema      Present on Admission:  • Chronic respiratory failure with hypoxia and hypercapnia (HCC)  • Benign essential hypertension  • Sepsis ([address])  • Type 2 diabetes mellitus ([address])  • Obesity      Admitting Diagnosis: Urinary tract infection [N39.0]  Fever [R50.9]  Sepsis ([address]) [A41.9]  Age/Sex: 69 y.o. male  Admission Orders:   See above note      Network Utilization Review Department  ATTENTION: Please call with any questions or concerns to [phone] and carefully listen to the prompts so that you are directed to the right person. All voicemails are confidential.  Angela Beasley all requests for admission clinical reviews, approved or denied determinations and any other requests to dedicated fax number below belonging to the campus where the patient is receiving treatment.  List of dedicated fax numbers for the Facilities:  [place] DENIALS (Administrative/Medical Necessity) [phone] 2303 [facility] ([facility] [address] [place] [address] [place] [phone]   [address] [place] [address] Drive [address] [address] [facility] [address] [place] [address] [phone]

## 2023-08-22 NOTE — ASSESSMENT & PLAN NOTE
· Status post cystoscopy with ureteral stent placement  · Sung catheter and stent remain in place until seen by urology at follow-up in 3 weeks.

## 2023-08-22 NOTE — PLAN OF CARE
Problem: MOBILITY - ADULT  Goal: Maintain or return to baseline ADL function  Description: INTERVENTIONS:  -  Assess patient's ability to carry out ADLs; assess patient's baseline for ADL function and identify physical deficits which impact ability to perform ADLs (bathing, care of mouth/teeth, toileting, grooming, dressing, etc.)  - Assess/evaluate cause of self-care deficits   - Assess range of motion  - Assess patient's mobility; develop plan if impaired  - Assess patient's need for assistive devices and provide as appropriate  - Encourage maximum independence but intervene and supervise when necessary  - Involve family in performance of ADLs  - Assess for home care needs following discharge   - Consider OT consult to assist with ADL evaluation and planning for discharge  - Provide patient education as appropriate  Outcome: Progressing  Goal: Maintains/Returns to pre admission functional level  Description: INTERVENTIONS:  - Perform BMAT or MOVE assessment daily.   - Set and communicate daily mobility goal to care team and patient/family/caregiver.    - Collaborate with rehabilitation services on mobility goals if consulted  - Record patient progress and toleration of activity level   Outcome: Progressing     Problem: PAIN - ADULT  Goal: Verbalizes/displays adequate comfort level or baseline comfort level  Description: Interventions:  - Encourage patient to monitor pain and request assistance  - Assess pain using appropriate pain scale  - Administer analgesics based on type and severity of pain and evaluate response  - Implement non-pharmacological measures as appropriate and evaluate response  - Consider cultural and social influences on pain and pain management  - Notify physician/advanced practitioner if interventions unsuccessful or patient reports new pain  Outcome: Progressing     Problem: INFECTION - ADULT  Goal: Absence or prevention of progression during hospitalization  Description: INTERVENTIONS:  - Assess and monitor for signs and symptoms of infection  - Monitor lab/diagnostic results  - Monitor all insertion sites, i.e. indwelling lines, tubes, and drains  - Monitor endotracheal if appropriate and nasal secretions for changes in amount and color  - [place] appropriate cooling/warming therapies per order  - Administer medications as ordered  - Instruct and encourage patient and family to use good hand hygiene technique  - Identify and instruct in appropriate isolation precautions for identified infection/condition  Outcome: Progressing  Goal: Absence of fever/infection during neutropenic period  Description: INTERVENTIONS:  - Monitor WBC    Outcome: Progressing     Problem: SAFETY ADULT  Goal: Patient will remain free of falls  Description: INTERVENTIONS:  - Educate patient/family on patient safety including physical limitations  - Instruct patient to call for assistance with activity   - Consult OT/PT to assist with strengthening/mobility   - Keep Call bell within reach  - Keep bed low and locked with side rails adjusted as appropriate  - Keep care items and personal belongings within reach  - Initiate and maintain comfort rounds  - Make Fall Risk Sign visible to staff  - Apply yellow socks and bracelet for high fall risk patients  - Consider moving patient to room near nurses station  Outcome: Progressing     Problem: DISCHARGE PLANNING  Goal: Discharge to home or other facility with appropriate resources  Description: INTERVENTIONS:  - Identify barriers to discharge w/patient and caregiver  - Arrange for needed discharge resources and transportation as appropriate  - Identify discharge learning needs (meds, wound care, etc.)  - Arrange for interpretive services to assist at discharge as needed  - Refer to Case Management Department for coordinating discharge planning if the patient needs post-hospital services based on physician/advanced practitioner order or complex needs related to functional status, cognitive ability, or social support system  Outcome: Progressing     Problem: Knowledge Deficit  Goal: Patient/family/caregiver demonstrates understanding of disease process, treatment plan, medications, and discharge instructions  Description: Complete learning assessment and assess knowledge base.   Interventions:  - Provide teaching at level of understanding  - Provide teaching via preferred learning methods  Outcome: Progressing     Problem: Prexisting or High Potential for Compromised Skin Integrity  Goal: Skin integrity is maintained or improved  Description: INTERVENTIONS:  - Identify patients at risk for skin breakdown  - Assess and monitor skin integrity  - Assess and monitor nutrition and hydration status  - Monitor labs   - Assess for incontinence   - Turn and reposition patient  - Assist with mobility/ambulation  - Relieve pressure over bony prominences  - Avoid friction and shearing  - Provide appropriate hygiene as needed including keeping skin clean and dry  - Evaluate need for skin moisturizer/barrier cream  - Collaborate with interdisciplinary team   - Patient/family teaching  - Consider wound care consult   Outcome: Progressing

## 2023-08-22 NOTE — PROGRESS NOTES
Progress Note - Urology  [facility] 1955, 69 y.o. male MRN: [id number]    Unit/Bed#: [facility] 931-01 Encounter: [id number]    * Sepsis ([address])  Assessment & Plan  · POD#2 cystoscopy, left stent insertion   · WBC 13.88, trending down  · Creat 0.88  · fluoroscopy cystogram without urinary leak  · Medical optimization per primary team  · Continue to trend labs  · Empiric antibiotics, Tailor antibiotics to cultures  · Do NOT remove silva catheter, this will be removed in the outpatient setting  · Plan for follow-up in the office for cystoscopy stent removal in approximately 3 weeks with CT scan prior. · We will begin Levaquin the day before cystoscopy stent removal, this was sent to pharmacy    Urology will sign off but remain available for any further inpatient needs. Please feel free to contact the provider currently covering the Urology [facility] role for this campus with questions or concerns. Subjective: resting in bed. Reports recently ambulating. Has discomfort in the urinary catheter especially when sitting in the chair due to abdominal tissue pushing down. 24 HR EVENTS:   no significant events. Patient has  complaints of discomfort from the urinary catheter. Review of Systems   Constitutional: Negative for activity change, appetite change, chills, fatigue, fever and unexpected weight change. HENT: Negative for facial swelling. Eyes: Negative for discharge. Respiratory: Negative. Negative for cough and shortness of breath. Cardiovascular: Negative for chest pain and leg swelling. Gastrointestinal: Negative. Negative for abdominal distention, abdominal pain, constipation, diarrhea, nausea and vomiting. Endocrine: Negative. Genitourinary: Negative. Negative for decreased urine volume, dysuria, enuresis, flank pain, frequency, genital sores and hematuria. Catheter discomfort   Musculoskeletal: Negative for back pain and myalgias.    Skin: Negative for pallor and rash.   Allergic/Immunologic: Negative. Negative for immunocompromised state. Neurological: Negative for facial asymmetry and speech difficulty. Psychiatric/Behavioral: Negative for agitation and confusion. Objective:  Nursing Rounds:   Vitals: Blood pressure 125/76, pulse 90, temperature 98.4 °F (36.9 °C), resp. rate 17, SpO2 92 %. ,There is no height or weight on file to calculate BMI. INS & OUTS:  I/O last 24 hours: In: 120 [P.O.:120]  Out: 1450 [Urine:1450]    Physical Exam  Vitals and nursing note reviewed. Constitutional:       General: He is not in acute distress. Appearance: Normal appearance. He is obese. He is not ill-appearing, toxic-appearing or diaphoretic. HENT:      Head: Normocephalic. Pulmonary:      Effort: Pulmonary effort is normal. No respiratory distress. Abdominal:      General: Abdomen is flat. There is no distension. Genitourinary:     Comments: Sung catheter draining bess urine  Musculoskeletal:         General: No swelling. Cervical back: Normal range of motion. Skin:     General: Skin is warm and dry. Neurological:      General: No focal deficit present. Mental Status: He is alert and oriented to person, place, and time. Psychiatric:         Mood and Affect: Mood normal.         Behavior: Behavior normal.         Thought Content: Thought content normal.         Judgment: Judgment normal.         Imaging:  CYSTOGRAM     INDICATION:   possible urine leak on ct scan.  Patient is status post cystoscopy retrograde pyelogram with insertion left ureteral stent performed on 8/21/2023        COMPARISON: CT abdomen/pelvis without contrast dated 8/21/2023, fluoroscopy retrograde pyelogram dated 8/20/2023, CT chest abdomen pelvis with contrast dated 8/20/2023.     IMAGES: 17     FLUOROSCOPY TIME:   5 minutes 45 seconds     FINDINGS:   images reveal a left-sided nephroureteral stent with extensive looping at the distal aspect.     The urinary bladder was filled with contrast material in a retrograde fashion Via existing Sung catheter. Approximately 250 mL was given. Patient was unable to tolerate more volume.     The bladder appears normal in contour and distends normally. There are no filling defects. Left-sided vesicoureteral reflux is identified almost immediately around the indwelling left nephroureteral stent with contrast opacifying the renal collecting   system on the left. Slight outpouching of the distal ureter at the inferior aspect of the looping catheter, which may be related to tethering suggested on same-day CT. Nephroureteral stent in similar position compared to CT.     Images were obtained in AP, RPO and lateral position.     No extravasation of contrast is identified.     Contrast was fully drained from bladder for a post void image.     Dr. Hilary Wade was present in the room for dynamic imaging and filling of bladder.     IMPRESSION:  Indwelling left nephroureteral stent with looping of the distal portion and slight outpouching of distal ureteral contrast at the inferior aspect of the loop, which may be related to tethering suggested on same-day CT.     No bladder leak identified.     Procedure was performed by Scot GARSIA under the direct supervision of Dr. You Burk performed: [id number]    Imaging reviewed - both report and images personally reviewed.      Labs:  Recent Labs     08/20/23  0052 08/21/23  0444 08/22/23  0604   WBC 22.44* 25.18* 13.88*       Recent Labs     08/20/23  0052 08/21/23  0444 08/22/23  0604   HGB 12.3 11.0* 10.3*     Recent Labs     08/20/23  0052 08/21/23  0444 08/22/23  0604   HCT 37.8 34.6* 31.5*     Recent Labs     08/20/23  0052 08/20/23  1614 08/21/23  0444 08/22/23  0604   CREATININE 1.04 1.14 0.95 0.88     Lab Results   Component Value Date    HGB 10.3 (L) 08/22/2023    HCT 31.5 (L) 08/22/2023    WBC 13.88 (H) 08/22/2023     08/22/2023     Lab Results Component Value Date    K 4.4 08/22/2023     08/22/2023    CO2 27 08/22/2023    BUN 17 08/22/2023    CREATININE 0.88 08/22/2023    CALCIUM 8.5 08/22/2023    GLUCOSE 130 07/20/2023     Urine Culture: Growth: [id number] pseudomonas aeruginosa    History:    Past Medical History:   Diagnosis Date   • Hypertension    • Kidney stone    • Lymphedema      Past Surgical History:   Procedure Laterality Date   • FETAL SURGERY FOR CONGENITAL HERNIA      5 years ago   • FL CYSTOGRAM  8/21/2023   • FL RETROGRADE PYELOGRAM  8/20/2023   • GASTRIC BYPASS      20 years ago   • PANNICULECTOMY     • PENIS SURGERY      penile straightening procedure/evangelina tucks   • NE CYSTO BLADDER W/URETERAL CATHETERIZATION Left 8/20/2023    Procedure: CYSTOSCOPY RETROGRADE PYELOGRAM WITH INSERTION STENT URETERAL;  Surgeon: Andie Moreira MD;  Location: [facility] OR;  Service: Urology   • TOTAL HIP ARTHROPLASTY      10 years ago     Family History   Problem Relation Age of Onset   • Heart disease Mother      Social History     Socioeconomic History   • Marital status:      Spouse name: Not on file   • Number of children: Not on file   • Years of education: Not on file   • Highest education level: Not on file   Occupational History   • Not on file   Tobacco Use   • Smoking status: Never   • Smokeless tobacco: Never   Vaping Use   • Vaping Use: Never used   Substance and Sexual Activity   • Alcohol use: Never   • Drug use: Never   • Sexual activity: Not Currently   Other Topics Concern   • Not on file   Social History Narrative   • Not on file     Social Determinants of Health     Financial Resource Strain: Not on file   Food Insecurity: No Food Insecurity (8/21/2023)    Hunger Vital Sign    • Worried About Running Out of Food in the Last Year: Never true    • Ran Out of Food in the Last Year: Never true   Transportation Needs: No Transportation Needs (7/18/2023)    PRAPARE - Transportation    • Lack of Transportation (Medical): No    • Lack of Transportation (Non-Medical):  No   Physical Activity: Not on file   Stress: Not on file   Social Connections: Not on file   Intimate Partner Violence: Not on file   Housing Stability: High Risk (8/21/2023)    Housing Stability Vital Sign    • Unable to Pay for Housing in the Last Year: No    • Number of Places Lived in the Last Year: 1    • Unstable Housing in the Last Year: Yes       The following portions of the patient's history were reviewed and updated as appropriate: allergies, current medications, past family history, past medical history, past social history, past surgical history and problem list    RALPH Vasquez  Date: 8/22/2023 Time: 11:21 AM

## 2023-08-22 NOTE — PLAN OF CARE
Problem: MOBILITY - ADULT  Goal: Maintain or return to baseline ADL function  Description: INTERVENTIONS:  -  Assess patient's ability to carry out ADLs; assess patient's baseline for ADL function and identify physical deficits which impact ability to perform ADLs (bathing, care of mouth/teeth, toileting, grooming, dressing, etc.)  - Assess/evaluate cause of self-care deficits   - Assess range of motion  - Assess patient's mobility; develop plan if impaired  - Assess patient's need for assistive devices and provide as appropriate  - Encourage maximum independence but intervene and supervise when necessary  - Involve family in performance of ADLs  - Assess for home care needs following discharge   - Consider OT consult to assist with ADL evaluation and planning for discharge  - Provide patient education as appropriate  Outcome: Progressing  Goal: Maintains/Returns to pre admission functional level  Description: INTERVENTIONS:  - Perform BMAT or MOVE assessment daily.   - Set and communicate daily mobility goal to care team and patient/family/caregiver.    - Collaborate with rehabilitation services on mobility goals if consulted  - Record patient progress and toleration of activity level   Outcome: Progressing     Problem: PAIN - ADULT  Goal: Verbalizes/displays adequate comfort level or baseline comfort level  Description: Interventions:  - Encourage patient to monitor pain and request assistance  - Assess pain using appropriate pain scale  - Administer analgesics based on type and severity of pain and evaluate response  - Implement non-pharmacological measures as appropriate and evaluate response  - Consider cultural and social influences on pain and pain management  - Notify physician/advanced practitioner if interventions unsuccessful or patient reports new pain  Outcome: Progressing     Problem: INFECTION - ADULT  Goal: Absence or prevention of progression during hospitalization  Description: INTERVENTIONS:  - Assess and monitor for signs and symptoms of infection  - Monitor lab/diagnostic results  - Monitor all insertion sites, i.e. indwelling lines, tubes, and drains  - Monitor endotracheal if appropriate and nasal secretions for changes in amount and color  - [place] appropriate cooling/warming therapies per order  - Administer medications as ordered  - Instruct and encourage patient and family to use good hand hygiene technique  - Identify and instruct in appropriate isolation precautions for identified infection/condition  Outcome: Progressing  Goal: Absence of fever/infection during neutropenic period  Description: INTERVENTIONS:  - Monitor WBC    Outcome: Progressing     Problem: SAFETY ADULT  Goal: Patient will remain free of falls  Description: INTERVENTIONS:  - Educate patient/family on patient safety including physical limitations  - Instruct patient to call for assistance with activity   - Consult OT/PT to assist with strengthening/mobility   - Keep Call bell within reach  - Keep bed low and locked with side rails adjusted as appropriate  - Keep care items and personal belongings within reach  - Initiate and maintain comfort rounds  - Make Fall Risk Sign visible to staff  - Apply yellow socks and bracelet for high fall risk patients  - Consider moving patient to room near nurses station  Outcome: Progressing     Problem: DISCHARGE PLANNING  Goal: Discharge to home or other facility with appropriate resources  Description: INTERVENTIONS:  - Identify barriers to discharge w/patient and caregiver  - Arrange for needed discharge resources and transportation as appropriate  - Identify discharge learning needs (meds, wound care, etc.)  - Arrange for interpretive services to assist at discharge as needed  - Refer to Case Management Department for coordinating discharge planning if the patient needs post-hospital services based on physician/advanced practitioner order or complex needs related to functional status, cognitive ability, or social support system  Outcome: Progressing     Problem: Knowledge Deficit  Goal: Patient/family/caregiver demonstrates understanding of disease process, treatment plan, medications, and discharge instructions  Description: Complete learning assessment and assess knowledge base.   Interventions:  - Provide teaching at level of understanding  - Provide teaching via preferred learning methods  Outcome: Progressing     Problem: Prexisting or High Potential for Compromised Skin Integrity  Goal: Skin integrity is maintained or improved  Description: INTERVENTIONS:  - Identify patients at risk for skin breakdown  - Assess and monitor skin integrity  - Assess and monitor nutrition and hydration status  - Monitor labs   - Assess for incontinence   - Turn and reposition patient  - Assist with mobility/ambulation  - Relieve pressure over bony prominences  - Avoid friction and shearing  - Provide appropriate hygiene as needed including keeping skin clean and dry  - Evaluate need for skin moisturizer/barrier cream  - Collaborate with interdisciplinary team   - Patient/family teaching  - Consider wound care consult   Outcome: Progressing

## 2023-08-22 NOTE — PROGRESS NOTES
[facility]  Progress Note  Name: Pedro Singletary  MRN: [id number]  Unit/Bed#: [facility] 931-01 I Date of Admission: 8/20/2023   Date of Service: 8/22/2023 I Hospital Day: 2    Assessment/Plan   * Sepsis [facility])  Assessment & Plan  · [facility] with complaint of fever, found to have tachycardia, tachypnea and leukocytosis meeting sepsis criteria  · CT chest abdomen pelvis shows left hydronephrosis from left UVJ calculus measuring 8 mm  · Underwent cystoscopy with ureteral stent placement on 8/20       · Repeat Fluoro cystogram 8/21  showed: Indwelling left nephroureteral stent with looping of the distal portion and slight outpouching of distal ureteral contrast at the inferior aspect of the loop, which may be related to tethering suggested on same-day CT.  · Urine culture growing Pseudomonas; on IV cefepime  · Urology recommendations for discharge: Maintain Sung catheter and ureteral stent until seen at follow-up in 3 weeks. Will need repeat CT scan prior to visit, if no stone present cystoscopy and stent removal can take place at that follow-up appointment. · Patient only placed on appropriate antibiotic coverage for Pseudomonas on 8/21. We will plan to continue on IV cefepime through tomorrow 8/23 and transition to p.o. Levaquin on discharge. Hydronephrosis with ureteropelvic junction (UPJ) obstruction  Assessment & Plan  · Status post cystoscopy with ureteral stent placement  · Sung catheter and stent remain in place until seen by urology at follow-up in 3 weeks. UTI (urinary tract infection)  Assessment & Plan  · Urine culture growing Pseudomonas  · Now on IV cefepime; will continue through tomorrow 8/23  · Plan to transition to p.o.  Levaquin on discharge to complete a 10-day course of therapy through 8/30    Morbid obesity with BMI of 40.0-44.9, adult ([address])  Assessment & Plan  · BMI 41; affects all levels of care, diet and lifestyle modifications recommended    Ambulatory dysfunction  Assessment & Plan  · Discharged from [address] [place] [address] on 8/11. Per patient rehab was sucessful at [place] and has been feeling stronger. · Fall precautions   · Consult PT/OT and CM - home with home pt/ot    Benign essential hypertension  Assessment & Plan  · Resume lisinopril    Chronic respiratory failure with hypoxia and hypercapnia (HCC)  Assessment & Plan  · Baseline: room air during the day and 2 L nasal cannula at night  · Atrovent/Xopenex 3 times daily, albuterol as needed  · Has not followed up with pulmonology outpatient yet. · Respiratory protocol    Type 2 diabetes mellitus ([address])  Assessment & Plan  · home regimen: Diet controlled  · SSI    Hyponatremia-resolved as of 8/22/2023  Assessment & Plan  · Na 129 on admission   · Likely secondary to poor oral intake  · Improved to 132         VTE Pharmacologic Prophylaxis:   Moderate Risk (Score 3-4) - Pharmacological DVT Prophylaxis Ordered: heparin. Patient Centered Rounds: I performed bedside rounds with nursing staff today. Discussions with Specialists or Other Care Team Provider: LEE.     Education and Discussions with Family / Patient: Patient declined call to . Total Time Spent on Date of Encounter in care of patient: 35 minutes This time was spent on one or more of the following: performing physical exam; counseling and coordination of care; obtaining or reviewing history; documenting in the medical record; reviewing/ordering tests, medications or procedures; communicating with other healthcare professionals and discussing with patient's family/caregivers. Current Length of Stay: 2 day(s)  Current Patient Status: Inpatient   Certification Statement: The patient will continue to require additional inpatient hospital stay due to IV abx's  Discharge Plan: Anticipate discharge tomorrow to home with home services.     Code Status: Level 3 - DNAR and DNI    Subjective:   Patient seen and examined. Much better today. Pain and discomfort have finally subsided. He is afebrile. Objective:     Vitals:   Temp (24hrs), Av.3 °F (36.8 °C), Min:97.9 °F (36.6 °C), Max:98.6 °F (37 °C)    Temp:  [97.9 °F (36.6 °C)-98.6 °F (37 °C)] 98.4 °F (36.9 °C)  HR:  [66-90] 90  Resp:  [15-17] 17  BP: (123-131)/(66-76) 125/76  SpO2:  [91 %-92 %] 92 %  There is no height or weight on file to calculate BMI. Input and Output Summary (last 24 hours): Intake/Output Summary (Last 24 hours) at 2023 1406  Last data filed at 2023 1300  Gross per 24 hour   Intake 600 ml   Output 1925 ml   Net -1325 ml       PHYSICAL EXAM:    Vitals signs reviewed  Constitutional   Awake and cooperative. NAD. Head/Neck   Normocephalic. Atraumatic. HEENT   No scleral icterus. EOMI. Heart   Regular rate and rhythm. No murmers. Lungs   Clear to auscultation bilaterally. Respirations unlaboured. Abdomen   Soft. Nontender. Nondistended. Skin   Skin color normal. No rashes. Extremities   No deformities. No peripheral edema. Neuro   Alert and oriented. No new deficits. Psych   Mood stable.  Affect normal.         Additional Data:     Labs:  Results from last 7 days   Lab Units 23  0604 23  0444   WBC Thousand/uL 13.88* 25.18*   HEMOGLOBIN g/dL 10.3* 11.0*   HEMATOCRIT % 31.5* 34.6*   PLATELETS Thousands/uL 257 278   NEUTROS PCT %  --  87*   LYMPHS PCT %  --  6*   MONOS PCT %  --  5   EOS PCT %  --  1     Results from last 7 days   Lab Units 23  0604 23  0444   SODIUM mmol/L 135 132*   POTASSIUM mmol/L 4.4 4.3   CHLORIDE mmol/L 103 100   CO2 mmol/L 27 29   BUN mg/dL 17 19   CREATININE mg/dL 0.88 0.95   ANION GAP mmol/L 5 3   CALCIUM mg/dL 8.5 8.4   ALBUMIN g/dL 1.9* 2.0*   TOTAL BILIRUBIN mg/dL  --  0.70   ALK PHOS U/L  --  116   ALT U/L  --  12   AST U/L  --  12   GLUCOSE RANDOM mg/dL 111 110     Results from last 7 days   Lab Units 23  0052   INR  1.04     Results from last 7 days Lab Units 08/22/23  1114 08/22/23  0742 08/21/23  2116 08/21/23  1702 08/21/23  1135 08/21/23  0708 08/20/23  2115 08/20/23  1656 08/20/23  1310 08/20/23  0730   POC GLUCOSE mg/dl 135 125 146* 166* 180* 133 148* 210* 153* 150*         Results from last 7 days   Lab Units 08/21/23  0444 08/20/23  0052   LACTIC ACID mmol/L  --  1.0   PROCALCITONIN ng/ml 0.34* 0.21       Lines/Drains:  Invasive Devices     Peripheral Intravenous Line  Duration           Peripheral IV 08/20/23 Right Antecubital 2 days          Drain  Duration           Urethral Catheter Latex 16 Fr. 2 days              Urinary Catheter:  Goal for removal: N/A- Discharging with Sung               Imaging: No pertinent imaging reviewed. Recent Cultures (last 7 days):   Results from last 7 days   Lab Units 08/20/23  1220 08/20/23  0211 08/20/23  0100 08/20/23  0052   BLOOD CULTURE   --   --  No Growth at 48 hrs. No Growth at 48 hrs.    URINE CULTURE  30,000-39,000 cfu/ml Pseudomonas aeruginosa* >100,000 cfu/ml Pseudomonas aeruginosa*  --   --        Last 24 Hours Medication List:   Current Facility-Administered Medications   Medication Dose Route Frequency Provider Last Rate   • acetaminophen  650 mg Oral Q6H PRN Brianna Bourgeois MD     • albuterol  2 puff Inhalation Q6H PRN Brianna Bourgeois MD     • cefepime  2,000 mg Intravenous Q8H Chalmers Fabry Starsdev, DO 2,000 mg (08/22/23 1106)   • guaiFENesin  1,200 mg Oral Q12H Suzanna Li MD     • heparin (porcine)  5,000 Units Subcutaneous Q8H [address] Brianna Bourgeois MD     • insulin lispro  1-5 Units Subcutaneous TID AC Brianna Bourgeois MD     • insulin lispro  1-5 Units Subcutaneous HS Brianna Bourgeois MD     • iohexol  50 mL Oral Once in imaging Brianna Bourgeois MD     • ipratropium  0.5 mg Nebulization Q6H PRN Brianna Bourgeois MD     • levalbuterol  1.25 mg Nebulization Q6H PRN Brianna Bourgeois MD     • lisinopril  10 mg Oral Daily Galen Church DO     • nystatin   Topical BID Brianna Bourgeois MD     • oxyCODONE  10 mg Oral Q6H PRN Danie Diaz MD     • oxyCODONE  5 mg Oral Q4H PRN Danie Diaz MD     • polyethylene glycol  17 g Oral Daily Galen Church DO          Today, Patient Was Seen By: Berhane Grant DO    **Please Note: This note may have been constructed using a voice recognition system. **

## 2023-08-22 NOTE — TRANSPORTATION MEDICAL NECESSITY
Section I - General Information    Name of Patient: Misty Frausto                 : 1955    Medicare #: [id number]  Transport Date: 23 (PCS is valid for round trips on this date and for all repetitive trips in the 60-day range as noted below.)  Origin: [facility]G [facility]                                                         Destination: [address]  Is the pt's stay covered under Medicare Part A (PPS/DRG)   []     Closest appropriate facility? If no, why is transport to more distant facility required? Yes  If hospice pt, is this transport related to pt's terminal illness? NA       Section II - Medical Necessity Questionnaire  Ambulance transportation is medically necessary only if other means of transport are contraindicated or would be potentially harmful to the patient. To meet this requirement, the patient must either be "bed confined" or suffer from a condition such that transport by means other than ambulance is contraindicated by the patient's condition. The following questions must be answered by the medical professional signing below for this form to be valid:    1)  Describe the MEDICAL CONDITION (physical and/or mental) of this patient  [address] that requires the patient to be transported in an ambulance and why transport by other means is contraindicated by the patient's condition:    2) Is the patient "bed confined" as defined below? Yes  To be "be confined" the patient must satisfy all three of the following conditions: (1) unable to get up from bed without Assistance; AND (2) unable to ambulate; AND (3) unable to sit in a chair or wheelchair. 3) Can this patient safely be transported by car or wheelchair van (i.e., seated during transport without a medical attendant or monitoring)?    No    4) In addition to completing questions 1-3 above, please check any of the following conditions that apply*:   *Note: supporting documentation for any boxes checked must be maintained in the patient's medical records. If hosp-hosp transfer, describe services needed at 2nd facility not available at 1st facility? Medical attendant required   Requires oxygen-unable to self administer      Section III - Signature of Physician or Healthcare Professional  I certify that the above information is true and correct based on my evaluation of this patient, and represent that the patient requires transport by ambulance and that other forms of transport are contraindicated. I understand that this information will be used by the Centers for Medicare and Medicaid Services (CMS) to support the determination of medical necessity for ambulance services, and I represent that I have personal knowledge of the patient's condition at time of transport. []  If this box is checked, I also certify that the patient is physically or mentally incapable of signing the ambulance service's claim and that the institution with which I am affiliated has furnished care, services, or assistance to the patient. My signature below is made on behalf of the patient pursuant to 42 CFR §424.36(b)(4). In accordance with 42 CFR §424.37, the specific reason(s) that the patient is physically or mentally incapable of signing the claim form is as follows:       Signature of Physician* or [address] [place] [address], RN_______  Signature Date 08/22/23 (For scheduled repetitive transports, this form is not valid for transports performed more than 60 days after this date)    Printed Name & Credentials of Physician or Healthcare Professional (MD, DO, RN, etc.)________________________________  *Form must be signed by patient's attending physician for scheduled, repetitive transports.  For non-repetitive, unscheduled ambulance transports, if unable to obtain the signature of the attending physician, any of the following may sign (choose appropriate option below)  [] Physician Assistant []  Clinical Nurse Specialist []  Registered Nurse  []  Nurse Practitioner  [x] Discharge Planner

## 2023-08-23 PROBLEM — K59.03 DRUG-INDUCED CONSTIPATION: Status: ACTIVE | Noted: 2023-07-31

## 2023-08-23 LAB
ALBUMIN SERPL BCP-MCNC: 2.5 G/DL (ref 3.5–5)
ANION GAP SERPL CALCULATED.3IONS-SCNC: 5 MMOL/L
BUN SERPL-MCNC: 15 MG/DL (ref 5–25)
CALCIUM ALBUM COR SERPL-MCNC: 9.6 MG/DL (ref 8.3–10.1)
CALCIUM SERPL-MCNC: 8.4 MG/DL (ref 8.4–10.2)
CHLORIDE SERPL-SCNC: 99 MMOL/L (ref 96–108)
CO2 SERPL-SCNC: 30 MMOL/L (ref 21–32)
CREAT SERPL-MCNC: 0.85 MG/DL (ref 0.6–1.3)
ERYTHROCYTE [DISTWIDTH] IN BLOOD BY AUTOMATED COUNT: 13.5 % (ref 11.6–15.1)
GFR SERPL CREATININE-BSD FRML MDRD: 88 ML/MIN/1.73SQ M
GLUCOSE SERPL-MCNC: 110 MG/DL (ref 65–140)
GLUCOSE SERPL-MCNC: 135 MG/DL (ref 65–140)
GLUCOSE SERPL-MCNC: 141 MG/DL (ref 65–140)
GLUCOSE SERPL-MCNC: 174 MG/DL (ref 65–140)
GLUCOSE SERPL-MCNC: 98 MG/DL (ref 65–140)
HCT VFR BLD AUTO: 33 % (ref 36.5–49.3)
HGB BLD-MCNC: 10.6 G/DL (ref 12–17)
MAGNESIUM SERPL-MCNC: 2 MG/DL (ref 1.9–2.7)
MCH RBC QN AUTO: 30.5 PG (ref 26.8–34.3)
MCHC RBC AUTO-ENTMCNC: 32.1 G/DL (ref 31.4–37.4)
MCV RBC AUTO: 95 FL (ref 82–98)
PHOSPHATE SERPL-MCNC: 3.7 MG/DL (ref 2.3–4.1)
PLATELET # BLD AUTO: 267 THOUSANDS/UL (ref 149–390)
PMV BLD AUTO: 8.7 FL (ref 8.9–12.7)
POTASSIUM SERPL-SCNC: 4.6 MMOL/L (ref 3.5–5.3)
RBC # BLD AUTO: 3.48 MILLION/UL (ref 3.88–5.62)
SODIUM SERPL-SCNC: 134 MMOL/L (ref 135–147)
WBC # BLD AUTO: 8.76 THOUSAND/UL (ref 4.31–10.16)

## 2023-08-23 PROCEDURE — 82948 REAGENT STRIP/BLOOD GLUCOSE: CPT

## 2023-08-23 PROCEDURE — 80069 RENAL FUNCTION PANEL: CPT | Performed by: STUDENT IN AN ORGANIZED HEALTH CARE EDUCATION/TRAINING PROGRAM

## 2023-08-23 PROCEDURE — 99223 1ST HOSP IP/OBS HIGH 75: CPT | Performed by: INTERNAL MEDICINE

## 2023-08-23 PROCEDURE — 97530 THERAPEUTIC ACTIVITIES: CPT

## 2023-08-23 PROCEDURE — 87040 BLOOD CULTURE FOR BACTERIA: CPT | Performed by: INTERNAL MEDICINE

## 2023-08-23 PROCEDURE — 94664 DEMO&/EVAL PT USE INHALER: CPT

## 2023-08-23 PROCEDURE — 99232 SBSQ HOSP IP/OBS MODERATE 35: CPT | Performed by: INTERNAL MEDICINE

## 2023-08-23 PROCEDURE — 83735 ASSAY OF MAGNESIUM: CPT | Performed by: STUDENT IN AN ORGANIZED HEALTH CARE EDUCATION/TRAINING PROGRAM

## 2023-08-23 PROCEDURE — 85027 COMPLETE CBC AUTOMATED: CPT | Performed by: STUDENT IN AN ORGANIZED HEALTH CARE EDUCATION/TRAINING PROGRAM

## 2023-08-23 PROCEDURE — 97116 GAIT TRAINING THERAPY: CPT

## 2023-08-23 RX ORDER — SENNOSIDES 8.6 MG
2 TABLET ORAL 2 TIMES DAILY
Status: DISCONTINUED | OUTPATIENT
Start: 2023-08-23 | End: 2023-08-25 | Stop reason: HOSPADM

## 2023-08-23 RX ORDER — DOCUSATE SODIUM 100 MG/1
100 CAPSULE, LIQUID FILLED ORAL 2 TIMES DAILY
Status: DISCONTINUED | OUTPATIENT
Start: 2023-08-23 | End: 2023-08-25 | Stop reason: HOSPADM

## 2023-08-23 RX ADMIN — POLYETHYLENE GLYCOL 3350 17 G: 17 POWDER, FOR SOLUTION ORAL at 08:29

## 2023-08-23 RX ADMIN — HEPARIN SODIUM 5000 UNITS: 5000 INJECTION INTRAVENOUS; SUBCUTANEOUS at 22:45

## 2023-08-23 RX ADMIN — NYSTATIN: 100000 POWDER TOPICAL at 17:02

## 2023-08-23 RX ADMIN — SENNOSIDES 17.2 MG: 8.6 TABLET, FILM COATED ORAL at 17:01

## 2023-08-23 RX ADMIN — LISINOPRIL 10 MG: 10 TABLET ORAL at 08:29

## 2023-08-23 RX ADMIN — NYSTATIN: 100000 POWDER TOPICAL at 08:30

## 2023-08-23 RX ADMIN — GUAIFENESIN 1200 MG: 600 TABLET, EXTENDED RELEASE ORAL at 22:45

## 2023-08-23 RX ADMIN — HEPARIN SODIUM 5000 UNITS: 5000 INJECTION INTRAVENOUS; SUBCUTANEOUS at 13:05

## 2023-08-23 RX ADMIN — INSULIN LISPRO 1 UNITS: 100 INJECTION, SOLUTION INTRAVENOUS; SUBCUTANEOUS at 12:46

## 2023-08-23 RX ADMIN — HEPARIN SODIUM 5000 UNITS: 5000 INJECTION INTRAVENOUS; SUBCUTANEOUS at 05:56

## 2023-08-23 RX ADMIN — CEFEPIME 2000 MG: 2 INJECTION, POWDER, FOR SOLUTION INTRAVENOUS at 20:02

## 2023-08-23 RX ADMIN — CEFEPIME 2000 MG: 2 INJECTION, POWDER, FOR SOLUTION INTRAVENOUS at 03:11

## 2023-08-23 RX ADMIN — CEFEPIME 2000 MG: 2 INJECTION, POWDER, FOR SOLUTION INTRAVENOUS at 12:59

## 2023-08-23 RX ADMIN — DOCUSATE SODIUM 100 MG: 100 CAPSULE, LIQUID FILLED ORAL at 17:01

## 2023-08-23 RX ADMIN — GUAIFENESIN 1200 MG: 600 TABLET, EXTENDED RELEASE ORAL at 08:29

## 2023-08-23 NOTE — ASSESSMENT & PLAN NOTE
· Urine and blood culture growing Pseudomonas  · Continue cefepime -waiting for final sensitivities. · ID was consulted overnight after positive blood cultures with Pseudomonas. · Repeat blood cultures ordered. · Discussed with infectious disease.

## 2023-08-23 NOTE — PLAN OF CARE
Problem: MOBILITY - ADULT  Goal: Maintain or return to baseline ADL function  Description: INTERVENTIONS:  -  Assess patient's ability to carry out ADLs; assess patient's baseline for ADL function and identify physical deficits which impact ability to perform ADLs (bathing, care of mouth/teeth, toileting, grooming, dressing, etc.)  - Assess/evaluate cause of self-care deficits   - Assess range of motion  - Assess patient's mobility; develop plan if impaired  - Assess patient's need for assistive devices and provide as appropriate  - Encourage maximum independence but intervene and supervise when necessary  - Involve family in performance of ADLs  - Assess for home care needs following discharge   - Consider OT consult to assist with ADL evaluation and planning for discharge  - Provide patient education as appropriate  Outcome: Progressing  Goal: Maintains/Returns to pre admission functional level  Description: INTERVENTIONS:  - Perform BMAT or MOVE assessment daily.   - Set and communicate daily mobility goal to care team and patient/family/caregiver.    - Collaborate with rehabilitation services on mobility goals if consulted  - Record patient progress and toleration of activity level   Outcome: Progressing     Problem: PAIN - ADULT  Goal: Verbalizes/displays adequate comfort level or baseline comfort level  Description: Interventions:  - Encourage patient to monitor pain and request assistance  - Assess pain using appropriate pain scale  - Administer analgesics based on type and severity of pain and evaluate response  - Implement non-pharmacological measures as appropriate and evaluate response  - Consider cultural and social influences on pain and pain management  - Notify physician/advanced practitioner if interventions unsuccessful or patient reports new pain  Outcome: Progressing     Problem: INFECTION - ADULT  Goal: Absence or prevention of progression during hospitalization  Description: INTERVENTIONS:  - Assess and monitor for signs and symptoms of infection  - Monitor lab/diagnostic results  - Monitor all insertion sites, i.e. indwelling lines, tubes, and drains  - Monitor endotracheal if appropriate and nasal secretions for changes in amount and color  - [place] appropriate cooling/warming therapies per order  - Administer medications as ordered  - Instruct and encourage patient and family to use good hand hygiene technique  - Identify and instruct in appropriate isolation precautions for identified infection/condition  Outcome: Progressing  Goal: Absence of fever/infection during neutropenic period  Description: INTERVENTIONS:  - Monitor WBC    Outcome: Progressing     Problem: SAFETY ADULT  Goal: Patient will remain free of falls  Description: INTERVENTIONS:  - Educate patient/family on patient safety including physical limitations  - Instruct patient to call for assistance with activity   - Consult OT/PT to assist with strengthening/mobility   - Keep Call bell within reach  - Keep bed low and locked with side rails adjusted as appropriate  - Keep care items and personal belongings within reach  - Initiate and maintain comfort rounds  - Make Fall Risk Sign visible to staff  - Apply yellow socks and bracelet for high fall risk patients  - Consider moving patient to room near nurses station  Outcome: Progressing     Problem: DISCHARGE PLANNING  Goal: Discharge to home or other facility with appropriate resources  Description: INTERVENTIONS:  - Identify barriers to discharge w/patient and caregiver  - Arrange for needed discharge resources and transportation as appropriate  - Identify discharge learning needs (meds, wound care, etc.)  - Arrange for interpretive services to assist at discharge as needed  - Refer to Case Management Department for coordinating discharge planning if the patient needs post-hospital services based on physician/advanced practitioner order or complex needs related to functional status, cognitive ability, or social support system  Outcome: Progressing     Problem: Knowledge Deficit  Goal: Patient/family/caregiver demonstrates understanding of disease process, treatment plan, medications, and discharge instructions  Description: Complete learning assessment and assess knowledge base.   Interventions:  - Provide teaching at level of understanding  - Provide teaching via preferred learning methods  Outcome: Progressing     Problem: Prexisting or High Potential for Compromised Skin Integrity  Goal: Skin integrity is maintained or improved  Description: INTERVENTIONS:  - Identify patients at risk for skin breakdown  - Assess and monitor skin integrity  - Assess and monitor nutrition and hydration status  - Monitor labs   - Assess for incontinence   - Turn and reposition patient  - Assist with mobility/ambulation  - Relieve pressure over bony prominences  - Avoid friction and shearing  - Provide appropriate hygiene as needed including keeping skin clean and dry  - Evaluate need for skin moisturizer/barrier cream  - Collaborate with interdisciplinary team   - Patient/family teaching  - Consider wound care consult   Outcome: Progressing

## 2023-08-23 NOTE — PLAN OF CARE
Problem: MOBILITY - ADULT  Goal: Maintain or return to baseline ADL function  Description: INTERVENTIONS:  -  Assess patient's ability to carry out ADLs; assess patient's baseline for ADL function and identify physical deficits which impact ability to perform ADLs (bathing, care of mouth/teeth, toileting, grooming, dressing, etc.)  - Assess/evaluate cause of self-care deficits   - Assess range of motion  - Assess patient's mobility; develop plan if impaired  - Assess patient's need for assistive devices and provide as appropriate  - Encourage maximum independence but intervene and supervise when necessary  - Involve family in performance of ADLs  - Assess for home care needs following discharge   - Consider OT consult to assist with ADL evaluation and planning for discharge  - Provide patient education as appropriate  Outcome: Progressing  Goal: Maintains/Returns to pre admission functional level  Description: INTERVENTIONS:  - Perform BMAT or MOVE assessment daily.   - Set and communicate daily mobility goal to care team and patient/family/caregiver.    - Collaborate with rehabilitation services on mobility goals if consulted  - Record patient progress and toleration of activity level   Outcome: Progressing     Problem: PAIN - ADULT  Goal: Verbalizes/displays adequate comfort level or baseline comfort level  Description: Interventions:  - Encourage patient to monitor pain and request assistance  - Assess pain using appropriate pain scale  - Administer analgesics based on type and severity of pain and evaluate response  - Implement non-pharmacological measures as appropriate and evaluate response  - Consider cultural and social influences on pain and pain management  - Notify physician/advanced practitioner if interventions unsuccessful or patient reports new pain  Outcome: Progressing     Problem: INFECTION - ADULT  Goal: Absence or prevention of progression during hospitalization  Description: INTERVENTIONS:  - Assess and monitor for signs and symptoms of infection  - Monitor lab/diagnostic results  - Monitor all insertion sites, i.e. indwelling lines, tubes, and drains  - Monitor endotracheal if appropriate and nasal secretions for changes in amount and color  - [place] appropriate cooling/warming therapies per order  - Administer medications as ordered  - Instruct and encourage patient and family to use good hand hygiene technique  - Identify and instruct in appropriate isolation precautions for identified infection/condition  Outcome: Progressing  Goal: Absence of fever/infection during neutropenic period  Description: INTERVENTIONS:  - Monitor WBC    Outcome: Progressing     Problem: SAFETY ADULT  Goal: Patient will remain free of falls  Description: INTERVENTIONS:  - Educate patient/family on patient safety including physical limitations  - Instruct patient to call for assistance with activity   - Consult OT/PT to assist with strengthening/mobility   - Keep Call bell within reach  - Keep bed low and locked with side rails adjusted as appropriate  - Keep care items and personal belongings within reach  - Initiate and maintain comfort rounds  - Make Fall Risk Sign visible to staff  - Apply yellow socks and bracelet for high fall risk patients  - Consider moving patient to room near nurses station  Outcome: Progressing     Problem: DISCHARGE PLANNING  Goal: Discharge to home or other facility with appropriate resources  Description: INTERVENTIONS:  - Identify barriers to discharge w/patient and caregiver  - Arrange for needed discharge resources and transportation as appropriate  - Identify discharge learning needs (meds, wound care, etc.)  - Arrange for interpretive services to assist at discharge as needed  - Refer to Case Management Department for coordinating discharge planning if the patient needs post-hospital services based on physician/advanced practitioner order or complex needs related to functional status, cognitive ability, or social support system  Outcome: Progressing     Problem: Knowledge Deficit  Goal: Patient/family/caregiver demonstrates understanding of disease process, treatment plan, medications, and discharge instructions  Description: Complete learning assessment and assess knowledge base.   Interventions:  - Provide teaching at level of understanding  - Provide teaching via preferred learning methods  Outcome: Progressing     Problem: Prexisting or High Potential for Compromised Skin Integrity  Goal: Skin integrity is maintained or improved  Description: INTERVENTIONS:  - Identify patients at risk for skin breakdown  - Assess and monitor skin integrity  - Assess and monitor nutrition and hydration status  - Monitor labs   - Assess for incontinence   - Turn and reposition patient  - Assist with mobility/ambulation  - Relieve pressure over bony prominences  - Avoid friction and shearing  - Provide appropriate hygiene as needed including keeping skin clean and dry  - Evaluate need for skin moisturizer/barrier cream  - Collaborate with interdisciplinary team   - Patient/family teaching  - Consider wound care consult   Outcome: Progressing

## 2023-08-23 NOTE — ASSESSMENT & PLAN NOTE
· Discharged from [address] [place] [address] on 8/11. Per patient rehab was sucessful at [place] and has been feeling stronger.    · Fall precautions   · Consult PT/OT and CM - home with home pt/ot

## 2023-08-23 NOTE — PLAN OF CARE
Problem: PHYSICAL THERAPY ADULT  Goal: Performs mobility at highest level of function for planned discharge setting. See evaluation for individualized goals. Description: Treatment/Interventions: Functional transfer training, LE strengthening/ROM, Elevations, Therapeutic exercise, Endurance training, Patient/family training, Equipment eval/education, Bed mobility, Gait training, Spoke to nursing, Spoke to case management, OT  Equipment Recommended: Sherie Topete (pt reports owning)       See flowsheet documentation for full assessment, interventions and recommendations. Outcome: Progressing  Note: Prognosis: Good  Problem List: Decreased strength, Decreased endurance, Impaired balance, Decreased mobility, Pain  Assessment: Pt seen for PT treatment session this date. Therapy session focused on bed mobility, functional transfers, gait training and stair training in order to improve overall mobility and independence. Pt requires assist of 1 for all mobility performed this date. Increased difficulty with bed mobility 2* catheter and pain, pt reports that he typically sleeps in recliner chair. Pt initially requires min A for STS with RW progresses to close S level with repeition and additoin of arm rests in recliner. Pt progresses to close S level with RW for ambulation as well as stairs. Reports that SO may be able to stay with pt upon d/c however does not want to burden her- still not agreeable to HHPT despite education regarding safety. Pt making progress toward goals. Pt was left supine in bed at the end of PT session with all needs in reach. Pt would benefit from continued PT services while in hospital to address remaining limitations. PT to continue to follow pt and recommends home with HHPT + increased social support pending progress. The patient's AM-PAC Basic Mobility Inpatient Short Form Raw Score is 17. A Raw score of greater than 16 suggests the patient may benefit from discharge to home.  Please also refer to the recommendation of the Physical Therapist for safe discharge planning. PT Discharge Recommendation: Home with home health rehabilitation (+ increased social support pending continued progress + social support availability)    See flowsheet documentation for full assessment.

## 2023-08-23 NOTE — PROGRESS NOTES
[facility]  Progress Note  Name: Jesu Licea  MRN: [id number]  Unit/Bed#: [facility]PHP 931-01 I Date of Admission: 8/20/2023   Date of Service: 8/23/2023 I Hospital Day: 3    Assessment/Plan   Hydronephrosis with ureteropelvic junction (UPJ) obstruction  Assessment & Plan  · Status post cystoscopy with ureteral stent placement  · Sung catheter and stent remain in place until seen by urology at follow-up in 3 weeks. Sepsis secondary to Pseudomonas UTI complicated by Pseudomonas bacteremia (urinary tract infection)  Assessment & Plan  · Urine and blood culture growing Pseudomonas  · Continue cefepime -waiting for final sensitivities. · ID was consulted overnight after positive blood cultures with Pseudomonas. · Repeat blood cultures ordered. · Discussed with infectious disease. Ambulatory dysfunction  Assessment & Plan  · Discharged from [address] [place] [address] on 8/11. Per patient rehab was sucessful at [place] and has been feeling stronger. · Fall precautions   · Consult PT/OT and CM - home with home pt/ot    Drug-induced constipation  Assessment & Plan  Continue MiraLAX. Add Colace and senna. Patient reluctant to use suppository/enema currently. Benign essential hypertension  Assessment & Plan  · Stable. · Continue lisinopril    Chronic respiratory failure with hypoxia and hypercapnia (HCC)  Assessment & Plan  · Baseline: room air during the day and 2 L nasal cannula at night  · Atrovent/Xopenex 3 times daily, albuterol as needed  · Has not followed up with pulmonology outpatient yet. · Respiratory protocol    Type 2 diabetes mellitus ([address])  Assessment & Plan  · home regimen: Diet controlled  · SSI             VTE Pharmacologic Prophylaxis:   Pharmacologic: Heparin  Mechanical VTE Prophylaxis in Place: Yes    Patient Centered Rounds: I have performed bedside rounds with nursing staff today.     Discussions with Specialists or Other Care Team Provider: ID    Education and Discussions with Family / Patient: pt    Time Spent for Care: 45 minutes. More than 50% of total time spent on counseling and coordination of care as described above. Current Length of Stay: 3 day(s)    Current Patient Status: Inpatient   Certification Statement: The patient will continue to require additional inpatient hospital stay due to above    Discharge Plan: 1-2 days    Code Status: Level 3 - DNAR and DNI      Subjective:   Pt seen and examined by me in morning. Complain of constipation. Ardith Celio he has not had good bowel movement in days now. Objective:     Vitals:   Temp (24hrs), Av.2 °F (36.8 °C), Min:97.9 °F (36.6 °C), Max:98.3 °F (36.8 °C)    Temp:  [97.9 °F (36.6 °C)-98.3 °F (36.8 °C)] 97.9 °F (36.6 °C)  HR:  [62-70] 66  Resp:  [16-23] 18  BP: (123-139)/() 127/86  SpO2:  [91 %-95 %] 92 %  There is no height or weight on file to calculate BMI. Input and Output Summary (last 24 hours): Intake/Output Summary (Last 24 hours) at 2023 1552  Last data filed at 2023 1450  Gross per 24 hour   Intake 100 ml   Output 2200 ml   Net -2100 ml       Physical Exam:     Physical Exam    Constitutional: Pt appears comfortable. Not in any acute distress. .   Cardiovascular: Normal rate, regular rhythm, normal heart sounds. No murmur heard. Pulmonary/Chest: Effort normal, air entry b/l equal. No respiratory distress. Pt has no wheezes or crackles. Abdominal: Soft. distended, Non-tender. Bowel sounds are normal.   Musculoskeletal: Normal range of motion. Neurological: awake, alert. Moving all extremities spontaneously. Psychiatric: normal mood and affect.       Additional Data:     Labs:    Results from last 7 days   Lab Units 23  0512 23  0604 23  0444   WBC Thousand/uL 8.76   < > 25.18*   HEMOGLOBIN g/dL 10.6*   < > 11.0*   HEMATOCRIT % 33.0*   < > 34.6*   PLATELETS Thousands/uL 267   < > 278   NEUTROS PCT %  --   --  87*   LYMPHS PCT %  --   -- 6*   MONOS PCT %  --   --  5   EOS PCT %  --   --  1    < > = values in this interval not displayed. Results from last 7 days   Lab Units 08/23/23  0512 08/22/23  0604 08/21/23  0444   SODIUM mmol/L 134*   < > 132*   POTASSIUM mmol/L 4.6   < > 4.3   CHLORIDE mmol/L 99   < > 100   CO2 mmol/L 30   < > 29   BUN mg/dL 15   < > 19   CREATININE mg/dL 0.85   < > 0.95   ANION GAP mmol/L 5   < > 3   CALCIUM mg/dL 8.4   < > 8.4   ALBUMIN g/dL 2.5*   < > 2.0*   TOTAL BILIRUBIN mg/dL  --   --  0.70   ALK PHOS U/L  --   --  116   ALT U/L  --   --  12   AST U/L  --   --  12   GLUCOSE RANDOM mg/dL 110   < > 110    < > = values in this interval not displayed. Results from last 7 days   Lab Units 08/20/23  0052   INR  1.04     Results from last 7 days   Lab Units 08/23/23  1044 08/23/23  0723 08/22/23  2105 08/22/23  1546 08/22/23  1114 08/22/23  0742 08/21/23  2116 08/21/23  1702 08/21/23  1135 08/21/23  0708 08/20/23  2115 08/20/23  1656   POC GLUCOSE mg/dl 174* 98 124 131 135 125 146* 166* 180* 133 148* 210*         Results from last 7 days   Lab Units 08/21/23  0444 08/20/23  0052   LACTIC ACID mmol/L  --  1.0   PROCALCITONIN ng/ml 0.34* 0.21           * I Have Reviewed All Lab Data Listed Above. * Additional Pertinent Lab Tests Reviewed: [facility] Admission Reviewed    Imaging:    Imaging Reports Reviewed Today Include:   Imaging Personally Reviewed by Myself Includes:     Recent Cultures (last 7 days):     Results from last 7 days   Lab Units 08/20/23  1220 08/20/23  0211 08/20/23  0100 08/20/23  0052   BLOOD CULTURE   --   --  No Growth at 72 hrs.  Pseudomonas aeruginosa*   GRAM STAIN RESULT   --   --   --  Gram negative rods*   URINE CULTURE  30,000-39,000 cfu/ml Pseudomonas aeruginosa* >100,000 cfu/ml Pseudomonas aeruginosa*  --   --        Last 24 Hours Medication List:   Current Facility-Administered Medications   Medication Dose Route Frequency Provider Last Rate   • acetaminophen  650 mg Oral Q6H PRN Kandis Crawford MD     • albuterol  2 puff Inhalation Q6H PRN Kandis Crawford MD     • cefepime  2,000 mg Intravenous Q8H Hodan [facility], DO 2,000 mg (08/23/23 1259)   • docusate sodium  100 mg Oral BID Dave Shore MD     • guaiFENesin  1,200 mg Oral Q12H Kalia Jacobs MD     • heparin (porcine)  5,000 Units Subcutaneous Q8H Kalia Jacobs MD     • insulin lispro  1-5 Units Subcutaneous TID Yael Valdes MD     • insulin lispro  1-5 Units Subcutaneous HS Kandis Crawford MD     • iohexol  50 mL Oral Once in imaging Kandis Crawford MD     • ipratropium  0.5 mg Nebulization Q6H PRN Kandis Crawford MD     • levalbuterol  1.25 mg Nebulization Q6H PRN Kandis Crawford MD     • lisinopril  10 mg Oral Daily Galen Church DO     • nystatin   Topical BID Kandis Crawford MD     • oxyCODONE  10 mg Oral Q6H PRN Kandis Crawford MD     • oxyCODONE  5 mg Oral Q4H PRN Kandis Crawford MD     • polyethylene glycol  17 g Oral Daily Galen Church DO     • senna  2 tablet Oral BID Dave Shore MD          Today, Patient Was Seen By: Dave Shore MD    ** Please Note: Dictation voice to text software may have been used in the creation of this document.  **

## 2023-08-23 NOTE — RESTORATIVE TECHNICIAN NOTE
Restorative Technician Note      Patient Name: Balwinder Garcia     [facility] Visit Date: 08/23/23  Note Type: Mobility  Patient Position Upon Consult: Supine  Activity Performed: Ambulated  Assistive Device: Roller walker  Patient Position at End of Consult: Supine;  All needs within reach    Jasbir Adam Restorative Technician

## 2023-08-23 NOTE — PHYSICAL THERAPY NOTE
PHYSICAL THERAPY NOTE          Patient Name: Kim Graves  FDTAU'A Date: 8/23/2023 08/23/23 1147   PT Last Visit   PT Visit Date 08/23/23   Note Type   Note Type Treatment   Pain Assessment   Pain Assessment Tool 0-10   Pain Score 5   Pain Location/Orientation Location: Groin   Patient's Stated Pain Goal No pain   Hospital Pain Intervention(s) Environmental changes; Ambulation/increased activity; Emotional support   Restrictions/Precautions   Weight Bearing Precautions Per Order No   Other Precautions Multiple lines; Fall Risk;Pain   General   Chart Reviewed Yes   Response to Previous Treatment Patient with no complaints from previous session. Family/Caregiver Present No   Cognition   Overall Cognitive Status WFL   Arousal/Participation Responsive; Cooperative   Attention Attends with cues to redirect   Orientation Level Oriented X4   Memory Within functional limits   Following Commands Follows one step commands with increased time or repetition   Comments pt pleasant and cooperative   Bed Mobility   Supine to Sit 3  Moderate assistance   Additional items Assist x 1;HOB elevated; Bedrails; Increased time required   Sit to Supine 4  Minimal assistance   Additional items Assist x 1; Increased time required;Verbal cues;LE management   Additional Comments pt supine in bed upon arrival. pt left supine in bed with all needs within reach  (pt reports increased difficulty with bed mobility 2* catheter placement; has recliner to sleep in at home)   Transfers   Sit to Stand 4  Minimal assistance   Additional items Assist x 1; Armrests; Increased time required;Verbal cues   Stand to Sit 5  Supervision   Additional items Armrests; Increased time required;Verbal cues   Toilet transfer 5  Supervision   Additional items Increased time required;Verbal cues; Commode   Additional Comments transfers with RW; min A required for bed transfer, S level from bedside chair   Ambulation/Elevation   Gait pattern Excessively slow; Short stride;Decreased foot clearance   Gait Assistance 5  Supervision   Additional items Verbal cues   Assistive Device Rolling walker   Distance 160ft   Stair Management Assistance 5  Supervision   Stair Management Technique Two rails; Step to pattern; Foreward   Number of Stairs 7   Balance   Static Sitting Fair   Dynamic Sitting Fair   Static Standing Fair   Dynamic Standing [address] [facility] -  (with RW)   Activity Tolerance   Activity Tolerance Patient tolerated treatment well   Nurse Made Aware RN cleared pt to participate in therapy session   Assessment   Prognosis Good   Problem List Decreased strength;Decreased endurance; Impaired balance;Decreased mobility;Pain   Assessment Pt seen for PT treatment session this date. Therapy session focused on bed mobility, functional transfers, gait training and stair training in order to improve overall mobility and independence. Pt requires assist of 1 for all mobility performed this date. Increased difficulty with bed mobility 2* catheter and pain, pt reports that he typically sleeps in recliner chair. Pt initially requires min A for STS with RW progresses to close S level with repeition and additoin of arm rests in recliner. Pt progresses to close S level with RW for ambulation as well as stairs. Reports that SO may be able to stay with pt upon d/c however does not want to burden her- still not agreeable to HHPT despite education regarding safety. Pt making progress toward goals. Pt was left supine in bed at the end of PT session with all needs in reach. Pt would benefit from continued PT services while in hospital to address remaining limitations. PT to continue to follow pt and recommends home with HHPT + increased social support pending progress. The patient's AM-PAC Basic Mobility Inpatient Short Form Raw Score is 17.  A Raw score of greater than 16 suggests the patient may benefit from discharge to home. Please also refer to the recommendation of the Physical Therapist for safe discharge planning. Goals   Patient Goals to get rid of his catheter   STG Expiration Date 09/04/23   PT Treatment Day 1   Plan   Treatment/Interventions Functional transfer training;LE strengthening/ROM; Elevations; Therapeutic exercise; Endurance training;Patient/family training;Equipment eval/education; Bed mobility;Gait training;Spoke to nursing;Spoke to case management;OT   Progress Progressing toward goals   PT Frequency 3-5x/wk   Recommendation   PT Discharge Recommendation Home with home health rehabilitation  (+ increased social support pending continued progress + social support availability)   Equipment Recommended Walker   AM-PAC Basic Mobility Inpatient   Turning in Flat Bed Without Bedrails 3   Lying on Back to Sitting on Edge of Flat Bed Without Bedrails 2   Moving Bed to Chair 3   Standing Up From Chair Using Arms 3   Walk in Room 3   Climb 3-5 Stairs With Railing 3   Basic Mobility Inpatient Raw Score 17   Basic Mobility Standardized Score 39.67   Highest Level Of Mobility   JH-HLM Goal 5: Stand one or more mins   JH-HLM Achieved 7: Walk 25 feet or more   Education   Education Provided Mobility training;Assistive device   Patient Demonstrates acceptance/verbal understanding   End of Consult   Patient Position at End of Consult Supine; All needs within reach     Rick Escobar, PT, DPT

## 2023-08-24 ENCOUNTER — TELEPHONE (OUTPATIENT)
Dept: OTHER | Facility: HOSPITAL | Age: 68
End: 2023-08-24

## 2023-08-24 LAB
ALBUMIN SERPL BCP-MCNC: 2.5 G/DL (ref 3.5–5)
ANION GAP SERPL CALCULATED.3IONS-SCNC: 6 MMOL/L
BACTERIA BLD CULT: ABNORMAL
BASOPHILS # BLD AUTO: 0.03 THOUSANDS/ÂΜL (ref 0–0.1)
BASOPHILS NFR BLD AUTO: 0 % (ref 0–1)
BUN SERPL-MCNC: 13 MG/DL (ref 5–25)
CALCIUM ALBUM COR SERPL-MCNC: 9.7 MG/DL (ref 8.3–10.1)
CALCIUM SERPL-MCNC: 8.5 MG/DL (ref 8.4–10.2)
CHLORIDE SERPL-SCNC: 99 MMOL/L (ref 96–108)
CO2 SERPL-SCNC: 31 MMOL/L (ref 21–32)
CREAT SERPL-MCNC: 0.71 MG/DL (ref 0.6–1.3)
EOSINOPHIL # BLD AUTO: 0.33 THOUSAND/ÂΜL (ref 0–0.61)
EOSINOPHIL NFR BLD AUTO: 4 % (ref 0–6)
ERYTHROCYTE [DISTWIDTH] IN BLOOD BY AUTOMATED COUNT: 13.1 % (ref 11.6–15.1)
GFR SERPL CREATININE-BSD FRML MDRD: 95 ML/MIN/1.73SQ M
GLUCOSE SERPL-MCNC: 111 MG/DL (ref 65–140)
GLUCOSE SERPL-MCNC: 114 MG/DL (ref 65–140)
GLUCOSE SERPL-MCNC: 118 MG/DL (ref 65–140)
GLUCOSE SERPL-MCNC: 129 MG/DL (ref 65–140)
GLUCOSE SERPL-MCNC: 135 MG/DL (ref 65–140)
GRAM STN SPEC: ABNORMAL
HCT VFR BLD AUTO: 35 % (ref 36.5–49.3)
HGB BLD-MCNC: 11.3 G/DL (ref 12–17)
IMM GRANULOCYTES # BLD AUTO: 0.05 THOUSAND/UL (ref 0–0.2)
IMM GRANULOCYTES NFR BLD AUTO: 1 % (ref 0–2)
LYMPHOCYTES # BLD AUTO: 1.07 THOUSANDS/ÂΜL (ref 0.6–4.47)
LYMPHOCYTES NFR BLD AUTO: 14 % (ref 14–44)
MAGNESIUM SERPL-MCNC: 2 MG/DL (ref 1.9–2.7)
MCH RBC QN AUTO: 30.6 PG (ref 26.8–34.3)
MCHC RBC AUTO-ENTMCNC: 32.3 G/DL (ref 31.4–37.4)
MCV RBC AUTO: 95 FL (ref 82–98)
MONOCYTES # BLD AUTO: 0.7 THOUSAND/ÂΜL (ref 0.17–1.22)
MONOCYTES NFR BLD AUTO: 9 % (ref 4–12)
NEUTROPHILS # BLD AUTO: 5.42 THOUSANDS/ÂΜL (ref 1.85–7.62)
NEUTS SEG NFR BLD AUTO: 72 % (ref 43–75)
NRBC BLD AUTO-RTO: 0 /100 WBCS
P AERUGINOSA DNA BLD POS NAA+NON-PROBE: DETECTED
PHOSPHATE SERPL-MCNC: 3.6 MG/DL (ref 2.3–4.1)
PLATELET # BLD AUTO: 266 THOUSANDS/UL (ref 149–390)
PMV BLD AUTO: 8.6 FL (ref 8.9–12.7)
POTASSIUM SERPL-SCNC: 4.6 MMOL/L (ref 3.5–5.3)
RBC # BLD AUTO: 3.69 MILLION/UL (ref 3.88–5.62)
SODIUM SERPL-SCNC: 136 MMOL/L (ref 135–147)
WBC # BLD AUTO: 7.6 THOUSAND/UL (ref 4.31–10.16)

## 2023-08-24 PROCEDURE — 83735 ASSAY OF MAGNESIUM: CPT | Performed by: STUDENT IN AN ORGANIZED HEALTH CARE EDUCATION/TRAINING PROGRAM

## 2023-08-24 PROCEDURE — 99232 SBSQ HOSP IP/OBS MODERATE 35: CPT | Performed by: INTERNAL MEDICINE

## 2023-08-24 PROCEDURE — 80069 RENAL FUNCTION PANEL: CPT | Performed by: STUDENT IN AN ORGANIZED HEALTH CARE EDUCATION/TRAINING PROGRAM

## 2023-08-24 PROCEDURE — 82948 REAGENT STRIP/BLOOD GLUCOSE: CPT

## 2023-08-24 PROCEDURE — 94760 N-INVAS EAR/PLS OXIMETRY 1: CPT

## 2023-08-24 PROCEDURE — 97116 GAIT TRAINING THERAPY: CPT

## 2023-08-24 PROCEDURE — 97530 THERAPEUTIC ACTIVITIES: CPT

## 2023-08-24 PROCEDURE — 85025 COMPLETE CBC W/AUTO DIFF WBC: CPT | Performed by: INTERNAL MEDICINE

## 2023-08-24 RX ORDER — BISACODYL 10 MG
10 SUPPOSITORY, RECTAL RECTAL ONCE
Status: DISCONTINUED | OUTPATIENT
Start: 2023-08-24 | End: 2023-08-25 | Stop reason: HOSPADM

## 2023-08-24 RX ORDER — CIPROFLOXACIN 750 MG/1
750 TABLET, FILM COATED ORAL 2 TIMES DAILY
Status: DISCONTINUED | OUTPATIENT
Start: 2023-08-24 | End: 2023-08-25 | Stop reason: HOSPADM

## 2023-08-24 RX ADMIN — CEFEPIME 2000 MG: 2 INJECTION, POWDER, FOR SOLUTION INTRAVENOUS at 11:05

## 2023-08-24 RX ADMIN — DOCUSATE SODIUM 100 MG: 100 CAPSULE, LIQUID FILLED ORAL at 08:06

## 2023-08-24 RX ADMIN — HEPARIN SODIUM 5000 UNITS: 5000 INJECTION INTRAVENOUS; SUBCUTANEOUS at 13:34

## 2023-08-24 RX ADMIN — GUAIFENESIN 1200 MG: 600 TABLET, EXTENDED RELEASE ORAL at 08:06

## 2023-08-24 RX ADMIN — GUAIFENESIN 1200 MG: 600 TABLET, EXTENDED RELEASE ORAL at 21:56

## 2023-08-24 RX ADMIN — NYSTATIN: 100000 POWDER TOPICAL at 08:06

## 2023-08-24 RX ADMIN — NYSTATIN: 100000 POWDER TOPICAL at 16:39

## 2023-08-24 RX ADMIN — HEPARIN SODIUM 5000 UNITS: 5000 INJECTION INTRAVENOUS; SUBCUTANEOUS at 05:18

## 2023-08-24 RX ADMIN — CIPROFLOXACIN 750 MG: 750 TABLET, FILM COATED ORAL at 16:47

## 2023-08-24 RX ADMIN — POLYETHYLENE GLYCOL 3350 17 G: 17 POWDER, FOR SOLUTION ORAL at 08:06

## 2023-08-24 RX ADMIN — DOCUSATE SODIUM 100 MG: 100 CAPSULE, LIQUID FILLED ORAL at 16:47

## 2023-08-24 RX ADMIN — SENNOSIDES 17.2 MG: 8.6 TABLET, FILM COATED ORAL at 16:47

## 2023-08-24 RX ADMIN — CEFEPIME 2000 MG: 2 INJECTION, POWDER, FOR SOLUTION INTRAVENOUS at 05:17

## 2023-08-24 RX ADMIN — SENNOSIDES 17.2 MG: 8.6 TABLET, FILM COATED ORAL at 08:06

## 2023-08-24 RX ADMIN — LISINOPRIL 10 MG: 10 TABLET ORAL at 08:06

## 2023-08-24 RX ADMIN — HEPARIN SODIUM 5000 UNITS: 5000 INJECTION INTRAVENOUS; SUBCUTANEOUS at 21:57

## 2023-08-24 NOTE — TELEPHONE ENCOUNTER
Antionette Ashley called to schedule stent removal and catheter removal. I explained to her that since he's still admitted I can't tell when to schedule him for these but that there will be a nurse following up with him once he's discharged and at that point the patient can ask her about those 2 appointments.

## 2023-08-24 NOTE — ASSESSMENT & PLAN NOTE
· Discharged from [address] [place] [address] on 8/11. Per patient rehab was sucessful at [place] and has been feeling stronger.    · Fall precautions   · Consult PT/OT and CM - home with home pt/ot

## 2023-08-24 NOTE — PLAN OF CARE
Problem: PHYSICAL THERAPY ADULT  Goal: Performs mobility at highest level of function for planned discharge setting. See evaluation for individualized goals. Description: Treatment/Interventions: Functional transfer training, LE strengthening/ROM, Elevations, Therapeutic exercise, Endurance training, Patient/family training, Equipment eval/education, Bed mobility, Gait training, Spoke to nursing, Spoke to case management, OT  Equipment Recommended: Rui Blanca (pt reports owning)       See flowsheet documentation for full assessment, interventions and recommendations. Outcome: Progressing  Note: Prognosis: Good  Problem List: Decreased strength, Decreased endurance, Impaired balance, Decreased mobility, Pain  Assessment: Patient was received seated on toilet today . Patient was agreeable to therapy services today. PT session focused on gait and functional mobility today today in order to improve functional mobility and independence. Functional mobility was performed as described above. Pt was eager to participate in therapy today, but transferred to and from the toilet several times throughout session due to urge to defecate. Upon finish toileting, pt was able to ambulate further today than he has in previous therapy sessions. Upon return to room, pt was led through several seated therapeutic exercises for BLE strengthening and to reduce risk of muscle atrophy. Patient will benefit from continued PT services while in hospital in order to address remaining limitations. The patients AM-PAC Basic Mobility Inpatient Short From Raw Score is 20 . A Raw score of  20  suggests that the patient may benefit from discharge to home. PT recommendation at this time is for home with HHPT services. Please also refer to the recommendation of the Physical Therapist for safe discharge planning.   Barriers to Discharge: None     PT Discharge Recommendation: Home with home health rehabilitation    See flowsheet documentation for full assessment.

## 2023-08-24 NOTE — QUICK NOTE
Patient had difficulty managing Sung catheter at home due to inability to perform perineal care. Original plan was for Sung catheter x3 weeks. Discussed with Dr. Rickie Whitten. He is agreeable for trial of void in a.m. with afternoon ultrasound of the kidney and bladder with PVR in radiology department. Due to body habitus we do not feel we would get an accurate PVR on the floor. Would not recommend removing catheter tonight in case there is difficulty replacing the catheter if need be.   Recommendations discussed with [facility] team

## 2023-08-24 NOTE — PROGRESS NOTES
[address] [facility]  Progress Note  Name: Wang Hill  MRN: [id number]  Unit/Bed#: PPHP 931-01 I Date of Admission: 8/20/2023   Date of Service: 8/24/2023 I Hospital Day: 4    Assessment/Plan   Hydronephrosis with ureteropelvic junction (UPJ) obstruction  Assessment & Plan  · Status post cystoscopy with ureteral stent placement  · Sung catheter and stent remain in place until seen by urology at follow-up in 3 weeks. Patient expressed concerns about going home with Sung catheter and the risk of reinfection. He says it may be difficult for him to will take care of the Sung at home. Visiting nurses to be arranged by case management although it will not be every day. We are going to try and alternate days of visiting nurse and PT/OT. · Requested urology to get him a sooner outpatient appointment for trial of void as soon as he is done with his antibiotic on 8/31    Sepsis secondary to Pseudomonas UTI complicated by Pseudomonas bacteremia (urinary tract infection)  Assessment & Plan  · Urine and blood culture growing Pseudomonas  · Based on sensitivities patient transition to oral ciprofloxacin today. To complete total 10 days of antibiotic through 8/31/2023. · Discussed with infectious disease. Ambulatory dysfunction  Assessment & Plan  · Discharged from [address] [place] [address] on 8/11. Per patient rehab was sucessful at [place] and has been feeling stronger. · Fall precautions   · Consult PT/OT and CM - home with home pt/ot    Benign essential hypertension  Assessment & Plan  · Stable. · Continue lisinopril    Chronic respiratory failure with hypoxia and hypercapnia (HCC)  Assessment & Plan  · Baseline: room air during the day and 2 L nasal cannula at night  · Atrovent/Xopenex 3 times daily, albuterol as needed  · Has not followed up with pulmonology outpatient yet.     · Respiratory protocol    Type 2 diabetes mellitus ([address])  Assessment & Plan  · home regimen: Diet controlled  · SSI    * Sepsis ([address])  Assessment & Plan  · POA secondary to UTI/bacteremia  · Now resolved             VTE Pharmacologic Prophylaxis:   Pharmacologic: Heparin  Mechanical VTE Prophylaxis in Place: Yes    Patient Centered Rounds: I have performed bedside rounds with nursing staff today. Discussions with Specialists or Other Care Team Provider: ID, urology    Education and Discussions with Family / Patient: pt    Time Spent for Care: 45 minutes. More than 50% of total time spent on counseling and coordination of care as described above. Current Length of Stay: 4 day(s)    Current Patient Status: Inpatient   Certification Statement: The patient will continue to require additional inpatient hospital stay due to above    Discharge Plan: tomorrow    Code Status: Level 3 - DNAR and DNI      Subjective:   Pt seen and examined by me in morning. Denied any specific complaints. Also expressed concerns about going home by himself with Sung catheter as he feels that he will be able to take care of it properly because of his large abdominal fat fold. Objective:     Vitals:   Temp (24hrs), Av.1 °F (36.7 °C), Min:97.9 °F (36.6 °C), Max:98.3 °F (36.8 °C)    Temp:  [97.9 °F (36.6 °C)-98.3 °F (36.8 °C)] 98.1 °F (36.7 °C)  HR:  [60-70] 68  Resp:  [17-20] 17  BP: (127-142)/(85-87) 142/85  SpO2:  [91 %-93 %] 91 %  There is no height or weight on file to calculate BMI. Input and Output Summary (last 24 hours): Intake/Output Summary (Last 24 hours) at 2023 1449  Last data filed at 2023 1048  Gross per 24 hour   Intake 920 ml   Output 3200 ml   Net -2280 ml       Physical Exam:     Physical Exam    Constitutional: Pt appears comfortable. Not in any acute distress. Cardiovascular: Normal rate, regular rhythm, normal heart sounds. No murmur heard. Pulmonary/Chest: Effort normal, air entry b/l equal. No respiratory distress. Pt has no wheezes or crackles. Abdominal: Soft.  Non-distended, Non-tender. Bowel sounds are normal.   Musculoskeletal: Normal range of motion. Neurological: awake, alert. Moving all extremities spontaneously. Psychiatric: normal mood and affect. Additional Data:     Labs:    Results from last 7 days   Lab Units 08/24/23  0754   WBC Thousand/uL 7.60   HEMOGLOBIN g/dL 11.3*   HEMATOCRIT % 35.0*   PLATELETS Thousands/uL 266   NEUTROS PCT % 72   LYMPHS PCT % 14   MONOS PCT % 9   EOS PCT % 4     Results from last 7 days   Lab Units 08/24/23  0537 08/22/23  0604 08/21/23  0444   SODIUM mmol/L 136   < > 132*   POTASSIUM mmol/L 4.6   < > 4.3   CHLORIDE mmol/L 99   < > 100   CO2 mmol/L 31   < > 29   BUN mg/dL 13   < > 19   CREATININE mg/dL 0.71   < > 0.95   ANION GAP mmol/L 6   < > 3   CALCIUM mg/dL 8.5   < > 8.4   ALBUMIN g/dL 2.5*   < > 2.0*   TOTAL BILIRUBIN mg/dL  --   --  0.70   ALK PHOS U/L  --   --  116   ALT U/L  --   --  12   AST U/L  --   --  12   GLUCOSE RANDOM mg/dL 111   < > 110    < > = values in this interval not displayed. Results from last 7 days   Lab Units 08/20/23  0052   INR  1.04     Results from last 7 days   Lab Units 08/24/23  1108 08/24/23  0755 08/23/23  2101 08/23/23  1625 08/23/23  1044 08/23/23  0723 08/22/23  2105 08/22/23  1546 08/22/23  1114 08/22/23  0742 08/21/23  2116 08/21/23  1702   POC GLUCOSE mg/dl 135 118 135 141* 174* 98 124 131 135 125 146* 166*         Results from last 7 days   Lab Units 08/21/23  0444 08/20/23  0052   LACTIC ACID mmol/L  --  1.0   PROCALCITONIN ng/ml 0.34* 0.21           * I Have Reviewed All Lab Data Listed Above. * Additional Pertinent Lab Tests Reviewed: [address] [facility] Admission Reviewed    Imaging:    Imaging Reports Reviewed Today Include:   Imaging Personally Reviewed by Myself Includes:      Recent Cultures (last 7 days):     Results from last 7 days   Lab Units 08/23/23  1258 08/20/23  1220 08/20/23  0211 08/20/23  0100 08/20/23  0052   BLOOD CULTURE  Received in Microbiology Lab.  Culture in Progress. Received in Microbiology Lab. Culture in Progress. --   --  No Growth After 4 Days. Pseudomonas aeruginosa*   GRAM STAIN RESULT   --   --   --   --  Gram negative rods*   URINE CULTURE   --  30,000-39,000 cfu/ml Pseudomonas aeruginosa* >100,000 cfu/ml Pseudomonas aeruginosa*  --   --        Last 24 Hours Medication List:   Current Facility-Administered Medications   Medication Dose Route Frequency Provider Last Rate   • acetaminophen  650 mg Oral Q6H PRN Richar Walton MD     • albuterol  2 puff Inhalation Q6H PRN Mariah Castro MD     • ciprofloxacin  750 mg Oral BID Brandon Sevilla MD     • docusate sodium  100 mg Oral BID Tasha Chapman MD     • guaiFENesin  1,200 mg Oral Q12H Aminata Cody MD     • heparin (porcine)  5,000 Units Subcutaneous Q8H [address] Mariah Castro MD     • insulin lispro  1-5 Units Subcutaneous TID Dominguez Styles MD     • insulin lispro  1-5 Units Subcutaneous HS Mariah Castro MD     • iohexol  50 mL Oral Once in imaging Mariah Castro MD     • ipratropium  0.5 mg Nebulization Q6H PRN Mariah Castro MD     • levalbuterol  1.25 mg Nebulization Q6H PRN Mariah Castro MD     • lisinopril  10 mg Oral Daily Galen Church DO     • nystatin   Topical BID Mariah Castro MD     • oxyCODONE  10 mg Oral Q6H PRN Mariah Castro MD     • oxyCODONE  5 mg Oral Q4H PRN Mariah Castro MD     • polyethylene glycol  17 g Oral Daily Galen Church DO     • senna  2 tablet Oral BID Tasha Chapman MD          Today, Patient Was Seen By: Tasha Chapman MD    ** Please Note: Dictation voice to text software may have been used in the creation of this document.  **

## 2023-08-24 NOTE — PLAN OF CARE
Problem: MOBILITY - ADULT  Goal: Maintain or return to baseline ADL function  Description: INTERVENTIONS:  -  Assess patient's ability to carry out ADLs; assess patient's baseline for ADL function and identify physical deficits which impact ability to perform ADLs (bathing, care of mouth/teeth, toileting, grooming, dressing, etc.)  - Assess/evaluate cause of self-care deficits   - Assess range of motion  - Assess patient's mobility; develop plan if impaired  - Assess patient's need for assistive devices and provide as appropriate  - Encourage maximum independence but intervene and supervise when necessary  - Involve family in performance of ADLs  - Assess for home care needs following discharge   - Consider OT consult to assist with ADL evaluation and planning for discharge  - Provide patient education as appropriate  Outcome: Progressing  Goal: Maintains/Returns to pre admission functional level  Description: INTERVENTIONS:  - Perform BMAT or MOVE assessment daily.   - Set and communicate daily mobility goal to care team and patient/family/caregiver.    - Collaborate with rehabilitation services on mobility goals if consulted  - Record patient progress and toleration of activity level   Outcome: Progressing     Problem: PAIN - ADULT  Goal: Verbalizes/displays adequate comfort level or baseline comfort level  Description: Interventions:  - Encourage patient to monitor pain and request assistance  - Assess pain using appropriate pain scale  - Administer analgesics based on type and severity of pain and evaluate response  - Implement non-pharmacological measures as appropriate and evaluate response  - Consider cultural and social influences on pain and pain management  - Notify physician/advanced practitioner if interventions unsuccessful or patient reports new pain  Outcome: Progressing     Problem: INFECTION - ADULT  Goal: Absence or prevention of progression during hospitalization  Description: INTERVENTIONS:  - Assess and monitor for signs and symptoms of infection  - Monitor lab/diagnostic results  - Monitor all insertion sites, i.e. indwelling lines, tubes, and drains  - Monitor endotracheal if appropriate and nasal secretions for changes in amount and color  - [place] appropriate cooling/warming therapies per order  - Administer medications as ordered  - Instruct and encourage patient and family to use good hand hygiene technique  - Identify and instruct in appropriate isolation precautions for identified infection/condition  Outcome: Progressing  Goal: Absence of fever/infection during neutropenic period  Description: INTERVENTIONS:  - Monitor WBC    Outcome: Progressing     Problem: SAFETY ADULT  Goal: Patient will remain free of falls  Description: INTERVENTIONS:  - Educate patient/family on patient safety including physical limitations  - Instruct patient to call for assistance with activity   - Consult OT/PT to assist with strengthening/mobility   - Keep Call bell within reach  - Keep bed low and locked with side rails adjusted as appropriate  - Keep care items and personal belongings within reach  - Initiate and maintain comfort rounds  - Make Fall Risk Sign visible to staff  - Apply yellow socks and bracelet for high fall risk patients  - Consider moving patient to room near nurses station  Outcome: Progressing     Problem: DISCHARGE PLANNING  Goal: Discharge to home or other facility with appropriate resources  Description: INTERVENTIONS:  - Identify barriers to discharge w/patient and caregiver  - Arrange for needed discharge resources and transportation as appropriate  - Identify discharge learning needs (meds, wound care, etc.)  - Arrange for interpretive services to assist at discharge as needed  - Refer to Case Management Department for coordinating discharge planning if the patient needs post-hospital services based on physician/advanced practitioner order or complex needs related to functional status, cognitive ability, or social support system  Outcome: Progressing     Problem: Knowledge Deficit  Goal: Patient/family/caregiver demonstrates understanding of disease process, treatment plan, medications, and discharge instructions  Description: Complete learning assessment and assess knowledge base.   Interventions:  - Provide teaching at level of understanding  - Provide teaching via preferred learning methods  Outcome: Progressing     Problem: Prexisting or High Potential for Compromised Skin Integrity  Goal: Skin integrity is maintained or improved  Description: INTERVENTIONS:  - Identify patients at risk for skin breakdown  - Assess and monitor skin integrity  - Assess and monitor nutrition and hydration status  - Monitor labs   - Assess for incontinence   - Turn and reposition patient  - Assist with mobility/ambulation  - Relieve pressure over bony prominences  - Avoid friction and shearing  - Provide appropriate hygiene as needed including keeping skin clean and dry  - Evaluate need for skin moisturizer/barrier cream  - Collaborate with interdisciplinary team   - Patient/family teaching  - Consider wound care consult   Outcome: Progressing

## 2023-08-24 NOTE — PLAN OF CARE
Problem: MOBILITY - ADULT  Goal: Maintain or return to baseline ADL function  Description: INTERVENTIONS:  -  Assess patient's ability to carry out ADLs; assess patient's baseline for ADL function and identify physical deficits which impact ability to perform ADLs (bathing, care of mouth/teeth, toileting, grooming, dressing, etc.)  - Assess/evaluate cause of self-care deficits   - Assess range of motion  - Assess patient's mobility; develop plan if impaired  - Assess patient's need for assistive devices and provide as appropriate  - Encourage maximum independence but intervene and supervise when necessary  - Involve family in performance of ADLs  - Assess for home care needs following discharge   - Consider OT consult to assist with ADL evaluation and planning for discharge  - Provide patient education as appropriate  Outcome: Progressing  Goal: Maintains/Returns to pre admission functional level  Description: INTERVENTIONS:  - Perform BMAT or MOVE assessment daily.   - Set and communicate daily mobility goal to care team and patient/family/caregiver.    - Collaborate with rehabilitation services on mobility goals if consulted  - Record patient progress and toleration of activity level   Outcome: Progressing     Problem: PAIN - ADULT  Goal: Verbalizes/displays adequate comfort level or baseline comfort level  Description: Interventions:  - Encourage patient to monitor pain and request assistance  - Assess pain using appropriate pain scale  - Administer analgesics based on type and severity of pain and evaluate response  - Implement non-pharmacological measures as appropriate and evaluate response  - Consider cultural and social influences on pain and pain management  - Notify physician/advanced practitioner if interventions unsuccessful or patient reports new pain  Outcome: Progressing     Problem: INFECTION - ADULT  Goal: Absence or prevention of progression during hospitalization  Description: INTERVENTIONS:  - Assess and monitor for signs and symptoms of infection  - Monitor lab/diagnostic results  - Monitor all insertion sites, i.e. indwelling lines, tubes, and drains  - Monitor endotracheal if appropriate and nasal secretions for changes in amount and color  - [place] appropriate cooling/warming therapies per order  - Administer medications as ordered  - Instruct and encourage patient and family to use good hand hygiene technique  - Identify and instruct in appropriate isolation precautions for identified infection/condition  Outcome: Progressing  Goal: Absence of fever/infection during neutropenic period  Description: INTERVENTIONS:  - Monitor WBC    Outcome: Progressing     Problem: SAFETY ADULT  Goal: Patient will remain free of falls  Description: INTERVENTIONS:  - Educate patient/family on patient safety including physical limitations  - Instruct patient to call for assistance with activity   - Consult OT/PT to assist with strengthening/mobility   - Keep Call bell within reach  - Keep bed low and locked with side rails adjusted as appropriate  - Keep care items and personal belongings within reach  - Initiate and maintain comfort rounds  - Make Fall Risk Sign visible to staff  - Apply yellow socks and bracelet for high fall risk patients  - Consider moving patient to room near nurses station  Outcome: Progressing     Problem: DISCHARGE PLANNING  Goal: Discharge to home or other facility with appropriate resources  Description: INTERVENTIONS:  - Identify barriers to discharge w/patient and caregiver  - Arrange for needed discharge resources and transportation as appropriate  - Identify discharge learning needs (meds, wound care, etc.)  - Arrange for interpretive services to assist at discharge as needed  - Refer to Case Management Department for coordinating discharge planning if the patient needs post-hospital services based on physician/advanced practitioner order or complex needs related to functional status, cognitive ability, or social support system  Outcome: Progressing     Problem: Knowledge Deficit  Goal: Patient/family/caregiver demonstrates understanding of disease process, treatment plan, medications, and discharge instructions  Description: Complete learning assessment and assess knowledge base.   Interventions:  - Provide teaching at level of understanding  - Provide teaching via preferred learning methods  Outcome: Progressing     Problem: Prexisting or High Potential for Compromised Skin Integrity  Goal: Skin integrity is maintained or improved  Description: INTERVENTIONS:  - Identify patients at risk for skin breakdown  - Assess and monitor skin integrity  - Assess and monitor nutrition and hydration status  - Monitor labs   - Assess for incontinence   - Turn and reposition patient  - Assist with mobility/ambulation  - Relieve pressure over bony prominences  - Avoid friction and shearing  - Provide appropriate hygiene as needed including keeping skin clean and dry  - Evaluate need for skin moisturizer/barrier cream  - Collaborate with interdisciplinary team   - Patient/family teaching  - Consider wound care consult   Outcome: Progressing

## 2023-08-24 NOTE — RESTORATIVE TECHNICIAN NOTE
Restorative Technician Note      Patient Name: Caleb Sepulveda     Restorative Tech Visit Date: 08/24/23  Note Type: Mobility  Patient Position Upon Consult: Supine  Activity Performed: Ambulated  Assistive Device: Roller walker  Patient Position at End of Consult: All needs within reach;  Other (comment) (pt was in BR; Left in the care of PT)    Karyn Mccabe, Restorative Technician

## 2023-08-24 NOTE — PHYSICAL THERAPY NOTE
PHYSICAL THERAPY NOTE          Patient Name: Darina Hodgkin  YGAFW'P Date: 8/24/2023 08/24/23 1100   PT Last Visit   PT Visit Date 08/24/23   Note Type   Note Type Treatment   Pain Assessment   Pain Assessment Tool 0-10   Pain Score No Pain   Restrictions/Precautions   Weight Bearing Precautions Per Order No   Other Precautions Fall Risk   General   Chart Reviewed Yes   Response to Previous Treatment Patient with no complaints from previous session. Family/Caregiver Present No   Cognition   Overall Cognitive Status WFL   Arousal/Participation Responsive; Cooperative   Attention Attends with cues to redirect   Orientation Level Oriented X4   Memory Within functional limits   Following Commands Follows one step commands with increased time or repetition   Subjective   Subjective pt pleasant and cooperative throughout therapy session. pt received on toilet   Bed Mobility   Supine to Sit Unable to assess   Sit to Supine Unable to assess   Transfers   Sit to Stand 5  Supervision   Additional items Increased time required;Verbal cues   Stand to Sit 5  Supervision   Additional items Increased time required;Verbal cues   Toilet transfer 5  Supervision   Additional items Increased time required;Verbal cues; Commode   Additional Comments transfers w RW   Ambulation/Elevation   Gait pattern Excessively slow; Short stride; Foward flexed; Shuffling;Decreased foot clearance; Wide SADIA; Improper Weight shift   Gait Assistance 5  Supervision   Additional items Verbal cues   Assistive Device Rolling walker   Distance 180'   Stair Management Assistance Not tested   Balance   Static Sitting Fair +   Dynamic Sitting Fair   Static Standing Fair   Dynamic Standing [facility] -   Activity Tolerance   Activity Tolerance Patient tolerated treatment well   Nurse Made Aware RN cleared and updated   Exercises   Hip Flexion Sitting;15 reps;AROM; Bilateral   Knee AROM Long Arc Quad Sitting;15 reps;AROM; Bilateral   Ankle Pumps Sitting;15 reps;AROM; Bilateral   Assessment   Prognosis Good   Problem List Decreased strength;Decreased endurance; Impaired balance;Decreased mobility;Pain   Assessment Patient was received seated on toilet today . Patient was agreeable to therapy services today. PT session focused on gait and functional mobility today today in order to improve functional mobility and independence. Functional mobility was performed as described above. Pt was eager to participate in therapy today, but transferred to and from the toilet several times throughout session due to urge to defecate. Upon finish toileting, pt was able to ambulate further today than he has in previous therapy sessions. Upon return to room, pt was led through several seated therapeutic exercises for BLE strengthening and to reduce risk of muscle atrophy. Patient will benefit from continued PT services while in hospital in order to address remaining limitations. The patients [facility] Basic Mobility Inpatient Short From Raw Score is 20 . A Raw score of  20  suggests that the patient may benefit from discharge to home. PT recommendation at this time is for home with HHPT services. Please also refer to the recommendation of the Physical Therapist for safe discharge planning. Barriers to Discharge None   Goals   Patient Goals to continue getting better   STG Expiration Date 09/04/23   PT Treatment Day 2   Plan   Treatment/Interventions ADL retraining;Functional transfer training;LE strengthening/ROM; Elevations; Therapeutic exercise; Endurance training;Bed mobility;Gait training   Progress Progressing toward goals   PT Frequency 3-5x/wk   Recommendation   PT Discharge Recommendation Home with home health rehabilitation   [facility]   Turning in Flat Bed Without Bedrails 4   Lying on Back to Sitting on Edge of Flat Bed Without Bedrails 3   Moving Bed to Chair 3   Standing Up From Chair Using Arms 4   Walk in Room 4   Climb 3-5 Stairs With Railing 2   Basic Mobility Inpatient Raw Score 20   Basic Mobility Standardized Score 43.99   Highest Level Of Mobility   JH-HLM Goal 6: Walk 10 steps or more   JH-HLM Achieved 7: Walk 25 feet or more   Education   Education Provided Mobility training;Assistive device   Patient Demonstrates acceptance/verbal understanding   End of Consult   Patient Position at End of Consult Bedside chair; All needs within reach       Lewis Kenyon PT, DPT

## 2023-08-24 NOTE — CASE MANAGEMENT
Case Management Discharge Planning Note    Patient name Nickie Johnson  Location [address]/[facility] 931-01 MRN [id number]  : 1955 Date 2023       Current Admission Date: 2023  Current Admission Diagnosis:Sepsis [facility])   Patient Active Problem List    Diagnosis Date Noted   • Ambulatory dysfunction 2023   • Morbid obesity with BMI of 40.0-44.9, adult ([address]) 2023   • Hydronephrosis with ureteropelvic junction (UPJ) obstruction 2023   • Drug-induced constipation 2023   • Insomnia 2023   • Dermatitis 2023   • Iron deficiency anemia 2023   • Encephalopathy acute 2023   • Sepsis ([address]) 2023   • Sepsis secondary to Pseudomonas UTI complicated by Pseudomonas bacteremia (urinary tract infection) 2023   • Hypertension 2023   • Lymphedema 2023   • Type 2 diabetes mellitus ([address]) 2023   • Obesity 2023   • Chronic respiratory failure with hypoxia and hypercapnia ([address]) 2023   • Elevated troponin 2023   • Congenital buried penis 2023   • Urinary bladder stone 2023   • Hydrocele 2023   • Benign essential hypertension 2022      LOS (days): 4  Geometric Mean LOS (GMLOS) (days): 5.10  Days to GMLOS:1.1     OBJECTIVE:  Risk of Unplanned Readmission Score: 22.48         Current admission status: Inpatient   Preferred Pharmacy:   [address] [place] [address] [address] [place] [address]  Phone: [phone] Fax: [phone]    Primary Care Provider: Aristeo Carter DO    Primary Insurance: [facility]  Secondary Insurance:     DISCHARGE DETAILS:  Met at bedside with patient, provider and bedside RN to discuss [facility] and silva care. Educated to patient that [facility] will not come to home everyday but would stagger visits to ensure disciplines coming as many times a week as possible. Pt agreeable to [facility]. CM to continue to support.

## 2023-08-24 NOTE — ASSESSMENT & PLAN NOTE
· Status post cystoscopy with ureteral stent placement  · Sung catheter and stent remain in place until seen by urology at follow-up in 3 weeks. Patient expressed concerns about going home with Sung catheter and the risk of reinfection. He says it may be difficult for him to will take care of the Sung at home. Visiting nurses to be arranged by case management although it will not be every day. We are going to try and alternate days of visiting nurse and PT/OT.    · Requested urology to get him a sooner outpatient appointment for trial of void as soon as he is done with his antibiotic on 8/31

## 2023-08-24 NOTE — PROGRESS NOTES
Progress Note - Infectious Disease   Raul Euceda 69 y.o. male MRN: [id number]  Unit/Bed#: [facility] 931-01 Encounter: [id number]      Impression/Recommendations:  Sepsis, POA. HR, WBC. Due to bacteremia, UTI as below. No other appreciable source. Clinically stable and non-toxic. Improved. Rec:  • Continue antibiotics as below  • Follow temperatures closely  • Supportive care as per the primary service     Pseudomonal bacteremia. Due to UTI as below. Rec:  • Change antibiotics to Cipro 750 mg PO Q12 with plan to complete 10 days total antibiotics through      Pseudomonal UTI. Due to obstruction as below. Rec:  • Continue antibiotics as below     Obstructive nephrolithiasis with hydronephrosis. Status post cysto, retrograde pyelogram, left ureteral stent placement. Noted to be difficult due to inclusion of ureter in inguinal hernia as seen on cystogram.  Stone not seen on repeat CT. Rec:  • Sung per Urology  • Repeat CT in 3 weeks per Urology with outpatient follow-up to determine ultimate plan     Inguinal hernia.     Chronic hypoxic respiratory failure. On 2L nocturnal O2.     DM.     MO. Status post gastric bypass.     The above impression and plan was discussed in detail with the patient. The patient is stable from an ID standpoint for D/C at any time.     Antibiotics:  Cefepime #3    Subjective:  Patient seen on AM rounds. Still no BM. Stent uncomfortable with sitting up.     24 Hour Events:  No documented fevers, chills, sweats, nausea, vomiting, or diarrhea.     Objective:  Vitals:  Temp:  [97.9 °F (36.6 °C)-98.3 °F (36.8 °C)] 98.1 °F (36.7 °C)  HR:  [60-70] 68  Resp:  [17-20] 17  BP: (127-142)/(85-87) 142/85  SpO2:  [91 %-93 %] 91 %  Temp (24hrs), Av.1 °F (36.7 °C), Min:97.9 °F (36.6 °C), Max:98.3 °F (36.8 °C)  Current: Temperature: 98.1 °F (36.7 °C)    Physical Exam:   General:  No acute distress  Psychiatric:  Awake and alert  Pulmonary:  Normal respiratory excursion without accessory muscle use  Abdomen:  Obese  Extremities:  No edema  Skin:  No rashes  :  Sung in place    Lab Results:  I have personally reviewed pertinent labs. Results from last 7 days   Lab Units 08/24/23  0537 08/23/23  0512 08/22/23  0604 08/21/23  0444 08/20/23  1614 08/20/23  0052   POTASSIUM mmol/L 4.6 4.6 4.4 4.3   < > 4.5   CHLORIDE mmol/L 99 99 103 100   < > 95*   CO2 mmol/L 31 30 27 29   < > 28   BUN mg/dL 13 15 17 19   < > 22   CREATININE mg/dL 0.71 0.85 0.88 0.95   < > 1.04   EGFR ml/min/1.73sq m 95 88 87 81   < > 72   CALCIUM mg/dL 8.5 8.4 8.5 8.4   < > 9.2   AST U/L  --   --   --  12  --  16   ALT U/L  --   --   --  12  --  12   ALK PHOS U/L  --   --   --  116  --  118*    < > = values in this interval not displayed. Results from last 7 days   Lab Units 08/24/23  0754 08/23/23  0512 08/22/23  0604   WBC Thousand/uL 7.60 8.76 13.88*   HEMOGLOBIN g/dL 11.3* 10.6* 10.3*   PLATELETS Thousands/uL 266 267 257     Results from last 7 days   Lab Units 08/23/23  1258 08/20/23  1220 08/20/23  0211 08/20/23  0100 08/20/23  0052   BLOOD CULTURE  Received in Microbiology Lab. Culture in Progress. Received in Microbiology Lab. Culture in Progress. --   --  No Growth After 4 Days. Pseudomonas aeruginosa*   GRAM STAIN RESULT   --   --   --   --  Gram negative rods*   URINE CULTURE   --  30,000-39,000 cfu/ml Pseudomonas aeruginosa* >100,000 cfu/ml Pseudomonas aeruginosa*  --   --        Imaging Studies:   I have personally reviewed pertinent imaging study reports and images in PACS. EKG, Pathology, and Other Studies:   I have personally reviewed pertinent reports.

## 2023-08-24 NOTE — TELEPHONE ENCOUNTER
They are going to attempt to remove his catheter tomorrow and do a formal ultrasound with PVR during this hospitalization.   He may or may not need an outpatient void trial

## 2023-08-25 ENCOUNTER — APPOINTMENT (OUTPATIENT)
Dept: RADIOLOGY | Facility: HOSPITAL | Age: 68
DRG: 854 | End: 2023-08-25
Payer: COMMERCIAL

## 2023-08-25 VITALS
HEART RATE: 60 BPM | TEMPERATURE: 97.8 F | RESPIRATION RATE: 17 BRPM | SYSTOLIC BLOOD PRESSURE: 141 MMHG | OXYGEN SATURATION: 94 % | DIASTOLIC BLOOD PRESSURE: 83 MMHG

## 2023-08-25 LAB
BACTERIA BLD CULT: NORMAL
GLUCOSE SERPL-MCNC: 109 MG/DL (ref 65–140)
GLUCOSE SERPL-MCNC: 116 MG/DL (ref 65–140)

## 2023-08-25 PROCEDURE — 99239 HOSP IP/OBS DSCHRG MGMT >30: CPT | Performed by: INTERNAL MEDICINE

## 2023-08-25 PROCEDURE — 94664 DEMO&/EVAL PT USE INHALER: CPT

## 2023-08-25 PROCEDURE — 97530 THERAPEUTIC ACTIVITIES: CPT

## 2023-08-25 PROCEDURE — 99232 SBSQ HOSP IP/OBS MODERATE 35: CPT | Performed by: INTERNAL MEDICINE

## 2023-08-25 PROCEDURE — 76775 US EXAM ABDO BACK WALL LIM: CPT

## 2023-08-25 PROCEDURE — 82948 REAGENT STRIP/BLOOD GLUCOSE: CPT

## 2023-08-25 PROCEDURE — 97116 GAIT TRAINING THERAPY: CPT

## 2023-08-25 RX ORDER — DOCUSATE SODIUM 100 MG/1
100 CAPSULE, LIQUID FILLED ORAL 2 TIMES DAILY
Qty: 10 CAPSULE | Refills: 0 | Status: SHIPPED | OUTPATIENT
Start: 2023-08-25

## 2023-08-25 RX ORDER — POLYETHYLENE GLYCOL 3350 17 G/17G
17 POWDER, FOR SOLUTION ORAL DAILY
Qty: 14 EACH | Refills: 0 | Status: SHIPPED | OUTPATIENT
Start: 2023-08-26

## 2023-08-25 RX ORDER — CIPROFLOXACIN 750 MG/1
750 TABLET, FILM COATED ORAL 2 TIMES DAILY
Qty: 12 TABLET | Refills: 0 | Status: SHIPPED | OUTPATIENT
Start: 2023-08-25 | End: 2023-08-31

## 2023-08-25 RX ADMIN — HEPARIN SODIUM 5000 UNITS: 5000 INJECTION INTRAVENOUS; SUBCUTANEOUS at 06:18

## 2023-08-25 RX ADMIN — NYSTATIN: 100000 POWDER TOPICAL at 09:01

## 2023-08-25 RX ADMIN — DOCUSATE SODIUM 100 MG: 100 CAPSULE, LIQUID FILLED ORAL at 09:00

## 2023-08-25 RX ADMIN — CIPROFLOXACIN 750 MG: 750 TABLET, FILM COATED ORAL at 09:00

## 2023-08-25 RX ADMIN — SENNOSIDES 17.2 MG: 8.6 TABLET, FILM COATED ORAL at 09:00

## 2023-08-25 RX ADMIN — GUAIFENESIN 1200 MG: 600 TABLET, EXTENDED RELEASE ORAL at 09:00

## 2023-08-25 RX ADMIN — LISINOPRIL 10 MG: 10 TABLET ORAL at 09:00

## 2023-08-25 RX ADMIN — HEPARIN SODIUM 5000 UNITS: 5000 INJECTION INTRAVENOUS; SUBCUTANEOUS at 13:35

## 2023-08-25 NOTE — RESTORATIVE TECHNICIAN NOTE
Restorative Technician Note      Patient Name: Caleb Sepulveda     Restorative Tech Visit Date: 08/25/23  Note Type: Mobility  Patient Position Upon Consult: Supine  Activity Performed: Ambulated  Assistive Device: Roller walker  Patient Position at End of Consult: Bedside chair;  All needs within reach    Karyn Mccabe Restorative Technician

## 2023-08-25 NOTE — PLAN OF CARE
Problem: MOBILITY - ADULT  Goal: Maintain or return to baseline ADL function  Description: INTERVENTIONS:  -  Assess patient's ability to carry out ADLs; assess patient's baseline for ADL function and identify physical deficits which impact ability to perform ADLs (bathing, care of mouth/teeth, toileting, grooming, dressing, etc.)  - Assess/evaluate cause of self-care deficits   - Assess range of motion  - Assess patient's mobility; develop plan if impaired  - Assess patient's need for assistive devices and provide as appropriate  - Encourage maximum independence but intervene and supervise when necessary  - Involve family in performance of ADLs  - Assess for home care needs following discharge   - Consider OT consult to assist with ADL evaluation and planning for discharge  - Provide patient education as appropriate  Outcome: Progressing  Goal: Maintains/Returns to pre admission functional level  Description: INTERVENTIONS:  - Perform BMAT or MOVE assessment daily.   - Set and communicate daily mobility goal to care team and patient/family/caregiver.    - Collaborate with rehabilitation services on mobility goals if consulted  - Record patient progress and toleration of activity level   Outcome: Progressing     Problem: PAIN - ADULT  Goal: Verbalizes/displays adequate comfort level or baseline comfort level  Description: Interventions:  - Encourage patient to monitor pain and request assistance  - Assess pain using appropriate pain scale  - Administer analgesics based on type and severity of pain and evaluate response  - Implement non-pharmacological measures as appropriate and evaluate response  - Consider cultural and social influences on pain and pain management  - Notify physician/advanced practitioner if interventions unsuccessful or patient reports new pain  Outcome: Progressing     Problem: INFECTION - ADULT  Goal: Absence or prevention of progression during hospitalization  Description: INTERVENTIONS:  - Assess and monitor for signs and symptoms of infection  - Monitor lab/diagnostic results  - Monitor all insertion sites, i.e. indwelling lines, tubes, and drains  - Monitor endotracheal if appropriate and nasal secretions for changes in amount and color  - [place] appropriate cooling/warming therapies per order  - Administer medications as ordered  - Instruct and encourage patient and family to use good hand hygiene technique  - Identify and instruct in appropriate isolation precautions for identified infection/condition  Outcome: Progressing  Goal: Absence of fever/infection during neutropenic period  Description: INTERVENTIONS:  - Monitor WBC    Outcome: Progressing     Problem: SAFETY ADULT  Goal: Patient will remain free of falls  Description: INTERVENTIONS:  - Educate patient/family on patient safety including physical limitations  - Instruct patient to call for assistance with activity   - Consult OT/PT to assist with strengthening/mobility   - Keep Call bell within reach  - Keep bed low and locked with side rails adjusted as appropriate  - Keep care items and personal belongings within reach  - Initiate and maintain comfort rounds  - Make Fall Risk Sign visible to staff  - Apply yellow socks and bracelet for high fall risk patients  - Consider moving patient to room near nurses station  Outcome: Progressing     Problem: DISCHARGE PLANNING  Goal: Discharge to home or other facility with appropriate resources  Description: INTERVENTIONS:  - Identify barriers to discharge w/patient and caregiver  - Arrange for needed discharge resources and transportation as appropriate  - Identify discharge learning needs (meds, wound care, etc.)  - Arrange for interpretive services to assist at discharge as needed  - Refer to Case Management Department for coordinating discharge planning if the patient needs post-hospital services based on physician/advanced practitioner order or complex needs related to functional status, cognitive ability, or social support system  Outcome: Progressing     Problem: Knowledge Deficit  Goal: Patient/family/caregiver demonstrates understanding of disease process, treatment plan, medications, and discharge instructions  Description: Complete learning assessment and assess knowledge base.   Interventions:  - Provide teaching at level of understanding  - Provide teaching via preferred learning methods  Outcome: Progressing     Problem: Prexisting or High Potential for Compromised Skin Integrity  Goal: Skin integrity is maintained or improved  Description: INTERVENTIONS:  - Identify patients at risk for skin breakdown  - Assess and monitor skin integrity  - Assess and monitor nutrition and hydration status  - Monitor labs   - Assess for incontinence   - Turn and reposition patient  - Assist with mobility/ambulation  - Relieve pressure over bony prominences  - Avoid friction and shearing  - Provide appropriate hygiene as needed including keeping skin clean and dry  - Evaluate need for skin moisturizer/barrier cream  - Collaborate with interdisciplinary team   - Patient/family teaching  - Consider wound care consult   Outcome: Progressing

## 2023-08-25 NOTE — ASSESSMENT & PLAN NOTE
· Discharged from [address] [place] [address] on 8/11. Per patient rehab was sucessful at [place] and has been feeling stronger. · Fall precautions   · Consult PT/OT and CM - home with home pt/ot -Per case management, patient refused home health care.

## 2023-08-25 NOTE — ASSESSMENT & PLAN NOTE
Continue MiraLAX, Colace  Patient had good bowel movement this morning.   Continue stool softeners on discharge

## 2023-08-25 NOTE — DISCHARGE SUMMARY
[address] [facility]  Discharge- Claudell Lock 1955, 69 y.o. male MRN: [id number]  Unit/Bed#: [facility] 931-01 Encounter: [id number]  Primary Care Provider: Sunil Watson DO   Date and time admitted to hospital: 8/20/2023 11:27 AM    Hydronephrosis with ureteropelvic junction (UPJ) obstruction  Assessment & Plan  · Status post cystoscopy with ureteral stent placement  · Stent currently in place. · Patient also had Sung catheter that was removed last night. Initially plan was to discharge patient with Sung but patient expressed severe concerns about going home with Sung and inability to take care of it properly. So after discussion with urology, Sung catheter was removed yesterday. Patient has been urinating without any problems. Ultrasound of kidney and bladder were done which did not show any significant retention. Unfortunately PVR could not be done on ultrasound as patient urinated before. Bedside bladder scan PVRs were reviewed being done by nurse and they were negative. · Patient to follow-up with urology outpatient in 3 weeks for ureteral stent removal.  Will need CT prior to appointment. Sepsis secondary to Pseudomonas UTI complicated by Pseudomonas bacteremia (urinary tract infection)  Assessment & Plan  · Urine and blood culture growing Pseudomonas  · Based on sensitivities patient transitioned to oral ciprofloxacin. To complete total 10 days of antibiotic through 8/31/2023. Ambulatory dysfunction  Assessment & Plan  · Discharged from [address] [place] [address] on 8/11. Per patient rehab was sucessful at [place] and has been feeling stronger. · Fall precautions   · Consult PT/OT and CM - home with home pt/ot -Per case management, patient refused home health care. Drug-induced constipation  Assessment & Plan  Continue MiraLAX, Colace  Patient had good bowel movement this morning.   Continue stool softeners on discharge    Benign essential hypertension  Assessment & Plan  · Stable. · Continue lisinopril    Type 2 diabetes mellitus ([address])  Assessment & Plan  · home regimen: Diet controlled  · SSI    * Sepsis ([address])  Assessment & Plan  · POA secondary to UTI/bacteremia  · Now resolved        Discharging Physician / Practitioner: Janelle Ayala MD  PCP: Isamar Gonzalez DO  Admission Date:   Admission Orders (From admission, onward)     Ordered        08/20/23 1649  Inpatient Admission  Once                      Discharge Date: 08/25/23    Medical Problems     Resolved Problems  Date Reviewed: 8/21/2023          Resolved    Hyponatremia 8/22/2023     Resolved by  Iris Akins DO          Reason for Admission: Urinary obstruction     Hospital Course:     Nam Hendricks is a 69 y.o. male patient who originally presented to the hospital on 8/20/2023 due to UTI, sepsis from urinary ureteral obstruction s/p ureteral stent placement. Urine and blood cultures positive for Pseudomonas. Patient to be discharged on ciprofloxacin to complete total 10 days of treatment. Will need outpatient follow-up with urology for ureteral stent. Please see above list of diagnoses and related plan for additional information. Condition at Discharge: good     Discharge Day Visit / Exam:     Subjective:  Pt seen and examined by me in morning. Denied any complaints. Feeling much better. Had a good bowel movement. Vitals: Blood Pressure: 141/83 (08/25/23 0748)  Pulse: 60 (08/25/23 0748)  Temperature: 97.8 °F (36.6 °C) (08/25/23 0748)  Temp Source: Oral (08/23/23 2203)  Respirations: 17 (08/25/23 0748)  SpO2: 94 % (08/25/23 0748)     Exam:   Physical Exam    Constitutional: Pt appears comfortable. Not in any acute distress. Cardiovascular: Normal rate, regular rhythm, normal heart sounds. No murmur heard. Pulmonary/Chest: Effort normal, air entry b/l equal. No respiratory distress. Pt has no wheezes or crackles. Abdominal: Soft. Non-distended, Non-tender.  Bowel sounds are normal. Musculoskeletal: Normal range of motion. Neurological: awake, alert. Moving all extremities spontaneously. Psychiatric: normal mood and affect. Discussion with Family: pt    Discharge instructions/Information to patient and family:   See after visit summary for information provided to patient and family. Provisions for Follow-Up Care:  See after visit summary for information related to follow-up care and any pertinent home health orders. Disposition:     Home    For Discharges to [facility] SNF:   · Not Applicable to this Patient - Not Applicable to this Patient    Planned Readmission: no     Discharge Statement:  I spent 35 minutes discharging the patient. This time was spent on the day of discharge. I had direct contact with the patient on the day of discharge. Greater than 50% of the total time was spent examining patient, answering all patient questions, arranging and discussing plan of care with patient as well as directly providing post-discharge instructions. Additional time then spent on discharge activities. Discharge Medications:  See after visit summary for reconciled discharge medications provided to patient and family.       ** Please Note: This note has been constructed using a voice recognition system **

## 2023-08-25 NOTE — ASSESSMENT & PLAN NOTE
· Status post cystoscopy with ureteral stent placement  · Stent currently in place. · Patient also had Sung catheter that was removed last night. Initially plan was to discharge patient with Sung but patient expressed severe concerns about going home with Sung and inability to take care of it properly. So after discussion with urology, Sung catheter was removed yesterday. Patient has been urinating without any problems. Ultrasound of kidney and bladder were done which did not show any significant retention. Unfortunately PVR could not be done on ultrasound as patient urinated before. Bedside bladder scan PVRs were reviewed being done by nurse and they were negative. · Patient to follow-up with urology outpatient in 3 weeks for ureteral stent removal.  Will need CT prior to appointment.

## 2023-08-25 NOTE — PROGRESS NOTES
-- Patient: Melissa Shah  -- MRN: [id number]  -- Aidin Request ID: [id number]  -- Level of care reserved: Isai Huerta  -- Partner Reserved: [facility], [address] [place] [address],8W [phone]  -- Clinical needs requested:  -- Geography searched: [address] [facility]  -- Start of Service:  -- Request sent: 11:35am EDT on 8/24/2023 by Malini Davis  -- Partner reserved: 8:28am EDT on 8/25/2023 by Malini Dvais  -- Choice list shared: 8:28am EDT on 8/25/2023 by Malini Davis

## 2023-08-25 NOTE — CASE MANAGEMENT
Case Management Discharge Planning Note    Patient name Melissa Shah  Location [address] [place] [address] [address]/[facility] 931-01 MRN [id number]  : 1955 Date 2023       Current Admission Date: 2023  Current Admission Diagnosis:Sepsis [facility])   Patient Active Problem List    Diagnosis Date Noted   • Ambulatory dysfunction 2023   • Morbid obesity with BMI of 40.0-44.9, adult ([address]) 2023   • Hydronephrosis with ureteropelvic junction (UPJ) obstruction 2023   • Drug-induced constipation 2023   • Insomnia 2023   • Dermatitis 2023   • Iron deficiency anemia 2023   • Encephalopathy acute 2023   • Sepsis ([address]) 2023   • Sepsis secondary to Pseudomonas UTI complicated by Pseudomonas bacteremia (urinary tract infection) 2023   • Hypertension 2023   • Lymphedema 2023   • Type 2 diabetes mellitus ([address]) 2023   • Obesity 2023   • Chronic respiratory failure with hypoxia and hypercapnia ([address]) 2023   • Elevated troponin 2023   • Congenital buried penis 2023   • Urinary bladder stone 2023   • Hydrocele 2023   • Benign essential hypertension 2022      LOS (days): 5  Geometric Mean LOS (GMLOS) (days): 5.10  Days to GMLOS:0.1     OBJECTIVE:  Risk of Unplanned Readmission Score: 22.39         Current admission status: Inpatient   Preferred Pharmacy:   [address] [place] [address] [address]  [address] [place] [address]  Phone: [phone] Fax: [phone]    Primary Care Provider: Stepan Maxwell DO    Primary Insurance: [facility]  Secondary Insurance:     DISCHARGE DETAILS:  Met at bedside with patient, silva out and voided per SLIM. Patient does not want home care now and will go to outpatient that he previously arranged. Referral cancelled. Patient has testing this afternoon. ZEUS MOSES. CM to continue to support.

## 2023-08-25 NOTE — ASSESSMENT & PLAN NOTE
· Urine and blood culture growing Pseudomonas  · Based on sensitivities patient transitioned to oral ciprofloxacin. To complete total 10 days of antibiotic through 8/31/2023.

## 2023-08-25 NOTE — PROGRESS NOTES
Progress Note - Infectious Disease   Balwinder Garcia 69 y.o. male MRN: [id number]  Unit/Bed#: [facility] 931-01 Encounter: [id number]      Impression/Recommendations:  Sepsis, POA.    HR, WBC.  Due to bacteremia, UTI as below.  No other appreciable source.  Clinically stable and non-toxic.  Improved. Rec:  • Continue antibiotics as below  • Follow temperatures closely  • Supportive care as per the primary service     Pseudomonal bacteremia.    Due to UTI as below. Rec:  • Continue Cipro with plan to complete 10 days total antibiotics through      Pseudomonal UTI.    Due to obstruction as below. Rec:  • Continue antibiotics as below     Obstructive nephrolithiasis with hydronephrosis. Status post cysto, retrograde pyelogram, left ureteral stent placement.  Noted to be difficult due to inclusion of ureter in inguinal hernia as seen on cystogram.  Stone not seen on repeat CT. Rec:  • Outpatient urology follow-up     Inguinal hernia.     Chronic hypoxic respiratory failure. On 2L nocturnal O2.     DM.     MO. Status post gastric bypass.     The patient is stable from an ID standpoint for D/C at any time. We will sign off for now. Please call with new questions.     Antibiotics:  Cipro #2  Antibiotics #4    Subjective:  Patient seen on AM rounds. "I'm about to take a walk". Feels well. Able to urinate after Sung removed. Had BM. 24 Hour Events:  No documented fevers, chills, sweats, nausea, vomiting, or diarrhea.     Objective:  Vitals:  Temp:  [97.8 °F (36.6 °C)-98.4 °F (36.9 °C)] 97.8 °F (36.6 °C)  HR:  [58-60] 60  Resp:  [17-18] 17  BP: (141-144)/(83-84) 141/83  SpO2:  [94 %-95 %] 94 %  Temp (24hrs), Av.1 °F (36.7 °C), Min:97.8 °F (36.6 °C), Max:98.4 °F (36.9 °C)  Current: Temperature: 97.8 °F (36.6 °C)    Physical Exam:   General:  No acute distress  Psychiatric:  Awake and alert  Pulmonary:  Normal respiratory excursion without accessory muscle use  Abdomen:  Soft, nontender  Extremities:  No edema  Skin:  No rashes    Lab Results:  I have personally reviewed pertinent labs. Results from last 7 days   Lab Units 08/24/23  0537 08/23/23  0512 08/22/23  0604 08/21/23  0444 08/20/23  1614 08/20/23  0052   POTASSIUM mmol/L 4.6 4.6 4.4 4.3   < > 4.5   CHLORIDE mmol/L 99 99 103 100   < > 95*   CO2 mmol/L 31 30 27 29   < > 28   BUN mg/dL 13 15 17 19   < > 22   CREATININE mg/dL 0.71 0.85 0.88 0.95   < > 1.04   EGFR ml/min/1.73sq m 95 88 87 81   < > 72   CALCIUM mg/dL 8.5 8.4 8.5 8.4   < > 9.2   AST U/L  --   --   --  12  --  16   ALT U/L  --   --   --  12  --  12   ALK PHOS U/L  --   --   --  116  --  118*    < > = values in this interval not displayed. Results from last 7 days   Lab Units 08/24/23  0754 08/23/23  0512 08/22/23  0604   WBC Thousand/uL 7.60 8.76 13.88*   HEMOGLOBIN g/dL 11.3* 10.6* 10.3*   PLATELETS Thousands/uL 266 267 257     Results from last 7 days   Lab Units 08/23/23  1258 08/20/23  1220 08/20/23  0211 08/20/23  0100 08/20/23  0052   BLOOD CULTURE  No Growth at 24 hrs. No Growth at 24 hrs.  --   --  No Growth After 5 Days. Pseudomonas aeruginosa*   GRAM STAIN RESULT   --   --   --   --  Gram negative rods*   URINE CULTURE   --  30,000-39,000 cfu/ml Pseudomonas aeruginosa* >100,000 cfu/ml Pseudomonas aeruginosa*  --   --        Imaging Studies:   I have personally reviewed pertinent imaging study reports and images in PACS. EKG, Pathology, and Other Studies:   I have personally reviewed pertinent reports.

## 2023-08-25 NOTE — PLAN OF CARE
Problem: INFECTION - ADULT  Goal: Absence or prevention of progression during hospitalization  Description: INTERVENTIONS:  - Assess and monitor for signs and symptoms of infection  - Monitor lab/diagnostic results  - Monitor all insertion sites, i.e. indwelling lines, tubes, and drains  - Monitor endotracheal if appropriate and nasal secretions for changes in amount and color  - [place] appropriate cooling/warming therapies per order  - Administer medications as ordered  - Instruct and encourage patient and family to use good hand hygiene technique  - Identify and instruct in appropriate isolation precautions for identified infection/condition  Outcome: Progressing

## 2023-08-25 NOTE — PLAN OF CARE
Problem: PHYSICAL THERAPY ADULT  Goal: Performs mobility at highest level of function for planned discharge setting. See evaluation for individualized goals. Description: Treatment/Interventions: Functional transfer training, LE strengthening/ROM, Elevations, Therapeutic exercise, Endurance training, Patient/family training, Equipment eval/education, Bed mobility, Gait training, Spoke to nursing, Spoke to case management, OT  Equipment Recommended: Reanna Gatica (pt reports owning)       See flowsheet documentation for full assessment, interventions and recommendations. Outcome: Progressing  Note: Prognosis: Good  Problem List: Decreased strength, Decreased endurance, Impaired balance, Decreased mobility, Pain  Assessment: Pt seen for PT treatment session this date. Therapy session focused on functional transfers, gait training, stair training and endurance training in order to improve overall mobility and independence. Pt requires S level for all mobility performed this date. Continues to be limited by body habitus and deconditioning. Pt making steady progress toward goals. Pt was left sitting OOB in recliner at the end of PT session with all needs in reach. Pt would benefit from continued PT services while in hospital to address remaining limitations. PT to continue to follow pt to 1-2 more sessions and recommends home with HHPT + increased social support. The patient's AM-[facility] Basic Mobility Inpatient Short Form Raw Score is 17. A Raw score of greater than 16 suggests the patient may benefit from discharge to home. Please also refer to the recommendation of the Physical Therapist for safe discharge planning. Barriers to Discharge: None     PT Discharge Recommendation: Home with home health rehabilitation    See flowsheet documentation for full assessment.

## 2023-08-25 NOTE — PHYSICAL THERAPY NOTE
PHYSICAL THERAPY NOTE          Patient Name: Sabrina Junior  UYRPQ'U Date: 8/25/2023 08/25/23 1105   PT Last Visit   PT Visit Date 08/25/23   Note Type   Note Type Treatment   Pain Assessment   Pain Assessment Tool 0-10   Pain Score No Pain   Restrictions/Precautions   Weight Bearing Precautions Per Order No   Other Precautions Fall Risk   General   Chart Reviewed Yes   Response to Previous Treatment Patient with no complaints from previous session. Family/Caregiver Present No   Cognition   Overall Cognitive Status WFL   Arousal/Participation Responsive; Cooperative   Attention Attends with cues to redirect   Orientation Level Oriented X4   Memory Within functional limits   Following Commands Follows one step commands with increased time or repetition   Comments pt pleasant and cooperative   Bed Mobility   Supine to Sit Unable to assess   Sit to Supine Unable to assess   Additional Comments pt sitting OOB in recliner upon arrival. Pt left sitting OOB in recliner with all needs wihtin reach   Transfers   Sit to Stand 5  Supervision   Additional items Armrests; Increased time required;Verbal cues   Stand to Sit 5  Supervision   Additional items Armrests; Increased time required;Verbal cues   Toilet transfer 5  Supervision   Additional items Increased time required;Verbal cues; Commode   Additional Comments transfers with RW   Ambulation/Elevation   Gait pattern Excessively slow; Short stride;Decreased foot clearance; Improper Weight shift   Gait Assistance 5  Supervision   Assistive Device Rolling walker   Distance 180'   Stair Management Assistance 5  Supervision   Stair Management Technique Two rails; Step to pattern; Foreward   Number of Stairs 7   Balance   Static Sitting Fair +   Dynamic Sitting Fair   Static Standing Fair   Dynamic Standing Fair -   Ambulatory Fair -   Activity Tolerance   Activity Tolerance Patient tolerated treatment well   Nurse Made Aware RN cleared pt to participate in therapy session   Assessment   Prognosis Good   Problem List Decreased strength;Decreased endurance; Impaired balance;Decreased mobility;Pain   Assessment Pt seen for PT treatment session this date. Therapy session focused on functional transfers, gait training, stair training and endurance training in order to improve overall mobility and independence. Pt requires S level for all mobility performed this date. Continues to be limited by body habitus and deconditioning. Pt making steady progress toward goals. Pt was left sitting OOB in recliner at the end of PT session with all needs in reach. Pt would benefit from continued PT services while in hospital to address remaining limitations. PT to continue to follow pt to 1-2 more sessions and recommends home with HHPT + increased social support. The patient's AM-PAC Basic Mobility Inpatient Short Form Raw Score is 17. A Raw score of greater than 16 suggests the patient may benefit from discharge to home. Please also refer to the recommendation of the Physical Therapist for safe discharge planning. Barriers to Discharge None   Goals   Patient Goals to continue to get stronger + more independent   STG Expiration Date 09/04/23   PT Treatment Day 3   Plan   Treatment/Interventions Functional transfer training;LE strengthening/ROM; Therapeutic exercise;Elevations; Endurance training;Patient/family training;Equipment eval/education; Bed mobility;Gait training;Spoke to nursing;Spoke to case management;OT   Progress Progressing toward goals   PT Frequency 3-5x/wk   Recommendation   PT Discharge Recommendation Home with home health rehabilitation   [address] [place] [address] Mobility Inpatient   Turning in Flat Bed Without Bedrails 3   Lying on Back to Sitting on Edge of Flat Bed Without Bedrails 2   Moving Bed to Chair 3   Standing Up From Chair Using Arms 3   Walk in Room 3   Climb 3-5 Stairs With Jenna 3   Basic Mobility Inpatient Raw Score 17   Basic Mobility Standardized Score 39.67   Highest Level Of Mobility   JH-HLM Goal 5: Stand one or more mins   JH-HLM Achieved 7: Walk 25 feet or more   Education   Education Provided Assistive device; Mobility training   Patient Demonstrates acceptance/verbal understanding   End of Consult   Patient Position at End of Consult Bedside chair; All needs within reach     Kirit Guillen, PT, DPT

## 2023-08-27 LAB
BACTERIA BLD CULT: NORMAL
BACTERIA BLD CULT: NORMAL

## 2023-08-28 LAB
BACTERIA BLD CULT: NORMAL
BACTERIA BLD CULT: NORMAL

## 2023-08-28 NOTE — TELEPHONE ENCOUNTER
Pt's wife called to schedule stent removal in 2 weeks.    CT scan is scheduled on 9/5    Please review

## 2023-08-29 NOTE — TELEPHONE ENCOUNTER
Called and spoke with patient's wife.  Informed on scheduling stent removal. Scheduled 9/14 at [facility].

## 2023-09-05 ENCOUNTER — HOSPITAL ENCOUNTER (OUTPATIENT)
Dept: CT IMAGING | Facility: HOSPITAL | Age: 68
Discharge: HOME | End: 2023-09-05
Payer: COMMERCIAL

## 2023-09-05 DIAGNOSIS — N13.2 HYDRONEPHROSIS WITH URINARY OBSTRUCTION DUE TO URETERAL CALCULUS: ICD-10-CM

## 2023-09-05 DIAGNOSIS — Q62.11 HYDRONEPHROSIS WITH URETEROPELVIC JUNCTION (UPJ) OBSTRUCTION: ICD-10-CM

## 2023-09-05 PROCEDURE — 74176 CT ABD & PELVIS W/O CONTRAST: CPT

## 2023-09-05 PROCEDURE — G1004 CDSM NDSC: HCPCS

## 2023-09-06 NOTE — PHYSICAL THERAPY NOTE
PHYSICAL THERAPY TREATMENT NOTE    Patient Name: Gemini Mcdaniel  JABDU'L Date: 7/21/2023 07/21/23 1255   PT Last Visit   PT Visit Date 07/21/23   Note Type   Note Type Treatment for insurance authorization   Pain Assessment   Pain Assessment Tool 0-10   Pain Score No Pain   Restrictions/Precautions   Weight Bearing Precautions Per Order No   Other Precautions Chair Alarm; Bed Alarm;O2;Fall Risk  (4L NC O2)   General   Chart Reviewed Yes   Response to Previous Treatment Patient with no complaints from previous session. Family/Caregiver Present Yes  (DaughterVarun very enegaged and helpful throughout session)   Cognition   Arousal/Participation Alert   Attention Attends with cues to redirect   Orientation Level Oriented to person;Oriented to place;Oriented to time;Disoriented to situation   Memory Decreased recall of recent events   Following Commands Follows multistep commands with increased time or repetition   Comments Pt w/ improved cognition throughout session   Bed Mobility   Supine to Sit Unable to assess   Additional Comments Pt OOB in recliner chair at start and end of session   Transfers   Sit to Stand 2  Maximal assistance   Additional items Assist x 2; Increased time required  (from recliner, mod A x 2 from EOB)   Stand to Sit 3  Moderate assistance   Additional items Assist x 1; Increased time required   Additional Comments Pt performed STS x 2, once from recliner chair, and once from elevated EOB   Ambulation/Elevation   Gait pattern Wide SADIA; Decreased foot clearance   Gait Assistance 3  Moderate assist   Additional items Assist x 1;Verbal cues   Assistive Device Rolling walker   Distance 15 ft x 2  (seated rest break between)   Balance   Static Sitting Fair +   Static Standing Poor   Ambulatory Poor   Activity Tolerance   Activity Tolerance Patient tolerated treatment well   Medical Staff Made Aware BLAKE Terrell CM Julia   Nurse Made Aware LIVAN Summers He   Assessment   Problem List Decreased strength;Decreased endurance; Impaired balance;Decreased mobility;Obesity   Assessment Pt seen for PT intervention. Pt w/ continued significant improvement in cognition and alertness, which positively impacts functional mobility and tolerance to activity. Pt continues to require max A x 2 to perform STS from low recliner chair. However he is able to ambulate further and more trials than previous sessions (and even at STR per dtr). Pt is only mod A x 1 to ambulate w/ RW and benefits from cues to keep head up/looking forward. Pt returned to recliner chair at end of session. Discharge recommendatio nis for Level 1 as pt demonstrates continued improvement w/ functional mobility,  tolerance to longer therapy sessions, previously I at baseline, and significant social support   Goals   Patient Goals to get better   [facility] Expiration Date 07/28/23   Short Term Goal #1 Patient will: Perform all bed mobility tasks w/ minx1 to improve pt's independence w/ repositioning for decrease risk of skin breakdown, Perform all transfers w/ minx1 consistently from various height surfaces in order to improve I w/ engagement w/ real-world environments/situations, Ambulate at least 25 ft. with roller walker w/ minx1 w/o LOB to facilitate return and engagement w/ previous living environment, Increase all balance 1/2 grade to decrease risk for falls and Tolerate 3 hr OOB to faciliate upright tolerance   PT Treatment Day 2   Plan   Treatment/Interventions Functional transfer training;LE strengthening/ROM; Therapeutic exercise; Endurance training;Cognitive reorientation;Patient/family training;Equipment eval/education; Bed mobility;Gait training   PT Frequency (S)  3-5x/wk   Recommendation   UB Rehab Discharge Recommendation (PT/OT) (S)  Level 1   [facility] Bariatric wheeled walker   [address] in Flat Bed Without Bedrails 2   Lying on Back to Sitting on Edge of Flat Bed Without Bedrails 2   Moving Bed to Chair 2   Standing Up From Chair Using Arms 2   Walk in Room 2   Climb 3-5 Stairs With Railing 1   Basic Mobility Inpatient Raw Score 11   Basic Mobility Standardized Score 30.25   Turning Head Towards Sound 4   Follow Simple Instructions 4   Low Function Basic Mobility Raw Score  19   Low Function Basic Mobility Standardized Score  31.06   Highest Level Of Mobility   -[facility] Goal 4: Move to chair/commode   JH-HLM Achieved 7: Walk 25 feet or more   Education   Education Provided Mobility training  (STS transfers)   End of Consult   Patient Position at End of Consult Bedside chair;Bed/Chair alarm activated; All needs within reach       Patient requires PT/OT co-treat due to signficant assistance with mobility, and to challenge activity tolerance. Both PT and OT goals were addressed separately during this session. The patient's AM-PAC Basic Mobility Inpatient Short Form Raw Score is 11. A Raw score of less than or equal to 16 suggests the patient may benefit from discharge to post-acute rehabilitation services. Please also refer to the recommendation of the Physical Therapist for safe discharge planning. Pt would continue to benefit from skilled PT during this admission in order to progress patient towards goals to decrease risk of falls and maximize independence.      Scot Kaye, PT, DPT Consent 1/Introductory Paragraph: The rationale for Mohs was explained to the patient and consent was obtained. The risks, benefits and alternatives to therapy were discussed in detail. Specifically, the risks of infection, scarring, bleeding, prolonged wound healing, incomplete removal, allergy to anesthesia, nerve injury and recurrence were addressed. Prior to the procedure, the treatment site was clearly identified and confirmed by the patient. All components of Universal Protocol/PAUSE Rule completed.

## 2023-09-11 ENCOUNTER — NURSE TRIAGE (OUTPATIENT)
Age: 68
End: 2023-09-11

## 2023-09-12 ENCOUNTER — EVALUATION (OUTPATIENT)
Dept: PHYSICAL THERAPY | Facility: CLINIC | Age: 68
End: 2023-09-12
Payer: COMMERCIAL

## 2023-09-12 DIAGNOSIS — I89.0 LYMPHEDEMA OF BOTH LOWER EXTREMITIES: Primary | ICD-10-CM

## 2023-09-12 DIAGNOSIS — I87.2 VENOUS INSUFFICIENCY: ICD-10-CM

## 2023-09-12 PROCEDURE — 97162 PT EVAL MOD COMPLEX 30 MIN: CPT | Performed by: PHYSICAL THERAPIST

## 2023-09-12 PROCEDURE — 97110 THERAPEUTIC EXERCISES: CPT | Performed by: PHYSICAL THERAPIST

## 2023-09-12 NOTE — PROGRESS NOTES
PT Evaluation     Today's date: 2023  Patient name: Jennifer Baez  : 1955  MRN: [id number]  Referring provider: Kobi Banegas DO  Dx:   Encounter Diagnosis     ICD-10-CM    1. Lymphedema of right lower extremity  I89.0                      Assessment  Assessment details: Pt is a 69y.o. year old male coming to outpatient PT with a diagnosis of B Le lymphedema. Pt presents with increased pain, (+) hemosiderin staining associated with venous insufficiency, stage II lymphedema, and overall decreased functional mobility. Pt would benefit from skilled PT services in order to address these deficits and reach maximum level of function. Thank you kindly for the referral!    Pt was educated in elevation of the extremity and ankle pump exercise to reduce edema. Pt would benefit from Comprilan bandaging, Circaid compression system and a pneumatic compression pump to reduce LE edema. Impairments: lacks appropriate home exercise program  Other impairment: increased B LE edema  Understanding of Dx/Px/POC: good   Prognosis: fair    Goals  STG's ( 3-4 weeks)  1. Pt will be independent in HEP  2.Pt will be independent in compression wrapping or Circaid system donning and doffing  LTG's (4-6 weeks)  1. Reduce B LE edema to max ability  2. Pt will be independent in use of maintenance garment    Plan  Patient would benefit from: skilled physical therapy  Other planned modality interventions: pneumatic compression pump  Planned therapy interventions: home exercise program  Other planned therapy interventions: MLD/ CLT  Frequency: 2x week  Duration in weeks: 6  Plan of Care beginning date: 10/12/2023  Plan of Care expiration date: 10/24/2023  Treatment plan discussed with: patient and caregiver        Subjective Evaluation    History of Present Illness  Mechanism of injury: Pt has recently returned from 2 months of hospitalization. Pt is going to [facility] rehab for deconditioning.  Pt has had R > L LE edema for several years. Pt has a tendency to turn his ankle outward when driving. Pt was sent to an inpatient rehab facility in [facility] and they wrapped his legs with ace wraps. Pt felt like he got a reduction in his swelling. Pt has worn tubigrip stockings. Pt has recently purchased knee high compression stockings. Pt is not sure of the reason that he has swelling of his legs. Pt has increased discomfort with walking 2* swelling and has difficulty putting on shoes and socks and keeping the shoes and socks on his feet. Doppler US testing was negative for blood clot. 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  Hobbies: none  Gait; slow speed with RW  Patient Goals  Patient goals for therapy: decreased edema    Pain  At best pain ratin  At worst pain ratin  Location: B LE's    Social Support  Steps to enter house: yes (10)  Stairs in house: no   Lives with: spouse      Diagnostic Tests  Ultrasound findings: normalTreatments  No previous or current treatments        Objective     Strength/Myotome Testing     Additional Strength Details  B hemosiderin staining lower legs  (+) fibrotic tissue B LE's  (+) stemmer sign R LE  (+) pitting edema R LE  L LE has a fresh wound anterior shin- pt banged it on the golf cart today        Dx:  B LE R > L lymphedema  EPOC: 10/24/23  CO-MORBIDITIES:   PERSONAL FACTORS:   Precautions: none      Manuals             MLD/ CLT             Circaid measurement comprilan R            Pneumatic compression pump                          Neuro Re-Ed                                                                                                                     Ther Ex             Lymphedema dx & mgt educ 8'                                                                                                                                 Ther Activity                                       Gait Training                                       Modalities

## 2023-09-12 NOTE — TELEPHONE ENCOUNTER
I spoke with Nayeli Ha, patients wife, states patient's glans penis is stuck under the foreskin. Patient is circumcised. I asked when this started she stated at some point while he was in the hospital(8/20-8/25). I then asked if this is a change from when patient was in the hospital, she stated it is the same and patient was discharged with this issue. Denies any urological complaints, patient is able to urinate without discomfort. I explained that I will make the physicians aware, but that if he has any worsening problems or urinary complaints prior to Thursday, to call us and let us know, otherwise we will see patient in the office on Thursday for stent removal.   ED precautions reviewed.

## 2023-09-12 NOTE — LETTER
2023    DO Mary Grace Fry  [address]    Patient: Misty Frausto   YOB: 1955   Date of Visit: 2023     Encounter Diagnosis     ICD-10-CM    1. Lymphedema of both lower extremities  I89.0       2. Venous insufficiency  I87.2           Dear Dr. Chandan Delatorre: Thank you for your recent referral of Misty Frausto. Please review the attached evaluation summary from Kirill's recent visit. Please verify that you agree with the plan of care by signing the attached order. If you have any questions or concerns, please do not hesitate to call. I sincerely appreciate the opportunity to share in the care of one of your patients and hope to have another opportunity to work with you in the near future. Sincerely,    Tomasz Akhtar, PT      Referring Provider:      I certify that I have read the below Plan of Care and certify the need for these services furnished under this plan of treatment while under my care. DO Mary Grace Fry  [address]  Via Fax: [phone]          PT Evaluation     Today's date: 2023  Patient name: Misty Frausto  : 1955  MRN: [id number]  Referring provider: Bon Hart DO  Dx:   Encounter Diagnosis     ICD-10-CM    1. Lymphedema of right lower extremity  I89.0                      Assessment  Assessment details: Pt is a 69y.o. year old male coming to outpatient PT with a diagnosis of B Le lymphedema. Pt presents with increased pain, (+) hemosiderin staining associated with venous insufficiency, stage II lymphedema, and overall decreased functional mobility. Pt would benefit from skilled PT services in order to address these deficits and reach maximum level of function. Thank you kindly for the referral!    Pt was educated in elevation of the extremity and ankle pump exercise to reduce edema.  Pt would benefit from Comprilan bandaging, Circaid compression system and a pneumatic compression pump to reduce LE edema. Impairments: lacks appropriate home exercise program  Other impairment: increased B LE edema  Understanding of Dx/Px/POC: good   Prognosis: fair    Goals  STG's ( 3-4 weeks)  1. Pt will be independent in HEP  2.Pt will be independent in compression wrapping or Circaid system donning and doffing  LTG's (4-6 weeks)  1. Reduce B LE edema to max ability  2. Pt will be independent in use of maintenance garment    Plan  Patient would benefit from: skilled physical therapy  Other planned modality interventions: pneumatic compression pump  Planned therapy interventions: home exercise program  Other planned therapy interventions: MLD/ CLT  Frequency: 2x week  Duration in weeks: 6  Plan of Care beginning date: 10/12/2023  Plan of Care expiration date: 10/24/2023  Treatment plan discussed with: patient and caregiver        Subjective Evaluation    History of Present Illness  Mechanism of injury: Pt has recently returned from 2 months of hospitalization. Pt is going to [facility] rehab for deconditioning. Pt has had R > L LE edema for several years. Pt has a tendency to turn his ankle outward when driving. Pt was sent to an inpatient rehab facility in [facility] and they wrapped his legs with ace wraps. Pt felt like he got a reduction in his swelling. Pt has worn tubigrip stockings. Pt has recently purchased knee high compression stockings. Pt is not sure of the reason that he has swelling of his legs. Pt has increased discomfort with walking 2* swelling and has difficulty putting on shoes and socks and keeping the shoes and socks on his feet. Doppler US testing was negative for blood clot.     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  Hobbies: none  Gait; slow speed with RW  Patient Goals  Patient goals for therapy: decreased edema    Pain  At best pain ratin  At worst pain ratin  Location: B LE's    Social Support  Steps to enter house: yes (10)  Stairs in house: no   Lives with: spouse      Diagnostic Tests  Ultrasound findings: normalTreatments  No previous or current treatments        Objective     Strength/Myotome Testing     Additional Strength Details  B hemosiderin staining lower legs  (+) fibrotic tissue B LE's  (+) stemmer sign R LE  (+) pitting edema R LE  L LE has a fresh wound anterior shin- pt banged it on the golf cart today        Dx:  B LE R > L lymphedema  EPOC: 10/24/23  CO-MORBIDITIES:   PERSONAL FACTORS:   Precautions: none      Manuals 9/12            MLD/ CLT             Circaid measurement comprilan R            Pneumatic compression pump                          Neuro Re-Ed                                                                                                                     Ther Ex             Lymphedema dx & mgt educ 8'                                                                                                                                 Ther Activity                                       Gait Training                                       Modalities

## 2023-09-13 NOTE — TELEPHONE ENCOUNTER
Patients wife calling in questioning if based off of CT results done on 9/5/23. She just wants to make sure that patient will still be definitely getting stent out tomorrow. They are an hour away and don't want to drive down if he is going to be unable to get this out. Please review and call patients wife back.      CB: [phone]

## 2023-09-13 NOTE — TELEPHONE ENCOUNTER
Joo I need help. Odd body habitus. I read your note. CT is confusing me I don't think I see any residual stone. Initial stone was 8mm distal/uvj. Can you look thanks    Should I try to take his stent out in [id number] [facility] tomorrow maybe best if I offer him Friday at 12:00 so you're there for backup?

## 2023-09-15 ENCOUNTER — PROCEDURE VISIT (OUTPATIENT)
Dept: UROLOGY | Facility: CLINIC | Age: 68
End: 2023-09-15
Payer: COMMERCIAL

## 2023-09-15 VITALS
BODY MASS INDEX: 39.17 KG/M2 | HEIGHT: 75 IN | DIASTOLIC BLOOD PRESSURE: 84 MMHG | WEIGHT: 315 LBS | HEART RATE: 64 BPM | SYSTOLIC BLOOD PRESSURE: 132 MMHG

## 2023-09-15 DIAGNOSIS — Z46.6 ENCOUNTER FOR REMOVAL OF URETERAL STENT: Primary | ICD-10-CM

## 2023-09-15 PROCEDURE — 52310 CYSTOSCOPY AND TREATMENT: CPT | Performed by: PHYSICIAN ASSISTANT

## 2023-09-15 RX ORDER — MULTIVITAMIN
1 TABLET ORAL DAILY
COMMUNITY

## 2023-09-15 RX ORDER — IBUPROFEN 200 MG
200 TABLET ORAL
COMMUNITY

## 2023-09-15 RX ORDER — NYSTATIN 100000 [USP'U]/G
POWDER TOPICAL
COMMUNITY
Start: 2023-08-18

## 2023-09-15 RX ORDER — LEVOFLOXACIN 750 MG/1
750 TABLET ORAL EVERY 24 HOURS
Qty: 1 TABLET | Refills: 0 | Status: SHIPPED | OUTPATIENT
Start: 2023-09-15 | End: 2023-09-16

## 2023-09-15 RX ORDER — QUINIDINE GLUCONATE 324 MG
240 TABLET, EXTENDED RELEASE ORAL
COMMUNITY

## 2023-09-15 NOTE — PROGRESS NOTES
Cystoscopy     Date/Time 9/15/2023 12:00 PM     Performed by  Kelly Johnston PA-C   Authorized by Kelly Johnston PA-C     Universal Protocol:  Consent: Verbal consent obtained. Written consent obtained. Risks and benefits: risks, benefits and alternatives were discussed  Consent given by: patient  Patient understanding: patient states understanding of the procedure being performed  Patient consent: the patient's understanding of the procedure matches consent given        Procedure Details:  Procedure type: simple removal of a foreign body, stone, or stent    Additional Procedure Details: Distal/UVJ stone on the left underwent ureteral stenting in the hospital in August.  Follow-up CT scan shows no residual stone alongside the stent. Hydronephrosis resolved here today for stent removal.      The patient returns to the office today to undergo cystoscopy with LEFT ureteral stent removal. Risk and benefits of the procedure were discussed and informed consent was obtained. The patient was placed in the modified supine position. The genitalia were prepped and draped in a sterile fashion. Viscous lidocaine was used for local anesthesia. The flexible cystoscope was passed. The bladder was inspected. The stent was identified coming from the LEFT ureteral orifice. The stent was grasped with a flexible grasper and was then removed in its entirety without complications. Overall the patient tolerated the procedure. The patient was provided with a 3 day prescription of levaquin for infection prophylaxis following the procedure. They were made aware to advise our office of any fevers greater than 101 degrees Fahrenheit, malaise, or chills. They were advised that it is normal to have cramping pain on the ipsilateral side for a day or so after ureteral stent removal.  They are to remain well hydrated in the coming days.
You can access the FollowMyHealth Patient Portal offered by [facility] by registering at the following website: [email or web address]. By joining [facility]’s FollowMyHealth portal, you will also be able to view your health information using other applications (apps) compatible with our system.

## 2023-09-15 NOTE — PATIENT INSTRUCTIONS
Dietary Management of Kidney Stone Disease    The dietary recommendations for most people who make kidney stones (especially the most common calcium oxalate stones) are uncomplicated and are not too tedious or bland. Most importantly, the following recommendations also promote better health for a variety of reasons. FLUIDS:  The single most important change for the majority patients is the need to greatly increase fluid intake. You should at least produce two liters (about two quarts) of urine each day. Depending on the heat outdoors and your level of physical activity, this usually means consuming ten, 10 ounce glasses (100 ounces) of fluid per day. Water is always a good choice, but other drinks including tea, coffee, soda, and juice are also allowed as long as no one beverage becomes the sole source of fluid. CALCIUM:  There is excellent evidence that calcium should not be avoided, but instead moderated. A range of 600 to 1,100 mg of calcium per day, especially consumed at meals is probably a reasonable target. (i.e. 2-3 dairy servings per day) This might include small servings of yogurt, milk or ice cream.  This amount helps avoid over-absorption of oxalate from the digestive tract and also allows for healthy bone maintenance. SODIUM (SALT): Too much salt in your diet (both from the shaker and in the prepared foods that we buy) is bad for your blood pressure, bad for your heart, and also increases the amount of calcium in your urine. A reasonable sodium restriction to 2,000-2,500mg/day (about the amount in one teaspoon) is an excellent target. You should get into the habit of reading the “Nutrients” labels on all the foods that you eat and watch out for the foods that have a high sodium content (snack foods, smoked or processed foods, caned foods). Fresh and frozen foods usually have the least amount of sodium.     PROTEIN:  High protein diets from animal meat (beef, chicken, pork, fish) also increases the rate of kidney stone formation and is equally unhealthy for your heart. All patients should moderate their meat intake to 3-7 ounces per day, and particularly stay away from red meat protein. OXALATE:  Most stone-formers should avoid heavy intake of oxalate-rich foods. These include green roughage (spinach, mustard, kale), strawberries, chocolate, tea, iced tea, and nuts. In addition, heavy, excess doses of Vitamin C can also produce surges in urinary oxalate levels and should be avoided. BARE-BONES RECOMMENDATIONS:  Fluids, fluids, fluids. Low salt diet (your primary care doctor will love you). Moderate calcium (dairy products), especially with meals. Moderate red meat intake.

## 2023-10-10 ENCOUNTER — OFFICE VISIT (OUTPATIENT)
Dept: PHYSICAL THERAPY | Facility: CLINIC | Age: 68
End: 2023-10-10
Payer: COMMERCIAL

## 2023-10-10 DIAGNOSIS — I89.0 LYMPHEDEMA OF BOTH LOWER EXTREMITIES: Primary | ICD-10-CM

## 2023-10-10 DIAGNOSIS — I87.2 VENOUS INSUFFICIENCY: ICD-10-CM

## 2023-10-10 PROCEDURE — 97140 MANUAL THERAPY 1/> REGIONS: CPT | Performed by: PHYSICAL THERAPIST

## 2023-10-10 PROCEDURE — 97016 VASOPNEUMATIC DEVICE THERAPY: CPT | Performed by: PHYSICAL THERAPIST

## 2023-10-10 NOTE — PROGRESS NOTES
Daily Note     Today's date: 10/10/2023  Patient name: Balwinder Garcia  : 1955  MRN: [id number]  Referring provider: Chon Loya DO  Dx:   Encounter Diagnosis     ICD-10-CM    1. Lymphedema of both lower extremities  I89.0       2. Venous insufficiency  I87.2                      Subjective: Pt reports he has been wearing knee high compression stockings to help to control his swelling. Objective: See treatment diary below      Assessment: Tolerated treatment well. Patient would benefit from continued PT. Instructed spouse in B LE Comprilan bandaging from toes to knee. Plan: Continue per plan of care. Focus on decreasing edema.          Dx: B LE R > L lymphedema  EPOC: 10/24/23  CO-MORBIDITIES:   PERSONAL FACTORS:   Precautions: none      Manuals 9/12 10/10           MLD/ CLT  30'           Circaid measurement comprilan R            Pneumatic compression pump  15'                        Neuro Re-Ed                                                                                                                     Ther Ex             Lymphedema dx & mgt educ 8'                                                                                                                                 Ther Activity                                       Gait Training                                       Modalities

## 2023-10-16 ENCOUNTER — HOSPITAL ENCOUNTER (OUTPATIENT)
Dept: CT IMAGING | Facility: HOSPITAL | Age: 68
Discharge: HOME | End: 2023-10-16
Payer: COMMERCIAL

## 2023-10-16 DIAGNOSIS — Z46.6 ENCOUNTER FOR REMOVAL OF URETERAL STENT: ICD-10-CM

## 2023-10-16 PROCEDURE — 74176 CT ABD & PELVIS W/O CONTRAST: CPT

## 2023-10-16 PROCEDURE — G1004 CDSM NDSC: HCPCS

## 2023-10-18 ENCOUNTER — OFFICE VISIT (OUTPATIENT)
Dept: PHYSICAL THERAPY | Facility: CLINIC | Age: 68
End: 2023-10-18
Payer: COMMERCIAL

## 2023-10-18 DIAGNOSIS — I89.0 LYMPHEDEMA OF BOTH LOWER EXTREMITIES: Primary | ICD-10-CM

## 2023-10-18 DIAGNOSIS — I87.2 VENOUS INSUFFICIENCY: ICD-10-CM

## 2023-10-18 PROCEDURE — 97016 VASOPNEUMATIC DEVICE THERAPY: CPT

## 2023-10-18 PROCEDURE — 97140 MANUAL THERAPY 1/> REGIONS: CPT

## 2023-10-18 NOTE — PROGRESS NOTES
Daily Note     Today's date: 10/18/2023  Patient name: Leana Quintana  : 1955  MRN: [id number]  Referring provider: Lidia Palmer DO  Dx:   Encounter Diagnosis     ICD-10-CM    1. Lymphedema of both lower extremities  I89.0       2. Venous insufficiency  I87.2                      Subjective: Pt reports the wraps are hard to keep up. Objective: See treatment diary below      Assessment: Tolerated treatment well. Patient would benefit from continued PT. Instructed spouse in B LE Comprilan bandaging from toes to knee. Plan: Continue per plan of care. Focus on decreasing edema.          Dx: B LE R > L lymphedema  EPOC: 10/24/23  CO-MORBIDITIES:   PERSONAL FACTORS:   Precautions: none      Manuals 9/12 10/10 10/18          MLD/ CLT  30' 30'          Circaid measurement comprilan R            Pneumatic compression pump  15' 15'                       Neuro Re-Ed                                                                                                                     Ther Ex             Lymphedema dx & mgt educ 8'                                                                                                                                 Ther Activity                                       Gait Training                                       Modalities

## 2023-10-19 ENCOUNTER — OFFICE VISIT (OUTPATIENT)
Dept: PHYSICAL THERAPY | Facility: CLINIC | Age: 68
End: 2023-10-19
Payer: COMMERCIAL

## 2023-10-19 DIAGNOSIS — I89.0 LYMPHEDEMA OF BOTH LOWER EXTREMITIES: Primary | ICD-10-CM

## 2023-10-19 DIAGNOSIS — I87.2 VENOUS INSUFFICIENCY: ICD-10-CM

## 2023-10-19 PROCEDURE — 97016 VASOPNEUMATIC DEVICE THERAPY: CPT | Performed by: PHYSICAL THERAPIST

## 2023-10-19 PROCEDURE — 97140 MANUAL THERAPY 1/> REGIONS: CPT | Performed by: PHYSICAL THERAPIST

## 2023-10-19 NOTE — PROGRESS NOTES
Daily Note     Today's date: 10/19/2023  Patient name: Lefty Young  : 1955  MRN: [id number]  Referring provider: Michelle Corona DO  Dx:   Encounter Diagnosis     ICD-10-CM    1. Lymphedema of both lower extremities  I89.0       2. Venous insufficiency  I87.2                      Subjective:  Pt reports it is hard to keep the compression wraps on.       Objective: See treatment diary below      Assessment: Tolerated treatment well. Patient would benefit from continued PT. Pt has a nice reduction of LE edema in B lower legs. Pt continues to have increased edema in R foot. Plan: Continue per plan of care. Focus on decreasing edema in B LE's.      Dx: B LE R > L lymphedema  EPOC: 10/24/23  CO-MORBIDITIES:   PERSONAL FACTORS:   Precautions: none      Manuals 9/12 10/10 10/18 10/19         MLD/ CLT  30' 30' 30'         Circaid measurement comprilan R            Pneumatic compression pump  15' 15' 15'                      Neuro Re-Ed                                                                                                                     Ther Ex             Lymphedema dx & mgt educ 8'                                                                                                                                 Ther Activity                                       Gait Training                                       Modalities

## 2023-10-21 PROBLEM — A41.9 SEPSIS (HCC): Status: RESOLVED | Noted: 2023-07-17 | Resolved: 2023-10-21

## 2023-10-22 PROBLEM — N39.0 UTI (URINARY TRACT INFECTION): Status: RESOLVED | Noted: 2023-07-17 | Resolved: 2023-10-22

## 2023-10-23 ENCOUNTER — EVALUATION (OUTPATIENT)
Dept: PHYSICAL THERAPY | Facility: CLINIC | Age: 68
End: 2023-10-23
Payer: COMMERCIAL

## 2023-10-23 DIAGNOSIS — I89.0 LYMPHEDEMA OF BOTH LOWER EXTREMITIES: Primary | ICD-10-CM

## 2023-10-23 DIAGNOSIS — I87.2 VENOUS INSUFFICIENCY: ICD-10-CM

## 2023-10-23 PROCEDURE — 97140 MANUAL THERAPY 1/> REGIONS: CPT | Performed by: PHYSICAL THERAPIST

## 2023-10-23 PROCEDURE — 97016 VASOPNEUMATIC DEVICE THERAPY: CPT | Performed by: PHYSICAL THERAPIST

## 2023-10-23 NOTE — LETTER
2023    Charu Barnes DO  Mary Grace  [address]    Patient: Sabrina Junior   YOB: 1955   Date of Visit: 10/23/2023     Encounter Diagnosis     ICD-10-CM    1. Lymphedema of both lower extremities  I89.0       2. Venous insufficiency  I87.2           Dear Dr. José Miguel Hull: Thank you for your recent referral of Sabrina Junior. Please review the attached evaluation summary from Kirill's recent visit. Please verify that you agree with the plan of care by signing the attached order. If you have any questions or concerns, please do not hesitate to call. I sincerely appreciate the opportunity to share in the care of one of your patients and hope to have another opportunity to work with you in the near future. Sincerely,    Priscilla Dinh, PT      Referring Provider:      I certify that I have read the below Plan of Care and certify the need for these services furnished under this plan of treatment while under my care. Charu Barnes DO  [address] [facility] [address]  Via Fax: [phone]          PT Re-Evaluation     Today's date: 10/23/2023  Patient name: Sabrina Junior  : 1955  MRN: [id number]  Referring provider: Charu Barnes DO  Dx:   Encounter Diagnosis     ICD-10-CM    1. Lymphedema of both lower extremities  I89.0       2. Venous insufficiency  I87.2                      Assessment  Assessment details: Since starting skilled PT, B LE edema has reduced nicely with a 14% and 19% reduction in LE edema. Pt is wearing compression bandaging and gets good benefit from the pneumatic compression pump. Pt would benefit from a home pneumatic compression pump to maintain reduction in LE edema. Impairments: lacks appropriate home exercise program  Other impairment: increased B LE edema  Understanding of Dx/Px/POC: good   Prognosis: fair    Goals  STG's ( 3-4 weeks)  1. Pt will be independent in HEP-met  2. Pt will be independent in compression wrapping or Circaid system donning and doffing- met  LTG's (4-6 weeks)  1. Reduce B LE edema to max ability- met  2. Pt will be independent in use of maintenance garment- not met    Plan  Patient would benefit from: skilled physical therapy  Other planned modality interventions: pneumatic compression pump  Planned therapy interventions: home exercise program  Other planned therapy interventions: MLD/ CLT  Frequency: 2x week  Duration in weeks: 6  Plan of Care beginning date: 10/23/2023  Plan of Care expiration date: 2023  Treatment plan discussed with: patient and caregiver        Subjective Evaluation    History of Present Illness  Mechanism of injury: I.E: Pt has recently returned from 2 months of hospitalization. Pt is going to [facility] rehab for deconditioning. Pt has had R > L LE edema for several years. Pt has a tendency to turn his ankle outward when driving. Pt was sent to an inpatient rehab facility in [facility] and they wrapped his legs with ace wraps. Pt felt like he got a reduction in his swelling. Pt has worn tubigrip stockings. Pt has recently purchased knee high compression stockings. Pt is not sure of the reason that he has swelling of his legs. Pt has increased discomfort with walking 2* swelling and has difficulty putting on shoes and socks and keeping the shoes and socks on his feet. Doppler US testing was negative for blood clot. 23: Pt is able to wear slippers to therapy with greater ease. Pt is tolerating wearing compression wrapping well and his spouse is independent in wrapping his legs. Pt is beginning to walk without his walker. Pt is elevating his legs and performing ankle pumps.     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  Hobbies: none  Gait; slow speed with RW  Patient Goals  Patient goals for therapy: decreased edema    Pain  At best pain ratin  At worst pain ratin  Location: B LUBA's    Social Support  Steps to enter house: yes (10)  Stairs in house: no   Lives with: spouse      Diagnostic Tests  Ultrasound findings: normalTreatments  No previous or current treatments        Objective     Strength/Myotome Testing     Additional Strength Details  B hemosiderin staining lower legs  (+) fibrotic tissue B LE's  (+) stemmer sign R LE  (+) pitting edema R LE  L LE has a fresh wound anterior shin- pt banged it on the golf cart today        Dx:  B LE R > L lymphedema  EPOC: 10/24/23  CO-MORBIDITIES:   PERSONAL FACTORS:   Precautions: none      Manuals 9/12 10/23           MLD/ CLT  25'           Circaid measurement comprilan R            Pneumatic compression pump  15'                        Neuro Re-Ed                                                                                                                     Ther Ex             Lymphedema dx & mgt educ 8'                                                                                                                                 Ther Activity                                       Gait Training                                       Modalities

## 2023-10-23 NOTE — PROGRESS NOTES
PT Re-Evaluation     Today's date: 10/23/2023  Patient name: Brianda Kiran  : 1955  MRN: [id number]  Referring provider: Ledy Delvalle DO  Dx:   Encounter Diagnosis     ICD-10-CM    1. Lymphedema of both lower extremities  I89.0       2. Venous insufficiency  I87.2                      Assessment  Assessment details: Since starting skilled PT, B LE edema has reduced nicely with a 14% and 19% reduction in LE edema. Pt is wearing compression bandaging and gets good benefit from the pneumatic compression pump. Pt would benefit from a home pneumatic compression pump to maintain reduction in LE edema. Impairments: lacks appropriate home exercise program  Other impairment: increased B LE edema  Understanding of Dx/Px/POC: good   Prognosis: fair    Goals  STG's ( 3-4 weeks)  1. Pt will be independent in HEP-met  2. Pt will be independent in compression wrapping or Circaid system donning and doffing- met  LTG's (4-6 weeks)  1. Reduce B LE edema to max ability- met  2. Pt will be independent in use of maintenance garment- not met    Plan  Patient would benefit from: skilled physical therapy  Other planned modality interventions: pneumatic compression pump  Planned therapy interventions: home exercise program  Other planned therapy interventions: MLD/ CLT  Frequency: 2x week  Duration in weeks: 6  Plan of Care beginning date: 10/23/2023  Plan of Care expiration date: 2023  Treatment plan discussed with: patient and caregiver        Subjective Evaluation    History of Present Illness  Mechanism of injury: I.E: Pt has recently returned from 2 months of hospitalization. Pt is going to [facility] rehab for deconditioning. Pt has had R > L LE edema for several years. Pt has a tendency to turn his ankle outward when driving. Pt was sent to an inpatient rehab facility in [facility] and they wrapped his legs with ace wraps. Pt felt like he got a reduction in his swelling. Pt has worn tubigrip stockings.  Pt has recently purchased knee high compression stockings. Pt is not sure of the reason that he has swelling of his legs. Pt has increased discomfort with walking 2* swelling and has difficulty putting on shoes and socks and keeping the shoes and socks on his feet. Doppler US testing was negative for blood clot. 23: Pt is able to wear slippers to therapy with greater ease. Pt is tolerating wearing compression wrapping well and his spouse is independent in wrapping his legs. Pt is beginning to walk without his walker. Pt is elevating his legs and performing ankle pumps. 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; event rental owner; [employer] owner  Hobbies: none  Gait; slow speed with RW  Patient Goals  Patient goals for therapy: decreased edema    Pain  At best pain ratin  At worst pain ratin  Location: B LE's    Social Support  Steps to enter house: yes (10)  Stairs in house: no   Lives with: spouse      Diagnostic Tests  Ultrasound findings: normalTreatments  No previous or current treatments        Objective     Strength/Myotome Testing     Additional Strength Details  B hemosiderin staining lower legs  (+) fibrotic tissue B LE's  (+) stemmer sign R LE  (+) pitting edema R LE  L LE has a fresh wound anterior shin- pt banged it on the golf cart today        Dx:  B LE R > L lymphedema  EPOC: 10/24/23  CO-MORBIDITIES:   PERSONAL FACTORS:   Precautions: none      Manuals 9/12 10/23           MLD/ CLT  25'           Circaid measurement comprilan R            Pneumatic compression pump  15'                        Neuro Re-Ed                                                                                                                     Ther Ex             Lymphedema dx & mgt educ 8'                                                                                                                                 Ther Activity                                       Gait Training                                       Modalities

## 2023-10-26 ENCOUNTER — OFFICE VISIT (OUTPATIENT)
Dept: PHYSICAL THERAPY | Facility: CLINIC | Age: 68
End: 2023-10-26
Payer: COMMERCIAL

## 2023-10-26 DIAGNOSIS — I87.2 VENOUS INSUFFICIENCY: ICD-10-CM

## 2023-10-26 DIAGNOSIS — I89.0 LYMPHEDEMA OF BOTH LOWER EXTREMITIES: Primary | ICD-10-CM

## 2023-10-26 PROCEDURE — 97016 VASOPNEUMATIC DEVICE THERAPY: CPT | Performed by: PHYSICAL THERAPIST

## 2023-10-26 PROCEDURE — 97140 MANUAL THERAPY 1/> REGIONS: CPT | Performed by: PHYSICAL THERAPIST

## 2023-10-26 NOTE — PROGRESS NOTES
Daily Note     Today's date: 10/26/2023  Patient name: Nam Hendricks  : 1955  MRN: [id number]  Referring provider: Isamar Gonzalez DO  Dx:   Encounter Diagnosis     ICD-10-CM    1. Lymphedema of both lower extremities  I89.0       2. Venous insufficiency  I87.2                      Subjective:  Pt reports his girl friend is wrapping his legs. Pt is tolerating the wraps well. Objective: See treatment diary below      Assessment: Tolerated treatment well. Patient  is making improvements with edema reduction. R LE skin is healing and flaking off. Plan: Continue per plan of care. Focus on reducing edema.      Dx: B LE R > L lymphedema  EPOC: 10/24/23  CO-MORBIDITIES:   PERSONAL FACTORS:   Precautions: none      Manuals 9/12 10/10 10/18 10/19 10/26        MLD/ CLT  30' 30' 30' 30'        Circaid measurement comprilan R            Pneumatic compression pump  15' 15' 15' 15'                     Neuro Re-Ed                                                                                                                     Ther Ex             Lymphedema dx & mgt educ 8'                                                                                                                                 Ther Activity                                       Gait Training                                       Modalities Procedure: colon dx GI Bleed  Anes: MAC-BMI  Prep: suprep/gavilyte  Diabetic: no  Blood Thinners: no      BMI-58    Chart reviewed via EPIC

## 2023-10-30 ENCOUNTER — OFFICE VISIT (OUTPATIENT)
Dept: PHYSICAL THERAPY | Facility: CLINIC | Age: 68
End: 2023-10-30
Payer: COMMERCIAL

## 2023-10-30 DIAGNOSIS — I89.0 LYMPHEDEMA OF BOTH LOWER EXTREMITIES: Primary | ICD-10-CM

## 2023-10-30 DIAGNOSIS — I87.2 VENOUS INSUFFICIENCY: ICD-10-CM

## 2023-10-30 PROCEDURE — 97016 VASOPNEUMATIC DEVICE THERAPY: CPT | Performed by: PHYSICAL THERAPIST

## 2023-10-30 PROCEDURE — 97140 MANUAL THERAPY 1/> REGIONS: CPT | Performed by: PHYSICAL THERAPIST

## 2023-10-30 NOTE — PROGRESS NOTES
Daily Note     Today's date: 10/30/2023  Patient name: Jace Hu  : 1955  MRN: [id number]  Referring provider: Pati Byrne DO  Dx:   Encounter Diagnosis     ICD-10-CM    1. Lymphedema of both lower extremities  I89.0       2. Venous insufficiency  I87.2                      Subjective: PT reports he is wearing smaller compression stockings today. He was able to fit into them well. Objective: See treatment diary below      Assessment: Tolerated treatment well. Patient  was measured for Circaid size large for B LE's. Pt is ready for a home pneumatic compression pump      Plan: Continue per plan of care. Focus on reducing edema.      Dx: B LE R > L lymphedema  EPOC: 10/24/23  CO-MORBIDITIES:   PERSONAL FACTORS:   Precautions: none      Manuals 9/12 10/10 10/18 10/19 10/26 10/30       MLD/ CLT  30' 30' 30' 30' 30'       Circaid measurement comprilan R            Pneumatic compression pump  15' 15' 15' 15' 15'                    Neuro Re-Ed                                                                                                                     Ther Ex             Lymphedema dx & mgt educ 8'                                                                                                                                 Ther Activity                                       Gait Training                                       Modalities

## 2023-11-01 ENCOUNTER — OFFICE VISIT (OUTPATIENT)
Dept: PHYSICAL THERAPY | Facility: CLINIC | Age: 68
End: 2023-11-01
Payer: COMMERCIAL

## 2023-11-01 DIAGNOSIS — I89.0 LYMPHEDEMA OF BOTH LOWER EXTREMITIES: Primary | ICD-10-CM

## 2023-11-01 DIAGNOSIS — I87.2 VENOUS INSUFFICIENCY: ICD-10-CM

## 2023-11-01 PROCEDURE — 97140 MANUAL THERAPY 1/> REGIONS: CPT

## 2023-11-01 PROCEDURE — 97016 VASOPNEUMATIC DEVICE THERAPY: CPT

## 2023-11-01 NOTE — PROGRESS NOTES
Daily Note     Today's date: 2023  Patient name: Sabrina Junior  : 1955  MRN: [id number]  Referring provider: Charu Barnes DO  Dx:   Encounter Diagnosis     ICD-10-CM    1. Lymphedema of both lower extremities  I89.0       2. Venous insufficiency  I87.2                      Subjective: PT reports he is wearing smaller compression stockings today. He was able to fit into them well. Objective: See treatment diary below      Assessment: Tolerated treatment well. Patient is be getting his compression pump tomorrow and finalizing his circaid order. Plan: Continue per plan of care. Focus on reducing edema.      Dx: B LE R > L lymphedema  EPOC: 10/24/23  CO-MORBIDITIES:   PERSONAL FACTORS:   Precautions: none      Manuals 9/12 10/10 10/18 10/19 10/26 10/30 11/1      MLD/ CLT  30' 30' 30' 30' 30' 30'      Circaid measurement comprilan R            Pneumatic compression pump  15' 15' 15' 15' 15' 15                   Neuro Re-Ed                                                                                                                     Ther Ex             Lymphedema dx & mgt educ 8'                                                                                                                                 Ther Activity                                       Gait Training                                       Modalities

## 2023-11-06 ENCOUNTER — OFFICE VISIT (OUTPATIENT)
Dept: PHYSICAL THERAPY | Facility: CLINIC | Age: 68
End: 2023-11-06
Payer: COMMERCIAL

## 2023-11-06 DIAGNOSIS — I87.2 VENOUS INSUFFICIENCY: ICD-10-CM

## 2023-11-06 DIAGNOSIS — I89.0 LYMPHEDEMA OF BOTH LOWER EXTREMITIES: Primary | ICD-10-CM

## 2023-11-06 PROCEDURE — 97140 MANUAL THERAPY 1/> REGIONS: CPT | Performed by: PHYSICAL THERAPIST

## 2023-11-06 NOTE — PROGRESS NOTES
Daily Note / Discharge Note    Today's date: 2023  Patient name: Claudell Lock  : 1955  MRN: [id number]  Referring provider: Sunil Watson DO  Dx:   Encounter Diagnosis     ICD-10-CM    1. Lymphedema of both lower extremities  I89.0       2. Venous insufficiency  I87.2                      Subjective: Pt reports he has been using his pneumatic compression pump at home every day. Objective: See treatment diary below      Assessment: Tolerated treatment well. Patient  is waiting for Circaid compression wraps to arrive. Pt understands how to use his home pump well. Pt will continue with compression wrapping until Circaid wraps arrive. Plan:  D/c pt from skilled PT.      Dx: B LE R > L lymphedema  EPOC: 10/24/23  CO-MORBIDITIES:   PERSONAL FACTORS:   Precautions: none      Manuals 9/12 10/10 10/18 10/19 10/26 10/30 11/1 11/6     MLD/ CLT  30' 30' 30' 30' 30' 30' 40'     Circaid measurement comprilan R            Pneumatic compression pump  15' 15' 15' 15' 15' 15                   Neuro Re-Ed                                                                                                                     Ther Ex             Lymphedema dx & mgt educ 8'                                                                                                                                 Ther Activity                                       Gait Training                                       Modalities

## 2024-08-06 ENCOUNTER — APPOINTMENT (EMERGENCY)
Dept: CT IMAGING | Facility: HOSPITAL | Age: 69
End: 2024-08-06
Payer: COMMERCIAL

## 2024-08-06 ENCOUNTER — HOSPITAL ENCOUNTER (EMERGENCY)
Facility: HOSPITAL | Age: 69
Discharge: HOME | End: 2024-08-06
Attending: EMERGENCY MEDICINE
Payer: COMMERCIAL

## 2024-08-06 ENCOUNTER — TELEPHONE (OUTPATIENT)
Dept: OTHER | Facility: HOSPITAL | Age: 69
End: 2024-08-06

## 2024-08-06 VITALS
SYSTOLIC BLOOD PRESSURE: 179 MMHG | HEART RATE: 74 BPM | HEIGHT: 75 IN | BODY MASS INDEX: 37.92 KG/M2 | OXYGEN SATURATION: 93 % | TEMPERATURE: 97.8 F | RESPIRATION RATE: 18 BRPM | WEIGHT: 305 LBS | DIASTOLIC BLOOD PRESSURE: 93 MMHG

## 2024-08-06 DIAGNOSIS — N39.0 UTI (URINARY TRACT INFECTION): Primary | ICD-10-CM

## 2024-08-06 DIAGNOSIS — R33.9 URINARY RETENTION: ICD-10-CM

## 2024-08-06 LAB
2HR DELTA HS TROPONIN: 1 NG/L
ALBUMIN SERPL BCG-MCNC: 3.2 G/DL (ref 3.5–5)
ALP SERPL-CCNC: 93 U/L (ref 34–104)
ALT SERPL W P-5'-P-CCNC: 13 U/L (ref 7–52)
ANION GAP SERPL CALCULATED.3IONS-SCNC: 7 MMOL/L (ref 4–13)
AST SERPL W P-5'-P-CCNC: 17 U/L (ref 13–39)
BACTERIA UR QL AUTO: ABNORMAL /HPF
BASOPHILS # BLD AUTO: 0.02 THOUSANDS/ÂΜL (ref 0–0.1)
BASOPHILS NFR BLD AUTO: 0 % (ref 0–1)
BILIRUB SERPL-MCNC: 0.6 MG/DL (ref 0.2–1)
BILIRUB UR QL STRIP: NEGATIVE
BUN SERPL-MCNC: 20 MG/DL (ref 5–25)
CALCIUM ALBUM COR SERPL-MCNC: 9.3 MG/DL (ref 8.3–10.1)
CALCIUM SERPL-MCNC: 8.7 MG/DL (ref 8.4–10.2)
CARDIAC TROPONIN I PNL SERPL HS: 11 NG/L
CARDIAC TROPONIN I PNL SERPL HS: 12 NG/L
CHLORIDE SERPL-SCNC: 103 MMOL/L (ref 96–108)
CLARITY UR: ABNORMAL
CO2 SERPL-SCNC: 27 MMOL/L (ref 21–32)
COLOR UR: YELLOW
CREAT SERPL-MCNC: 0.99 MG/DL (ref 0.6–1.3)
EOSINOPHIL # BLD AUTO: 0.17 THOUSAND/ÂΜL (ref 0–0.61)
EOSINOPHIL NFR BLD AUTO: 2 % (ref 0–6)
ERYTHROCYTE [DISTWIDTH] IN BLOOD BY AUTOMATED COUNT: 14 % (ref 11.6–15.1)
FLUAV RNA RESP QL NAA+PROBE: NEGATIVE
FLUBV RNA RESP QL NAA+PROBE: NEGATIVE
GFR SERPL CREATININE-BSD FRML MDRD: 76 ML/MIN/1.73SQ M
GLUCOSE SERPL-MCNC: 111 MG/DL (ref 65–140)
GLUCOSE UR STRIP-MCNC: NEGATIVE MG/DL
HCT VFR BLD AUTO: 37.4 % (ref 36.5–49.3)
HGB BLD-MCNC: 12 G/DL (ref 12–17)
HGB UR QL STRIP.AUTO: NEGATIVE
HYALINE CASTS #/AREA URNS LPF: ABNORMAL /LPF
IMM GRANULOCYTES # BLD AUTO: 0.02 THOUSAND/UL (ref 0–0.2)
IMM GRANULOCYTES NFR BLD AUTO: 0 % (ref 0–2)
KETONES UR STRIP-MCNC: NEGATIVE MG/DL
LACTATE SERPL-SCNC: 1.1 MMOL/L (ref 0.5–2)
LEUKOCYTE ESTERASE UR QL STRIP: ABNORMAL
LYMPHOCYTES # BLD AUTO: 1.41 THOUSANDS/ÂΜL (ref 0.6–4.47)
LYMPHOCYTES NFR BLD AUTO: 19 % (ref 14–44)
MCH RBC QN AUTO: 28.4 PG (ref 26.8–34.3)
MCHC RBC AUTO-ENTMCNC: 32.1 G/DL (ref 31.4–37.4)
MCV RBC AUTO: 88 FL (ref 82–98)
MONOCYTES # BLD AUTO: 0.84 THOUSAND/ÂΜL (ref 0.17–1.22)
MONOCYTES NFR BLD AUTO: 11 % (ref 4–12)
MUCOUS THREADS UR QL AUTO: ABNORMAL
NEUTROPHILS # BLD AUTO: 4.9 THOUSANDS/ÂΜL (ref 1.85–7.62)
NEUTS SEG NFR BLD AUTO: 68 % (ref 43–75)
NITRITE UR QL STRIP: NEGATIVE
NON-SQ EPI CELLS URNS QL MICRO: ABNORMAL /HPF
NRBC BLD AUTO-RTO: 0 /100 WBCS
PH UR STRIP.AUTO: 5.5 [PH]
PLATELET # BLD AUTO: 269 THOUSANDS/UL (ref 149–390)
PMV BLD AUTO: 8.6 FL (ref 8.9–12.7)
POTASSIUM SERPL-SCNC: 4 MMOL/L (ref 3.5–5.3)
PROT SERPL-MCNC: 6.4 G/DL (ref 6.4–8.4)
PROT UR STRIP-MCNC: ABNORMAL MG/DL
RBC # BLD AUTO: 4.23 MILLION/UL (ref 3.88–5.62)
RBC #/AREA URNS AUTO: ABNORMAL /HPF
RSV RNA RESP QL NAA+PROBE: NEGATIVE
SARS-COV-2 RNA RESP QL NAA+PROBE: NEGATIVE
SODIUM SERPL-SCNC: 137 MMOL/L (ref 135–147)
SP GR UR STRIP.AUTO: 1.02 (ref 1–1.03)
TSH SERPL DL<=0.05 MIU/L-ACNC: 2.47 UIU/ML (ref 0.45–4.5)
UROBILINOGEN UR STRIP-ACNC: 2 MG/DL
WBC # BLD AUTO: 7.36 THOUSAND/UL (ref 4.31–10.16)
WBC #/AREA URNS AUTO: ABNORMAL /HPF

## 2024-08-06 PROCEDURE — 84484 ASSAY OF TROPONIN QUANT: CPT | Performed by: EMERGENCY MEDICINE

## 2024-08-06 PROCEDURE — 74176 CT ABD & PELVIS W/O CONTRAST: CPT

## 2024-08-06 PROCEDURE — 71250 CT THORAX DX C-: CPT

## 2024-08-06 PROCEDURE — 96365 THER/PROPH/DIAG IV INF INIT: CPT

## 2024-08-06 PROCEDURE — 51798 US URINE CAPACITY MEASURE: CPT

## 2024-08-06 PROCEDURE — 99285 EMERGENCY DEPT VISIT HI MDM: CPT | Performed by: EMERGENCY MEDICINE

## 2024-08-06 PROCEDURE — 96361 HYDRATE IV INFUSION ADD-ON: CPT

## 2024-08-06 PROCEDURE — 85025 COMPLETE CBC W/AUTO DIFF WBC: CPT | Performed by: EMERGENCY MEDICINE

## 2024-08-06 PROCEDURE — 93005 ELECTROCARDIOGRAM TRACING: CPT

## 2024-08-06 PROCEDURE — 0241U HB NFCT DS VIR RESP RNA 4 TRGT: CPT | Performed by: EMERGENCY MEDICINE

## 2024-08-06 PROCEDURE — 81001 URINALYSIS AUTO W/SCOPE: CPT | Performed by: EMERGENCY MEDICINE

## 2024-08-06 PROCEDURE — 36415 COLL VENOUS BLD VENIPUNCTURE: CPT | Performed by: EMERGENCY MEDICINE

## 2024-08-06 PROCEDURE — 83605 ASSAY OF LACTIC ACID: CPT | Performed by: EMERGENCY MEDICINE

## 2024-08-06 PROCEDURE — 84443 ASSAY THYROID STIM HORMONE: CPT | Performed by: EMERGENCY MEDICINE

## 2024-08-06 PROCEDURE — 80053 COMPREHEN METABOLIC PANEL: CPT | Performed by: EMERGENCY MEDICINE

## 2024-08-06 PROCEDURE — 99285 EMERGENCY DEPT VISIT HI MDM: CPT

## 2024-08-06 RX ORDER — TAMSULOSIN HYDROCHLORIDE 0.4 MG/1
0.4 CAPSULE ORAL
Qty: 14 CAPSULE | Refills: 0 | Status: SHIPPED | OUTPATIENT
Start: 2024-08-06

## 2024-08-06 RX ORDER — CEPHALEXIN 500 MG/1
500 CAPSULE ORAL EVERY 6 HOURS SCHEDULED
Qty: 40 CAPSULE | Refills: 0 | Status: SHIPPED | OUTPATIENT
Start: 2024-08-06 | End: 2024-08-16

## 2024-08-06 RX ORDER — CEFTRIAXONE 2 G/50ML
2000 INJECTION, SOLUTION INTRAVENOUS ONCE
Status: COMPLETED | OUTPATIENT
Start: 2024-08-06 | End: 2024-08-06

## 2024-08-06 RX ADMIN — SODIUM CHLORIDE 1000 ML: 0.9 INJECTION, SOLUTION INTRAVENOUS at 15:42

## 2024-08-06 RX ADMIN — CEFTRIAXONE 2000 MG: 2 INJECTION, SOLUTION INTRAVENOUS at 17:58

## 2024-08-06 NOTE — DISCHARGE INSTRUCTIONS
Would also recommend reaching out to [facility] you had your [facility] surgery done for follow-up.

## 2024-08-06 NOTE — ED PROVIDER NOTES
History  Chief Complaint   Patient presents with    Possible UTI     Pt reports he has been on abx for 6 days (cipro) for a UTI but reports he still feels weak and has been having intermittent fevers.      Patient is a 68-year-old male.  He has a past medical history of hypertension, diabetes, and sepsis secondary to UTI in the setting of the stone.  6 days ago patient developed generalized weakness with dark urine.  He was worried he might of developed a UTI again.  He did not have dysuria or frequency.  He did have a urinalysis done that suggested UTI.  He was started on Cipro 250 mg p.o. twice daily.  He is on day 6 that he continues to feel severe fatigue and generalized malaise.  He was sent here by his primary provider to rule out sepsis.  He has had intermittent fevers.  No confusion.  No cough or congestion.  No abdominal pain.  No headache.        Prior to Admission Medications   Prescriptions Last Dose Informant Patient Reported? Taking?   Cyanocobalamin 1000 MCG CAPS  Self Yes No   Sig: Take 1,000 mcg by mouth daily   Multiple Vitamin (multivitamin) tablet  Self Yes No   Sig: Take 1 tablet by mouth daily   albuterol (PROVENTIL HFA,VENTOLIN HFA) 90 mcg/act inhaler  Self No No   Sig: Inhale 2 puffs every 6 (six) hours as needed for wheezing   docusate sodium (COLACE) 100 mg capsule  Self No No   Sig: Take 1 capsule (100 mg total) by mouth 2 (two) times a day   ferrous gluconate (FERGON) 240 (27 FE) MG tablet  Self Yes No   Sig: Take 240 mg by mouth   ferrous sulfate 325 (65 Fe) mg tablet  Self Yes No   Sig: Take 325 mg by mouth daily with breakfast   fluocinonide (LIDEX) 0.05 % ointment  Self No No   Sig: Apply topically 2 (two) times a day   guaiFENesin 1200 MG TB12  Self No No   Sig: Take 1 tablet (1,200 mg total) by mouth every 12 (twelve) hours   Patient not taking: Reported on 2023   ibuprofen (MOTRIN) 200 mg tablet  Self Yes No   Si mg   ipratropium (ATROVENT) 0.02 % nebulizer solution   Self No No   Sig: Take 2.5 mL (0.5 mg total) by nebulization 3 (three) times a day for 68 doses   lisinopril (ZESTRIL) 10 mg tablet  Self Yes No   Sig: Take 10 mg by mouth daily   melatonin 3 mg  Self Yes No   Sig: Take 3 mg by mouth daily at bedtime   nystatin (MYCOSTATIN) powder  Self Yes No   Sig: APPLY TOPICALLY TWICE A DAY AS NEEDED FOR RASH   polyethylene glycol (MIRALAX) 17 g packet  Self No No   Sig: Take 17 g by mouth daily Do not start before August 26, 2023.   senna (SENOKOT) 8.6 MG tablet  Self Yes No   Sig: Take 2 tablets by mouth daily at bedtime      Facility-Administered Medications: None       Past Medical History:   Diagnosis Date    Hypertension     Kidney stone     Lymphedema        Past Surgical History:   Procedure Laterality Date    FETAL SURGERY FOR CONGENITAL HERNIA      5 years ago    FL CYSTOGRAM  8/21/2023    FL RETROGRADE PYELOGRAM  8/20/2023    GASTRIC BYPASS      20 years ago    PANNICULECTOMY      PENIS SURGERY      penile straightening procedure/evangelina tucks    CA CYSTO BLADDER W/URETERAL CATHETERIZATION Left 8/20/2023    Procedure: CYSTOSCOPY RETROGRADE PYELOGRAM WITH INSERTION STENT URETERAL;  Surgeon: Moises Martinez MD;  Location: [facility];  Service: Urology    TOTAL HIP ARTHROPLASTY      10 years ago       Family History   Problem Relation Age of Onset    Heart disease Mother      I have reviewed and agree with the history as documented.    E-Cigarette/Vaping    E-Cigarette Use Never User      E-Cigarette/Vaping Substances     Social History     Tobacco Use    Smoking status: Never    Smokeless tobacco: Never   Vaping Use    Vaping status: Never Used   Substance Use Topics    Alcohol use: Never    Drug use: Never       Review of Systems   Constitutional:  Positive for fatigue and fever.   HENT:  Negative for rhinorrhea and sore throat.    Eyes:  Negative for pain, redness and visual disturbance.   Respiratory:  Negative for cough and shortness of breath.     Cardiovascular:  Negative for chest pain and leg swelling.   Gastrointestinal:  Negative for abdominal pain, diarrhea and vomiting.   Endocrine: Negative for polydipsia and polyuria.   Genitourinary:  Negative for dysuria, frequency and hematuria.   Musculoskeletal:  Negative for back pain and neck pain.   Skin:  Negative for rash and wound.   Allergic/Immunologic: Negative for immunocompromised state.   Neurological:  Negative for weakness, numbness and headaches.   Psychiatric/Behavioral:  Negative for hallucinations and suicidal ideas.        Physical Exam  Physical Exam  Vitals reviewed.   Constitutional:       General: He is not in acute distress.     Appearance: He is obese. He is not toxic-appearing.   HENT:      Head: Normocephalic and atraumatic.      Nose: Nose normal.      Mouth/Throat:      Mouth: Mucous membranes are moist.   Eyes:      General:         Right eye: No discharge.         Left eye: No discharge.      Conjunctiva/sclera: Conjunctivae normal.   Cardiovascular:      Rate and Rhythm: Normal rate and regular rhythm.      Pulses: Normal pulses.      Heart sounds: Normal heart sounds. No murmur heard.     No friction rub. No gallop.   Pulmonary:      Effort: Pulmonary effort is normal. No respiratory distress.      Breath sounds: Normal breath sounds. No stridor. No wheezing, rhonchi or rales.   Abdominal:      General: Bowel sounds are normal. There is no distension.      Palpations: Abdomen is soft.      Tenderness: There is no abdominal tenderness. There is no right CVA tenderness, left CVA tenderness, guarding or rebound.   Musculoskeletal:         General: Swelling present. No tenderness, deformity or signs of injury.      Cervical back: Normal range of motion and neck supple. No rigidity.      Right lower leg: Edema present.      Left lower leg: Edema present.      Comments: No calf pain or unilateral leg swelling   Skin:     General: Skin is warm and dry.      Coloration: Skin is not  jaundiced or pale.      Findings: No bruising, erythema or rash.   Neurological:      General: No focal deficit present.      Mental Status: He is alert and oriented to person, place, and time.      Cranial Nerves: No facial asymmetry.      Sensory: No sensory deficit.      Motor: Motor function is intact.   Psychiatric:         Mood and Affect: Mood normal.         Behavior: Behavior normal.         Vital Signs  ED Triage Vitals [08/06/24 1517]   Temperature Pulse Respirations Blood Pressure SpO2   97.8 °F (36.6 °C) 66 19 (!) 175/90 91 %      Temp Source Heart Rate Source Patient Position - Orthostatic VS BP Location FiO2 (%)   Temporal Monitor -- Left arm --      Pain Score       No Pain           Vitals:    08/06/24 1600 08/06/24 1630 08/06/24 1715 08/06/24 1730   BP: (!) 176/97 (!) 200/97 (!) 171/99 (!) 179/93   Pulse: 62 71 75 74         Visual Acuity      ED Medications  Medications   sodium chloride 0.9 % bolus 1,000 mL (0 mL Intravenous Stopped 8/6/24 1642)   cefTRIAXone (ROCEPHIN) IVPB (premix in dextrose) 2,000 mg 50 mL (0 mg Intravenous Stopped 8/6/24 1828)       Diagnostic Studies  Results Reviewed       Procedure Component Value Units Date/Time    HS Troponin I 2hr [[id number]]  (Normal) Collected: 08/06/24 1754    Lab Status: Final result Specimen: Blood from Arm, Right Updated: 08/06/24 1822     hs TnI 2hr 12 ng/L      Delta 2hr hsTnI 1 ng/L     Urine Microscopic [[id number]]  (Abnormal) Collected: 08/06/24 1625    Lab Status: Final result Specimen: Urine, Clean Catch Updated: 08/06/24 1705     RBC, UA 2-4 /hpf      WBC, UA 4-10 /hpf      Epithelial Cells Occasional /hpf      Bacteria, UA Occasional /hpf      MUCUS THREADS Moderate     Hyaline Casts, UA 2-4 /lpf     UA w Reflex to Microscopic w Reflex to Culture [[id number]]  (Abnormal) Collected: 08/06/24 1625    Lab Status: Final result Specimen: Urine, Clean Catch Updated: 08/06/24 1643     Color, UA Yellow     Clarity, UA Cloudy     Specific Gravity,  UA 1.025     pH, UA 5.5     Leukocytes, UA Trace     Nitrite, UA Negative     Protein, UA 30 (1+) mg/dl      Glucose, UA Negative mg/dl      Ketones, UA Negative mg/dl      Urobilinogen, UA 2.0 mg/dl      Bilirubin, UA Negative     Occult Blood, UA Negative    TSH, 3rd generation with Free T4 reflex [[id number]]  (Normal) Collected: 08/06/24 1541    Lab Status: Final result Specimen: Blood from Arm, Right Updated: 08/06/24 1628     TSH 3RD GENERATON 2.466 uIU/mL     Comprehensive metabolic panel [[id number]]  (Abnormal) Collected: 08/06/24 1541    Lab Status: Final result Specimen: Blood from Arm, Right Updated: 08/06/24 1628     Sodium 137 mmol/L      Potassium 4.0 mmol/L      Chloride 103 mmol/L      CO2 27 mmol/L      ANION GAP 7 mmol/L      BUN 20 mg/dL      Creatinine 0.99 mg/dL      Glucose 111 mg/dL      Calcium 8.7 mg/dL      Corrected Calcium 9.3 mg/dL      AST 17 U/L      ALT 13 U/L      Alkaline Phosphatase 93 U/L      Total Protein 6.4 g/dL      Albumin 3.2 g/dL      Total Bilirubin 0.60 mg/dL      eGFR 76 ml/min/1.73sq m     Narrative:      National Kidney Disease Foundation guidelines for Chronic Kidney Disease (CKD):     Stage 1 with normal or high GFR (GFR > 90 mL/min/1.73 square meters)    Stage 2 Mild CKD (GFR = 60-89 mL/min/1.73 square meters)    Stage 3A Moderate CKD (GFR = 45-59 mL/min/1.73 square meters)    Stage 3B Moderate CKD (GFR = 30-44 mL/min/1.73 square meters)    Stage 4 Severe CKD (GFR = 15-29 mL/min/1.73 square meters)    Stage 5 End Stage CKD (GFR <15 mL/min/1.73 square meters)  Note: GFR calculation is accurate only with a steady state creatinine    FLU/RSV/COVID - if FLU/RSV clinically relevant [[id number]]  (Normal) Collected: 08/06/24 1541    Lab Status: Final result Specimen: Nares from Nose Updated: 08/06/24 1626     SARS-CoV-2 Negative     INFLUENZA A PCR Negative     INFLUENZA B PCR Negative     RSV PCR Negative    Narrative:      FOR PEDIATRIC PATIENTS - copy/paste COVID  Guidelines URL to browser: [email or web address]    SARS-CoV-2 assay is a Nucleic Acid Amplification assay intended for the  qualitative detection of nucleic acid from SARS-CoV-2 in nasopharyngeal  swabs. Results are for the presumptive identification of SARS-CoV-2 RNA.    Positive results are indicative of infection with SARS-CoV-2, the virus  causing COVID-19, but do not rule out bacterial infection or co-infection  with other viruses. Laboratories within the [place] and its  territories are required to report all positive results to the appropriate  public health authorities. Negative results do not preclude SARS-CoV-2  infection and should not be used as the sole basis for treatment or other  patient management decisions. Negative results must be combined with  clinical observations, patient history, and epidemiological information.  This test has not been FDA cleared or approved.    This test has been authorized by FDA under an Emergency Use Authorization  (EUA). This test is only authorized for the duration of time the  declaration that circumstances exist justifying the authorization of the  emergency use of an in vitro diagnostic tests for detection of SARS-CoV-2  virus and/or diagnosis of COVID-19 infection under section 564(b)(1) of  the Act, 21 U.S.C. 360bbb-3(b)(1), unless the authorization is terminated  or revoked sooner. The test has been validated but independent review by FDA  and CLIA is pending.    Test performed using [employer] GeneXpert: This RT-PCR assay targets N2,  a region unique to SARS-CoV-2. A conserved region in the E-gene was chosen  for pan-Sarbecovirus detection which includes SARS-CoV-2.    According to CMS-2020-01-R, this platform meets the definition of high-throughput technology.    HS Troponin 0hr (reflex protocol) [[id number]]  (Normal) Collected: 08/06/24 1541    Lab Status: Final result Specimen: Blood from Arm, Right  Updated: 08/06/24 1619     hs TnI 0hr 11 ng/L     HS Troponin I 4hr [[id number]]     Lab Status: No result Specimen: Blood     Lactic acid, plasma (w/reflex if result > 2.0) [[id number]]  (Normal) Collected: 08/06/24 1541    Lab Status: Final result Specimen: Blood from Arm, Right Updated: 08/06/24 1612     LACTIC ACID 1.1 mmol/L     Narrative:      Result may be elevated if tourniquet was used during collection.    CBC and differential [[id number]]  (Abnormal) Collected: 08/06/24 1541    Lab Status: Final result Specimen: Blood from Arm, Right Updated: 08/06/24 1550     WBC 7.36 Thousand/uL      RBC 4.23 Million/uL      Hemoglobin 12.0 g/dL      Hematocrit 37.4 %      MCV 88 fL      MCH 28.4 pg      MCHC 32.1 g/dL      RDW 14.0 %      MPV 8.6 fL      Platelets 269 Thousands/uL      nRBC 0 /100 WBCs      Segmented % 68 %      Immature Grans % 0 %      Lymphocytes % 19 %      Monocytes % 11 %      Eosinophils Relative 2 %      Basophils Relative 0 %      Absolute Neutrophils 4.90 Thousands/µL      Absolute Immature Grans 0.02 Thousand/uL      Absolute Lymphocytes 1.41 Thousands/µL      Absolute Monocytes 0.84 Thousand/µL      Eosinophils Absolute 0.17 Thousand/µL      Basophils Absolute 0.02 Thousands/µL                    CT chest abdomen pelvis wo contrast   Final Result by Marta Mckeon MD (08/06 1652)   Markedly distended urinary bladder with no adjacent inflammatory fat stranding. Recommend correlation with urinalysis to exclude infection..      Infiltration of the anterior abdominal wall which may be related to prior panniculectomy but recommend correlation clinically to exclude cellulitis. No fluid collection.      Additional nonemergent findings as described above.      The study was marked in EPIC for immediate notification.                     Workstation performed: [id number]                    Procedures  ECG 12 Lead Documentation Only    Date/Time: 8/6/2024 3:55 PM    Performed by: Gary Andrews,  MD  Authorized by: Gary Andrews MD    ECG reviewed by me, the ED Provider: yes    Patient location:  ED  Comments:      Normal sinus rhythm with sinus arrhythmia.  First-degree AV block.  Septal infarct, age undetermined.  Abnormal EKG.           ED Course                                 SBIRT 20yo+      Flowsheet Row Most Recent Value   Initial Alcohol Screen: US AUDIT-C     1. How often do you have a drink containing alcohol? 0 Filed at: 08/06/2024 1635   2. How many drinks containing alcohol do you have on a typical day you are drinking?  0 Filed at: 08/06/2024 1635   3a. Male UNDER 65: How often do you have five or more drinks on one occasion? 0 Filed at: 08/06/2024 1635   3b. FEMALE Any Age, or MALE 65+: How often do you have 4 or more drinks on one occassion? 0 Filed at: 08/06/2024 1635   Audit-C Score 0 Filed at: 08/06/2024 1635   MARYURI: How many times in the past year have you...    Used an illegal drug or used a prescription medication for non-medical reasons? Never Filed at: 08/06/2024 1635                      Medical Decision Making  Patient is not septic.  There is no obstructing kidney stone.  No pneumonia or cholecystitis.  He was, however, retaining urine.  He still has leukocytes in the urine and occasional bacteria.  Postvoid residual was greater than 400.  Patient is refusing Sung catheterization.  I explained and patient understands risks of sepsis, renal failure, pain, and death.  He does have a history of abnormal anatomy from prior surgeries.  He would prefer to see his urologist to discuss options.  Reached out to urology.  They will send a message to the office to attempt to fast-track an appointment.  Patient was made aware that interventions and treatment in the ED may not fix the problem without Sung catheterization.  In fact there is a good chance he will deteriorate.  Patient is aware of this and still refuses Sung catheterization.    Amount and/or Complexity of Data  Reviewed  Labs: ordered. Decision-making details documented in ED Course.  Radiology: ordered. Decision-making details documented in ED Course.  ECG/medicine tests: ordered and independent interpretation performed. Decision-making details documented in ED Course.  Discussion of management or test interpretation with external provider(s): Urology    Risk  Prescription drug management.  Decision regarding hospitalization.                 Disposition  Final diagnoses:   UTI (urinary tract infection)   Urinary retention     Time reflects when diagnosis was documented in both MDM as applicable and the Disposition within this note       Time User Action Codes Description Comment    8/6/2024  6:36 PM Gary Andrews Add [N39.0] UTI (urinary tract infection)     8/6/2024  6:36 PM Gary Andrews Add [R33.9] Urinary retention           ED Disposition       ED Disposition   Discharge    Condition   Stable    Date/Time   Tue Aug 6, 2024 1836    Comment   Kirill Lamb discharge to home/self care.                   Follow-up Information       Follow up With Specialties Details Why Contact Info Additional Information    [facility] In 1 day  [address]  [facility] [address]  [phone] [facility] [place], [address], [facility] [address], [place], [place], [address]   [phone]            Patient's Medications   Discharge Prescriptions    CEPHALEXIN (KEFLEX) 500 MG CAPSULE    Take 1 capsule (500 mg total) by mouth every 6 (six) hours for 10 days       Start Date: 8/6/2024  End Date: 8/16/2024       Order Dose: 500 mg       Quantity: 40 capsule    Refills: 0    TAMSULOSIN (FLOMAX) 0.4 MG    Take 1 capsule (0.4 mg total) by mouth daily with dinner       Start Date: 8/6/2024  End Date: --       Order Dose: 0.4 mg       Quantity: 14 capsule    Refills: 0       No discharge procedures on file.    PDMP Review       None            ED  Provider  Electronically Signed by             Gary Andrews MD  08/06/24 6145

## 2024-08-06 NOTE — TELEPHONE ENCOUNTER
Patient seen in [facility] ER, PCP treating for UTI on Cipro. Sent in due to concerns for history of sepsis. Found to be retaining urine with PVR greater than 400. Patient refused silva catheter. Has history of phalloplasty and panniculectomy and primarily follows [facility]. Seen by us in the past for ureteral calculi. Please call to schedule patient for next day urgent visit. Patient was also instructed to reach out to primary team at [facility] as well.

## 2024-08-07 NOTE — TELEPHONE ENCOUNTER
Appointment was cancelled with [facility] this morning by PEP. Call placed to Paulina, patients significant other to ensure patient will be following up with [facility]ed[facility]. Paulina states patient scheduled an appointment with a different Urologist in [place] that he has previously been to as it is too far of a drive for patient to come to [facility]. No further action needed at this time.

## 2024-08-07 NOTE — TELEPHONE ENCOUNTER
Called and left voicemail message for patient to contact the office. Currently holding an appointment tomorrow at 11:40 am with SANDRA Haley at the [facility]. Patient should follow up with [facility]aria[facility] Cdeeno as well as he has been under their care. Will need to determine if patient contacted their office for an appointment. If patient contacted [facility] and has an appointment with them, tomorrow's appointment with [facility] can be cancelled.

## 2024-08-09 LAB
ATRIAL RATE: 65 BPM
P AXIS: 40 DEGREES
PR INTERVAL: 216 MS
QRS AXIS: -17 DEGREES
QRSD INTERVAL: 98 MS
QT INTERVAL: 430 MS
QTC INTERVAL: 447 MS
T WAVE AXIS: 23 DEGREES
VENTRICULAR RATE: 65 BPM

## 2024-08-09 PROCEDURE — 93010 ELECTROCARDIOGRAM REPORT: CPT | Performed by: INTERNAL MEDICINE

## 2025-08-22 DIAGNOSIS — I10 BENIGN ESSENTIAL HYPERTENSION: Primary | ICD-10-CM

## 2025-08-22 PROBLEM — Z98.890 S/P LEFT INGUINAL HERNIA REPAIR: Status: ACTIVE | Noted: 2025-08-22

## 2025-08-22 PROBLEM — R33.9 URINARY RETENTION: Status: ACTIVE | Noted: 2025-08-22

## 2025-08-22 PROBLEM — J45.909 ASTHMATIC BRONCHITIS: Status: ACTIVE | Noted: 2025-08-22

## 2025-08-22 PROBLEM — I49.3 ASYMPTOMATIC PVCS: Status: ACTIVE | Noted: 2025-08-22

## 2025-08-22 PROBLEM — M51.369 DDD (DEGENERATIVE DISC DISEASE), LUMBAR: Status: ACTIVE | Noted: 2025-08-22

## 2025-08-22 PROBLEM — E65 ABDOMINAL PANNUS: Status: ACTIVE | Noted: 2025-08-22

## 2025-08-22 PROBLEM — G47.33 OSA (OBSTRUCTIVE SLEEP APNEA): Status: ACTIVE | Noted: 2025-08-22

## 2025-08-22 PROBLEM — R01.1 HEART MURMUR: Status: ACTIVE | Noted: 2025-08-22

## 2025-08-22 PROBLEM — D17.1 LIPOMA OF BACK: Status: ACTIVE | Noted: 2025-08-22

## 2025-08-22 PROBLEM — K40.90 INGUINAL HERNIA: Status: ACTIVE | Noted: 2025-08-22

## 2025-08-22 PROBLEM — E66.01 MORBID OBESITY WITH BMI OF 40.0-44.9, ADULT (HCC): Status: ACTIVE | Noted: 2023-07-17

## 2025-08-22 PROBLEM — Z96.643: Status: ACTIVE | Noted: 2025-08-22

## 2025-08-22 PROBLEM — N40.0 BPH (BENIGN PROSTATIC HYPERPLASIA): Status: ACTIVE | Noted: 2025-08-22

## 2025-08-22 PROBLEM — M19.90 ARTHRITIS: Status: ACTIVE | Noted: 2025-08-22

## 2025-08-22 PROBLEM — N50.89 SCROTAL SWELLING: Status: ACTIVE | Noted: 2025-08-22

## 2025-08-22 PROBLEM — Z90.49 HISTORY OF APPENDECTOMY: Status: ACTIVE | Noted: 2025-08-22

## 2025-08-22 PROBLEM — R60.0 PERIPHERAL EDEMA: Status: ACTIVE | Noted: 2025-08-22

## 2025-08-22 PROBLEM — Z87.19 S/P LEFT INGUINAL HERNIA REPAIR: Status: ACTIVE | Noted: 2025-08-22

## 2025-08-22 PROBLEM — Z98.84 HISTORY OF BARIATRIC SURGERY: Status: ACTIVE | Noted: 2025-08-22

## 2025-08-22 PROBLEM — B37.2 CANDIDAL DERMATITIS: Status: ACTIVE | Noted: 2025-08-22

## 2025-08-22 PROBLEM — H26.9 CATARACT: Status: ACTIVE | Noted: 2025-08-22

## 2025-08-22 PROBLEM — N20.0 KIDNEY STONE: Status: ACTIVE | Noted: 2025-08-22

## 2025-08-22 PROBLEM — R19.5 LOOSE BOWEL MOVEMENT: Status: ACTIVE | Noted: 2025-08-22

## 2025-08-22 PROBLEM — Z09 S/P UMBILICAL HERNIA REPAIR, FOLLOW-UP EXAM: Status: ACTIVE | Noted: 2025-08-22

## 2025-08-22 PROBLEM — I83.90 VARICOSE VEINS OF LOWER EXTREMITY: Status: ACTIVE | Noted: 2025-08-22

## 2025-08-22 PROBLEM — E78.00 HYPERCHOLESTEREMIA: Status: ACTIVE | Noted: 2025-08-22

## 2025-08-22 PROBLEM — R26.9 ABNORMAL GAIT: Status: ACTIVE | Noted: 2025-08-22

## 2025-08-22 RX ORDER — LISINOPRIL 10 MG/1
10 TABLET ORAL DAILY
Qty: 90 TABLET | Refills: 1 | Status: SHIPPED | OUTPATIENT
Start: 2025-08-22

## 2025-08-22 RX ORDER — METHENAMINE HIPPURATE 1000 MG/1
1 TABLET ORAL 2 TIMES DAILY WITH MEALS
COMMUNITY

## 2025-08-22 RX ORDER — ROSUVASTATIN CALCIUM 10 MG/1
10 TABLET, COATED ORAL DAILY
COMMUNITY

## 2025-08-22 RX ORDER — FUROSEMIDE 20 MG/1
20 TABLET ORAL DAILY PRN
COMMUNITY

## (undated) DEVICE — GUIDEWIRE AMPLATZ SS .038 180CM

## (undated) DEVICE — CHLORHEXIDINE 4PCT 4 OZ

## (undated) DEVICE — CATH FOLEY COUDE 16FR 5ML 2 WAY TIEMANN LUBRICATH

## (undated) DEVICE — CATH URETERAL 5FR X 70 CM FLEX TIP POLYUR BARD

## (undated) DEVICE — SPECIMEN CONTAINER STERILE PEEL PACK

## (undated) DEVICE — VARIABLE LENGTH INJECTION STENT SET
Type: IMPLANTABLE DEVICE | Site: URETER | Status: NON-FUNCTIONAL
Brand: CONTOUR VL™ INJECTION STENT SET

## (undated) DEVICE — GUIDEWIRE STRGHT TIP 0.035 IN  SOLO PLUS

## (undated) DEVICE — CATH FOLEY COUNCIL 16FR 5ML 2 WAY LUBRICATH

## (undated) DEVICE — BAG DRAINAGE URINARY W TOWER

## (undated) DEVICE — GLOVE SRG BIOGEL 8

## (undated) DEVICE — PREMIUM DRY TRAY LF: Brand: MEDLINE INDUSTRIES, INC.

## (undated) DEVICE — STERILE CYSTO PACK: Brand: CARDINAL HEALTH